# Patient Record
Sex: FEMALE | Race: WHITE | NOT HISPANIC OR LATINO | Employment: FULL TIME | URBAN - METROPOLITAN AREA
[De-identification: names, ages, dates, MRNs, and addresses within clinical notes are randomized per-mention and may not be internally consistent; named-entity substitution may affect disease eponyms.]

---

## 2017-10-27 ENCOUNTER — HOSPITAL ENCOUNTER (OUTPATIENT)
Dept: MAMMOGRAPHY | Facility: HOSPITAL | Age: 42
Discharge: HOME | End: 2017-10-27
Attending: OBSTETRICS & GYNECOLOGY

## 2017-10-27 DIAGNOSIS — Z12.31 VISIT FOR SCREENING MAMMOGRAM: ICD-10-CM

## 2021-05-25 ENCOUNTER — RECORDS - HEALTHEAST (OUTPATIENT)
Dept: ADMINISTRATIVE | Facility: CLINIC | Age: 46
End: 2021-05-25

## 2021-06-02 ENCOUNTER — RECORDS - HEALTHEAST (OUTPATIENT)
Dept: ADMINISTRATIVE | Facility: CLINIC | Age: 46
End: 2021-06-02

## 2022-04-26 ENCOUNTER — OFFICE VISIT (OUTPATIENT)
Dept: URGENT CARE | Facility: URGENT CARE | Age: 47
End: 2022-04-26
Payer: COMMERCIAL

## 2022-04-26 VITALS
DIASTOLIC BLOOD PRESSURE: 83 MMHG | OXYGEN SATURATION: 100 % | SYSTOLIC BLOOD PRESSURE: 144 MMHG | WEIGHT: 194.2 LBS | HEART RATE: 97 BPM | TEMPERATURE: 99.7 F

## 2022-04-26 DIAGNOSIS — R10.31 ABDOMINAL PAIN, RIGHT LOWER QUADRANT: Primary | ICD-10-CM

## 2022-04-26 PROCEDURE — 99204 OFFICE O/P NEW MOD 45 MIN: CPT | Performed by: STUDENT IN AN ORGANIZED HEALTH CARE EDUCATION/TRAINING PROGRAM

## 2022-04-27 NOTE — PROGRESS NOTES
"ASSESSMENT/PLAN:  (R10.31) Abdominal pain, right lower quadrant  (primary encounter diagnosis)  Comment: Concerned for appendicitis. Rodríguez score: 5 points even without labs. Supporting evidence includes poor appetite, pain migrating from periumbilical to RLQ, \"feeling warm\" though no fever, RLQ abdominal tenderness with +Rovsing sign, +Obturator sign. No peritoneal signs present on exam and patient nontoxic appearing. Less likely but also considered tubo-ovarian pathology vs colitis vs SBO vs nephrolithiasis vs biliary disease. We discussed concern for possible appendicitis vs other pathologies; recommended ED assessment where patient can have imaging and labs performed.  Plan:   -Patient sent to ED        Appropriate PPE worn.    Options for treatment and follow-up care were reviewed with the patient and/or guardian. Alicia Iniguez and/or guardian engaged in the decision making process and verbalized understanding of the options discussed and agreed with the final plan.    See [facility] for orders, medications, letters, patient instructions  This note was dictated with Codelearn.      Mayco Serra MD    SUBJECTIVE:  Alicia Iniguez is an 46 year old female who presents for abdominal pain.     Lower bilateral/perumbilical pain since last night that has since transitioned to RLQ primarily today.  Took some ibuprofen today which helped initially but then got worse later to where she was keeled over.   Denies n/v but endorses lack of appetite all day.  Denies true fever but endorses \"feeling warm\" all day.  Still has her appendix and gallbladder. States she had her left ovary and fallopian tube removed laparoscopically, right is still present.  LMP 3 weeks ago.  had vasectomy.  No hx of abdominal disease prior. Not on medications.    PMH:   has no past medical history on file.  There is no problem list on file for this patient.    Social History     Socioeconomic History     Marital status: "      Spouse name: None     Number of children: None     Years of education: None     Highest education level: None   Tobacco Use     Smoking status: Never Smoker     Smokeless tobacco: Never Used     Family History   Problem Relation Age of Onset     Breast Cancer Mother        ALLERGIES:  Patient has no known allergies.    No current outpatient medications on file.     No current facility-administered medications for this visit.         ROS:  ROS is done and is negative for general/constitutional, eye, ENT, Respiratory, cardiovascular, GI, , Skin, musculoskeletal except as noted elsewhere.  All other review of systems negative except as noted elsewhere.    OBJECTIVE:  BP (!) 144/83   Pulse 97   Temp 99.7  F (37.6  C) (Tympanic)   Wt 88.1 kg (194 lb 3.2 oz)   SpO2 100%   General: Sitting comfortably. No acute distress. Nontoxic appearing.  HEENT: Conjunctivae are clear without discharge or erythema. No scleral icterus.  Neck: No masses.  Respiratory: No respiratory distress.   Cardiac: Warm and well perfused. Brisk cap refill.  Abdominal: Abdomen nondistended. RLQ abdominal tenderness with (+)Rovsing sign, (+)Obturator sign. (-)Psoas sign. No peritoneal signs present such as rebound or guarding.  Extremities: Upper and lower extremities grossly normal.  Skin: Skin warm without rashes.  Neurological: Motor function is grossly normal. Normal gait. Normal mentation.  Psychiatric: Good insight and judgement.      RESULTS  No results found for any visits on 04/26/22.  No results found for this or any previous visit (from the past 48 hour(s)).

## 2022-12-19 ENCOUNTER — APPOINTMENT (OUTPATIENT)
Dept: URBAN - METROPOLITAN AREA CLINIC 259 | Age: 47
Setting detail: DERMATOLOGY
End: 2022-12-20

## 2022-12-19 VITALS — HEIGHT: 66 IN | WEIGHT: 170 LBS

## 2022-12-19 DIAGNOSIS — L71.0 PERIORAL DERMATITIS: ICD-10-CM

## 2022-12-19 PROCEDURE — OTHER MIPS QUALITY: OTHER

## 2022-12-19 PROCEDURE — OTHER PRESCRIPTION: OTHER

## 2022-12-19 PROCEDURE — OTHER COUNSELING: OTHER

## 2022-12-19 PROCEDURE — 99203 OFFICE O/P NEW LOW 30 MIN: CPT

## 2022-12-19 RX ORDER — MINOCYCLINE HYDROCHLORIDE 50 MG/1
CAPSULE ORAL
Qty: 180 | Refills: 0 | Status: ERX | COMMUNITY
Start: 2022-12-19

## 2022-12-19 ASSESSMENT — LOCATION ZONE DERM: LOCATION ZONE: FACE

## 2022-12-19 ASSESSMENT — LOCATION SIMPLE DESCRIPTION DERM: LOCATION SIMPLE: RIGHT CHEEK

## 2022-12-19 ASSESSMENT — LOCATION DETAILED DESCRIPTION DERM: LOCATION DETAILED: RIGHT CENTRAL BUCCAL CHEEK

## 2022-12-19 NOTE — HPI: RASH
What Type Of Note Output Would You Prefer (Optional)?: Standard Output
Is This A New Presentation, Or A Follow-Up?: Rash
Additional History: Rash comes and goes. Rash recently flared after the patient was sick. Aquaphor exacerbated the rash and hydrocortisone helps to clear the bumps.

## 2023-01-19 ENCOUNTER — TRANSFERRED RECORDS (OUTPATIENT)
Dept: MULTI SPECIALTY CLINIC | Facility: CLINIC | Age: 48
End: 2023-01-19

## 2023-01-19 LAB — PAP SMEAR - HIM PATIENT REPORTED: NORMAL

## 2023-01-25 ENCOUNTER — HOSPITAL ENCOUNTER (OUTPATIENT)
Dept: MAMMOGRAPHY | Facility: CLINIC | Age: 48
Discharge: HOME | End: 2023-01-25
Attending: OBSTETRICS & GYNECOLOGY
Payer: COMMERCIAL

## 2023-01-25 DIAGNOSIS — N63.0 BREAST MASS: ICD-10-CM

## 2023-01-25 PROCEDURE — 77062 BREAST TOMOSYNTHESIS BI: CPT

## 2023-01-25 PROCEDURE — 76642 ULTRASOUND BREAST LIMITED: CPT | Mod: RT

## 2023-01-25 NOTE — LETTER
Alicia Iniguez  [address] [facility] [address]            January 25, 2023  Date of Exam: 1/25/23    Dear Alicia:    Thank you for your recent visit.    Breast Imaging Result: Based on your recent breast imaging, you have a suspicious area that usually requires a biopsy, at which time a small tissue sample would be taken from your breast.      Breast Density: Your breast imaging shows that you have dense breast tissue. This means you have a slightly higher risk of getting breast cancer. It also means your breast imaging will be harder to read, but it doesn't mean that breast imaging isn't useful. In fact, yearly breast imaging is even more important for individuals at higher risk. Additional testing may be warranted depending on your overall risk.    If you have already made these arrangements, please disregard this letter.    A report of your breast imaging results was sent to: Suha Post    Your breast imaging will become part of your medical file here at [facility] for at least 10 years. You are responsible for informing any new health care team or breast imaging facility of the date and location of this examination.    We appreciate the opportunity to participate in your health care.    Sincerely,  Kayla Zendejas DO   [facility] [place]

## 2023-01-25 NOTE — PROGRESS NOTES
Radiologist, Dr Kayla Zendejas, recommends right breast and right axilla lymph node ultrasound guided biopsies, and consult appts with breast surgeon and medical oncologist.     RN scheduled pt at [facility], [facility], [address], [place], [place]. [phone].  Bx Appt: Monday 1/30/23, arrival at 1245pm for 1:00pm appt. Consult appt with Darin Brunner and Stiven on Thursday, 2/9/23, arriving at 0915am for 0930am and 1000am appts respectively.      RN reviewed the procedure with patient and gave patient written pre and post biopsy handouts to take home.     Pt verbalizes understanding of procedure and appt location, date, and time. Calls welcomed.    Loretta Montiel RN, BSN, CBCN  [facility]

## 2023-01-30 ENCOUNTER — ANCILLARY PROCEDURE (OUTPATIENT)
Dept: MAMMOGRAPHY | Facility: CLINIC | Age: 48
End: 2023-01-30
Attending: OBSTETRICS & GYNECOLOGY
Payer: COMMERCIAL

## 2023-01-30 DIAGNOSIS — N63.0 BREAST MASS: ICD-10-CM

## 2023-01-30 PROCEDURE — 999N000065 MA POST PROCEDURE RIGHT

## 2023-01-30 PROCEDURE — 19083 BX BREAST 1ST LESION US IMAG: CPT | Mod: RT

## 2023-01-30 PROCEDURE — 88305 TISSUE EXAM BY PATHOLOGIST: CPT | Mod: 26 | Performed by: PATHOLOGY

## 2023-01-30 PROCEDURE — 88342 IMHCHEM/IMCYTCHM 1ST ANTB: CPT | Mod: TC,59 | Performed by: OBSTETRICS & GYNECOLOGY

## 2023-01-30 PROCEDURE — 88342 IMHCHEM/IMCYTCHM 1ST ANTB: CPT | Mod: 26 | Performed by: PATHOLOGY

## 2023-01-30 PROCEDURE — 38505 NEEDLE BIOPSY LYMPH NODES: CPT | Mod: RT

## 2023-01-30 PROCEDURE — 88341 IMHCHEM/IMCYTCHM EA ADD ANTB: CPT | Mod: 26 | Performed by: PATHOLOGY

## 2023-01-30 PROCEDURE — 88377 M/PHMTRC ALYS ISHQUANT/SEMIQ: CPT | Performed by: OBSTETRICS & GYNECOLOGY

## 2023-01-30 PROCEDURE — 88377 M/PHMTRC ALYS ISHQUANT/SEMIQ: CPT | Mod: 26 | Performed by: MEDICAL GENETICS

## 2023-01-30 PROCEDURE — 88360 TUMOR IMMUNOHISTOCHEM/MANUAL: CPT | Mod: 26 | Performed by: PATHOLOGY

## 2023-01-30 PROCEDURE — 88342 IMHCHEM/IMCYTCHM 1ST ANTB: CPT | Mod: TC,XU,59 | Performed by: OBSTETRICS & GYNECOLOGY

## 2023-01-30 NOTE — PROGRESS NOTES
During post procedure mammogram, patient became light headed, pale, and said she felt faint.  Patient sat down in chair.  Tech retrieved RN.      Vital signs stable:  B/P: 108/75, P: 89, R: 16     Applied wet cold wash cloth to head.  Patient reports feeling better now, but was feeling severe pain in biopsy area under right axilla and felt flushed and hot while standing for mammogram.  Patient reports only food intake so far all day was a banana and coffee this morning.  Tech gave patient some juice.  Patient started to get more color in face and reports feeling better, patient was assisted with applying ice pack to biopsy site, inside bra and assisted to discharge.  Instructions reviewed and sent with patient.  Patient reports feeling stable and has returned to baseline.  Spouse is driving patient home.        Roxana Aragon, RN, BSN, PHN, CBCN  Breast Center Imaging Nurse Coordinator   [facility]  [address]  [place] [address]  [phone]

## 2023-02-01 ENCOUNTER — TELEPHONE (OUTPATIENT)
Dept: MAMMOGRAPHY | Facility: CLINIC | Age: 48
End: 2023-02-01
Payer: COMMERCIAL

## 2023-02-01 LAB
PATH REPORT.ADDENDUM SPEC: ABNORMAL
PATH REPORT.ADDENDUM SPEC: ABNORMAL
PATH REPORT.COMMENTS IMP SPEC: ABNORMAL
PATH REPORT.COMMENTS IMP SPEC: YES
PATH REPORT.COMMENTS IMP SPEC: YES
PATH REPORT.FINAL DX SPEC: ABNORMAL
PATH REPORT.FINAL DX SPEC: ABNORMAL
PATH REPORT.GROSS SPEC: ABNORMAL
PATH REPORT.GROSS SPEC: ABNORMAL
PATH REPORT.MICROSCOPIC SPEC OTHER STN: ABNORMAL
PATH REPORT.MICROSCOPIC SPEC OTHER STN: ABNORMAL
PATH REPORT.RELEVANT HX SPEC: ABNORMAL
PATH REPORT.RELEVANT HX SPEC: ABNORMAL
PHOTO IMAGE: ABNORMAL
PHOTO IMAGE: ABNORMAL

## 2023-02-01 NOTE — TELEPHONE ENCOUNTER
Pathology report was reviewed with our Breast Center Radiologist, Dr. Aguilera, who confirmed the recent breast imaging is concordant with the final surgical pathology results.    I phoned patient, confirmed her full name, date of birth, and notified patient of the following breast biopsy results:      Final Diagnosis   A) BREAST BIOPSY WITH NEOPLASTIC LESION:        -  BIOPSY TYPE: ULTRASOUND-GUIDED CORE BIOPSIES        -  BIOPSY SITE: RIGHT BREAST, 12:00, 1 CM FROM NIPPLE        -  HISTOLOGIC TYPE: INVASIVE DUCTAL CARCINOMA        -  HISTOLOGIC GRADE (JANUARY HISTOLOGIC SCORE)              -  TUBULAR DIFFERENTIATION SCORE 3              -  NUCLEAR PLEOMORPHISM SCORE 2              -  MITOTIC RATE SCORE 1              -  OVERALL GRADE 2 (TOTAL SCORE 6/9)        -  DUCTAL CARCINOMA IN SITU (DCIS): SOLITARY SMALL DUCT, SOLID PATTERN,                NUCLEAR GRADE 2, NO NECROSIS; NEGATIVE FOR EXTENSIVE                INTRADUCTAL COMPONENT (EIC)        -  TUMOR SIZE OR EXTENT: TUMOR OCCUPIES FULL LENGTH OF ALL 3 CORES        -  MICROCALCIFICATIONS: RARE, ASSOCIATED WITH TUMOR EPITHELIUM        -  SMALL-VESSEL LYMPHATIC INVASION: NOT IDENTIFIED        -  ADDITIONAL FINDINGS: NONE        -  ADDITIONAL STUDIES:              -  ESTROGEN RECEPTORS POSITIVE (90% OF TUMOR NUCLEI STAIN STRONGLY)              -  PROGESTERONE RECEPTORS POSITIVE (95% OF TUMOR NUCLEI STAIN STRONGLY)              -  HER2/CORTNEY RECEPTORS NEGATIVE (1+ MEMBRANE STAINING)       B) RIGHT AXILLARY LYMPH NODE, ULTRASOUND-GUIDED CORE BIOPSIES:        -  POSITIVE FOR METASTATIC CARCINOMA, CONSISTENT WITH METASTASIS FROM                CARCINOMA OF RIGHT BREAST        -  INDEFINITE FOR EXTRANODAL TUMOR EXTENSION, BECAUSE OF                SPECIMEN FRAGMENTATION        -  LARGEST METASTATIC DEPOSIT IS AT LEAST 5 MM IN GREATEST DIMENSION        -  ADDITIONAL STUDIES:              -  ESTROGEN RECEPTORS POSITIVE (95% OF TUMOR NUCLEI STAIN STRONGLY)               -  PROGESTERONE RECEPTORS POSITIVE (80% OF TUMOR NUCLEI STAIN STRONGLY)              -  HER2/CORTNEY RECEPTORS EQUIVOCAL FOR OVEREXPRESSION (2+ MEMBRANE STAINING)                                Electronically signed by Basilio Adames MD on 2/1/2023 at 11:14 AM       Patient is scheduled for the following consults:    Breast Surgeon Consult:  Dr. Brunner 2/9/23      Oncology Consult   Dr. Saleem 2/9/23     [facility]  [address], [address]   [place] [address]  [phone]    Patient denies any concerns at her biopsy site.  Questions were answered and pathology report is released to  [facility] for patient to view, and resources for [email or web address] discussed with patient and support provided. New diagnosis information and resource folder will be provided to patient at surgical consult.  She has my direct phone number if she has further questions.  Patient verbalized understanding and agrees with the plan of care.    Ordering provider has been notified of the results and plan.       Roxana Aragon RN, BSN, PHN, CBCN  Imaging Nurse Coordinator  [facility]  [phone]

## 2023-02-03 LAB — INTERPRETATION: NORMAL

## 2023-02-04 ENCOUNTER — HEALTH MAINTENANCE LETTER (OUTPATIENT)
Age: 48
End: 2023-02-04

## 2023-02-09 ENCOUNTER — OFFICE VISIT (OUTPATIENT)
Dept: SURGERY | Facility: CLINIC | Age: 48
End: 2023-02-09
Attending: OBSTETRICS & GYNECOLOGY
Payer: COMMERCIAL

## 2023-02-09 ENCOUNTER — OFFICE VISIT (OUTPATIENT)
Dept: ONCOLOGY | Facility: HOSPITAL | Age: 48
End: 2023-02-09
Attending: OBSTETRICS & GYNECOLOGY
Payer: COMMERCIAL

## 2023-02-09 VITALS
WEIGHT: 191 LBS | BODY MASS INDEX: 30.7 KG/M2 | RESPIRATION RATE: 16 BRPM | HEIGHT: 66 IN | DIASTOLIC BLOOD PRESSURE: 91 MMHG | HEART RATE: 96 BPM | SYSTOLIC BLOOD PRESSURE: 156 MMHG

## 2023-02-09 DIAGNOSIS — C50.011 MALIGNANT NEOPLASM INVOLVING BOTH NIPPLE AND AREOLA OF RIGHT BREAST IN FEMALE, ESTROGEN RECEPTOR POSITIVE (H): Primary | ICD-10-CM

## 2023-02-09 DIAGNOSIS — C50.811 MALIGNANT NEOPLASM OF OVERLAPPING SITES OF RIGHT BREAST IN FEMALE, ESTROGEN RECEPTOR POSITIVE (H): Primary | ICD-10-CM

## 2023-02-09 DIAGNOSIS — Z17.0 MALIGNANT NEOPLASM OF OVERLAPPING SITES OF RIGHT BREAST IN FEMALE, ESTROGEN RECEPTOR POSITIVE (H): Primary | ICD-10-CM

## 2023-02-09 DIAGNOSIS — Z17.0 MALIGNANT NEOPLASM INVOLVING BOTH NIPPLE AND AREOLA OF RIGHT BREAST IN FEMALE, ESTROGEN RECEPTOR POSITIVE (H): Primary | ICD-10-CM

## 2023-02-09 PROCEDURE — 99205 OFFICE O/P NEW HI 60 MIN: CPT | Performed by: SPECIALIST

## 2023-02-09 PROCEDURE — 99215 OFFICE O/P EST HI 40 MIN: CPT | Performed by: SPECIALIST

## 2023-02-09 PROCEDURE — 99204 OFFICE O/P NEW MOD 45 MIN: CPT | Performed by: INTERNAL MEDICINE

## 2023-02-09 RX ORDER — MINOCYCLINE HYDROCHLORIDE 50 MG/1
50 CAPSULE ORAL 2 TIMES DAILY
COMMUNITY
Start: 2022-12-19 | End: 2023-08-08

## 2023-02-09 NOTE — H&P (VIEW-ONLY)
"This is a 47 year old  female who I'm asked to see by Suha Post for evaluation of a right breast cancer.  This was picked up by the patient.  She is actually noticed some changes in her nipple for couple months and then noticed thickening in the tissue behind the nipple.  Recently after bumping her breast there was some bloody discharge from the nipple.  A mammogram and ultrasound were ordered.  A very large mass was noted on mammogram.  Also multiple suspicious lymph nodes.  A needle biopsy was done which shows an invasive ductal carcinoma.  It is estrogen receptor strongly positive , progesterone receptor strongly positive  and HER-2 negative.  A lymph node was also biopsied to be positive for metastatic disease.    She has a moderate family history of breast cancer.  Her mother  of breast cancer and she also has an uncle who had prostate cancer.      PAST MEDICAL HISTORY:  No past medical history on file.  Past Surgical History:   Procedure Laterality Date     BREAST CYST ASPIRATION         Medications:  No current outpatient medications on file.  No current facility-administered medications for this visit.    Facility-Administered Medications Ordered in Other Visits:      lidocaine 1 % 10 mL, 10 mL, Intradermal, Once, Suha Post MD     lidocaine 1 % 10 mL, 10 mL, Intradermal, Once, Suha Post MD    Allergies:  No Known Allergies     Social History:  Social History     Tobacco Use     Smoking status: Never     Smokeless tobacco: Never        ROS:  Pertinent items are noted in HPI.    Physical Exam  BP (!) 156/91 (BP Location: Left arm)   Pulse 96   Resp 16   Ht 1.676 m (5' 6\")   Wt 86.6 kg (191 lb)   BMI 30.83 kg/m    General:alert, appears stated age and cooperative  Lungs:clear to auscultation bilaterally  CV:regular rate and rhythm  Breasts: Large central mass of the right breast measuring at least 4 to 5 cm in size.  The nipple is clearly involved and there is erosion " of the skin of the nipple as the tumor is clearly starting to come through the skin.  No palpable masses on the left.  Lymph Nodes: Unable to appreciate the adenopathy on the right that is visible on ultrasound.  No palpable adenopathy on the left.  Neuro: No neurologic deficits are noted.  Musculoskeletal: Normal range of motion of her upper extremities.  Skin: Normal skin turgor.    Reviewed her mammograms and ultrasound and pathology.    Impression: Right Breast Cancer. Clinically T3, N1-2.  Discussed the typical surgical options for treatment of breast cancer which generally are a lumpectomy (partial mastectomy) combined with radiation versus a mastectomy.  Explained that the survival benefit is the same for both.  The difference is in local recurrence risk.  The patient is a Poor candidate for a lumpectomy.  I actually do not think she is a candidate for lumpectomy at all.  In addition because of how many lymph nodes are involved I think we should get a PET scan first to make sure she does not have metastatic disease before doing anything.  Explained to the patient that no matter what the PET shows, she needs systemic treatment first.  Hopefully it shows only local regional disease in which case both myself and Dr. Saleem feel she would benefit from neoadjuvant chemotherapy.  If the PET shows metastatic disease, then we will consider some sort of surgical treatment down the line if she has a good response.  Explained to her the concern about the possibility of metastatic disease given the size of the lesion and lymph node involvement.  Because of the patient's family history she also should get genetic testing done.  If and when it comes time for surgery, that could play a role.      Plan: A PET has been scheduled for Monday.  Results of that are going to guide her future treatment.  Initial treatment will be with oncology but I also talked her about the possibility of needing a port placed.  We will also get  genetic testing started.  That could impact the type of surgery recommend.  It also could have some effect on her chemotherapy recommended.

## 2023-02-09 NOTE — LETTER
"    2023         RE: Alicia Iniguez  [address] [facility] [address]        Dear Colleague,    Thank you for referring your patient, Alicia Iniguez, to the [facility] [place]. Please see a copy of my visit note below.    This is a 47 year old  female who I'm asked to see by Suha Post for evaluation of a right breast cancer.  This was picked up by the patient.  She is actually noticed some changes in her nipple for couple months and then noticed thickening in the tissue behind the nipple.  Recently after bumping her breast there was some bloody discharge from the nipple.  A mammogram and ultrasound were ordered.  A very large mass was noted on mammogram.  Also multiple suspicious lymph nodes.  A needle biopsy was done which shows an invasive ductal carcinoma.  It is estrogen receptor strongly positive , progesterone receptor strongly positive  and HER-2 negative.  A lymph node was also biopsied to be positive for metastatic disease.    She has a moderate family history of breast cancer.  Her mother  of breast cancer and she also has an uncle who had prostate cancer.      PAST MEDICAL HISTORY:  No past medical history on file.  Past Surgical History:   Procedure Laterality Date     BREAST CYST ASPIRATION         Medications:  No current outpatient medications on file.  No current facility-administered medications for this visit.    Facility-Administered Medications Ordered in Other Visits:      lidocaine 1 % 10 mL, 10 mL, Intradermal, Once, Suha Post MD     lidocaine 1 % 10 mL, 10 mL, Intradermal, Once, Suha Post MD    Allergies:  No Known Allergies     Social History:  Social History     Tobacco Use     Smoking status: Never     Smokeless tobacco: Never        ROS:  Pertinent items are noted in HPI.    Physical Exam  BP (!) 156/91 (BP Location: Left arm)   Pulse 96   Resp 16   Ht 1.676 m (5' 6\")   Wt 86.6 kg (191 lb)   BMI 30.83 kg/m  "   General:alert, appears stated age and cooperative  Lungs:clear to auscultation bilaterally  CV:regular rate and rhythm  Breasts: Large central mass of the right breast measuring at least 4 to 5 cm in size.  The nipple is clearly involved and there is erosion of the skin of the nipple as the tumor is clearly starting to come through the skin.  No palpable masses on the left.  Lymph Nodes: Unable to appreciate the adenopathy on the right that is visible on ultrasound.  No palpable adenopathy on the left.  Neuro: No neurologic deficits are noted.  Musculoskeletal: Normal range of motion of her upper extremities.  Skin: Normal skin turgor.    Reviewed her mammograms and ultrasound and pathology.    Impression: Right Breast Cancer. Clinically T3, N1-2.  Discussed the typical surgical options for treatment of breast cancer which generally are a lumpectomy (partial mastectomy) combined with radiation versus a mastectomy.  Explained that the survival benefit is the same for both.  The difference is in local recurrence risk.  The patient is a Poor candidate for a lumpectomy.  I actually do not think she is a candidate for lumpectomy at all.  In addition because of how many lymph nodes are involved I think we should get a PET scan first to make sure she does not have metastatic disease before doing anything.  Explained to the patient that no matter what the PET shows, she needs systemic treatment first.  Hopefully it shows only local regional disease in which case both myself and Dr. Saleem feel she would benefit from neoadjuvant chemotherapy.  If the PET shows metastatic disease, then we will consider some sort of surgical treatment down the line if she has a good response.  Explained to her the concern about the possibility of metastatic disease given the size of the lesion and lymph node involvement.  Because of the patient's family history she also should get genetic testing done.  If and when it comes time for  surgery, that could play a role.      Plan: A PET has been scheduled for Monday.  Results of that are going to guide her future treatment.  Initial treatment will be with oncology but I also talked her about the possibility of needing a port placed.  We will also get genetic testing started.  That could impact the type of surgery recommend.  It also could have some effect on her chemotherapy recommended.              Again, thank you for allowing me to participate in the care of your patient.        Sincerely,        Maribell Brunner MD

## 2023-02-09 NOTE — LETTER
2/9/2023         RE: Alicia Iniguez  [address] [facility] [address]        Dear Colleague,    Thank you for referring your patient, Alicia Iniguez, to the [facility]. Please see a copy of my visit note below.    [facility] Hematology and Oncology Consult Note    Patient: Alicia Iniguez  MRN: [id number]  Date of Service: 02/09/2023      Reason for Visit    No chief complaint on file.        Assessment/Plan    Problem List Items Addressed This Visit    None  Visit Diagnoses     Malignant neoplasm of overlapping sites of right breast in female, estrogen receptor positive (H)    -  Primary        Locally advanced right-sided breast cancer  Clinically T3, N1-2, ER positive, VT positive, HER2 negative breast cancer  I reviewed her mammogram, ultrasound and biopsy reports in detail today.  Biopsy-proven invasive ductal carcinoma strongly ER and VT positive.  HER2 negative.  Liver biopsy was also positive.  HER2 2+ on the liver biopsy and FISH negative.  Since that she already has a large breast mass with positive lymph nodes there is always concern for metastatic disease.  Would recommend getting a PET scan before proceeding with further treatment.  If no evidence of metastatic disease then would be a candidate for neoadjuvant chemotherapy followed by surgery.  Even if it does not change surgical outcome, I think she would benefit from neoadjuvant chemotherapy considering the extent of the disease.  Dr. Brunner is in agreement with the plan.  We will get PET scan going ASAP I will see her in the clinic for further discussion.  She is young and does not have any significant medical comorbidities so would be a candidate for AC-T.     ECOG Performance    0 - Independent    Problem List    There is no problem list on file for this patient.    ______________________________________________________________________________    Staging History     Cancer Staging   No matching staging  information was found for the patient.      History of presenting illness:  Alicia Iniguez is a 47-year-old female with significant past medical history who is seen in the multidisciplinary breast clinic for new diagnosis of pulm receptor positive and HER2 negative, lymph node positive breast cancer right side.    Recently she noticed a lump in her right breast along with some skin changes and nipple changes which has been going on for the last couple of months.  She has not had a screening mammogram since 2017.  Mammogram done on 1/25/2023 showed a large irregular high density mass in the central portion of the right breast with nipple retraction.  Some thickened lymph nodes were also seen in the right axilla on the mammogram.  Ultrasound showed a 4.1 x 2.1 cm mass in the right breast with indistinct margins.  She also had sonographic evidence of abnormal lymph nodes in the right axilla the largest measuring 1.4 x 1 x 0.9 cm.  Biopsy of the breast mass came back showing invasive ductal carcinoma.  Grade 2.  With background DCIS.  No evidence of LVI.  ER strongly positive, ND strongly positive and HER2 negative (1+).  Right axilla lymph node biopsy was also done which showed metastatic carcinoma consistent with breast primary.  It was also strongly hormone positive and HER2 was 2+.  FISH negative.  She has a family history of breast cancer in her mother.  Her maternal uncle also had prostate cancer.    No personal history of any cardiovascular disease or diabetes.  Never smoker.      Past History    No past medical history on file. Family History   Problem Relation Age of Onset     Breast Cancer Mother       Past Surgical History:   Procedure Laterality Date     BREAST CYST ASPIRATION      Social History     Socioeconomic History     Marital status:      Spouse name: Not on file     Number of children: Not on file     Years of education: Not on file     Highest education level: Not on file   Occupational  History     Not on file   Tobacco Use     Smoking status: Never     Smokeless tobacco: Never   Substance and Sexual Activity     Alcohol use: Not on file     Drug use: Not on file     Sexual activity: Not on file   Other Topics Concern     Not on file   Social History Narrative     Not on file     Social Determinants of Health     Financial Resource Strain: Not on file   Food Insecurity: Not on file   Transportation Needs: Not on file   Physical Activity: Not on file   Stress: Not on file   Social Connections: Not on file   Intimate Partner Violence: Not on file   Housing Stability: Not on file        Allergies    No Known Allergies    Review of Systems    Pertinent items are noted in HPI.      Physical Exam    Oncology Vitals 2/9/2023   Height 168 cm   Weight 86.637 kg   BSA (m2) 2.01 m2   /91   Pulse 96   Temp -   Temp src -   SpO2 -       General: alert and cooperative  HEENT: Head: Normal, normocephalic, atraumatic.  Eye: Normal external eye, conjunctiva, lids cornea, MARK.  CNS: Alert and oriented x3, neurologic exam grossly normal.        Lab Results    Recent Results (from the past 168 hour(s))   [employer] Hold For Preauthorization:   Result Value Ref Range    Interpretation       Sample processed in lab for DNA for genetic testing. Insurance preauthorization will be initiated. Contact lab with questions, [phone].           Imaging Results      MA Post Procedure Right    Addendum Date: 2/1/2023    Pathology shows DCIS and invasive ductal carcinoma. This is consistent with imaging findings. A verbal report was given to the patient. Surgical and oncological consultation is recommended.    Result Date: 2/1/2023  US Breast Biopsy Core Needle Right INDICATION: Right breast mass. PROCEDURE: Informed consent was obtained from the patient. Ultrasound was used to localize the mass at the 12 o'clock position of the right breast, 1 cm from the nipple.  The skin was marked, then prepped and draped in  a sterile fashion. 1% lidocaine was used for local anesthesia. Under direct sonographic guidance, a 14-gauge coaxial needle was used to obtain 3 core biopsies.  A biopsy marking clip was then placed.  Digital mammograms performed in a separate room, using separate equipment, to document clip placement. The mammogram showed the clip to be in good position. The patient tolerated this well; there are no immediate complications.    IMPRESSION: 1. Ultrasound-guided biopsy of mass in the right breast. Pathology pending. 2. Post procedure mammogram for marker placement     US Breast Biopsy Core Lymph Node Right    Addendum Date: 2/1/2023    Pathology shows POSITIVE FOR METASTATIC CARCINOMA, CONSISTENT WITH METASTASIS FROM               CARCINOMA OF RIGHT BREAST. This is consistent with imaging findings. A verbal report was given to the patient. Surgical and oncological consultation is recommended.    Result Date: 2/1/2023  RIGHT LYMPH NODE ULTRASOUND GUIDED BIOPSY, CLIP PLACEMENT: INDICATIONS: Abnormal Lymph Node PROCEDURE: Informed consent was obtained from the patient. Ultrasound was used to localize the lymph node in the right axilla.  The skin was prepped and draped in a sterile fashion. Lidocaine was used for local anesthesia. Under direct sonographic guidance, a 12 gauge coaxial needle was used to obtain 4 core biopises. Biopsy marker is not visualized on the post procedure mammogram, and is outside the field of view. The patient tolerated this well, there are no immediate complications.     IMPRESSION: Ultrasound guided biopsy of a suspicious lymph node in the right axilla. Pathology pending.     US Breast Right Limited 1-3 Quadrants    Result Date: 1/25/2023  EXAM: MA DIAGNOSTIC BILATERAL W/ LILIYA, US BREAST RIGHT LIMITED 1-3 QUADRANTS LOCATION: [facility] DATE/TIME: 1/25/2023 9:14 AM INDICATION: 47-year-old female presents with skin changes involving the right nipple for a few months. The  patient also reports an area of firmness deep to the right nipple and reports an episode of bleeding from the right nipple after it was bumped. COMPARISON: 10/27/2017, 7/20/2016, 4/26/2010 MAMMOGRAPHIC FINDINGS: Bilateral full-field digital diagnostic mammograms performed. The breasts are heterogeneously dense, which may obscure small masses. Images evaluated with the assistance of CAD. Breast tomosynthesis was used in interpretation. There is a large, irregular, high density mass with associated obscured margins and architectural distortion within the right central breast at middle depth. There is associated retraction of the right nipple. A few possibly thickened lymph nodes are also demonstrated within the right axilla. Recommend sonographic evaluation of the right central breast and right axilla for further evaluation. There are no new or suspicious masses, calcifications, or architectural distortion within the left breast. There is a benign-appearing, mammographically stable mass within the left breast at the 9:00 position. ULTRASOUND FINDINGS: Visual inspection of the right breast demonstrates erythema, scaling, and open sores involving the right nipple. The right nipple is also retracted. Physical examination of the right breast demonstrates large, firm mass measuring at least 5 cm immediately deep to the left nipple within the central breast. Sonographic evaluation of the right central breast was performed. There is a large, hypoechoic mass with indistinct margins and associated posterior shadowing located immediately deep to the right nipple. It is difficult to accurately measure this mass given its size and indistinct margins, however, measures at least 4.1 x 2.1 cm. This mass also extends superficially to involve the overlying nipple. Sonographic evaluation of the right axilla was performed demonstrating a few morphologically abnormal lymph nodes demonstrating loss of normal fatty rochelle and thickened  cortices. The largest visualized lymph node measures 1.4 x 1.0 x 0.9 cm.     IMPRESSION: 1.  Suspicious, irregular, hypoechoic mass with indistinct margins within the central right breast, immediately deep to the nipple measures at least 4.1 x 2.1 cm, although is likely larger given physical examination findings. This mass extends into and involves the overlying right nipple. Given the constellation of imaging and physical examination findings of a suspicious mass and the right nipple skin changes, Paget's disease of the breasts is suspected. Recommend ultrasound-guided biopsy of the mass immediately deep to the right nipple. Also recommend surgical and oncological consultation. 2.  Several morphologically abnormal right axillary lymph nodes. Recommend a single, representative ultrasound-guided biopsy of one of these abnormal lymph nodes. ACR BI-RADS Category 5: Highly Suggestive of Malignancy. I personally scanned and discussed the findings and recommendations with the patient at the conclusion of the examination.     US Breast Biopsy Core Needle Right    Addendum Date: 2/1/2023    Pathology shows DCIS and invasive ductal carcinoma. This is consistent with imaging findings. A verbal report was given to the patient. Surgical and oncological consultation is recommended.    Result Date: 2/1/2023  US Breast Biopsy Core Needle Right INDICATION: Right breast mass. PROCEDURE: Informed consent was obtained from the patient. Ultrasound was used to localize the mass at the 12 o'clock position of the right breast, 1 cm from the nipple.  The skin was marked, then prepped and draped in a sterile fashion. 1% lidocaine was used for local anesthesia. Under direct sonographic guidance, a 14-gauge coaxial needle was used to obtain 3 core biopsies.  A biopsy marking clip was then placed.  Digital mammograms performed in a separate room, using separate equipment, to document clip placement. The mammogram showed the clip to be in good  position. The patient tolerated this well; there are no immediate complications.    IMPRESSION: 1. Ultrasound-guided biopsy of mass in the right breast. Pathology pending. 2. Post procedure mammogram for marker placement     MA Diagnostic Bilateral w/Liliya    Result Date: 1/25/2023  EXAM: MA DIAGNOSTIC BILATERAL W/ LILIYA, US BREAST RIGHT LIMITED 1-3 QUADRANTS LOCATION: [facility] DATE/TIME: 1/25/2023 9:14 AM INDICATION: 47-year-old female presents with skin changes involving the right nipple for a few months. The patient also reports an area of firmness deep to the right nipple and reports an episode of bleeding from the right nipple after it was bumped. COMPARISON: 10/27/2017, 7/20/2016, 4/26/2010 MAMMOGRAPHIC FINDINGS: Bilateral full-field digital diagnostic mammograms performed. The breasts are heterogeneously dense, which may obscure small masses. Images evaluated with the assistance of CAD. Breast tomosynthesis was used in interpretation. There is a large, irregular, high density mass with associated obscured margins and architectural distortion within the right central breast at middle depth. There is associated retraction of the right nipple. A few possibly thickened lymph nodes are also demonstrated within the right axilla. Recommend sonographic evaluation of the right central breast and right axilla for further evaluation. There are no new or suspicious masses, calcifications, or architectural distortion within the left breast. There is a benign-appearing, mammographically stable mass within the left breast at the 9:00 position. ULTRASOUND FINDINGS: Visual inspection of the right breast demonstrates erythema, scaling, and open sores involving the right nipple. The right nipple is also retracted. Physical examination of the right breast demonstrates large, firm mass measuring at least 5 cm immediately deep to the left nipple within the central breast. Sonographic evaluation of the  right central breast was performed. There is a large, hypoechoic mass with indistinct margins and associated posterior shadowing located immediately deep to the right nipple. It is difficult to accurately measure this mass given its size and indistinct margins, however, measures at least 4.1 x 2.1 cm. This mass also extends superficially to involve the overlying nipple. Sonographic evaluation of the right axilla was performed demonstrating a few morphologically abnormal lymph nodes demonstrating loss of normal fatty rochelle and thickened cortices. The largest visualized lymph node measures 1.4 x 1.0 x 0.9 cm.     IMPRESSION: 1.  Suspicious, irregular, hypoechoic mass with indistinct margins within the central right breast, immediately deep to the nipple measures at least 4.1 x 2.1 cm, although is likely larger given physical examination findings. This mass extends into and involves the overlying right nipple. Given the constellation of imaging and physical examination findings of a suspicious mass and the right nipple skin changes, Paget's disease of the breasts is suspected. Recommend ultrasound-guided biopsy of the mass immediately deep to the right nipple. Also recommend surgical and oncological consultation. 2.  Several morphologically abnormal right axillary lymph nodes. Recommend a single, representative ultrasound-guided biopsy of one of these abnormal lymph nodes. ACR BI-RADS Category 5: Highly Suggestive of Malignancy. I personally scanned and discussed the findings and recommendations with the patient at the conclusion of the examination.       A total of 45 minutes was spent today on this visit including face to face conversation with the patient, EMR review (labs, imaging studies, pathology reports and outside records), counseling and care co-ordination and documentation.    Signed by: Paola Saleem MD        Again, thank you for allowing me to participate in the care of your patient.         Sincerely,        Paola Saleem MD

## 2023-02-09 NOTE — PROGRESS NOTES
[facility] Hematology and Oncology Consult Note    Patient: Alicia Iniguez  MRN: [id number]  Date of Service: 02/09/2023      Reason for Visit    No chief complaint on file.        Assessment/Plan    Problem List Items Addressed This Visit    None  Visit Diagnoses     Malignant neoplasm of overlapping sites of right breast in female, estrogen receptor positive (H)    -  Primary        Locally advanced right-sided breast cancer  Clinically T3, N1-2, ER positive, AK positive, HER2 negative breast cancer  I reviewed her mammogram, ultrasound and biopsy reports in detail today.  Biopsy-proven invasive ductal carcinoma strongly ER and AK positive.  HER2 negative.  Lymph node biopsy was also positive.  HER2 2+ on the lymph node and FISH negative.  Since that she already has a large breast mass with positive lymph nodes there is always concern for metastatic disease.  Would recommend getting a PET scan before proceeding with further treatment.  If no evidence of metastatic disease then would be a candidate for neoadjuvant chemotherapy followed by surgery.  Even if it does not change surgical outcome, I think she would benefit from neoadjuvant chemotherapy considering the extent of the disease.  Dr. Brunner is in agreement with the plan.  We will get PET scan going ASAP I will see her in the clinic for further discussion.  She is young and does not have any significant medical comorbidities so would be a candidate for AC-T.     ECOG Performance    0 - Independent    Problem List    There is no problem list on file for this patient.    ______________________________________________________________________________    Staging History     Cancer Staging   No matching staging information was found for the patient.      History of presenting illness:  Alicia Iniguez is a 47-year-old female with significant past medical history who is seen in the multidisciplinary breast clinic for new diagnosis of hormone receptor  positive and HER2 negative, lymph node positive breast cancer right side.    Recently she noticed a lump in her right breast along with some skin changes and nipple changes which has been going on for the last couple of months.  She has not had a screening mammogram since 2017.  Mammogram done on 1/25/2023 showed a large irregular high density mass in the central portion of the right breast with nipple retraction.  Some thickened lymph nodes were also seen in the right axilla on the mammogram.  Ultrasound showed a 4.1 x 2.1 cm mass in the right breast with indistinct margins.  She also had sonographic evidence of abnormal lymph nodes in the right axilla the largest measuring 1.4 x 1 x 0.9 cm.  Biopsy of the breast mass came back showing invasive ductal carcinoma.  Grade 2.  With background DCIS.  No evidence of LVI.  ER strongly positive, MS strongly positive and HER2 negative (1+).  Right axilla lymph node biopsy was also done which showed metastatic carcinoma consistent with breast primary.  It was also strongly hormone positive and HER2 was 2+.  FISH negative.  She has a family history of breast cancer in her mother.  Her maternal uncle also had prostate cancer.    No personal history of any cardiovascular disease or diabetes.  Never smoker.      Past History    No past medical history on file. Family History   Problem Relation Age of Onset     Breast Cancer Mother       Past Surgical History:   Procedure Laterality Date     BREAST CYST ASPIRATION      Social History     Socioeconomic History     Marital status:      Spouse name: Not on file     Number of children: Not on file     Years of education: Not on file     Highest education level: Not on file   Occupational History     Not on file   Tobacco Use     Smoking status: Never     Smokeless tobacco: Never   Substance and Sexual Activity     Alcohol use: Not on file     Drug use: Not on file     Sexual activity: Not on file   Other Topics Concern     Not  on file   Social History Narrative     Not on file     Social Determinants of Health     Financial Resource Strain: Not on file   Food Insecurity: Not on file   Transportation Needs: Not on file   Physical Activity: Not on file   Stress: Not on file   Social Connections: Not on file   Intimate Partner Violence: Not on file   Housing Stability: Not on file        Allergies    No Known Allergies    Review of Systems    Pertinent items are noted in HPI.      Physical Exam    Oncology Vitals 2/9/2023   Height 168 cm   Weight 86.637 kg   BSA (m2) 2.01 m2   /91   Pulse 96   Temp -   Temp src -   SpO2 -       General: alert and cooperative  HEENT: Head: Normal, normocephalic, atraumatic.  Eye: Normal external eye, conjunctiva, lids cornea, MARK.  CNS: Alert and oriented x3, neurologic exam grossly normal.        Lab Results    Recent Results (from the past 168 hour(s))   [employer] Hold For Preauthorization:   Result Value Ref Range    Interpretation       Sample processed in lab for DNA for genetic testing. Insurance preauthorization will be initiated. Contact lab with questions, [phone].           Imaging Results      MA Post Procedure Right    Addendum Date: 2/1/2023    Pathology shows DCIS and invasive ductal carcinoma. This is consistent with imaging findings. A verbal report was given to the patient. Surgical and oncological consultation is recommended.    Result Date: 2/1/2023  US Breast Biopsy Core Needle Right INDICATION: Right breast mass. PROCEDURE: Informed consent was obtained from the patient. Ultrasound was used to localize the mass at the 12 o'clock position of the right breast, 1 cm from the nipple.  The skin was marked, then prepped and draped in a sterile fashion. 1% lidocaine was used for local anesthesia. Under direct sonographic guidance, a 14-gauge coaxial needle was used to obtain 3 core biopsies.  A biopsy marking clip was then placed.  Digital mammograms performed in a separate  room, using separate equipment, to document clip placement. The mammogram showed the clip to be in good position. The patient tolerated this well; there are no immediate complications.    IMPRESSION: 1. Ultrasound-guided biopsy of mass in the right breast. Pathology pending. 2. Post procedure mammogram for marker placement     US Breast Biopsy Core Lymph Node Right    Addendum Date: 2/1/2023    Pathology shows POSITIVE FOR METASTATIC CARCINOMA, CONSISTENT WITH METASTASIS FROM               CARCINOMA OF RIGHT BREAST. This is consistent with imaging findings. A verbal report was given to the patient. Surgical and oncological consultation is recommended.    Result Date: 2/1/2023  RIGHT LYMPH NODE ULTRASOUND GUIDED BIOPSY, CLIP PLACEMENT: INDICATIONS: Abnormal Lymph Node PROCEDURE: Informed consent was obtained from the patient. Ultrasound was used to localize the lymph node in the right axilla.  The skin was prepped and draped in a sterile fashion. Lidocaine was used for local anesthesia. Under direct sonographic guidance, a 12 gauge coaxial needle was used to obtain 4 core biopises. Biopsy marker is not visualized on the post procedure mammogram, and is outside the field of view. The patient tolerated this well, there are no immediate complications.     IMPRESSION: Ultrasound guided biopsy of a suspicious lymph node in the right axilla. Pathology pending.     US Breast Right Limited 1-3 Quadrants    Result Date: 1/25/2023  EXAM: MA DIAGNOSTIC BILATERAL W/ LILIYA, US BREAST RIGHT LIMITED 1-3 QUADRANTS LOCATION: [facility] DATE/TIME: 1/25/2023 9:14 AM INDICATION: 47-year-old female presents with skin changes involving the right nipple for a few months. The patient also reports an area of firmness deep to the right nipple and reports an episode of bleeding from the right nipple after it was bumped. COMPARISON: 10/27/2017, 7/20/2016, 4/26/2010 MAMMOGRAPHIC FINDINGS: Bilateral full-field digital  diagnostic mammograms performed. The breasts are heterogeneously dense, which may obscure small masses. Images evaluated with the assistance of CAD. Breast tomosynthesis was used in interpretation. There is a large, irregular, high density mass with associated obscured margins and architectural distortion within the right central breast at middle depth. There is associated retraction of the right nipple. A few possibly thickened lymph nodes are also demonstrated within the right axilla. Recommend sonographic evaluation of the right central breast and right axilla for further evaluation. There are no new or suspicious masses, calcifications, or architectural distortion within the left breast. There is a benign-appearing, mammographically stable mass within the left breast at the 9:00 position. ULTRASOUND FINDINGS: Visual inspection of the right breast demonstrates erythema, scaling, and open sores involving the right nipple. The right nipple is also retracted. Physical examination of the right breast demonstrates large, firm mass measuring at least 5 cm immediately deep to the left nipple within the central breast. Sonographic evaluation of the right central breast was performed. There is a large, hypoechoic mass with indistinct margins and associated posterior shadowing located immediately deep to the right nipple. It is difficult to accurately measure this mass given its size and indistinct margins, however, measures at least 4.1 x 2.1 cm. This mass also extends superficially to involve the overlying nipple. Sonographic evaluation of the right axilla was performed demonstrating a few morphologically abnormal lymph nodes demonstrating loss of normal fatty rochelle and thickened cortices. The largest visualized lymph node measures 1.4 x 1.0 x 0.9 cm.     IMPRESSION: 1.  Suspicious, irregular, hypoechoic mass with indistinct margins within the central right breast, immediately deep to the nipple measures at least 4.1 x  2.1 cm, although is likely larger given physical examination findings. This mass extends into and involves the overlying right nipple. Given the constellation of imaging and physical examination findings of a suspicious mass and the right nipple skin changes, Paget's disease of the breasts is suspected. Recommend ultrasound-guided biopsy of the mass immediately deep to the right nipple. Also recommend surgical and oncological consultation. 2.  Several morphologically abnormal right axillary lymph nodes. Recommend a single, representative ultrasound-guided biopsy of one of these abnormal lymph nodes. ACR BI-RADS Category 5: Highly Suggestive of Malignancy. I personally scanned and discussed the findings and recommendations with the patient at the conclusion of the examination.     US Breast Biopsy Core Needle Right    Addendum Date: 2/1/2023    Pathology shows DCIS and invasive ductal carcinoma. This is consistent with imaging findings. A verbal report was given to the patient. Surgical and oncological consultation is recommended.    Result Date: 2/1/2023  US Breast Biopsy Core Needle Right INDICATION: Right breast mass. PROCEDURE: Informed consent was obtained from the patient. Ultrasound was used to localize the mass at the 12 o'clock position of the right breast, 1 cm from the nipple.  The skin was marked, then prepped and draped in a sterile fashion. 1% lidocaine was used for local anesthesia. Under direct sonographic guidance, a 14-gauge coaxial needle was used to obtain 3 core biopsies.  A biopsy marking clip was then placed.  Digital mammograms performed in a separate room, using separate equipment, to document clip placement. The mammogram showed the clip to be in good position. The patient tolerated this well; there are no immediate complications.    IMPRESSION: 1. Ultrasound-guided biopsy of mass in the right breast. Pathology pending. 2. Post procedure mammogram for marker placement     MA Diagnostic  Bilateral w/Liliya    Result Date: 1/25/2023  EXAM: MA DIAGNOSTIC BILATERAL W/ LILIYA, US BREAST RIGHT LIMITED 1-3 QUADRANTS LOCATION: [facility] DATE/TIME: 1/25/2023 9:14 AM INDICATION: 47-year-old female presents with skin changes involving the right nipple for a few months. The patient also reports an area of firmness deep to the right nipple and reports an episode of bleeding from the right nipple after it was bumped. COMPARISON: 10/27/2017, 7/20/2016, 4/26/2010 MAMMOGRAPHIC FINDINGS: Bilateral full-field digital diagnostic mammograms performed. The breasts are heterogeneously dense, which may obscure small masses. Images evaluated with the assistance of CAD. Breast tomosynthesis was used in interpretation. There is a large, irregular, high density mass with associated obscured margins and architectural distortion within the right central breast at middle depth. There is associated retraction of the right nipple. A few possibly thickened lymph nodes are also demonstrated within the right axilla. Recommend sonographic evaluation of the right central breast and right axilla for further evaluation. There are no new or suspicious masses, calcifications, or architectural distortion within the left breast. There is a benign-appearing, mammographically stable mass within the left breast at the 9:00 position. ULTRASOUND FINDINGS: Visual inspection of the right breast demonstrates erythema, scaling, and open sores involving the right nipple. The right nipple is also retracted. Physical examination of the right breast demonstrates large, firm mass measuring at least 5 cm immediately deep to the left nipple within the central breast. Sonographic evaluation of the right central breast was performed. There is a large, hypoechoic mass with indistinct margins and associated posterior shadowing located immediately deep to the right nipple. It is difficult to accurately measure this mass given its size and  indistinct margins, however, measures at least 4.1 x 2.1 cm. This mass also extends superficially to involve the overlying nipple. Sonographic evaluation of the right axilla was performed demonstrating a few morphologically abnormal lymph nodes demonstrating loss of normal fatty rochelle and thickened cortices. The largest visualized lymph node measures 1.4 x 1.0 x 0.9 cm.     IMPRESSION: 1.  Suspicious, irregular, hypoechoic mass with indistinct margins within the central right breast, immediately deep to the nipple measures at least 4.1 x 2.1 cm, although is likely larger given physical examination findings. This mass extends into and involves the overlying right nipple. Given the constellation of imaging and physical examination findings of a suspicious mass and the right nipple skin changes, Paget's disease of the breasts is suspected. Recommend ultrasound-guided biopsy of the mass immediately deep to the right nipple. Also recommend surgical and oncological consultation. 2.  Several morphologically abnormal right axillary lymph nodes. Recommend a single, representative ultrasound-guided biopsy of one of these abnormal lymph nodes. ACR BI-RADS Category 5: Highly Suggestive of Malignancy. I personally scanned and discussed the findings and recommendations with the patient at the conclusion of the examination.       A total of 45 minutes was spent today on this visit including face to face conversation with the patient, EMR review (labs, imaging studies, pathology reports and outside records), counseling and care co-ordination and documentation.    Signed by: Paola Saleem MD

## 2023-02-09 NOTE — NURSING NOTE
"Alicia presents to [facility] today for a surgical consult with Dr. Brunner for surgical consult and Dr. Saleem for Medical Oncology consult in the Multi disciplinary clinic regarding her newly diagnosed right breast cancer.  She is accompanied by her  and sister for consult.  RN assessment and EMR update. BP (!) 156/91 (BP Location: Left arm)   Pulse 96   Resp 16   Ht 1.676 m (5' 6\")   Wt 86.6 kg (191 lb)   BMI 30.83 kg/m    Patient given a Breast Cancer Packet, contents reviewed.  She met with both Dr. Brunner and Dr. Saleem see dictations for details of visit. She will plan to have a PET CT on Monday, 2-13-23.  Pre PET prep reviewed with her as outlined on AVS.    Support provided, invited calls.  RN time 25 mins.  "

## 2023-02-10 ENCOUNTER — TELEPHONE (OUTPATIENT)
Dept: SURGERY | Facility: CLINIC | Age: 48
End: 2023-02-10
Payer: COMMERCIAL

## 2023-02-10 DIAGNOSIS — Z17.0 MALIGNANT NEOPLASM INVOLVING BOTH NIPPLE AND AREOLA OF RIGHT BREAST IN FEMALE, ESTROGEN RECEPTOR POSITIVE (H): Primary | ICD-10-CM

## 2023-02-10 DIAGNOSIS — C50.011 MALIGNANT NEOPLASM INVOLVING BOTH NIPPLE AND AREOLA OF RIGHT BREAST IN FEMALE, ESTROGEN RECEPTOR POSITIVE (H): Primary | ICD-10-CM

## 2023-02-10 NOTE — TELEPHONE ENCOUNTER
Alicia is returning a missed call from Dr Brunner. She has questions about the message that she left about the scan on Monday. She is available for a call back at [phone].

## 2023-02-13 ENCOUNTER — HOSPITAL ENCOUNTER (OUTPATIENT)
Dept: CT IMAGING | Facility: HOSPITAL | Age: 48
Discharge: HOME | End: 2023-02-13
Attending: SPECIALIST | Admitting: SPECIALIST
Payer: COMMERCIAL

## 2023-02-13 ENCOUNTER — LAB (OUTPATIENT)
Dept: LAB | Facility: CLINIC | Age: 48
End: 2023-02-13
Payer: COMMERCIAL

## 2023-02-13 ENCOUNTER — DOCUMENTATION ONLY (OUTPATIENT)
Dept: SURGERY | Facility: CLINIC | Age: 48
End: 2023-02-13

## 2023-02-13 DIAGNOSIS — Z17.0 MALIGNANT NEOPLASM INVOLVING BOTH NIPPLE AND AREOLA OF RIGHT BREAST IN FEMALE, ESTROGEN RECEPTOR POSITIVE (H): ICD-10-CM

## 2023-02-13 DIAGNOSIS — C50.011 MALIGNANT NEOPLASM INVOLVING BOTH NIPPLE AND AREOLA OF RIGHT BREAST IN FEMALE, ESTROGEN RECEPTOR POSITIVE (H): ICD-10-CM

## 2023-02-13 DIAGNOSIS — Z17.0 MALIGNANT NEOPLASM INVOLVING BOTH NIPPLE AND AREOLA OF RIGHT BREAST IN FEMALE, ESTROGEN RECEPTOR POSITIVE (H): Primary | ICD-10-CM

## 2023-02-13 DIAGNOSIS — C50.011 MALIGNANT NEOPLASM INVOLVING BOTH NIPPLE AND AREOLA OF RIGHT BREAST IN FEMALE, ESTROGEN RECEPTOR POSITIVE (H): Primary | ICD-10-CM

## 2023-02-13 LAB
INTERPRETATION: NORMAL
LAB PDF RESULT: NORMAL
SIGNIFICANT RESULTS: NORMAL
SPECIMEN DESCRIPTION: NORMAL
TEST DETAILS, MDL: NORMAL

## 2023-02-13 PROCEDURE — 250N000011 HC RX IP 250 OP 636: Performed by: SPECIALIST

## 2023-02-13 PROCEDURE — 74177 CT ABD & PELVIS W/CONTRAST: CPT

## 2023-02-13 PROCEDURE — 36415 COLL VENOUS BLD VENIPUNCTURE: CPT

## 2023-02-13 RX ORDER — IOPAMIDOL 755 MG/ML
100 INJECTION, SOLUTION INTRAVASCULAR ONCE
Status: COMPLETED | OUTPATIENT
Start: 2023-02-13 | End: 2023-02-13

## 2023-02-13 RX ADMIN — IOPAMIDOL 100 ML: 755 INJECTION, SOLUTION INTRAVENOUS at 12:21

## 2023-02-13 NOTE — PROGRESS NOTES
Patient came up to [facility] today to sign consent for genetic testing.  Reviewed consent with patient, signed.  She will go to lab to have her blood drawn today.  Told her to expect a call from scheduling to set up an appointment with a genetic counselor.  Support provided, invited calls.     PAST SURGICAL HISTORY:  H/O colonoscopy     H/O endoscopy     S/P arteriovenous (AV) fistula creation (2019)    S/P dilation and curettage (2017)    S/P laparoscopic cholecystectomy     S/P ovarian cystectomy (Age 29)

## 2023-02-14 ENCOUNTER — TELEPHONE (OUTPATIENT)
Dept: SURGERY | Facility: CLINIC | Age: 48
End: 2023-02-14
Payer: COMMERCIAL

## 2023-02-14 DIAGNOSIS — C50.011 MALIGNANT NEOPLASM INVOLVING BOTH NIPPLE AND AREOLA OF RIGHT BREAST IN FEMALE, ESTROGEN RECEPTOR POSITIVE (H): Primary | ICD-10-CM

## 2023-02-14 DIAGNOSIS — Z17.0 MALIGNANT NEOPLASM INVOLVING BOTH NIPPLE AND AREOLA OF RIGHT BREAST IN FEMALE, ESTROGEN RECEPTOR POSITIVE (H): Primary | ICD-10-CM

## 2023-02-14 LAB — INTERPRETATION: NORMAL

## 2023-02-14 NOTE — TELEPHONE ENCOUNTER
Called Alicia to help her schedule a bone scan, ordered by Dr. Brunner.  Scheduled her for Friday, 2-17-23 at 0700/1000 at [facility]'s location.  Reviewed prep with patient.  Told her I would reach out to Dr. Saleem's nurse, Gisella, to see if he would want to reschedule her follow up appointment with him so he can include the bone scan results into his treatment plan.  She states understanding. Support provided, invited calls.

## 2023-02-17 ENCOUNTER — ONCOLOGY VISIT (OUTPATIENT)
Dept: ONCOLOGY | Facility: HOSPITAL | Age: 48
End: 2023-02-17
Attending: INTERNAL MEDICINE
Payer: COMMERCIAL

## 2023-02-17 ENCOUNTER — HOSPITAL ENCOUNTER (OUTPATIENT)
Dept: NUCLEAR MEDICINE | Facility: HOSPITAL | Age: 48
Discharge: HOME | End: 2023-02-17
Attending: SPECIALIST
Payer: COMMERCIAL

## 2023-02-17 VITALS
BODY MASS INDEX: 30.02 KG/M2 | RESPIRATION RATE: 14 BRPM | HEIGHT: 66 IN | TEMPERATURE: 97.9 F | OXYGEN SATURATION: 100 % | WEIGHT: 186.8 LBS | SYSTOLIC BLOOD PRESSURE: 147 MMHG | DIASTOLIC BLOOD PRESSURE: 84 MMHG | HEART RATE: 100 BPM

## 2023-02-17 DIAGNOSIS — C50.011 MALIGNANT NEOPLASM INVOLVING BOTH NIPPLE AND AREOLA OF RIGHT BREAST IN FEMALE, ESTROGEN RECEPTOR POSITIVE (H): Primary | ICD-10-CM

## 2023-02-17 DIAGNOSIS — Z17.0 MALIGNANT NEOPLASM INVOLVING BOTH NIPPLE AND AREOLA OF RIGHT BREAST IN FEMALE, ESTROGEN RECEPTOR POSITIVE (H): ICD-10-CM

## 2023-02-17 DIAGNOSIS — C50.811 MALIGNANT NEOPLASM OF OVERLAPPING SITES OF RIGHT BREAST IN FEMALE, ESTROGEN RECEPTOR POSITIVE (H): ICD-10-CM

## 2023-02-17 DIAGNOSIS — Z17.0 MALIGNANT NEOPLASM OF OVERLAPPING SITES OF RIGHT BREAST IN FEMALE, ESTROGEN RECEPTOR POSITIVE (H): ICD-10-CM

## 2023-02-17 DIAGNOSIS — Z17.0 MALIGNANT NEOPLASM INVOLVING BOTH NIPPLE AND AREOLA OF RIGHT BREAST IN FEMALE, ESTROGEN RECEPTOR POSITIVE (H): Primary | ICD-10-CM

## 2023-02-17 DIAGNOSIS — C50.011 MALIGNANT NEOPLASM INVOLVING BOTH NIPPLE AND AREOLA OF RIGHT BREAST IN FEMALE, ESTROGEN RECEPTOR POSITIVE (H): ICD-10-CM

## 2023-02-17 DIAGNOSIS — I42.7 DRUG-INDUCED CARDIOMYOPATHY (H): Primary | ICD-10-CM

## 2023-02-17 PROCEDURE — 99215 OFFICE O/P EST HI 40 MIN: CPT | Performed by: INTERNAL MEDICINE

## 2023-02-17 PROCEDURE — 78306 BONE IMAGING WHOLE BODY: CPT

## 2023-02-17 PROCEDURE — A9503 TC99M MEDRONATE: HCPCS | Performed by: SPECIALIST

## 2023-02-17 PROCEDURE — 99213 OFFICE O/P EST LOW 20 MIN: CPT | Mod: 25 | Performed by: INTERNAL MEDICINE

## 2023-02-17 PROCEDURE — 343N000001 HC RX 343: Performed by: SPECIALIST

## 2023-02-17 RX ORDER — TC 99M MEDRONATE 20 MG/10ML
23-27 INJECTION, POWDER, LYOPHILIZED, FOR SOLUTION INTRAVENOUS ONCE
Status: COMPLETED | OUTPATIENT
Start: 2023-02-17 | End: 2023-02-17

## 2023-02-17 RX ORDER — ACETAMINOPHEN 500 MG
500-1000 TABLET ORAL PRN
COMMUNITY
End: 2023-11-13

## 2023-02-17 RX ADMIN — TC 99M MEDRONATE 25.9 MILLICURIE: 20 INJECTION, POWDER, LYOPHILIZED, FOR SOLUTION INTRAVENOUS at 07:18

## 2023-02-17 ASSESSMENT — PAIN SCALES - GENERAL: PAINLEVEL: NO PAIN (0)

## 2023-02-17 NOTE — PROGRESS NOTES
"Oncology Rooming Note    February 17, 2023 11:54 AM   Alicia Iniguez is a 47 year old female who presents for:    Chief Complaint   Patient presents with     Oncology Clinic Visit     Return visit to review prior PET and discuss treatment planning related to Malignant neoplasm of overlapping sites of right breast in female, estrogen receptor positive     Initial Vitals: BP (!) 147/84 (BP Location: Left arm, Patient Position: Sitting, Cuff Size: Adult Regular)   Pulse 100   Temp 97.9  F (36.6  C) (Tympanic)   Resp 14   Ht 1.676 m (5' 6\")   Wt 84.7 kg (186 lb 12.8 oz)   SpO2 100%   BMI 30.15 kg/m   Estimated body mass index is 30.15 kg/m  as calculated from the following:    Height as of this encounter: 1.676 m (5' 6\").    Weight as of this encounter: 84.7 kg (186 lb 12.8 oz). Body surface area is 1.99 meters squared.  No Pain (0) Comment: Data Unavailable   No LMP recorded.  Allergies reviewed: Yes  Medications reviewed: Yes    Medications: Medication refills not needed today.  Pharmacy [facility] entered into [facility]: [id number] [address] OF [facility] [place]    Clinical concerns:       DIANA QUINN CMA            "

## 2023-02-17 NOTE — LETTER
"    2/17/2023         RE: Alicia Iniguez  [address] [facility] [address]        Dear Colleague,    Thank you for referring your patient, Alicia Iniguez, to the [facility] [place]. Please see a copy of my visit note below.    Oncology Rooming Note    February 17, 2023 11:54 AM   Alicia Iniguez is a 47 year old female who presents for:    Chief Complaint   Patient presents with     Oncology Clinic Visit     Return visit to review prior PET and discuss treatment planning related to Malignant neoplasm of overlapping sites of right breast in female, estrogen receptor positive     Initial Vitals: BP (!) 147/84 (BP Location: Left arm, Patient Position: Sitting, Cuff Size: Adult Regular)   Pulse 100   Temp 97.9  F (36.6  C) (Tympanic)   Resp 14   Ht 1.676 m (5' 6\")   Wt 84.7 kg (186 lb 12.8 oz)   SpO2 100%   BMI 30.15 kg/m   Estimated body mass index is 30.15 kg/m  as calculated from the following:    Height as of this encounter: 1.676 m (5' 6\").    Weight as of this encounter: 84.7 kg (186 lb 12.8 oz). Body surface area is 1.99 meters squared.  No Pain (0) Comment: Data Unavailable   No LMP recorded.  Allergies reviewed: Yes  Medications reviewed: Yes    Medications: Medication refills not needed today.  Pharmacy name entered into MICMALI: [id number] [address]    Clinical concerns:       DIANA QUINN CMA              [facility] Hematology and Oncology Progress Note    Patient: Alicia Iniguez  MRN: [id number]  Date of Service: 02/17/2023        Reason for Visit    Chief Complaint   Patient presents with     Oncology Clinic Visit     Return visit to review prior PET and discuss treatment planning related to Malignant neoplasm of overlapping sites of right breast in female, estrogen receptor positive         Problem List Items Addressed This Visit        Other    Malignant neoplasm of overlapping sites " of right breast in female, estrogen receptor positive (H)    Relevant Orders    Echocardiogram Complete   Other Visit Diagnoses     Drug-induced cardiomyopathy (H)    -  Primary    Relevant Orders    Echocardiogram Complete            Assessment and Plan  Locally advanced right-sided breast cancer, hormone receptor positive and HER2 negative  I reviewed her CT scan and bone scan images and report in detail today.  Fortunately no evidence of any metastatic disease.  She has significant right axillary lymphadenopathy which has been biopsy-proven to be metastatic breast cancer.  Clinically T3 N2 M0 disease.  She is already met with surgery and previously the plan was to proceed with neoadjuvant chemotherapy followed by surgery.  Likely she will need mastectomy.  Even though neoadjuvant chemotherapy for her will not make any difference in terms of what kind of surgery she gets, I think it will definitely improve surgical outcome.  She is happy to hear that there is no evidence of any metastatic disease.    In this setting I reviewed potential neoadjuvant chemotherapy options.  Considering her age and the high risk disease I would recommend neoadjuvant dose dense AC for 4 cycles followed by weekly Taxol for 12 weeks.  Reviewed the rationale behind this.  Reviewed the potential side effects and complications associated with treatment.  She will need an echocardiogram.    She is agreeable to the plan and has consented to the treatment.  We will schedule chemo in the coming week or so.  She will need a port placement which will be placed by surgery.  She lives close to [place] so plan is to schedule chemotherapy in [place] [facility].  I will follow along.     Cancer Staging   No matching staging information was found for the patient.    ECOG Performance    0 - Independent         Problem List    Patient Active Problem List   Diagnosis     Malignant neoplasm of overlapping sites of right breast in female, estrogen  receptor positive (H)     Malignant neoplasm involving both nipple and areola of right breast in female, estrogen receptor positive (H)     Adverse effect of antineoplastic and immunosuppressive drugs, sequela          Interval History   Alicia Iniguez is a 47 year old female with locally advanced hormone receptor positive HER2 negative breast cancer of the right side who is here to discuss neoadjuvant chemotherapy.    Previously I saw her in the multidisciplinary clinic for a new diagnosis of right-sided breast cancer.  She had a large right-sided breast mass with nipple retraction and right axillary lymphadenopathy.  Biopsy of both the breast mass and lymph node were positive for invasive ductal carcinoma.  Strongly ER and IN positive and HER2 negative.  Due to the size of the tumor we had recommended considering neoadjuvant chemotherapy.  However we also recommended getting systemic staging to look for any distant mets.  We had ordered a PET scan but it was denied by the insurance.  So she got a CT chest abdomen pelvis with contrast and a bone scan.  She is here to review the results and make further follow-up plans.  Denies any new issues since last visit.  He does notice a little bit of a clear discharge from her right nipple with some skin denudation in that area.        Review of Systems  A comprehensive review of systems was negative except for what is noted in the interval history    Current Outpatient Medications   Medication     acetaminophen (TYLENOL) 500 MG tablet     minocycline (MINOCIN) 50 MG capsule     Probiotic Product (PROBIOTIC PO)     HYDROcodone-acetaminophen (NORCO) 5-325 MG tablet     No current facility-administered medications for this visit.     Facility-Administered Medications Ordered in Other Visits   Medication     ceFAZolin (ANCEF) 2 g in 100 mL D5W intermittent infusion     HYDROcodone-acetaminophen (NORCO) 5-325 MG per tablet 1 tablet     ibuprofen (ADVIL/MOTRIN) tablet 600 mg      lactated ringers infusion     lidocaine (LMX4) kit     lidocaine 1 % 0.1-1 mL     lidocaine 1 % 10 mL     lidocaine 1 % 10 mL     oxyCODONE IR (ROXICODONE) tablet 10 mg     sodium chloride (PF) 0.9% PF flush 3 mL     sodium chloride (PF) 0.9% PF flush 3 mL        Physical Exam    No flowsheet data found.    General: alert and cooperative  HEENT: Head: Normal, normocephalic, atraumatic.  Eye: Normal external eye, conjunctiva, lids cornea, MARK.  Chest: Clear to auscultation bilaterally  Cardiac: S1, S2 normal, regular rate and rhythm  Abdomen: abdomen is soft without significant tenderness, masses, organomegaly or guarding  Extremities: atraumatic, no peripheral edema  Breast: Large right-sided breast mass with nipple retraction seen.  There is skin denudation on the right nipple with scant serous discharge.  Clinically palpable right axillary lymph nodes.  Firm and possibly matted.  N2: Clinical exam.  CNS: Alert and oriented x3, neurologic exam grossly normal.  Lymphatics: No bilateral cervical, axillary, supraclavicular or inguinal adenopathy noted    Lab Results    No results found for this or any previous visit (from the past 168 hour(s)).    Imaging    CT Chest/Abdomen/Pelvis w Contrast    Result Date: 2/13/2023  EXAM: CT CHEST/ABDOMEN/PELVIS W CONTRAST LOCATION: [facility] DATE/TIME: 2/13/2023 12:22 PM INDICATION: Pt with advanced Right Breast Cancer. High risk for metastatic disease. Insurance company would not offer a PET as initial evaluation. COMPARISON: 04/27/2022 CT abdomen and pelvis. TECHNIQUE: CT scan of the chest, abdomen, and pelvis was performed following injection of IV contrast. Multiplanar reformats were obtained. Dose reduction techniques were used. CONTRAST: 100ml IV Isovue 370. FINDINGS: LUNGS AND PLEURA: Normal. MEDIASTINUM/AXILLAE: There is a lobulated hyperdense mass in the central right breast involving the nipple with prominent but subcentimeter mildly  hyperdense right axillary lymph nodes largest measuring 7 mm in short axis dimension. CORONARY ARTERY CALCIFICATION: None. HEPATOBILIARY: Tiny presumed cyst in the left hepatic lobe. PANCREAS: Normal. SPLEEN: Normal. ADRENAL GLANDS: Normal. KIDNEYS/BLADDER: Small nonobstructing stone lower pole left kidney. No ureteric stone or hydronephrosis. BOWEL: Normal. LYMPH NODES: Normal. VASCULATURE: Unremarkable. PELVIC ORGANS: Normal. MUSCULOSKELETAL: Normal.     IMPRESSION: 1.  Lobulated hyperdense mass in the central right breast extending to the nipple where there is some retraction. There are subcentimeter but mildly hyperdense right axillary lymph nodes having the same attenuation as the primary breast mass. Largest node measures 7 mm in short axis dimension. 2.  Tiny presumed hepatic cyst.    MA Post Procedure Right    Addendum Date: 2/1/2023    Pathology shows DCIS and invasive ductal carcinoma. This is consistent with imaging findings. A verbal report was given to the patient. Surgical and oncological consultation is recommended.    Result Date: 2/1/2023  US Breast Biopsy Core Needle Right INDICATION: Right breast mass. PROCEDURE: Informed consent was obtained from the patient. Ultrasound was used to localize the mass at the 12 o'clock position of the right breast, 1 cm from the nipple.  The skin was marked, then prepped and draped in a sterile fashion. 1% lidocaine was used for local anesthesia. Under direct sonographic guidance, a 14-gauge coaxial needle was used to obtain 3 core biopsies.  A biopsy marking clip was then placed.  Digital mammograms performed in a separate room, using separate equipment, to document clip placement. The mammogram showed the clip to be in good position. The patient tolerated this well; there are no immediate complications.    IMPRESSION: 1. Ultrasound-guided biopsy of mass in the right breast. Pathology pending. 2. Post procedure mammogram for marker placement     US Breast Biopsy  Core Lymph Node Right    Addendum Date: 2/1/2023    Pathology shows POSITIVE FOR METASTATIC CARCINOMA, CONSISTENT WITH METASTASIS FROM               CARCINOMA OF RIGHT BREAST. This is consistent with imaging findings. A verbal report was given to the patient. Surgical and oncological consultation is recommended.    Result Date: 2/1/2023  RIGHT LYMPH NODE ULTRASOUND GUIDED BIOPSY, CLIP PLACEMENT: INDICATIONS: Abnormal Lymph Node PROCEDURE: Informed consent was obtained from the patient. Ultrasound was used to localize the lymph node in the right axilla.  The skin was prepped and draped in a sterile fashion. Lidocaine was used for local anesthesia. Under direct sonographic guidance, a 12 gauge coaxial needle was used to obtain 4 core biopises. Biopsy marker is not visualized on the post procedure mammogram, and is outside the field of view. The patient tolerated this well, there are no immediate complications.     IMPRESSION: Ultrasound guided biopsy of a suspicious lymph node in the right axilla. Pathology pending.     US Breast Right Limited 1-3 Quadrants    Result Date: 1/25/2023  EXAM: MA DIAGNOSTIC BILATERAL W/ LILIYA, US BREAST RIGHT LIMITED 1-3 QUADRANTS LOCATION: [facility] DATE/TIME: 1/25/2023 9:14 AM INDICATION: 47-year-old female presents with skin changes involving the right nipple for a few months. The patient also reports an area of firmness deep to the right nipple and reports an episode of bleeding from the right nipple after it was bumped. COMPARISON: 10/27/2017, 7/20/2016, 4/26/2010 MAMMOGRAPHIC FINDINGS: Bilateral full-field digital diagnostic mammograms performed. The breasts are heterogeneously dense, which may obscure small masses. Images evaluated with the assistance of CAD. Breast tomosynthesis was used in interpretation. There is a large, irregular, high density mass with associated obscured margins and architectural distortion within the right central breast at middle  depth. There is associated retraction of the right nipple. A few possibly thickened lymph nodes are also demonstrated within the right axilla. Recommend sonographic evaluation of the right central breast and right axilla for further evaluation. There are no new or suspicious masses, calcifications, or architectural distortion within the left breast. There is a benign-appearing, mammographically stable mass within the left breast at the 9:00 position. ULTRASOUND FINDINGS: Visual inspection of the right breast demonstrates erythema, scaling, and open sores involving the right nipple. The right nipple is also retracted. Physical examination of the right breast demonstrates large, firm mass measuring at least 5 cm immediately deep to the left nipple within the central breast. Sonographic evaluation of the right central breast was performed. There is a large, hypoechoic mass with indistinct margins and associated posterior shadowing located immediately deep to the right nipple. It is difficult to accurately measure this mass given its size and indistinct margins, however, measures at least 4.1 x 2.1 cm. This mass also extends superficially to involve the overlying nipple. Sonographic evaluation of the right axilla was performed demonstrating a few morphologically abnormal lymph nodes demonstrating loss of normal fatty rochelle and thickened cortices. The largest visualized lymph node measures 1.4 x 1.0 x 0.9 cm.     IMPRESSION: 1.  Suspicious, irregular, hypoechoic mass with indistinct margins within the central right breast, immediately deep to the nipple measures at least 4.1 x 2.1 cm, although is likely larger given physical examination findings. This mass extends into and involves the overlying right nipple. Given the constellation of imaging and physical examination findings of a suspicious mass and the right nipple skin changes, Paget's disease of the breasts is suspected. Recommend ultrasound-guided biopsy of the mass  immediately deep to the right nipple. Also recommend surgical and oncological consultation. 2.  Several morphologically abnormal right axillary lymph nodes. Recommend a single, representative ultrasound-guided biopsy of one of these abnormal lymph nodes. ACR BI-RADS Category 5: Highly Suggestive of Malignancy. I personally scanned and discussed the findings and recommendations with the patient at the conclusion of the examination.     US Breast Biopsy Core Needle Right    Addendum Date: 2/1/2023    Pathology shows DCIS and invasive ductal carcinoma. This is consistent with imaging findings. A verbal report was given to the patient. Surgical and oncological consultation is recommended.    Result Date: 2/1/2023  US Breast Biopsy Core Needle Right INDICATION: Right breast mass. PROCEDURE: Informed consent was obtained from the patient. Ultrasound was used to localize the mass at the 12 o'clock position of the right breast, 1 cm from the nipple.  The skin was marked, then prepped and draped in a sterile fashion. 1% lidocaine was used for local anesthesia. Under direct sonographic guidance, a 14-gauge coaxial needle was used to obtain 3 core biopsies.  A biopsy marking clip was then placed.  Digital mammograms performed in a separate room, using separate equipment, to document clip placement. The mammogram showed the clip to be in good position. The patient tolerated this well; there are no immediate complications.    IMPRESSION: 1. Ultrasound-guided biopsy of mass in the right breast. Pathology pending. 2. Post procedure mammogram for marker placement     MA Diagnostic Bilateral w/Liliya    Result Date: 1/25/2023  EXAM: MA DIAGNOSTIC BILATERAL W/ LILIYA, US BREAST RIGHT LIMITED 1-3 QUADRANTS LOCATION: [facility] DATE/TIME: 1/25/2023 9:14 AM INDICATION: 47-year-old female presents with skin changes involving the right nipple for a few months. The patient also reports an area of firmness deep to the  right nipple and reports an episode of bleeding from the right nipple after it was bumped. COMPARISON: 10/27/2017, 7/20/2016, 4/26/2010 MAMMOGRAPHIC FINDINGS: Bilateral full-field digital diagnostic mammograms performed. The breasts are heterogeneously dense, which may obscure small masses. Images evaluated with the assistance of CAD. Breast tomosynthesis was used in interpretation. There is a large, irregular, high density mass with associated obscured margins and architectural distortion within the right central breast at middle depth. There is associated retraction of the right nipple. A few possibly thickened lymph nodes are also demonstrated within the right axilla. Recommend sonographic evaluation of the right central breast and right axilla for further evaluation. There are no new or suspicious masses, calcifications, or architectural distortion within the left breast. There is a benign-appearing, mammographically stable mass within the left breast at the 9:00 position. ULTRASOUND FINDINGS: Visual inspection of the right breast demonstrates erythema, scaling, and open sores involving the right nipple. The right nipple is also retracted. Physical examination of the right breast demonstrates large, firm mass measuring at least 5 cm immediately deep to the left nipple within the central breast. Sonographic evaluation of the right central breast was performed. There is a large, hypoechoic mass with indistinct margins and associated posterior shadowing located immediately deep to the right nipple. It is difficult to accurately measure this mass given its size and indistinct margins, however, measures at least 4.1 x 2.1 cm. This mass also extends superficially to involve the overlying nipple. Sonographic evaluation of the right axilla was performed demonstrating a few morphologically abnormal lymph nodes demonstrating loss of normal fatty rochelle and thickened cortices. The largest visualized lymph node measures 1.4 x  1.0 x 0.9 cm.     IMPRESSION: 1.  Suspicious, irregular, hypoechoic mass with indistinct margins within the central right breast, immediately deep to the nipple measures at least 4.1 x 2.1 cm, although is likely larger given physical examination findings. This mass extends into and involves the overlying right nipple. Given the constellation of imaging and physical examination findings of a suspicious mass and the right nipple skin changes, Paget's disease of the breasts is suspected. Recommend ultrasound-guided biopsy of the mass immediately deep to the right nipple. Also recommend surgical and oncological consultation. 2.  Several morphologically abnormal right axillary lymph nodes. Recommend a single, representative ultrasound-guided biopsy of one of these abnormal lymph nodes. ACR BI-RADS Category 5: Highly Suggestive of Malignancy. I personally scanned and discussed the findings and recommendations with the patient at the conclusion of the examination.     NM Bone Scan Whole Body    Result Date: 2/17/2023  EXAM: NM BONE SCAN WHOLE BODY LOCATION: [facility] DATE/TIME: 2/17/2023 10:32 AM INDICATION: Breast cancer COMPARISON: CT the chest and pelvis dated 02/13/2023 TECHNIQUE: 25.9 mCi technetium-99m MDP, IV. Anterior and posterior delayed whole-body images at 3 hours with additional spot images of the skull. FINDINGS: Radiotracer uptake in a pattern typical of degenerative change involving the shoulders and spine. Focal radiotracer uptake in the mid anterior left tibia likely representing sequelae of prior trauma or stress related process. No suspicious areas of altered uptake to suggest osseous metastasis.     IMPRESSION: No convincing scintigraphic evidence of osseous metastasis.    Complex patient.  E5 visit.    Signed by: Paola Saleem MD        Again, thank you for allowing me to participate in the care of your patient.        Sincerely,        Paola Saleem MD

## 2023-02-17 NOTE — PROGRESS NOTES
[facility] Hematology and Oncology Progress Note    Patient: Alicia Iniguez  MRN: [id number]  Date of Service: 02/17/2023        Reason for Visit    Chief Complaint   Patient presents with     Oncology Clinic Visit     Return visit to review prior PET and discuss treatment planning related to Malignant neoplasm of overlapping sites of right breast in female, estrogen receptor positive         Problem List Items Addressed This Visit        Other    Malignant neoplasm of overlapping sites of right breast in female, estrogen receptor positive (H)    Relevant Orders    Echocardiogram Complete   Other Visit Diagnoses     Drug-induced cardiomyopathy (H)    -  Primary    Relevant Orders    Echocardiogram Complete            Assessment and Plan  Locally advanced right-sided breast cancer, hormone receptor positive and HER2 negative  I reviewed her CT scan and bone scan images and report in detail today.  Fortunately no evidence of any metastatic disease.  She has significant right axillary lymphadenopathy which has been biopsy-proven to be metastatic breast cancer.  Clinically T3 N2 M0 disease.  She is already met with surgery and previously the plan was to proceed with neoadjuvant chemotherapy followed by surgery.  Likely she will need mastectomy.  Even though neoadjuvant chemotherapy for her will not make any difference in terms of what kind of surgery she gets, I think it will definitely improve surgical outcome.  She is happy to hear that there is no evidence of any metastatic disease.    In this setting I reviewed potential neoadjuvant chemotherapy options.  Considering her age and the high risk disease I would recommend neoadjuvant dose dense AC for 4 cycles followed by weekly Taxol for 12 weeks.  Reviewed the rationale behind this.  Reviewed the potential side effects and complications associated with treatment.  She will need an echocardiogram.    She is agreeable to the plan and has consented to the  treatment.  We will schedule chemo in the coming week or so.  She will need a port placement which will be placed by surgery.  She lives close to [place] so plan is to schedule chemotherapy in [place] [facility].  I will follow along.     Cancer Staging   No matching staging information was found for the patient.    ECOG Performance    0 - Independent         Problem List    Patient Active Problem List   Diagnosis     Malignant neoplasm of overlapping sites of right breast in female, estrogen receptor positive (H)     Malignant neoplasm involving both nipple and areola of right breast in female, estrogen receptor positive (H)     Adverse effect of antineoplastic and immunosuppressive drugs, sequela          Interval History   Alicia Iniguez is a 47 year old female with locally advanced hormone receptor positive HER2 negative breast cancer of the right side who is here to discuss neoadjuvant chemotherapy.    Previously I saw her in the multidisciplinary clinic for a new diagnosis of right-sided breast cancer.  She had a large right-sided breast mass with nipple retraction and right axillary lymphadenopathy.  Biopsy of both the breast mass and lymph node were positive for invasive ductal carcinoma.  Strongly ER and NE positive and HER2 negative.  Due to the size of the tumor we had recommended considering neoadjuvant chemotherapy.  However we also recommended getting systemic staging to look for any distant mets.  We had ordered a PET scan but it was denied by the insurance.  So she got a CT chest abdomen pelvis with contrast and a bone scan.  She is here to review the results and make further follow-up plans.  Denies any new issues since last visit.  He does notice a little bit of a clear discharge from her right nipple with some skin denudation in that area.        Review of Systems  A comprehensive review of systems was negative except for what is noted in the interval history    Current Outpatient Medications    Medication     acetaminophen (TYLENOL) 500 MG tablet     minocycline (MINOCIN) 50 MG capsule     Probiotic Product (PROBIOTIC PO)     HYDROcodone-acetaminophen (NORCO) 5-325 MG tablet     No current facility-administered medications for this visit.     Facility-Administered Medications Ordered in Other Visits   Medication     ceFAZolin (ANCEF) 2 g in 100 mL D5W intermittent infusion     HYDROcodone-acetaminophen (NORCO) 5-325 MG per tablet 1 tablet     ibuprofen (ADVIL/MOTRIN) tablet 600 mg     lactated ringers infusion     lidocaine (LMX4) kit     lidocaine 1 % 0.1-1 mL     lidocaine 1 % 10 mL     lidocaine 1 % 10 mL     oxyCODONE IR (ROXICODONE) tablet 10 mg     sodium chloride (PF) 0.9% PF flush 3 mL     sodium chloride (PF) 0.9% PF flush 3 mL        Physical Exam    No flowsheet data found.    General: alert and cooperative  HEENT: Head: Normal, normocephalic, atraumatic.  Eye: Normal external eye, conjunctiva, lids cornea, MARK.  Chest: Clear to auscultation bilaterally  Cardiac: S1, S2 normal, regular rate and rhythm  Abdomen: abdomen is soft without significant tenderness, masses, organomegaly or guarding  Extremities: atraumatic, no peripheral edema  Breast: Large right-sided breast mass with nipple retraction seen.  There is skin denudation on the right nipple with scant serous discharge.  Clinically palpable right axillary lymph nodes.  Firm and possibly matted.  N2: Clinical exam.  CNS: Alert and oriented x3, neurologic exam grossly normal.  Lymphatics: No bilateral cervical, axillary, supraclavicular or inguinal adenopathy noted    Lab Results    No results found for this or any previous visit (from the past 168 hour(s)).    Imaging    CT Chest/Abdomen/Pelvis w Contrast    Result Date: 2/13/2023  EXAM: CT CHEST/ABDOMEN/PELVIS W CONTRAST LOCATION: [facility] DATE/TIME: 2/13/2023 12:22 PM INDICATION: Pt with advanced Right Breast Cancer. High risk for metastatic disease.  Insurance company would not offer a PET as initial evaluation. COMPARISON: 04/27/2022 CT abdomen and pelvis. TECHNIQUE: CT scan of the chest, abdomen, and pelvis was performed following injection of IV contrast. Multiplanar reformats were obtained. Dose reduction techniques were used. CONTRAST: 100ml IV Isovue 370. FINDINGS: LUNGS AND PLEURA: Normal. MEDIASTINUM/AXILLAE: There is a lobulated hyperdense mass in the central right breast involving the nipple with prominent but subcentimeter mildly hyperdense right axillary lymph nodes largest measuring 7 mm in short axis dimension. CORONARY ARTERY CALCIFICATION: None. HEPATOBILIARY: Tiny presumed cyst in the left hepatic lobe. PANCREAS: Normal. SPLEEN: Normal. ADRENAL GLANDS: Normal. KIDNEYS/BLADDER: Small nonobstructing stone lower pole left kidney. No ureteric stone or hydronephrosis. BOWEL: Normal. LYMPH NODES: Normal. VASCULATURE: Unremarkable. PELVIC ORGANS: Normal. MUSCULOSKELETAL: Normal.     IMPRESSION: 1.  Lobulated hyperdense mass in the central right breast extending to the nipple where there is some retraction. There are subcentimeter but mildly hyperdense right axillary lymph nodes having the same attenuation as the primary breast mass. Largest node measures 7 mm in short axis dimension. 2.  Tiny presumed hepatic cyst.    MA Post Procedure Right    Addendum Date: 2/1/2023    Pathology shows DCIS and invasive ductal carcinoma. This is consistent with imaging findings. A verbal report was given to the patient. Surgical and oncological consultation is recommended.    Result Date: 2/1/2023  US Breast Biopsy Core Needle Right INDICATION: Right breast mass. PROCEDURE: Informed consent was obtained from the patient. Ultrasound was used to localize the mass at the 12 o'clock position of the right breast, 1 cm from the nipple.  The skin was marked, then prepped and draped in a sterile fashion. 1% lidocaine was used for local anesthesia. Under direct sonographic  guidance, a 14-gauge coaxial needle was used to obtain 3 core biopsies.  A biopsy marking clip was then placed.  Digital mammograms performed in a separate room, using separate equipment, to document clip placement. The mammogram showed the clip to be in good position. The patient tolerated this well; there are no immediate complications.    IMPRESSION: 1. Ultrasound-guided biopsy of mass in the right breast. Pathology pending. 2. Post procedure mammogram for marker placement     US Breast Biopsy Core Lymph Node Right    Addendum Date: 2/1/2023    Pathology shows POSITIVE FOR METASTATIC CARCINOMA, CONSISTENT WITH METASTASIS FROM               CARCINOMA OF RIGHT BREAST. This is consistent with imaging findings. A verbal report was given to the patient. Surgical and oncological consultation is recommended.    Result Date: 2/1/2023  RIGHT LYMPH NODE ULTRASOUND GUIDED BIOPSY, CLIP PLACEMENT: INDICATIONS: Abnormal Lymph Node PROCEDURE: Informed consent was obtained from the patient. Ultrasound was used to localize the lymph node in the right axilla.  The skin was prepped and draped in a sterile fashion. Lidocaine was used for local anesthesia. Under direct sonographic guidance, a 12 gauge coaxial needle was used to obtain 4 core biopises. Biopsy marker is not visualized on the post procedure mammogram, and is outside the field of view. The patient tolerated this well, there are no immediate complications.     IMPRESSION: Ultrasound guided biopsy of a suspicious lymph node in the right axilla. Pathology pending.     US Breast Right Limited 1-3 Quadrants    Result Date: 1/25/2023  EXAM: MA DIAGNOSTIC BILATERAL W/ LILIYA, US BREAST RIGHT LIMITED 1-3 QUADRANTS LOCATION: [facility] DATE/TIME: 1/25/2023 9:14 AM INDICATION: 47-year-old female presents with skin changes involving the right nipple for a few months. The patient also reports an area of firmness deep to the right nipple and reports an episode  of bleeding from the right nipple after it was bumped. COMPARISON: 10/27/2017, 7/20/2016, 4/26/2010 MAMMOGRAPHIC FINDINGS: Bilateral full-field digital diagnostic mammograms performed. The breasts are heterogeneously dense, which may obscure small masses. Images evaluated with the assistance of CAD. Breast tomosynthesis was used in interpretation. There is a large, irregular, high density mass with associated obscured margins and architectural distortion within the right central breast at middle depth. There is associated retraction of the right nipple. A few possibly thickened lymph nodes are also demonstrated within the right axilla. Recommend sonographic evaluation of the right central breast and right axilla for further evaluation. There are no new or suspicious masses, calcifications, or architectural distortion within the left breast. There is a benign-appearing, mammographically stable mass within the left breast at the 9:00 position. ULTRASOUND FINDINGS: Visual inspection of the right breast demonstrates erythema, scaling, and open sores involving the right nipple. The right nipple is also retracted. Physical examination of the right breast demonstrates large, firm mass measuring at least 5 cm immediately deep to the left nipple within the central breast. Sonographic evaluation of the right central breast was performed. There is a large, hypoechoic mass with indistinct margins and associated posterior shadowing located immediately deep to the right nipple. It is difficult to accurately measure this mass given its size and indistinct margins, however, measures at least 4.1 x 2.1 cm. This mass also extends superficially to involve the overlying nipple. Sonographic evaluation of the right axilla was performed demonstrating a few morphologically abnormal lymph nodes demonstrating loss of normal fatty rochelle and thickened cortices. The largest visualized lymph node measures 1.4 x 1.0 x 0.9 cm.     IMPRESSION: 1.   Suspicious, irregular, hypoechoic mass with indistinct margins within the central right breast, immediately deep to the nipple measures at least 4.1 x 2.1 cm, although is likely larger given physical examination findings. This mass extends into and involves the overlying right nipple. Given the constellation of imaging and physical examination findings of a suspicious mass and the right nipple skin changes, Paget's disease of the breasts is suspected. Recommend ultrasound-guided biopsy of the mass immediately deep to the right nipple. Also recommend surgical and oncological consultation. 2.  Several morphologically abnormal right axillary lymph nodes. Recommend a single, representative ultrasound-guided biopsy of one of these abnormal lymph nodes. ACR BI-RADS Category 5: Highly Suggestive of Malignancy. I personally scanned and discussed the findings and recommendations with the patient at the conclusion of the examination.     US Breast Biopsy Core Needle Right    Addendum Date: 2/1/2023    Pathology shows DCIS and invasive ductal carcinoma. This is consistent with imaging findings. A verbal report was given to the patient. Surgical and oncological consultation is recommended.    Result Date: 2/1/2023  US Breast Biopsy Core Needle Right INDICATION: Right breast mass. PROCEDURE: Informed consent was obtained from the patient. Ultrasound was used to localize the mass at the 12 o'clock position of the right breast, 1 cm from the nipple.  The skin was marked, then prepped and draped in a sterile fashion. 1% lidocaine was used for local anesthesia. Under direct sonographic guidance, a 14-gauge coaxial needle was used to obtain 3 core biopsies.  A biopsy marking clip was then placed.  Digital mammograms performed in a separate room, using separate equipment, to document clip placement. The mammogram showed the clip to be in good position. The patient tolerated this well; there are no immediate  complications.    IMPRESSION: 1. Ultrasound-guided biopsy of mass in the right breast. Pathology pending. 2. Post procedure mammogram for marker placement     MA Diagnostic Bilateral w/Liliya    Result Date: 1/25/2023  EXAM: MA DIAGNOSTIC BILATERAL W/ LILIYA, US BREAST RIGHT LIMITED 1-3 QUADRANTS LOCATION: [facility] DATE/TIME: 1/25/2023 9:14 AM INDICATION: 47-year-old female presents with skin changes involving the right nipple for a few months. The patient also reports an area of firmness deep to the right nipple and reports an episode of bleeding from the right nipple after it was bumped. COMPARISON: 10/27/2017, 7/20/2016, 4/26/2010 MAMMOGRAPHIC FINDINGS: Bilateral full-field digital diagnostic mammograms performed. The breasts are heterogeneously dense, which may obscure small masses. Images evaluated with the assistance of CAD. Breast tomosynthesis was used in interpretation. There is a large, irregular, high density mass with associated obscured margins and architectural distortion within the right central breast at middle depth. There is associated retraction of the right nipple. A few possibly thickened lymph nodes are also demonstrated within the right axilla. Recommend sonographic evaluation of the right central breast and right axilla for further evaluation. There are no new or suspicious masses, calcifications, or architectural distortion within the left breast. There is a benign-appearing, mammographically stable mass within the left breast at the 9:00 position. ULTRASOUND FINDINGS: Visual inspection of the right breast demonstrates erythema, scaling, and open sores involving the right nipple. The right nipple is also retracted. Physical examination of the right breast demonstrates large, firm mass measuring at least 5 cm immediately deep to the left nipple within the central breast. Sonographic evaluation of the right central breast was performed. There is a large, hypoechoic mass  with indistinct margins and associated posterior shadowing located immediately deep to the right nipple. It is difficult to accurately measure this mass given its size and indistinct margins, however, measures at least 4.1 x 2.1 cm. This mass also extends superficially to involve the overlying nipple. Sonographic evaluation of the right axilla was performed demonstrating a few morphologically abnormal lymph nodes demonstrating loss of normal fatty rochelle and thickened cortices. The largest visualized lymph node measures 1.4 x 1.0 x 0.9 cm.     IMPRESSION: 1.  Suspicious, irregular, hypoechoic mass with indistinct margins within the central right breast, immediately deep to the nipple measures at least 4.1 x 2.1 cm, although is likely larger given physical examination findings. This mass extends into and involves the overlying right nipple. Given the constellation of imaging and physical examination findings of a suspicious mass and the right nipple skin changes, Paget's disease of the breasts is suspected. Recommend ultrasound-guided biopsy of the mass immediately deep to the right nipple. Also recommend surgical and oncological consultation. 2.  Several morphologically abnormal right axillary lymph nodes. Recommend a single, representative ultrasound-guided biopsy of one of these abnormal lymph nodes. ACR BI-RADS Category 5: Highly Suggestive of Malignancy. I personally scanned and discussed the findings and recommendations with the patient at the conclusion of the examination.     NM Bone Scan Whole Body    Result Date: 2/17/2023  EXAM: NM BONE SCAN WHOLE BODY LOCATION: [facility] DATE/TIME: 2/17/2023 10:32 AM INDICATION: Breast cancer COMPARISON: CT the chest and pelvis dated 02/13/2023 TECHNIQUE: 25.9 mCi technetium-99m MDP, IV. Anterior and posterior delayed whole-body images at 3 hours with additional spot images of the skull. FINDINGS: Radiotracer uptake in a pattern typical of  degenerative change involving the shoulders and spine. Focal radiotracer uptake in the mid anterior left tibia likely representing sequelae of prior trauma or stress related process. No suspicious areas of altered uptake to suggest osseous metastasis.     IMPRESSION: No convincing scintigraphic evidence of osseous metastasis.    Complex patient.  E5 visit.    Signed by: Paola Saleem MD

## 2023-02-21 ENCOUNTER — ANESTHESIA EVENT (OUTPATIENT)
Dept: SURGERY | Facility: AMBULATORY SURGERY CENTER | Age: 48
End: 2023-02-21
Payer: COMMERCIAL

## 2023-02-21 DIAGNOSIS — T45.1X5S ADVERSE EFFECT OF ANTINEOPLASTIC AND IMMUNOSUPPRESSIVE DRUGS, SEQUELA: ICD-10-CM

## 2023-02-22 ENCOUNTER — HOSPITAL ENCOUNTER (OUTPATIENT)
Facility: AMBULATORY SURGERY CENTER | Age: 48
Discharge: HOME | End: 2023-02-22
Attending: SPECIALIST
Payer: COMMERCIAL

## 2023-02-22 ENCOUNTER — ANESTHESIA (OUTPATIENT)
Dept: SURGERY | Facility: AMBULATORY SURGERY CENTER | Age: 48
End: 2023-02-22
Payer: COMMERCIAL

## 2023-02-22 ENCOUNTER — PATIENT OUTREACH (OUTPATIENT)
Dept: ONCOLOGY | Facility: HOSPITAL | Age: 48
End: 2023-02-22

## 2023-02-22 VITALS
HEART RATE: 71 BPM | HEIGHT: 66 IN | RESPIRATION RATE: 16 BRPM | DIASTOLIC BLOOD PRESSURE: 80 MMHG | WEIGHT: 180 LBS | SYSTOLIC BLOOD PRESSURE: 134 MMHG | BODY MASS INDEX: 28.93 KG/M2 | OXYGEN SATURATION: 99 % | TEMPERATURE: 96.9 F

## 2023-02-22 DIAGNOSIS — Z17.0 MALIGNANT NEOPLASM INVOLVING BOTH NIPPLE AND AREOLA OF RIGHT BREAST IN FEMALE, ESTROGEN RECEPTOR POSITIVE (H): ICD-10-CM

## 2023-02-22 DIAGNOSIS — C50.011 MALIGNANT NEOPLASM INVOLVING BOTH NIPPLE AND AREOLA OF RIGHT BREAST IN FEMALE, ESTROGEN RECEPTOR POSITIVE (H): ICD-10-CM

## 2023-02-22 PROCEDURE — 36561 INSERT TUNNELED CV CATH: CPT | Performed by: SPECIALIST

## 2023-02-22 DEVICE — IMPLANTABLE DEVICE
Type: IMPLANTABLE DEVICE | Site: CHEST | Status: NON-FUNCTIONAL
Removed: 2023-12-01

## 2023-02-22 RX ORDER — CEFAZOLIN SODIUM 2 G/100ML
2 INJECTION, SOLUTION INTRAVENOUS
Status: COMPLETED | OUTPATIENT
Start: 2023-02-22 | End: 2023-02-22

## 2023-02-22 RX ORDER — CEFAZOLIN SODIUM 2 G/100ML
2 INJECTION, SOLUTION INTRAVENOUS SEE ADMIN INSTRUCTIONS
Status: DISCONTINUED | OUTPATIENT
Start: 2023-02-22 | End: 2023-02-23 | Stop reason: HOSPADM

## 2023-02-22 RX ORDER — OXYCODONE HYDROCHLORIDE 10 MG/1
10 TABLET ORAL
Status: DISCONTINUED | OUTPATIENT
Start: 2023-02-22 | End: 2023-02-23 | Stop reason: HOSPADM

## 2023-02-22 RX ORDER — OXYCODONE HYDROCHLORIDE 5 MG/1
5 TABLET ORAL
Status: COMPLETED | OUTPATIENT
Start: 2023-02-22 | End: 2023-02-22

## 2023-02-22 RX ORDER — GLYCOPYRROLATE 0.2 MG/ML
INJECTION, SOLUTION INTRAMUSCULAR; INTRAVENOUS PRN
Status: DISCONTINUED | OUTPATIENT
Start: 2023-02-22 | End: 2023-02-22

## 2023-02-22 RX ORDER — PROPOFOL 10 MG/ML
INJECTION, EMULSION INTRAVENOUS CONTINUOUS PRN
Status: DISCONTINUED | OUTPATIENT
Start: 2023-02-22 | End: 2023-02-22

## 2023-02-22 RX ORDER — LIDOCAINE HYDROCHLORIDE 20 MG/ML
INJECTION, SOLUTION INFILTRATION; PERINEURAL PRN
Status: DISCONTINUED | OUTPATIENT
Start: 2023-02-22 | End: 2023-02-22

## 2023-02-22 RX ORDER — HYDROCODONE BITARTRATE AND ACETAMINOPHEN 5; 325 MG/1; MG/1
1 TABLET ORAL EVERY 6 HOURS PRN
Qty: 10 TABLET | Refills: 0 | Status: SHIPPED | OUTPATIENT
Start: 2023-02-22 | End: 2023-03-17

## 2023-02-22 RX ORDER — LIDOCAINE 40 MG/G
CREAM TOPICAL
Status: DISCONTINUED | OUTPATIENT
Start: 2023-02-22 | End: 2023-02-23 | Stop reason: HOSPADM

## 2023-02-22 RX ORDER — FENTANYL CITRATE 50 UG/ML
INJECTION, SOLUTION INTRAMUSCULAR; INTRAVENOUS PRN
Status: DISCONTINUED | OUTPATIENT
Start: 2023-02-22 | End: 2023-02-22

## 2023-02-22 RX ORDER — IBUPROFEN 600 MG/1
600 TABLET, FILM COATED ORAL
Status: DISCONTINUED | OUTPATIENT
Start: 2023-02-22 | End: 2023-02-23 | Stop reason: HOSPADM

## 2023-02-22 RX ORDER — HYDROCODONE BITARTRATE AND ACETAMINOPHEN 5; 325 MG/1; MG/1
1 TABLET ORAL
Status: DISCONTINUED | OUTPATIENT
Start: 2023-02-22 | End: 2023-02-23 | Stop reason: HOSPADM

## 2023-02-22 RX ORDER — BUPIVACAINE HYDROCHLORIDE 2.5 MG/ML
INJECTION, SOLUTION INFILTRATION; PERINEURAL PRN
Status: DISCONTINUED | OUTPATIENT
Start: 2023-02-22 | End: 2023-02-22 | Stop reason: HOSPADM

## 2023-02-22 RX ORDER — ONDANSETRON 2 MG/ML
INJECTION INTRAMUSCULAR; INTRAVENOUS PRN
Status: DISCONTINUED | OUTPATIENT
Start: 2023-02-22 | End: 2023-02-22

## 2023-02-22 RX ORDER — DEXAMETHASONE SODIUM PHOSPHATE 4 MG/ML
INJECTION, SOLUTION INTRA-ARTICULAR; INTRALESIONAL; INTRAMUSCULAR; INTRAVENOUS; SOFT TISSUE PRN
Status: DISCONTINUED | OUTPATIENT
Start: 2023-02-22 | End: 2023-02-22

## 2023-02-22 RX ORDER — SODIUM CHLORIDE, SODIUM LACTATE, POTASSIUM CHLORIDE, CALCIUM CHLORIDE 600; 310; 30; 20 MG/100ML; MG/100ML; MG/100ML; MG/100ML
INJECTION, SOLUTION INTRAVENOUS CONTINUOUS
Status: DISCONTINUED | OUTPATIENT
Start: 2023-02-22 | End: 2023-02-23 | Stop reason: HOSPADM

## 2023-02-22 RX ADMIN — ONDANSETRON 4 MG: 2 INJECTION INTRAMUSCULAR; INTRAVENOUS at 08:21

## 2023-02-22 RX ADMIN — PROPOFOL 75 MCG/KG/MIN: 10 INJECTION, EMULSION INTRAVENOUS at 08:17

## 2023-02-22 RX ADMIN — FENTANYL CITRATE 50 MCG: 50 INJECTION, SOLUTION INTRAMUSCULAR; INTRAVENOUS at 08:15

## 2023-02-22 RX ADMIN — OXYCODONE HYDROCHLORIDE 5 MG: 5 TABLET ORAL at 09:01

## 2023-02-22 RX ADMIN — CEFAZOLIN SODIUM 2 G: 2 INJECTION, SOLUTION INTRAVENOUS at 08:11

## 2023-02-22 RX ADMIN — PROPOFOL 200 MCG/KG/MIN: 10 INJECTION, EMULSION INTRAVENOUS at 08:15

## 2023-02-22 RX ADMIN — SODIUM CHLORIDE, SODIUM LACTATE, POTASSIUM CHLORIDE, CALCIUM CHLORIDE: 600; 310; 30; 20 INJECTION, SOLUTION INTRAVENOUS at 06:57

## 2023-02-22 RX ADMIN — DEXAMETHASONE SODIUM PHOSPHATE 4 MG: 4 INJECTION, SOLUTION INTRA-ARTICULAR; INTRALESIONAL; INTRAMUSCULAR; INTRAVENOUS; SOFT TISSUE at 08:21

## 2023-02-22 RX ADMIN — FENTANYL CITRATE 50 MCG: 50 INJECTION, SOLUTION INTRAMUSCULAR; INTRAVENOUS at 08:24

## 2023-02-22 RX ADMIN — LIDOCAINE HYDROCHLORIDE 2 ML: 20 INJECTION, SOLUTION INFILTRATION; PERINEURAL at 08:15

## 2023-02-22 RX ADMIN — GLYCOPYRROLATE 0.2 MG: 0.2 INJECTION, SOLUTION INTRAMUSCULAR; INTRAVENOUS at 08:15

## 2023-02-22 NOTE — DISCHARGE INSTRUCTIONS
If you have any questions or concerns regarding your procedure, please contact Dr. Brunner, her office number is [phone].    You were given 5 mg of Oxycodone at 9:01 AM.    Vascular Access Port Implantation (Port Placement)    Port implantation is surgery to place (implant) a port under the skin. For vascular access, it's placed into a vein. The port allows medicines or nutrition to be sent right into your bloodstream. Blood can also be taken or given through the port. During the procedure, a long, thin tube (catheter) is threaded into one of your large veins. The tube is then attached to the port. This usually sits under the skin of your chest and causes a small bump. To use the port, a special needle is passed through your skin and into the port. The needle can stay in your skin for up to 7 days, if needed. A port can stay in place for weeks or months or longer.    After the procedure    You may be taken to a recovery room to recover from the sedation. Nurses will check on you as you rest. If you have pain, nurses can give you medicine. If you are not staying in the hospital overnight, you will be sent home a few hours after the procedure is done. A healthcare provider will tell you when you can go home. An adult family member or friend will need to drive you home.    Recovering at home    Take pain medicine as directed by your healthcare provider.  Take it easy for 24 hours after the procedure. Don't do any physical activity or heavy lifting until your healthcare provider says it s OK.  Keep the port clean and dry. Ask when you can shower again. You will need to keep the port dry by covering it when you shower.  Care for the insertion site as you are directed.  Don t swim, bathe, or do other activities that cause water to cover the insertion site.  Your port needs to be flushed at least every 4 to 6 weeks to keep it from getting blocked with blood clots. Talk with your healthcare provider about who will do  this for you.  In some cases, you may be taught how to flush it yourself.    Risks and possible complications of implantation    Bleeding  Infection of the insertion site  Damage to a blood vessel  Nerve injury or irritation  Skin breakdown over the port  Rarely, collapsed lung (for chest port placements)    Risks and possible complications of having a port    Blocked  port or catheter  Leakage or breakage of the port or catheter  The port moves out of position  Blood clot  Skin or bloodstream infection  Skin breakdown over the port    When to seek medical care    Call your healthcare provider right away if you have any of the following:  A fever of 101.5  F (38.6  C) or higher  Swelling, bleeding, or a collection of blood (hematoma) around the port insertion site  The skin near the port is red, warm, swollen, or broken  Shoulder pain or arm numbness, weakness, or tingling on the side where the port is located  You feel a heart flutter or racing heart  Swollen arm, if the port is placed in your arm  Cough, shortness of breath, or trouble taking a full, deep breath    Coping with pain    *Pain after surgery is normal and expected*    If you have pain after surgery, pain medicine will help you feel better. Take it as told, before pain becomes severe. Also, ask your healthcare provider or pharmacist about other ways to control pain. This might be with heat, ice, or relaxation. And follow any other instructions your surgeon or nurse gives you.    Tips for taking pain medicine    To get the best relief possible, remember these points:    Pain medicines can upset your stomach. Taking them with a little food may help.  Most pain relievers taken by mouth need at least 20 to 30 minutes to start to work.  Don't wait till your pain becomes severe before you take your medicine. Try to time your medicine so that you can take it before starting an activity. This might be before you get dressed, go for a walk, or sit down for  dinner.  Constipation is a common side effect of pain medicines. You can take medicines such as laxatives (Miralax) or stool softeners to help ease constipation. Drinking lots of fluids and eating foods such as fruits and vegetables that are high in fiber can also help.  Drinking alcohol and taking pain medicine can cause dizziness and slow your breathing. It can even be deadly. Don't drink alcohol while taking pain medicine.  Pain medicine can make you react more slowly to things. Don't drive or run machinery while taking pain medicine.  Your healthcare provider may tell you to take acetaminophen to help ease your pain. Ask him or her how much you are supposed to take each day. Acetaminophen or other pain relievers may interact with your prescription medicines or other over-the-counter (OTC) medicines. Some prescription medicines have acetaminophen and other ingredients. Using both prescription and OTC acetaminophen for pain can cause you to overdose. Read the labels on your OTC medicines with care. This will help you to clearly know the list of ingredients, how much to take, and any warnings. It may also help you not take too much acetaminophen. If you have questions or don't understand the information, ask your pharmacist or healthcare provider to explain it to you before you take the OTC medicine.    Discharge Instructions: After Your Surgery, regarding Anesthesia    You ve just had surgery. During surgery, you were given medicine called anesthesia to keep you relaxed and free of pain. After surgery, you may have some pain or nausea. This is common. Here are some tips for feeling better and getting well after surgery.    Going home    Your healthcare provider will show you how to take care of yourself when you go home. He or she will also answer your questions. Have an adult family member or friend drive you home. For the first 24 hours after your surgery:    Don't drive or use heavy equipment.  Don't make  important decisions or sign legal papers.  Don't drink alcohol.  Have an adult stay with you for the next 24 hours. He or she can watch for problems and help keep you safe.  Be sure to go to all follow-up visits with your healthcare provider. And rest after your surgery for as long as your healthcare provider tells you to.    Managing nausea    Some people have an upset stomach after surgery. This is often because of anesthesia, pain, or pain medicine, or the stress of surgery. These tips will help you handle nausea and eat healthy foods as you get better. If you were on a special food plan before surgery, ask your healthcare provider if you should follow it while you get better. These tips may help:    Don't push yourself to eat. Your body will tell you when to eat and how much.  Start off with clear liquids and soup. They are easier to digest.  Next try semi-solid foods, such as mashed potatoes, applesauce, and gelatin, as you feel ready.  Slowly move to solid foods. Don t eat fatty, rich, or spicy foods at first.  Don't force yourself to have 3 large meals a day. Instead eat smaller amounts more often.  Take pain medicines with a small amount of solid food, such as crackers or toast, to prevent nausea.    If you have obstructive sleep apnea    You were given anesthesia medicine during surgery to keep you comfortable and free of pain. After surgery, you may have more apnea spells because of this medicine and other medicines you were given. The spells may last longer than usual.   At home:    Keep using the continuous positive airway pressure (CPAP) device when you sleep. Unless your healthcare provider tells you not to, use it when you sleep, day or night. CPAP is a common device used to treat obstructive sleep apnea.  Talk with your provider before taking any pain medicine, muscle relaxants, or sedatives. Your provider will tell you about the possible dangers of taking these medicines.

## 2023-02-22 NOTE — ANESTHESIA POSTPROCEDURE EVALUATION
Patient: Alicia Iniguez    Procedure: Procedure(s):  Port Placement       Anesthesia Type:  MAC    Note:  Disposition: Outpatient   Postop Pain Control: Uneventful            Sign Out: Well controlled pain   PONV: No   Neuro/Psych: Uneventful            Sign Out: Acceptable/Baseline neuro status   Airway/Respiratory: Uneventful            Sign Out: Acceptable/Baseline resp. status   CV/Hemodynamics: Uneventful            Sign Out: Acceptable CV status; No obvious hypovolemia; No obvious fluid overload   Other NRE: NONE   DID A NON-ROUTINE EVENT OCCUR? No           Last vitals:  Vitals Value Taken Time   /59 02/22/23 0845   Temp 96.9  F (36.1  C) 02/22/23 0844   Pulse 81 02/22/23 0848   Resp 16 02/22/23 0844   SpO2 95 % 02/22/23 0848   Vitals shown include unvalidated device data.    Electronically Signed By: Ender Duran MD  February 22, 2023  8:52 AM

## 2023-02-22 NOTE — PROGRESS NOTES
[facility]: Cancer Care                                                                                      Pt calls in to ask questions about getting scheduled for chemo start.    She explained about the PET denial and CT + Bone scan instead - per Dr. Brunner.    Port is placed today.    Looking to schedule chemo start next week. She is willing to go to [place], [place], or [place] for start of chemo.      Signature:  Gisella Mauricio RN

## 2023-02-22 NOTE — INTERVAL H&P NOTE
"I have reviewed the surgical (or preoperative) H&P that is linked to this encounter, and examined the patient. There are no significant changes    Clinical Conditions Present on Arrival:  Clinically Significant Risk Factors Present on Admission                    # Overweight: Estimated body mass index is 29.05 kg/m  as calculated from the following:    Height as of this encounter: 1.676 m (5' 6\").    Weight as of this encounter: 81.6 kg (180 lb).       "

## 2023-02-22 NOTE — OP NOTE
Operative Note    Name:  Alicia Iniguez  PCP:  Suha Post  Procedure Date:  2/22/2023       Procedure:  Procedure(s):  Port Placement     Pre-Procedure Diagnosis:  Malignant neoplasm involving both nipple and areola of right breast in female, estrogen receptor positive (H) [C50.011, Z17.0]     Post-Procedure Diagnosis:    Same     Surgeon(s):  Maribell Brunner MD     Assistant: None        Anesthesia Type:  MAC       Findings:  Good position of catheter    Operative Report:      Patient was brought to the operating suite where she was placed in supine position.  IV sedation was administered.  The patient was prepped and draped in a sterile fashion.  After infiltration with quarter percent Marcaine, the Left subclavian vein  was accessed easily with a needle and a wire was passed.  An incision was then made further down on the chest wall and a small subcutaneous pocket was created with electrocautery.  The catheter was tunneled between the 2 points.  Under fluoroscopic guidance the introducer was then passed over the wire.  The wire was removed and the catheter passed through the introducer which was subsequently removed leaving the tip of the catheter in the right atrium.  This was all done under fluoroscopic guidance.  The catheter was then cut and hooked up to the port which is placed into the pocket.  This was accessed and blood withdrawn easily and flushed with heparinized saline.  The wound was then closed with 3-0 Vicryl to subcutaneous tissues and a subcuticular stitch of 4 Monocryl to the skin. Sterile dressings were placed.    Estimated Blood Loss:   5cc        Specimens:    None       Drains:   None    Complications:    None    Maribell Brunner MD     Date: 2/22/2023  Time: 8:40 AM

## 2023-02-22 NOTE — PROGRESS NOTES
Prior to discharge, offered to assist patient with getting dressed in accordance with [facility]'s Fall Prevention Policy ([id number]_06_104) following procedures where anesthesia medications are given. These medications can cause coordination and balance issues. This also includes assistance with bathroom usage. Educated patient about safeguards to prevent falls but patient declined/refused assistance.    Dagoberto Noland RN, 2/22/2023, 9:43 AM

## 2023-02-22 NOTE — ANESTHESIA PREPROCEDURE EVALUATION
Anesthesia Pre-Procedure Evaluation    Patient: Alicia Iniguez   MRN: [id number] : 1975        Procedure : Procedure(s):  Port Placement          History reviewed. No pertinent past medical history.   Past Surgical History:   Procedure Laterality Date     BREAST CYST ASPIRATION        No Known Allergies   Social History     Tobacco Use     Smoking status: Never     Smokeless tobacco: Never   Substance Use Topics     Alcohol use: Not Currently      Wt Readings from Last 1 Encounters:   23 81.6 kg (180 lb)        Anesthesia Evaluation   Pt has had prior anesthetic.     No history of anesthetic complications       ROS/MED HX  ENT/Pulmonary:  - neg pulmonary ROS     Neurologic:  - neg neurologic ROS     Cardiovascular:  - neg cardiovascular ROS     METS/Exercise Tolerance:     Hematologic:  - neg hematologic  ROS     Musculoskeletal:  - neg musculoskeletal ROS     GI/Hepatic:  - neg GI/hepatic ROS     Renal/Genitourinary:  - neg Renal ROS     Endo:  - neg endo ROS     Psychiatric/Substance Use:  - neg psychiatric ROS     Infectious Disease:  - neg infectious disease ROS     Malignancy:   (+) Malignancy, History of Breast.    Other:  - neg other ROS          Physical Exam    Airway  airway exam normal      Mallampati: II   TM distance: > 3 FB   Neck ROM: full   Mouth opening: > 3 cm    Respiratory Devices and Support         Dental           Cardiovascular   cardiovascular exam normal       Rhythm and rate: regular and normal     Pulmonary   pulmonary exam normal        breath sounds clear to auscultation           OUTSIDE LABS:  CBC: No results found for: WBC, HGB, HCT, PLT  BMP: No results found for: NA, POTASSIUM, CHLORIDE, CO2, BUN, CR, GLC  COAGS: No results found for: PTT, INR, FIBR  POC: No results found for: BGM, HCG, HCGS  HEPATIC: No results found for: ALBUMIN, PROTTOTAL, ALT, AST, GGT, ALKPHOS, BILITOTAL, BILIDIRECT, MARIANA  OTHER: No results found for: PH, LACT, A1C, KURT, PHOS, MAG, LIPASE,  AMYLASE, TSH, T4, T3, CRP, SED    Anesthesia Plan    ASA Status:  2      Anesthesia Type: MAC.     - Reason for MAC: immobility needed, straight local not clinically adequate              Consents    Anesthesia Plan(s) and associated risks, benefits, and realistic alternatives discussed. Questions answered and patient/representative(s) expressed understanding.    - Discussed:     - Discussed with:  Patient      - Extended Intubation/Ventilatory Support Discussed: No.      - Patient is DNR/DNI Status: No    Use of blood products discussed: No .     Postoperative Care    Pain management: IV analgesics, Oral pain medications.   PONV prophylaxis: Ondansetron (or other 5HT-3), Dexamethasone or Solumedrol     Comments:                Ender Duran MD

## 2023-02-22 NOTE — ANESTHESIA CARE TRANSFER NOTE
Patient: Alicia Iniguez    Procedure: Procedure(s):  Port Placement       Diagnosis: Malignant neoplasm involving both nipple and areola of right breast in female, estrogen receptor positive (H) [C50.011, Z17.0]  Diagnosis Additional Information: No value filed.    Anesthesia Type:   MAC     Note:    Oropharynx: oropharynx clear of all foreign objects and spontaneously breathing  Level of Consciousness: awake  Oxygen Supplementation: room air    Independent Airway: airway patency satisfactory and stable  Dentition: dentition unchanged  Vital Signs Stable: post-procedure vital signs reviewed and stable  Report to RN Given: handoff report given  Patient transferred to: Phase II    Handoff Report: Identifed the Patient, Identified the Reponsible Provider, Reviewed the pertinent medical history, Discussed the surgical course, Reviewed Intra-OP anesthesia mangement and issues during anesthesia, Set expectations for post-procedure period and Allowed opportunity for questions and acknowledgement of understanding      Vitals:  Vitals Value Taken Time   /58 02/22/23 0844   Temp 96.9  F (36.1  C) 02/22/23 0844   Pulse 81    Resp 16 02/22/23 0844   SpO2 96 % 02/22/23 0844       Electronically Signed By: KYLEIGH Rees CRNA  February 22, 2023  8:46 AM

## 2023-02-24 ENCOUNTER — HOSPITAL ENCOUNTER (OUTPATIENT)
Dept: CARDIOLOGY | Facility: HOSPITAL | Age: 48
Discharge: HOME | End: 2023-02-24
Attending: INTERNAL MEDICINE | Admitting: INTERNAL MEDICINE
Payer: COMMERCIAL

## 2023-02-24 DIAGNOSIS — C50.811 MALIGNANT NEOPLASM OF OVERLAPPING SITES OF RIGHT BREAST IN FEMALE, ESTROGEN RECEPTOR POSITIVE (H): ICD-10-CM

## 2023-02-24 DIAGNOSIS — I42.7 DRUG-INDUCED CARDIOMYOPATHY (H): ICD-10-CM

## 2023-02-24 DIAGNOSIS — Z17.0 MALIGNANT NEOPLASM OF OVERLAPPING SITES OF RIGHT BREAST IN FEMALE, ESTROGEN RECEPTOR POSITIVE (H): ICD-10-CM

## 2023-02-24 LAB — LVEF ECHO: NORMAL

## 2023-02-24 PROCEDURE — 93306 TTE W/DOPPLER COMPLETE: CPT | Mod: 26 | Performed by: INTERNAL MEDICINE

## 2023-02-24 PROCEDURE — 255N000002 HC RX 255 OP 636: Performed by: INTERNAL MEDICINE

## 2023-02-24 RX ADMIN — PERFLUTREN 2 ML: 6.52 INJECTION, SUSPENSION INTRAVENOUS at 13:20

## 2023-02-28 ENCOUNTER — PATIENT OUTREACH (OUTPATIENT)
Dept: ONCOLOGY | Facility: HOSPITAL | Age: 48
End: 2023-02-28
Payer: COMMERCIAL

## 2023-02-28 DIAGNOSIS — Z17.0 MALIGNANT NEOPLASM OF OVERLAPPING SITES OF RIGHT BREAST IN FEMALE, ESTROGEN RECEPTOR POSITIVE (H): ICD-10-CM

## 2023-02-28 DIAGNOSIS — C50.811 MALIGNANT NEOPLASM OF OVERLAPPING SITES OF RIGHT BREAST IN FEMALE, ESTROGEN RECEPTOR POSITIVE (H): ICD-10-CM

## 2023-02-28 DIAGNOSIS — T45.1X5S ADVERSE EFFECT OF ANTINEOPLASTIC AND IMMUNOSUPPRESSIVE DRUGS, SEQUELA: Primary | ICD-10-CM

## 2023-02-28 DIAGNOSIS — Z17.0 MALIGNANT NEOPLASM OF OVERLAPPING SITES OF RIGHT BREAST IN FEMALE, ESTROGEN RECEPTOR POSITIVE (H): Primary | ICD-10-CM

## 2023-02-28 DIAGNOSIS — C50.811 MALIGNANT NEOPLASM OF OVERLAPPING SITES OF RIGHT BREAST IN FEMALE, ESTROGEN RECEPTOR POSITIVE (H): Primary | ICD-10-CM

## 2023-02-28 RX ORDER — PROCHLORPERAZINE MALEATE 10 MG
10 TABLET ORAL EVERY 6 HOURS PRN
Qty: 30 TABLET | Refills: 2 | Status: SHIPPED | OUTPATIENT
Start: 2023-03-02 | End: 2023-08-08

## 2023-02-28 RX ORDER — ONDANSETRON 8 MG/1
8 TABLET, FILM COATED ORAL EVERY 8 HOURS PRN
Qty: 30 TABLET | Refills: 2 | Status: SHIPPED | OUTPATIENT
Start: 2023-03-02 | End: 2023-08-08

## 2023-02-28 RX ORDER — DEXAMETHASONE 4 MG/1
8 TABLET ORAL DAILY
Qty: 6 TABLET | Refills: 3 | Status: SHIPPED | OUTPATIENT
Start: 2023-03-02 | End: 2023-08-08

## 2023-02-28 RX ORDER — LIDOCAINE/PRILOCAINE 2.5 %-2.5%
CREAM (GRAM) TOPICAL PRN
Qty: 30 G | Refills: 1 | Status: SHIPPED | OUTPATIENT
Start: 2023-02-28 | End: 2023-11-13

## 2023-02-28 NOTE — PROGRESS NOTES
[facility]: Cancer Care Initial Note                                    Discussion with Patient:                                                      Phone call with patient to go over chemo teaching.      Antiemetics: discussed  Steroid use/side effect: discussed  Medication understanding/side effect: discussed  Port concerns: Port is still very sore nearly 1 week after placement. It is better today, but she is quite hesitant about having it accessed or used in any way at this point. Got EMLA cream RX and gave pt instructions.  Education concerns: Questions answered    Assessment:                                                      Initial  Current living arrangement:: I live in a private home with family  Informal Support system:: Children  Bed or wheelchair confined:: No  Mobility Status: Independent  Transportation means:: Regular car    Plan of Care Education Review:   Assessment completed with:: Patient    Current living arrangement  I live in a private home with family    Plan of Care Education   Diagnosis:: breast  Preparing for treatment:: Reviewed treatment preparation information with patient (vascular access, day of chemo, visitor policy, what to bring, etc.)  Vascular access:: Port  Side effect education:: Diarrhea/Constipation;Mouth sores;Nausea/Vomiting;Lab value monitoring (anemia, neutropenia, thrombocytopenia);Fatigue;Mylosuppression;Infection;Neuropathy;Hair loss;Skin changes;Urinary  Supportive services:: Nutrition;Oncology behavioral health;Social work;Palliative care;Cancer rehab;Home care  Transportation means:: Regular car  Safety/self care at home reviewed with patient:: Yes  Informal Support system:: Children  Coping - concerns/fears reviewed with patient:: No  Plan of Care:: JESSE follow-up appointment;Lab appointment;Imaging;MD follow-up appointment;Treatment schedule  When to call provider:: Bleeding;Increased shortness of breath;New/worsening pain;Shaking chills;Temperature  >100.4F;Uncontrolled diarrhea/constipation;Uncontrolled nausea/vomiting  Reasons for deferring treatment reviewed with patient:: Yes    Evaluation of Learning  Patient Education Provided: Yes  Readiness:: Eager;Acceptance         Intervention/Education provided during outreach:                                                       As above.     Patient to follow up as scheduled at next appt. Chemo starts on Thursday 3/2. Will reach out with new pt folder.    Signature:  Gisella Mauricio RN

## 2023-03-01 ENCOUNTER — TELEPHONE (OUTPATIENT)
Dept: ONCOLOGY | Facility: HOSPITAL | Age: 48
End: 2023-03-01
Payer: COMMERCIAL

## 2023-03-01 NOTE — PROGRESS NOTES
[facility] Hematology and Oncology Progress Note    Patient: Alicia Iniguez  MRN: [id number]  Date of Service: Mar 2, 2023          Reason for Visit    Chief Complaint   Patient presents with     Oncology Clinic Visit     [place] - Malignant neoplasm of overlapping sites of right breast in female, estrogen receptor positive (H)       Assessment and Plan     Cancer Staging   Malignant neoplasm of overlapping sites of right breast in female, estrogen receptor positive (H)  Staging form: Breast, AJCC 8th Edition  - Clinical: Stage IIA (cT3, cN2, cM0, G2, ER+, MD+, HER2-) - Signed by Shanelle Weaver APRN CNP on 3/1/2023    1.  Locally Advanced Breast cancer, right side, stage IIa, T3 N2 M0, grade 2, ER/MD positive, HER2/janine negative: Patient is here today to start neoadjuvant chemotherapy with dose dense Adriamycin and Cytoxan.  She will get this for 4 cycles and then follow that with 12 weekly doses of Taxol. High risk for neutropenia so she will get Neulasta.  We talked at length today about the expected side effects of both the chemotherapy as well as the Neulasta.  Patient did get some chemo education through the care coordinator.  She did give verbal consent to start today.  She understands that side effects include but are not limited to: Alopecia, nausea vomiting, diarrhea constipation, rash, heart issues, allergic reaction, headaches, myelosuppression, fatigue and weakness, peripheral neuropathy.  Patient understands that this is with curable intent.  She will return in 2 weeks for her next cycle.  I will have a care coordinator give her a call on Monday or Tuesday to see how she is doing.      ECOG Performance    0 - Independent    Distress Screening (within last 30 days)    1. How concerned are you about your ability to eat? : 0  2. How concerned are you about unintended weight loss or your current weight? : 0  3. How concerned are you about feeling depressed or very sad? : 0  4. How concerned  are you about feeling anxious or very scared? : 5  5. Do you struggle with the loss of meaning and tiago in your life? : Not at all  6. How concerned are you about work and home life issues that may be affected by your cancer? : 0  7. How concerned are you about knowing what resources are available to help you? : 0  8. Do you currently have what you would describe as Orthodoxy or spiritual struggles?            : Not at all       Pain  Pain Score: No Pain (0)    Problem List    Patient Active Problem List   Diagnosis     Malignant neoplasm of overlapping sites of right breast in female, estrogen receptor positive (H)     Malignant neoplasm involving both nipple and areola of right breast in female, estrogen receptor positive (H)     Adverse effect of antineoplastic and immunosuppressive drugs, sequela        ______________________________________________________________________________    History of Present Illness    Oncology history:  DIAGNOSIS: Breast cancer, right side, patient had a palpable lump with some nipple and skin changes.  She was also found to have lymphadenopathy in the axilla.  Patient had an ultrasound and mammogram that showed a 4.1 cm tumor with axillary lymphadenopathy  Biopsy was done January 31 and it showed invasive ductal carcinoma.  Grade 2.  ER CT positive and HER2/janine negative.  Biopsy was done of the lymph node as well and it was positive.    TREATMENT: Patient is here today to start neoadjuvant chemotherapy with dose dense Adriamycin and Cytoxan.    INTERIM HISTORY:   Is here today to start treatment.  She states that she feels very educated and ready to start.  She says she wants to just start so she can get it over with.  She denies any new bone or back pain.  Denies any changes in her breast.  Denies any infectious complaints.    Review of Systems    Pertinent items are noted in HPI.    Past History    No past medical history on file.    PHYSICAL EXAM  BP (!) 150/71 (BP Location: Left  "arm, Patient Position: Sitting, Cuff Size: Adult Regular)   Pulse 94   Temp 99.7  F (37.6  C) (Oral)   Resp 18   Ht 1.676 m (5' 6\")   Wt 86.2 kg (190 lb)   SpO2 99%   BMI 30.67 kg/m      GENERAL: no acute distress. Cooperative in conversation. Here with family and . Mask on  RESP: Regular respiratory rate. No expiratory wheezes   MUSCULOSKELETAL: no bilateral leg swelling  NEURO: non focal. Alert and oriented x3.   PSYCH: within normal limits. No depression or anxiety.  SKIN: exposed skin is dry intact.     Lab Results    Recent Results (from the past 168 hour(s))   Echocardiogram Complete   Result Value Ref Range    LVEF  60-65%    Comprehensive metabolic panel   Result Value Ref Range    Sodium 138 136 - 145 mmol/L    Potassium 4.0 3.4 - 5.3 mmol/L    Chloride 106 98 - 107 mmol/L    Carbon Dioxide (CO2) 22 22 - 29 mmol/L    Anion Gap 10 7 - 15 mmol/L    Urea Nitrogen 7.5 6.0 - 20.0 mg/dL    Creatinine 0.61 0.51 - 0.95 mg/dL    Calcium 9.1 8.6 - 10.0 mg/dL    Glucose 126 (H) 70 - 99 mg/dL    Alkaline Phosphatase 77 35 - 104 U/L    AST 20 10 - 35 U/L    ALT 16 10 - 35 U/L    Protein Total 7.1 6.4 - 8.3 g/dL    Albumin 4.3 3.5 - 5.2 g/dL    Bilirubin Total 0.2 <=1.2 mg/dL    GFR Estimate >90 >60 mL/min/1.73m2   CBC with platelets and differential   Result Value Ref Range    WBC Count 9.2 4.0 - 11.0 10e3/uL    RBC Count 4.49 3.80 - 5.20 10e6/uL    Hemoglobin 12.5 11.7 - 15.7 g/dL    Hematocrit 38.5 35.0 - 47.0 %    MCV 86 78 - 100 fL    MCH 27.8 26.5 - 33.0 pg    MCHC 32.5 31.5 - 36.5 g/dL    RDW 13.4 10.0 - 15.0 %    Platelet Count 358 150 - 450 10e3/uL    % Neutrophils 77 %    % Lymphocytes 16 %    % Monocytes 5 %    % Eosinophils 1 %    % Basophils 1 %    % Immature Granulocytes 0 %    NRBCs per 100 WBC 0 <1 /100    Absolute Neutrophils 7.2 1.6 - 8.3 10e3/uL    Absolute Lymphocytes 1.4 0.8 - 5.3 10e3/uL    Absolute Monocytes 0.5 0.0 - 1.3 10e3/uL    Absolute Eosinophils 0.1 0.0 - 0.7 10e3/uL    " Absolute Basophils 0.1 0.0 - 0.2 10e3/uL    Absolute Immature Granulocytes 0.0 <=0.4 10e3/uL    Absolute NRBCs 0.0 10e3/uL       Imaging    CT Chest/Abdomen/Pelvis w Contrast    Result Date: 2023  EXAM: CT CHEST/ABDOMEN/PELVIS W CONTRAST LOCATION: [facility] DATE/TIME: 2023 12:22 PM INDICATION: Pt with advanced Right Breast Cancer. High risk for metastatic disease. Insurance company would not offer a PET as initial evaluation. COMPARISON: 2022 CT abdomen and pelvis. TECHNIQUE: CT scan of the chest, abdomen, and pelvis was performed following injection of IV contrast. Multiplanar reformats were obtained. Dose reduction techniques were used. CONTRAST: 100ml IV Isovue 370. FINDINGS: LUNGS AND PLEURA: Normal. MEDIASTINUM/AXILLAE: There is a lobulated hyperdense mass in the central right breast involving the nipple with prominent but subcentimeter mildly hyperdense right axillary lymph nodes largest measuring 7 mm in short axis dimension. CORONARY ARTERY CALCIFICATION: None. HEPATOBILIARY: Tiny presumed cyst in the left hepatic lobe. PANCREAS: Normal. SPLEEN: Normal. ADRENAL GLANDS: Normal. KIDNEYS/BLADDER: Small nonobstructing stone lower pole left kidney. No ureteric stone or hydronephrosis. BOWEL: Normal. LYMPH NODES: Normal. VASCULATURE: Unremarkable. PELVIC ORGANS: Normal. MUSCULOSKELETAL: Normal.     IMPRESSION: 1.  Lobulated hyperdense mass in the central right breast extending to the nipple where there is some retraction. There are subcentimeter but mildly hyperdense right axillary lymph nodes having the same attenuation as the primary breast mass. Largest node measures 7 mm in short axis dimension. 2.  Tiny presumed hepatic cyst.    Echocardiogram Complete    Result Date: 2023  [id number]^[place] [address]  Name: LEE LLAMAS MRN: [id number] : 1975 Study Date: 2023  12:38 PM Age: 47 yrs Gender: Female Patient Location: [facility] Reason For Study: Malignant neoplasm of overlapping sites of right breast in femal Ordering Physician: LA UGARTE Referring Physician: LA UGARTE Performed By: YEVGENIY/DWAINE  BSA: 1.9 m2 Height: 66 in Weight: 180 lb HR: 86 BP: 138/71 mmHg ______________________________________________________________________________ Procedure Complete Echo Adult. ______________________________________________________________________________ Interpretation Summary  Normal chamber sizes There is borderline concentric left ventricular hypertrophy. The visual ejection fraction is 60-65%. No regional wall motion abnormalities noted. The right ventricle is normal in size and function. The interatrial septum bows toward the right atrium, consistent with increased left atrial pressure. Possible patent foramen ovale. There is trace to mild mitral regurgitation. There is trace tricuspid regurgitation. ______________________________________________________________________________ Left Ventricle The left ventricle is normal in size. There is borderline concentric left ventricular hypertrophy. The visual ejection fraction is 60-65%. Left ventricular diastolic function is normal. No regional wall motion abnormalities noted.  Right Ventricle The right ventricle is normal in size and function.  Atria Normal left atrial size. Right atrial size is normal. The interatrial septum bows toward the right atrium, consistent with increased left atrial pressure. Possible patent foramen ovale.  Mitral Valve Mitral valve leaflets appear normal. There is trace to mild mitral regurgitation.  Tricuspid Valve Tricuspid valve leaflets appear normal. There is trace tricuspid regurgitation.  Aortic Valve Aortic valve leaflets appear normal. There is no evidence of aortic stenosis or clinically significant aortic regurgitation.  Pulmonic Valve The pulmonic valve is normal in structure and function.   Vessels The aorta root is normal. IVC diameter and respiratory changes fall into an intermediate range suggesting an RA pressure of 8 mmHg.  Pericardium There is no pericardial effusion.  ______________________________________________________________________________ MMode/2D Measurements & Calculations IVSd: 1.2 cm LVIDd: 3.3 cm LVIDs: 1.7 cm LVPWd: 0.98 cm FS: 49.4 % LV mass(C)d: 109.0 grams LV mass(C)dI: 57.0 grams/m2  Ao root diam: 2.5 cm LA dimension: 2.7 cm LA/Ao: 1.1 LVOT diam: 2.0 cm LVOT area: 3.0 cm2 LA Volume Indexed (AL/bp): 28.0 ml/m2 RWT: 0.58  Time Measurements MM HR: 86.0 BPM  Doppler Measurements & Calculations MV E max franklin: 63.8 cm/sec MV A max franklin: 73.7 cm/sec MV E/A: 0.87  MV dec slope: 338.0 cm/sec2 MV dec time: 0.19 sec Ao V2 max: 125.1 cm/sec Ao max P.0 mmHg Ao V2 mean: 90.3 cm/sec Ao mean PG: 3.6 mmHg Ao V2 VTI: 25.4 cm ROSA M(I,D): 2.3 cm2 ROSA M(V,D): 2.3 cm2 LV V1 max PG: 3.7 mmHg LV V1 max: 96.4 cm/sec LV V1 VTI: 19.2 cm SV(LVOT): 58.5 ml SI(LVOT): 30.6 ml/m2 PA acc time: 0.15 sec AV Franklin Ratio (DI): 0.77 ROSA M Index (cm2/m2): 1.2 E/E': 5.9 E/E' av.5 Lateral E/e': 5.8 Medial E/e': 9.2 Peak E' Franklin: 10.9 cm/sec  ______________________________________________________________________________ Report approved by: Olga Calvo 2023 02:22 PM       NM Bone Scan Whole Body    Result Date: 2023  EXAM: NM BONE SCAN WHOLE BODY LOCATION: [facility] DATE/TIME: 2023 10:32 AM INDICATION: Breast cancer COMPARISON: CT the chest and pelvis dated 2023 TECHNIQUE: 25.9 mCi technetium-99m MDP, IV. Anterior and posterior delayed whole-body images at 3 hours with additional spot images of the skull. FINDINGS: Radiotracer uptake in a pattern typical of degenerative change involving the shoulders and spine. Focal radiotracer uptake in the mid anterior left tibia likely representing sequelae of prior trauma or stress related process. No suspicious areas of altered  uptake to suggest osseous metastasis.     IMPRESSION: No convincing scintigraphic evidence of osseous metastasis.     Total time spent with patient in face to face time, chart review and documentation was 45 minutes.    Complex patient undergoing chemotherapy, high risk drug therapy requiring intensive monitoring.       Signed by: KYLEIGH Fernandez CNP

## 2023-03-01 NOTE — TELEPHONE ENCOUNTER
PA Initiation    Medication: Emla Cream- PA initiated  Insurance Company: [employer] - Phone [phone] Fax [phone]  Start Date: 3/1/2023

## 2023-03-02 ENCOUNTER — ONCOLOGY VISIT (OUTPATIENT)
Dept: ONCOLOGY | Facility: HOSPITAL | Age: 48
End: 2023-03-02
Attending: INTERNAL MEDICINE
Payer: COMMERCIAL

## 2023-03-02 ENCOUNTER — INFUSION THERAPY VISIT (OUTPATIENT)
Dept: INFUSION THERAPY | Facility: HOSPITAL | Age: 48
End: 2023-03-02
Attending: INTERNAL MEDICINE
Payer: COMMERCIAL

## 2023-03-02 VITALS
OXYGEN SATURATION: 99 % | BODY MASS INDEX: 30.53 KG/M2 | TEMPERATURE: 99.7 F | DIASTOLIC BLOOD PRESSURE: 71 MMHG | SYSTOLIC BLOOD PRESSURE: 150 MMHG | WEIGHT: 190 LBS | RESPIRATION RATE: 18 BRPM | HEIGHT: 66 IN | HEART RATE: 94 BPM

## 2023-03-02 DIAGNOSIS — T45.1X5S ADVERSE EFFECT OF ANTINEOPLASTIC AND IMMUNOSUPPRESSIVE DRUGS, SEQUELA: Primary | ICD-10-CM

## 2023-03-02 DIAGNOSIS — Z17.0 MALIGNANT NEOPLASM OF OVERLAPPING SITES OF RIGHT BREAST IN FEMALE, ESTROGEN RECEPTOR POSITIVE (H): ICD-10-CM

## 2023-03-02 DIAGNOSIS — C50.811 MALIGNANT NEOPLASM OF OVERLAPPING SITES OF RIGHT BREAST IN FEMALE, ESTROGEN RECEPTOR POSITIVE (H): ICD-10-CM

## 2023-03-02 DIAGNOSIS — T45.1X5S ADVERSE EFFECT OF ANTINEOPLASTIC AND IMMUNOSUPPRESSIVE DRUGS, SEQUELA: ICD-10-CM

## 2023-03-02 DIAGNOSIS — C50.811 MALIGNANT NEOPLASM OF OVERLAPPING SITES OF RIGHT BREAST IN FEMALE, ESTROGEN RECEPTOR POSITIVE (H): Primary | ICD-10-CM

## 2023-03-02 DIAGNOSIS — Z17.0 MALIGNANT NEOPLASM OF OVERLAPPING SITES OF RIGHT BREAST IN FEMALE, ESTROGEN RECEPTOR POSITIVE (H): Primary | ICD-10-CM

## 2023-03-02 LAB
ALBUMIN SERPL BCG-MCNC: 4.3 G/DL (ref 3.5–5.2)
ALP SERPL-CCNC: 77 U/L (ref 35–104)
ALT SERPL W P-5'-P-CCNC: 16 U/L (ref 10–35)
ANION GAP SERPL CALCULATED.3IONS-SCNC: 10 MMOL/L (ref 7–15)
AST SERPL W P-5'-P-CCNC: 20 U/L (ref 10–35)
BASOPHILS # BLD AUTO: 0.1 10E3/UL (ref 0–0.2)
BASOPHILS NFR BLD AUTO: 1 %
BILIRUB SERPL-MCNC: 0.2 MG/DL
BUN SERPL-MCNC: 7.5 MG/DL (ref 6–20)
CALCIUM SERPL-MCNC: 9.1 MG/DL (ref 8.6–10)
CHLORIDE SERPL-SCNC: 106 MMOL/L (ref 98–107)
CREAT SERPL-MCNC: 0.61 MG/DL (ref 0.51–0.95)
DEPRECATED HCO3 PLAS-SCNC: 22 MMOL/L (ref 22–29)
EOSINOPHIL # BLD AUTO: 0.1 10E3/UL (ref 0–0.7)
EOSINOPHIL NFR BLD AUTO: 1 %
ERYTHROCYTE [DISTWIDTH] IN BLOOD BY AUTOMATED COUNT: 13.4 % (ref 10–15)
GFR SERPL CREATININE-BSD FRML MDRD: >90 ML/MIN/1.73M2
GLUCOSE SERPL-MCNC: 126 MG/DL (ref 70–99)
HCT VFR BLD AUTO: 38.5 % (ref 35–47)
HGB BLD-MCNC: 12.5 G/DL (ref 11.7–15.7)
IMM GRANULOCYTES # BLD: 0 10E3/UL
IMM GRANULOCYTES NFR BLD: 0 %
LYMPHOCYTES # BLD AUTO: 1.4 10E3/UL (ref 0.8–5.3)
LYMPHOCYTES NFR BLD AUTO: 16 %
MCH RBC QN AUTO: 27.8 PG (ref 26.5–33)
MCHC RBC AUTO-ENTMCNC: 32.5 G/DL (ref 31.5–36.5)
MCV RBC AUTO: 86 FL (ref 78–100)
MONOCYTES # BLD AUTO: 0.5 10E3/UL (ref 0–1.3)
MONOCYTES NFR BLD AUTO: 5 %
NEUTROPHILS # BLD AUTO: 7.2 10E3/UL (ref 1.6–8.3)
NEUTROPHILS NFR BLD AUTO: 77 %
NRBC # BLD AUTO: 0 10E3/UL
NRBC BLD AUTO-RTO: 0 /100
PLATELET # BLD AUTO: 358 10E3/UL (ref 150–450)
POTASSIUM SERPL-SCNC: 4 MMOL/L (ref 3.4–5.3)
PROT SERPL-MCNC: 7.1 G/DL (ref 6.4–8.3)
RBC # BLD AUTO: 4.49 10E6/UL (ref 3.8–5.2)
SODIUM SERPL-SCNC: 138 MMOL/L (ref 136–145)
WBC # BLD AUTO: 9.2 10E3/UL (ref 4–11)

## 2023-03-02 PROCEDURE — 96413 CHEMO IV INFUSION 1 HR: CPT

## 2023-03-02 PROCEDURE — 80053 COMPREHEN METABOLIC PANEL: CPT | Performed by: INTERNAL MEDICINE

## 2023-03-02 PROCEDURE — 85018 HEMOGLOBIN: CPT | Performed by: INTERNAL MEDICINE

## 2023-03-02 PROCEDURE — 99212 OFFICE O/P EST SF 10 MIN: CPT | Mod: 25 | Performed by: NURSE PRACTITIONER

## 2023-03-02 PROCEDURE — 96372 THER/PROPH/DIAG INJ SC/IM: CPT | Performed by: NURSE PRACTITIONER

## 2023-03-02 PROCEDURE — 250N000011 HC RX IP 250 OP 636: Performed by: NURSE PRACTITIONER

## 2023-03-02 PROCEDURE — 96411 CHEMO IV PUSH ADDL DRUG: CPT

## 2023-03-02 PROCEDURE — 96377 APPLICATON ON-BODY INJECTOR: CPT | Mod: XS

## 2023-03-02 PROCEDURE — 99215 OFFICE O/P EST HI 40 MIN: CPT | Performed by: NURSE PRACTITIONER

## 2023-03-02 PROCEDURE — 96375 TX/PRO/DX INJ NEW DRUG ADDON: CPT

## 2023-03-02 PROCEDURE — 258N000003 HC RX IP 258 OP 636: Performed by: NURSE PRACTITIONER

## 2023-03-02 PROCEDURE — 36415 COLL VENOUS BLD VENIPUNCTURE: CPT | Performed by: INTERNAL MEDICINE

## 2023-03-02 PROCEDURE — 96367 TX/PROPH/DG ADDL SEQ IV INF: CPT

## 2023-03-02 RX ORDER — ALBUTEROL SULFATE 0.83 MG/ML
2.5 SOLUTION RESPIRATORY (INHALATION)
Status: CANCELLED | OUTPATIENT
Start: 2023-03-03

## 2023-03-02 RX ORDER — HEPARIN SODIUM,PORCINE 10 UNIT/ML
5 VIAL (ML) INTRAVENOUS
Status: CANCELLED | OUTPATIENT
Start: 2023-03-17

## 2023-03-02 RX ORDER — HEPARIN SODIUM (PORCINE) LOCK FLUSH IV SOLN 100 UNIT/ML 100 UNIT/ML
5 SOLUTION INTRAVENOUS
Status: CANCELLED | OUTPATIENT
Start: 2023-03-03

## 2023-03-02 RX ORDER — HEPARIN SODIUM (PORCINE) LOCK FLUSH IV SOLN 100 UNIT/ML 100 UNIT/ML
5 SOLUTION INTRAVENOUS
Status: CANCELLED | OUTPATIENT
Start: 2023-03-17

## 2023-03-02 RX ORDER — METHYLPREDNISOLONE SODIUM SUCCINATE 125 MG/2ML
125 INJECTION, POWDER, LYOPHILIZED, FOR SOLUTION INTRAMUSCULAR; INTRAVENOUS
Status: CANCELLED
Start: 2023-03-17

## 2023-03-02 RX ORDER — DOXORUBICIN HYDROCHLORIDE 2 MG/ML
60 INJECTION, SOLUTION INTRAVENOUS ONCE
Status: COMPLETED | OUTPATIENT
Start: 2023-03-02 | End: 2023-03-02

## 2023-03-02 RX ORDER — ALBUTEROL SULFATE 90 UG/1
1-2 AEROSOL, METERED RESPIRATORY (INHALATION)
Status: CANCELLED
Start: 2023-03-03

## 2023-03-02 RX ORDER — ALBUTEROL SULFATE 0.83 MG/ML
2.5 SOLUTION RESPIRATORY (INHALATION)
Status: CANCELLED | OUTPATIENT
Start: 2023-03-17

## 2023-03-02 RX ORDER — HEPARIN SODIUM,PORCINE 10 UNIT/ML
5 VIAL (ML) INTRAVENOUS
Status: CANCELLED | OUTPATIENT
Start: 2023-03-03

## 2023-03-02 RX ORDER — DIPHENHYDRAMINE HYDROCHLORIDE 50 MG/ML
50 INJECTION INTRAMUSCULAR; INTRAVENOUS
Status: CANCELLED
Start: 2023-03-17

## 2023-03-02 RX ORDER — DOXORUBICIN HYDROCHLORIDE 2 MG/ML
60 INJECTION, SOLUTION INTRAVENOUS ONCE
Status: CANCELLED | OUTPATIENT
Start: 2023-03-17

## 2023-03-02 RX ORDER — ALBUTEROL SULFATE 90 UG/1
1-2 AEROSOL, METERED RESPIRATORY (INHALATION)
Status: CANCELLED
Start: 2023-03-17

## 2023-03-02 RX ORDER — EPINEPHRINE 1 MG/ML
0.3 INJECTION, SOLUTION INTRAMUSCULAR; SUBCUTANEOUS EVERY 5 MIN PRN
Status: CANCELLED | OUTPATIENT
Start: 2023-03-03

## 2023-03-02 RX ORDER — EPINEPHRINE 1 MG/ML
0.3 INJECTION, SOLUTION INTRAMUSCULAR; SUBCUTANEOUS EVERY 5 MIN PRN
Status: CANCELLED | OUTPATIENT
Start: 2023-03-17

## 2023-03-02 RX ORDER — PALONOSETRON 0.05 MG/ML
0.25 INJECTION, SOLUTION INTRAVENOUS ONCE
Status: CANCELLED | OUTPATIENT
Start: 2023-03-03

## 2023-03-02 RX ORDER — LORAZEPAM 2 MG/ML
0.5 INJECTION INTRAMUSCULAR EVERY 4 HOURS PRN
Status: CANCELLED | OUTPATIENT
Start: 2023-03-17

## 2023-03-02 RX ORDER — DOXORUBICIN HYDROCHLORIDE 2 MG/ML
60 INJECTION, SOLUTION INTRAVENOUS ONCE
Status: CANCELLED | OUTPATIENT
Start: 2023-03-03

## 2023-03-02 RX ORDER — MEPERIDINE HYDROCHLORIDE 25 MG/ML
25 INJECTION INTRAMUSCULAR; INTRAVENOUS; SUBCUTANEOUS EVERY 30 MIN PRN
Status: CANCELLED | OUTPATIENT
Start: 2023-03-03

## 2023-03-02 RX ORDER — HEPARIN SODIUM (PORCINE) LOCK FLUSH IV SOLN 100 UNIT/ML 100 UNIT/ML
5 SOLUTION INTRAVENOUS
Status: DISCONTINUED | OUTPATIENT
Start: 2023-03-02 | End: 2023-03-02 | Stop reason: HOSPADM

## 2023-03-02 RX ORDER — METHYLPREDNISOLONE SODIUM SUCCINATE 125 MG/2ML
125 INJECTION, POWDER, LYOPHILIZED, FOR SOLUTION INTRAMUSCULAR; INTRAVENOUS
Status: CANCELLED
Start: 2023-03-03

## 2023-03-02 RX ORDER — LORAZEPAM 2 MG/ML
0.5 INJECTION INTRAMUSCULAR EVERY 4 HOURS PRN
Status: CANCELLED | OUTPATIENT
Start: 2023-03-03

## 2023-03-02 RX ORDER — MEPERIDINE HYDROCHLORIDE 25 MG/ML
25 INJECTION INTRAMUSCULAR; INTRAVENOUS; SUBCUTANEOUS EVERY 30 MIN PRN
Status: CANCELLED | OUTPATIENT
Start: 2023-03-17

## 2023-03-02 RX ORDER — PALONOSETRON 0.05 MG/ML
0.25 INJECTION, SOLUTION INTRAVENOUS ONCE
Status: COMPLETED | OUTPATIENT
Start: 2023-03-02 | End: 2023-03-02

## 2023-03-02 RX ORDER — DIPHENHYDRAMINE HYDROCHLORIDE 50 MG/ML
50 INJECTION INTRAMUSCULAR; INTRAVENOUS
Status: CANCELLED
Start: 2023-03-03

## 2023-03-02 RX ORDER — PALONOSETRON 0.05 MG/ML
0.25 INJECTION, SOLUTION INTRAVENOUS ONCE
Status: CANCELLED | OUTPATIENT
Start: 2023-03-17

## 2023-03-02 RX ADMIN — Medication 5 ML: at 13:40

## 2023-03-02 RX ADMIN — CYCLOPHOSPHAMIDE 1195 MG: 1 INJECTION, POWDER, FOR SOLUTION INTRAVENOUS; ORAL at 12:49

## 2023-03-02 RX ADMIN — DOXORUBICIN HYDROCHLORIDE 120 MG: 2 INJECTION, SOLUTION INTRAVENOUS at 12:36

## 2023-03-02 RX ADMIN — PALONOSETRON 0.25 MG: 0.05 INJECTION, SOLUTION INTRAVENOUS at 12:01

## 2023-03-02 RX ADMIN — PEGFILGRASTIM 6 MG: KIT SUBCUTANEOUS at 12:59

## 2023-03-02 RX ADMIN — DEXAMETHASONE SODIUM PHOSPHATE: 10 INJECTION, SOLUTION INTRAMUSCULAR; INTRAVENOUS at 12:08

## 2023-03-02 RX ADMIN — SODIUM CHLORIDE 250 ML: 9 INJECTION, SOLUTION INTRAVENOUS at 11:59

## 2023-03-02 ASSESSMENT — PAIN SCALES - GENERAL: PAINLEVEL: NO PAIN (0)

## 2023-03-02 NOTE — PROGRESS NOTES
PT here ambulatory for first treatment. Labs drawn from port and results approved for txt. Orientated pt and family to txt area. Reviewed txt/ possible side effects and how to manage at home. Each medication given reviewed with pt. Premeds administered followed by Adriamycin ivp. Positive blood return throughout adriamycin ivp. Cytoxan infused over 45 minutes with concurrent ns at 100 ml per hour. PT still had sinus pressure during cytoxan towards end of infusion.Neulasta onpro video watched by pt and family. Onpro placed on right abdomen and reviewed it will start around 415 tomorrow and be completed around 5. Follow up reviewed. Txt completed and pt tolerated without any problems. txt completed and port flushed/deaccessed with 2x2 to site. Pt dc'd steady gait

## 2023-03-02 NOTE — LETTER
3/2/2023         RE: Alicia Inigeuz  [address] [facility] [address]        Dear Colleague,    Thank you for referring your patient, Alicia Iniguez, to the [facility] [place]. Please see a copy of my visit note below.    [facility] Progress Note    Patient: Alicia Iniguez  MRN: [id number]  Date of Service: Mar 2, 2023          Reason for Visit    Chief Complaint   Patient presents with     Oncology Clinic Visit     [place] - Malignant neoplasm of overlapping sites of right breast in female, estrogen receptor positive (H)       Assessment and Plan     Cancer Staging   Malignant neoplasm of overlapping sites of right breast in female, estrogen receptor positive (H)  Staging form: Breast, AJCC 8th Edition  - Clinical: Stage IIA (cT3, cN2, cM0, G2, ER+, NJ+, HER2-) - Signed by Shanelle Weaver APRN CNP on 3/1/2023    1.  Locally Advanced Breast cancer, right side, stage IIa, T3 N2 M0, grade 2, ER/NJ positive, HER2/janine negative: Patient is here today to start neoadjuvant chemotherapy with dose dense Adriamycin and Cytoxan.  She will get this for 4 cycles and then follow that with 12 weekly doses of Taxol. High risk for neutropenia so she will get Neulasta.  We talked at length today about the expected side effects of both the chemotherapy as well as the Neulasta.  Patient did get some chemo education through the care coordinator.  She did give verbal consent to start today.  She understands that side effects include but are not limited to: Alopecia, nausea vomiting, diarrhea constipation, rash, heart issues, allergic reaction, headaches, myelosuppression, fatigue and weakness, peripheral neuropathy.  Patient understands that this is with curable intent.  She will return in 2 weeks for her next cycle.  I will have a care coordinator give her a call on Monday or Tuesday to see how she is doing.      ECOG Performance    0 - Independent    Distress  Screening (within last 30 days)    1. How concerned are you about your ability to eat? : 0  2. How concerned are you about unintended weight loss or your current weight? : 0  3. How concerned are you about feeling depressed or very sad? : 0  4. How concerned are you about feeling anxious or very scared? : 5  5. Do you struggle with the loss of meaning and tiago in your life? : Not at all  6. How concerned are you about work and home life issues that may be affected by your cancer? : 0  7. How concerned are you about knowing what resources are available to help you? : 0  8. Do you currently have what you would describe as Yarsanism or spiritual struggles?            : Not at all       Pain  Pain Score: No Pain (0)    Problem List    Patient Active Problem List   Diagnosis     Malignant neoplasm of overlapping sites of right breast in female, estrogen receptor positive (H)     Malignant neoplasm involving both nipple and areola of right breast in female, estrogen receptor positive (H)     Adverse effect of antineoplastic and immunosuppressive drugs, sequela        ______________________________________________________________________________    History of Present Illness    Oncology history:  DIAGNOSIS: Breast cancer, right side, patient had a palpable lump with some nipple and skin changes.  She was also found to have lymphadenopathy in the axilla.  Patient had an ultrasound and mammogram that showed a 4.1 cm tumor with axillary lymphadenopathy  Biopsy was done January 31 and it showed invasive ductal carcinoma.  Grade 2.  ER ID positive and HER2/janine negative.  Biopsy was done of the lymph node as well and it was positive.    TREATMENT: Patient is here today to start neoadjuvant chemotherapy with dose dense Adriamycin and Cytoxan.    INTERIM HISTORY:   Is here today to start treatment.  She states that she feels very educated and ready to start.  She says she wants to just start so she can get it over with.  She denies  "any new bone or back pain.  Denies any changes in her breast.  Denies any infectious complaints.    Review of Systems    Pertinent items are noted in HPI.    Past History    No past medical history on file.    PHYSICAL EXAM  BP (!) 150/71 (BP Location: Left arm, Patient Position: Sitting, Cuff Size: Adult Regular)   Pulse 94   Temp 99.7  F (37.6  C) (Oral)   Resp 18   Ht 1.676 m (5' 6\")   Wt 86.2 kg (190 lb)   SpO2 99%   BMI 30.67 kg/m      GENERAL: no acute distress. Cooperative in conversation. Here with family and . Mask on  RESP: Regular respiratory rate. No expiratory wheezes   MUSCULOSKELETAL: no bilateral leg swelling  NEURO: non focal. Alert and oriented x3.   PSYCH: within normal limits. No depression or anxiety.  SKIN: exposed skin is dry intact.     Lab Results    Recent Results (from the past 168 hour(s))   Echocardiogram Complete   Result Value Ref Range    LVEF  60-65%    Comprehensive metabolic panel   Result Value Ref Range    Sodium 138 136 - 145 mmol/L    Potassium 4.0 3.4 - 5.3 mmol/L    Chloride 106 98 - 107 mmol/L    Carbon Dioxide (CO2) 22 22 - 29 mmol/L    Anion Gap 10 7 - 15 mmol/L    Urea Nitrogen 7.5 6.0 - 20.0 mg/dL    Creatinine 0.61 0.51 - 0.95 mg/dL    Calcium 9.1 8.6 - 10.0 mg/dL    Glucose 126 (H) 70 - 99 mg/dL    Alkaline Phosphatase 77 35 - 104 U/L    AST 20 10 - 35 U/L    ALT 16 10 - 35 U/L    Protein Total 7.1 6.4 - 8.3 g/dL    Albumin 4.3 3.5 - 5.2 g/dL    Bilirubin Total 0.2 <=1.2 mg/dL    GFR Estimate >90 >60 mL/min/1.73m2   CBC with platelets and differential   Result Value Ref Range    WBC Count 9.2 4.0 - 11.0 10e3/uL    RBC Count 4.49 3.80 - 5.20 10e6/uL    Hemoglobin 12.5 11.7 - 15.7 g/dL    Hematocrit 38.5 35.0 - 47.0 %    MCV 86 78 - 100 fL    MCH 27.8 26.5 - 33.0 pg    MCHC 32.5 31.5 - 36.5 g/dL    RDW 13.4 10.0 - 15.0 %    Platelet Count 358 150 - 450 10e3/uL    % Neutrophils 77 %    % Lymphocytes 16 %    % Monocytes 5 %    % Eosinophils 1 %    % Basophils " 1 %    % Immature Granulocytes 0 %    NRBCs per 100 WBC 0 <1 /100    Absolute Neutrophils 7.2 1.6 - 8.3 10e3/uL    Absolute Lymphocytes 1.4 0.8 - 5.3 10e3/uL    Absolute Monocytes 0.5 0.0 - 1.3 10e3/uL    Absolute Eosinophils 0.1 0.0 - 0.7 10e3/uL    Absolute Basophils 0.1 0.0 - 0.2 10e3/uL    Absolute Immature Granulocytes 0.0 <=0.4 10e3/uL    Absolute NRBCs 0.0 10e3/uL       Imaging    CT Chest/Abdomen/Pelvis w Contrast    Result Date: 2/13/2023  EXAM: CT CHEST/ABDOMEN/PELVIS W CONTRAST LOCATION: [facility] DATE/TIME: 2/13/2023 12:22 PM INDICATION: Pt with advanced Right Breast Cancer. High risk for metastatic disease. Insurance company would not offer a PET as initial evaluation. COMPARISON: 04/27/2022 CT abdomen and pelvis. TECHNIQUE: CT scan of the chest, abdomen, and pelvis was performed following injection of IV contrast. Multiplanar reformats were obtained. Dose reduction techniques were used. CONTRAST: 100ml IV Isovue 370. FINDINGS: LUNGS AND PLEURA: Normal. MEDIASTINUM/AXILLAE: There is a lobulated hyperdense mass in the central right breast involving the nipple with prominent but subcentimeter mildly hyperdense right axillary lymph nodes largest measuring 7 mm in short axis dimension. CORONARY ARTERY CALCIFICATION: None. HEPATOBILIARY: Tiny presumed cyst in the left hepatic lobe. PANCREAS: Normal. SPLEEN: Normal. ADRENAL GLANDS: Normal. KIDNEYS/BLADDER: Small nonobstructing stone lower pole left kidney. No ureteric stone or hydronephrosis. BOWEL: Normal. LYMPH NODES: Normal. VASCULATURE: Unremarkable. PELVIC ORGANS: Normal. MUSCULOSKELETAL: Normal.     IMPRESSION: 1.  Lobulated hyperdense mass in the central right breast extending to the nipple where there is some retraction. There are subcentimeter but mildly hyperdense right axillary lymph nodes having the same attenuation as the primary breast mass. Largest node measures 7 mm in short axis dimension. 2.  Tiny presumed hepatic  cyst.    Echocardiogram Complete    Result Date: 2023  [id number]^[place] [address]  Name: LEE LLAMAS MRN: [id number] : 1975 Study Date: 2023 12:38 PM Age: 47 yrs Gender: Female Patient Location: [facility] Reason For Study: Malignant neoplasm of overlapping sites of right breast in femal Ordering Physician: LA UGARTE Referring Physician: LA UGARTE Performed By: YEVGENIY/DWAINE  BSA: 1.9 m2 Height: 66 in Weight: 180 lb HR: 86 BP: 138/71 mmHg ______________________________________________________________________________ Procedure Complete Echo Adult. ______________________________________________________________________________ Interpretation Summary  Normal chamber sizes There is borderline concentric left ventricular hypertrophy. The visual ejection fraction is 60-65%. No regional wall motion abnormalities noted. The right ventricle is normal in size and function. The interatrial septum bows toward the right atrium, consistent with increased left atrial pressure. Possible patent foramen ovale. There is trace to mild mitral regurgitation. There is trace tricuspid regurgitation. ______________________________________________________________________________ Left Ventricle The left ventricle is normal in size. There is borderline concentric left ventricular hypertrophy. The visual ejection fraction is 60-65%. Left ventricular diastolic function is normal. No regional wall motion abnormalities noted.  Right Ventricle The right ventricle is normal in size and function.  Atria Normal left atrial size. Right atrial size is normal. The interatrial septum bows toward the right atrium, consistent with increased left atrial pressure. Possible patent foramen ovale.  Mitral Valve Mitral valve leaflets appear normal. There is trace to mild mitral regurgitation.  Tricuspid Valve Tricuspid valve leaflets appear  normal. There is trace tricuspid regurgitation.  Aortic Valve Aortic valve leaflets appear normal. There is no evidence of aortic stenosis or clinically significant aortic regurgitation.  Pulmonic Valve The pulmonic valve is normal in structure and function.  Vessels The aorta root is normal. IVC diameter and respiratory changes fall into an intermediate range suggesting an RA pressure of 8 mmHg.  Pericardium There is no pericardial effusion.  ______________________________________________________________________________ MMode/2D Measurements & Calculations IVSd: 1.2 cm LVIDd: 3.3 cm LVIDs: 1.7 cm LVPWd: 0.98 cm FS: 49.4 % LV mass(C)d: 109.0 grams LV mass(C)dI: 57.0 grams/m2  Ao root diam: 2.5 cm LA dimension: 2.7 cm LA/Ao: 1.1 LVOT diam: 2.0 cm LVOT area: 3.0 cm2 LA Volume Indexed (AL/bp): 28.0 ml/m2 RWT: 0.58  Time Measurements MM HR: 86.0 BPM  Doppler Measurements & Calculations MV E max franklin: 63.8 cm/sec MV A max franklin: 73.7 cm/sec MV E/A: 0.87  MV dec slope: 338.0 cm/sec2 MV dec time: 0.19 sec Ao V2 max: 125.1 cm/sec Ao max P.0 mmHg Ao V2 mean: 90.3 cm/sec Ao mean PG: 3.6 mmHg Ao V2 VTI: 25.4 cm ROSA M(I,D): 2.3 cm2 ROSA M(V,D): 2.3 cm2 LV V1 max PG: 3.7 mmHg LV V1 max: 96.4 cm/sec LV V1 VTI: 19.2 cm SV(LVOT): 58.5 ml SI(LVOT): 30.6 ml/m2 PA acc time: 0.15 sec AV Franklin Ratio (DI): 0.77 ROSA M Index (cm2/m2): 1.2 E/E': 5.9 E/E' av.5 Lateral E/e': 5.8 Medial E/e': 9.2 Peak E' Franklin: 10.9 cm/sec  ______________________________________________________________________________ Report approved by: Olga Calvo 2023 02:22 PM       NM Bone Scan Whole Body    Result Date: 2023  EXAM: NM BONE SCAN WHOLE BODY LOCATION: [facility] DATE/TIME: 2023 10:32 AM INDICATION: Breast cancer COMPARISON: CT the chest and pelvis dated 2023 TECHNIQUE: 25.9 mCi technetium-99m MDP, IV. Anterior and posterior delayed whole-body images at 3 hours with additional spot images of the skull. FINDINGS:  Radiotracer uptake in a pattern typical of degenerative change involving the shoulders and spine. Focal radiotracer uptake in the mid anterior left tibia likely representing sequelae of prior trauma or stress related process. No suspicious areas of altered uptake to suggest osseous metastasis.     IMPRESSION: No convincing scintigraphic evidence of osseous metastasis.     Total time spent with patient in face to face time, chart review and documentation was 45 minutes.    Complex patient undergoing chemotherapy, high risk drug therapy requiring intensive monitoring.       Signed by: KYLEIGH Fernandez CNP      Again, thank you for allowing me to participate in the care of your patient.        Sincerely,        KYLEIGH Fernandez CNP

## 2023-03-09 ENCOUNTER — PATIENT OUTREACH (OUTPATIENT)
Dept: ONCOLOGY | Facility: HOSPITAL | Age: 48
End: 2023-03-09
Payer: COMMERCIAL

## 2023-03-09 NOTE — PROGRESS NOTES
Addendum 2:30:    Pt returned call. She is doing well.   [facility]  SITUATION                                                      Admission:    Admission Date: 03/02/23   Reason for Admission: Chemotherapy start D1 C1  Discharge:   Discharge Date: 03/02/23  Discharge Diagnosis: same    BACKGROUND                                                      Per hospital discharge summary and inpatient provider notes:  Chemo D1 C1    ASSESSMENT         Discharge Assessment  How are you doing now that you are home?: good  How are your symptoms? (Red Flag symptoms escalate to triage hotline per guidelines): Improved  Do you feel your condition is stable enough to be safe at home until your provider visit?: Yes  Does the patient have their discharge instructions? : Yes  Does the patient have questions regarding their discharge instructions? : No  Were you started on any new medications or were there changes to any of your previous medications? : Yes  Does the patient have all of their medications?: Yes  Do you have questions regarding any of your medications? : No  Do you have all of your needed medical supplies or equipment (DME)?  (i.e. oxygen tank, CPAP, cane, etc.): Yes  Discharge follow-up appointment scheduled within 14 calendar days? : Yes  Discharge Follow Up Appointment Date: 03/10/23  Discharge Follow Up Appointment Scheduled with?: Specialty Care Provider       Post-op (Clinicians Only)  Fever: No  Chills: No  Eating & Drinking: eating and drinking without complaints/concerns  PO Intake: regular diet  Additional Symptoms: nausea (Very small amount. Took anti emetics only twice on seperate days.)  Bowel Function: normal  Urinary Status: voiding without complaint/concerns  Fatigue:  Mostly very fatigued, which was noticeable on Monday & Tuesday. It got worse on Tuesday evening & Wednesday. It is better today.     Cancer Care  No assessment indicated    PLAN                                                       Outpatient Plan:  As scheduled  We discussed when her next appointments are scheduled. Next week she will bring her daughter who is a PA student.       Future Appointments   Date Time Provider Department Center   3/17/2023  1:00 PM N CHEMO LAB DRAW 2 [facility]   3/17/2023  1:30 PM Shanelle Weaver APRN CNP [facility]   3/17/2023  2:00 PM N CHEMO CHAIR 4 [facility]   3/31/2023  8:30 AM [facility] CHEMO LAB DRAW 2 [facility]   3/31/2023  9:00 AM Paola Saleem MD [facility]   3/31/2023  9:30 AM N CHEMO CHAIR 6 [facility]   5/12/2023  9:00 AM Rihsabh Jarvis GC [facility]         For any urgent concerns, please contact our 24 hour clinic line:   Andreas: [phone]       Gisella Mauricio RN    9:04 AM:  [facility]: Cancer Care                                                                                      Called patient for wellbeing check. No answer, left message with invitation to call back and update how she is doing.      Signature:  Gisella Mauricio RN

## 2023-03-13 ENCOUNTER — LAB (OUTPATIENT)
Dept: LAB | Facility: CLINIC | Age: 48
End: 2023-03-13
Payer: COMMERCIAL

## 2023-03-13 DIAGNOSIS — C50.011 MALIGNANT NEOPLASM INVOLVING BOTH NIPPLE AND AREOLA OF RIGHT BREAST IN FEMALE, ESTROGEN RECEPTOR POSITIVE (H): Primary | ICD-10-CM

## 2023-03-13 DIAGNOSIS — Z17.0 MALIGNANT NEOPLASM INVOLVING BOTH NIPPLE AND AREOLA OF RIGHT BREAST IN FEMALE, ESTROGEN RECEPTOR POSITIVE (H): Primary | ICD-10-CM

## 2023-03-13 PROCEDURE — G0452 MOLECULAR PATHOLOGY INTERPR: HCPCS | Mod: 26 | Performed by: PATHOLOGY

## 2023-03-13 PROCEDURE — 81162 BRCA1&2 GEN FULL SEQ DUP/DEL: CPT | Performed by: SPECIALIST

## 2023-03-17 ENCOUNTER — LAB (OUTPATIENT)
Dept: INFUSION THERAPY | Facility: HOSPITAL | Age: 48
End: 2023-03-17
Attending: INTERNAL MEDICINE
Payer: COMMERCIAL

## 2023-03-17 ENCOUNTER — ONCOLOGY VISIT (OUTPATIENT)
Dept: ONCOLOGY | Facility: HOSPITAL | Age: 48
End: 2023-03-17
Attending: INTERNAL MEDICINE
Payer: COMMERCIAL

## 2023-03-17 VITALS
HEART RATE: 100 BPM | WEIGHT: 191.3 LBS | SYSTOLIC BLOOD PRESSURE: 135 MMHG | HEIGHT: 66 IN | RESPIRATION RATE: 16 BRPM | BODY MASS INDEX: 30.74 KG/M2 | OXYGEN SATURATION: 97 % | DIASTOLIC BLOOD PRESSURE: 63 MMHG | TEMPERATURE: 98.2 F

## 2023-03-17 DIAGNOSIS — C50.811 MALIGNANT NEOPLASM OF OVERLAPPING SITES OF RIGHT BREAST IN FEMALE, ESTROGEN RECEPTOR POSITIVE (H): ICD-10-CM

## 2023-03-17 DIAGNOSIS — Z17.0 MALIGNANT NEOPLASM OF OVERLAPPING SITES OF RIGHT BREAST IN FEMALE, ESTROGEN RECEPTOR POSITIVE (H): ICD-10-CM

## 2023-03-17 DIAGNOSIS — T45.1X5S ADVERSE EFFECT OF ANTINEOPLASTIC AND IMMUNOSUPPRESSIVE DRUGS, SEQUELA: Primary | ICD-10-CM

## 2023-03-17 LAB
BASOPHILS # BLD MANUAL: 0.1 10E3/UL (ref 0–0.2)
BASOPHILS NFR BLD MANUAL: 1 %
EOSINOPHIL # BLD MANUAL: 0 10E3/UL (ref 0–0.7)
EOSINOPHIL NFR BLD MANUAL: 0 %
ERYTHROCYTE [DISTWIDTH] IN BLOOD BY AUTOMATED COUNT: 14 % (ref 10–15)
HCT VFR BLD AUTO: 35 % (ref 35–47)
HGB BLD-MCNC: 11.7 G/DL (ref 11.7–15.7)
LYMPHOCYTES # BLD MANUAL: 1.6 10E3/UL (ref 0.8–5.3)
LYMPHOCYTES NFR BLD MANUAL: 22 %
MCH RBC QN AUTO: 28.4 PG (ref 26.5–33)
MCHC RBC AUTO-ENTMCNC: 33.4 G/DL (ref 31.5–36.5)
MCV RBC AUTO: 85 FL (ref 78–100)
METAMYELOCYTES # BLD MANUAL: 0.2 10E3/UL
METAMYELOCYTES NFR BLD MANUAL: 3 %
MONOCYTES # BLD MANUAL: 0.1 10E3/UL (ref 0–1.3)
MONOCYTES NFR BLD MANUAL: 2 %
NEUTROPHILS # BLD MANUAL: 5.2 10E3/UL (ref 1.6–8.3)
NEUTROPHILS NFR BLD MANUAL: 72 %
PLAT MORPH BLD: ABNORMAL
PLATELET # BLD AUTO: 355 10E3/UL (ref 150–450)
RBC # BLD AUTO: 4.12 10E6/UL (ref 3.8–5.2)
RBC MORPH BLD: ABNORMAL
WBC # BLD AUTO: 7.2 10E3/UL (ref 4–11)

## 2023-03-17 PROCEDURE — 96367 TX/PROPH/DG ADDL SEQ IV INF: CPT

## 2023-03-17 PROCEDURE — 250N000011 HC RX IP 250 OP 636: Performed by: NURSE PRACTITIONER

## 2023-03-17 PROCEDURE — 85027 COMPLETE CBC AUTOMATED: CPT | Performed by: NURSE PRACTITIONER

## 2023-03-17 PROCEDURE — 96372 THER/PROPH/DIAG INJ SC/IM: CPT | Performed by: NURSE PRACTITIONER

## 2023-03-17 PROCEDURE — 96411 CHEMO IV PUSH ADDL DRUG: CPT

## 2023-03-17 PROCEDURE — 99212 OFFICE O/P EST SF 10 MIN: CPT | Mod: 25 | Performed by: NURSE PRACTITIONER

## 2023-03-17 PROCEDURE — 96375 TX/PRO/DX INJ NEW DRUG ADDON: CPT

## 2023-03-17 PROCEDURE — 96377 APPLICATON ON-BODY INJECTOR: CPT

## 2023-03-17 PROCEDURE — 85007 BL SMEAR W/DIFF WBC COUNT: CPT | Performed by: NURSE PRACTITIONER

## 2023-03-17 PROCEDURE — 258N000003 HC RX IP 258 OP 636: Performed by: NURSE PRACTITIONER

## 2023-03-17 PROCEDURE — 96413 CHEMO IV INFUSION 1 HR: CPT

## 2023-03-17 PROCEDURE — 99214 OFFICE O/P EST MOD 30 MIN: CPT | Performed by: NURSE PRACTITIONER

## 2023-03-17 RX ORDER — METHYLPREDNISOLONE SODIUM SUCCINATE 125 MG/2ML
125 INJECTION, POWDER, LYOPHILIZED, FOR SOLUTION INTRAMUSCULAR; INTRAVENOUS
Status: DISCONTINUED | OUTPATIENT
Start: 2023-03-17 | End: 2023-03-17 | Stop reason: HOSPADM

## 2023-03-17 RX ORDER — DIPHENHYDRAMINE HYDROCHLORIDE 50 MG/ML
50 INJECTION INTRAMUSCULAR; INTRAVENOUS
Status: DISCONTINUED | OUTPATIENT
Start: 2023-03-17 | End: 2023-03-17 | Stop reason: HOSPADM

## 2023-03-17 RX ORDER — EPINEPHRINE 1 MG/ML
0.3 INJECTION, SOLUTION INTRAMUSCULAR; SUBCUTANEOUS EVERY 5 MIN PRN
Status: DISCONTINUED | OUTPATIENT
Start: 2023-03-17 | End: 2023-03-17 | Stop reason: HOSPADM

## 2023-03-17 RX ORDER — ALBUTEROL SULFATE 90 UG/1
1-2 AEROSOL, METERED RESPIRATORY (INHALATION)
Status: DISCONTINUED | OUTPATIENT
Start: 2023-03-17 | End: 2023-03-17 | Stop reason: HOSPADM

## 2023-03-17 RX ORDER — MEPERIDINE HYDROCHLORIDE 25 MG/ML
25 INJECTION INTRAMUSCULAR; INTRAVENOUS; SUBCUTANEOUS EVERY 30 MIN PRN
Status: DISCONTINUED | OUTPATIENT
Start: 2023-03-17 | End: 2023-03-17 | Stop reason: HOSPADM

## 2023-03-17 RX ORDER — ALBUTEROL SULFATE 0.83 MG/ML
2.5 SOLUTION RESPIRATORY (INHALATION)
Status: DISCONTINUED | OUTPATIENT
Start: 2023-03-17 | End: 2023-03-17 | Stop reason: HOSPADM

## 2023-03-17 RX ORDER — HEPARIN SODIUM (PORCINE) LOCK FLUSH IV SOLN 100 UNIT/ML 100 UNIT/ML
5 SOLUTION INTRAVENOUS
Status: DISCONTINUED | OUTPATIENT
Start: 2023-03-17 | End: 2023-03-17 | Stop reason: HOSPADM

## 2023-03-17 RX ORDER — DOXORUBICIN HYDROCHLORIDE 2 MG/ML
60 INJECTION, SOLUTION INTRAVENOUS ONCE
Status: COMPLETED | OUTPATIENT
Start: 2023-03-17 | End: 2023-03-17

## 2023-03-17 RX ORDER — PALONOSETRON 0.05 MG/ML
0.25 INJECTION, SOLUTION INTRAVENOUS ONCE
Status: COMPLETED | OUTPATIENT
Start: 2023-03-17 | End: 2023-03-17

## 2023-03-17 RX ADMIN — PALONOSETRON 0.25 MG: 0.05 INJECTION, SOLUTION INTRAVENOUS at 14:17

## 2023-03-17 RX ADMIN — Medication 5 ML: at 16:22

## 2023-03-17 RX ADMIN — PEGFILGRASTIM 6 MG: KIT SUBCUTANEOUS at 16:00

## 2023-03-17 RX ADMIN — DOXORUBICIN HYDROCHLORIDE 120 MG: 2 INJECTION, SOLUTION INTRAVENOUS at 15:04

## 2023-03-17 RX ADMIN — DEXAMETHASONE SODIUM PHOSPHATE: 10 INJECTION, SOLUTION INTRAMUSCULAR; INTRAVENOUS at 14:26

## 2023-03-17 RX ADMIN — SODIUM CHLORIDE 250 ML: 9 INJECTION, SOLUTION INTRAVENOUS at 14:14

## 2023-03-17 RX ADMIN — CYCLOPHOSPHAMIDE 1195 MG: 1 INJECTION, POWDER, FOR SOLUTION INTRAVENOUS; ORAL at 15:27

## 2023-03-17 ASSESSMENT — PAIN SCALES - GENERAL: PAINLEVEL: MILD PAIN (2)

## 2023-03-17 NOTE — PROGRESS NOTES
[facility] Hematology and Oncology Progress Note    Patient: Alicia Iniguez  MRN: [id number]  Date of Service: Mar 17, 2023          Reason for Visit    Chief Complaint   Patient presents with     Oncology Clinic Visit     2 week follow up with labs/infusion related to Malignant neoplasm of overlapping sites of right breast in female, estrogen receptor positive       Assessment and Plan     Cancer Staging   Malignant neoplasm of overlapping sites of right breast in female, estrogen receptor positive (H)  Staging form: Breast, AJCC 8th Edition  - Clinical: Stage IIA (cT3, cN2, cM0, G2, ER+, WV+, HER2-) - Signed by Shanelle Weaver APRN CNP on 3/1/2023    1.  Locally Advanced Breast cancer, right side, stage IIa, T3 N2 M0, grade 2, ER/WV positive, HER2/janine negative: Patient has started neoadjuvant chemotherapy with dose dense Adriamycin and Cytoxan.  She is here today for cycle 2.  She will continue at full dose.  She will return in 2 weeks for cycle 3.  After 4 cycles of the Adriamycin and Cytoxan she will then go on to 12 weekly doses of Taxol.    2.  Fatigue, low-grade nausea: These are expected side effects of treatment.  Continues take antiemetics as needed.  Continue to be active but rest as well.  We did talk about the Chemotrim nature of the fatigue.      ECOG Performance    0 - Independent    Distress Screening (within last 30 days)    1. How concerned are you about your ability to eat? : 0  2. How concerned are you about unintended weight loss or your current weight? : 0  3. How concerned are you about feeling depressed or very sad? : 0  4. How concerned are you about feeling anxious or very scared? : 5  5. Do you struggle with the loss of meaning and tiago in your life? : Not at all  6. How concerned are you about work and home life issues that may be affected by your cancer? : 0  7. How concerned are you about knowing what resources are available to help you? : 0  8. Do you currently have what  you would describe as [employer] or spiritual struggles?            : Not at all       Pain  Pain Score: Mild Pain (2)  Pain Loc: Head (mild headache)    Problem List    Patient Active Problem List   Diagnosis     Malignant neoplasm of overlapping sites of right breast in female, estrogen receptor positive (H)     Malignant neoplasm involving both nipple and areola of right breast in female, estrogen receptor positive (H)     Adverse effect of antineoplastic and immunosuppressive drugs, sequela        ______________________________________________________________________________    History of Present Illness    Oncology history:  DIAGNOSIS: Breast cancer, right side, patient had a palpable lump with some nipple and skin changes.  She was also found to have lymphadenopathy in the axilla.  Patient had an ultrasound and mammogram that showed a 4.1 cm tumor with axillary lymphadenopathy  Biopsy was done January 31 and it showed invasive ductal carcinoma.  Grade 2.  ER NC positive and HER2/janine negative.  Biopsy was done of the lymph node as well and it was positive.    TREATMENT: Patient has started neoadjuvant chemotherapy with dose dense Adriamycin and Cytoxan.  Today is cycle 2    INTERIM HISTORY:   Is here today to continue treatment overall she states that the first cycle went better than expected.  Her hair is starting to fall out.  She states that she did have a lot of fatigue for a couple of days as well as some low-grade nausea.  She says she does had to eat certain things but was not really throwing up.  She did take Compazine for a couple of days.  She denies any mucositis.  Denies any significant neuropathy.  Denies any infectious complaints.  She says she had a little head cold earlier this week but feels much better.    Review of Systems    Pertinent items are noted in HPI.    Past History    No past medical history on file.    PHYSICAL EXAM  /63 (BP Location: Left arm, Patient Position: Sitting, Cuff  "Size: Adult Regular)   Pulse 100   Temp 98.2  F (36.8  C) (Tympanic)   Resp 16   Ht 1.676 m (5' 6\")   Wt 86.8 kg (191 lb 4.8 oz)   SpO2 97%   BMI 30.88 kg/m      GENERAL: no acute distress. Cooperative in conversation. Here with daughter. Mask on  RESP: Regular respiratory rate. No expiratory wheezes   MUSCULOSKELETAL: no bilateral leg swelling  NEURO: non focal. Alert and oriented x3.   PSYCH: within normal limits. No depression or anxiety.  SKIN: exposed skin is dry intact.     Lab Results    Recent Results (from the past 168 hour(s))   Hereditary cancer BRCA1/BRCA2   Result Value Ref Range    Specimen Description       Blood: ACD      Significant Results       TEST REQUESTED:  Hereditary Cancer - BRCA1/BRCA2.  Next generation sequencing and copy number variation analysis of: BRCA1 and BRCA2.    RESULTS: NEGATIVE  Pathogenic/Likely Pathogenic Variant(s): None Detected  Variant(s) of Uncertain Significance: None Detected      Interpretation       No pathogenic or likely pathogenic variants were detected in the genes analyzed. Therefore, a hereditary cause for this patient's personal and/or family history of cancer was not identified. Genetic counseling regarding these results is recommended.        Test Details       BACKGROUND:   Hereditary breast and ovarian cancer syndrome (HBOC) is an inherited cancer predisposition syndrome caused by pathogenic variants in the BRCA1 and BRCA2 genes. HBOC is associated with an increased risk for breast, ovarian, pancreatic, prostate, and possibly other cancers. HBOC is inherited in an autosomal dominant manner.    In addition to the analysis of the coding exons and adjoining intronic/UTR sequences, this assay includes sequence analysis of the promoter regions of BRCA1 and BRCA2. Copy number variation analysis is also performed for the promoter regions of BRCA1 and BRCA2.       COVERAGE:  This analysis is limited to the coding exons and immediately adjoining intronic " sequences of the analyzed genes. Coverage is only guaranteed for biologically relevant transcripts, as defined in the LRG database. Coverage minimums are not guaranteed for intronic positions. Therefore, intronic variants cannot be confirmed or excluded with the same degree of confidence as coding exonic variants. With the exception of a limited set of known pathogenic variants, sequences residing more than 25 base pairs from a coding exon are not routinely analyzed. A list of these supplemental positions is available upon request. The proportion of coding bases covered at 15x and 20x coverage are reported below. Sensitivity is reduced in regions with less than 20x coverage, and variants cannot be confidently excluded in regions with <15x coverage. A list of specific regions not meeting these minimum thresholds is available upon request. For panels with less than 25 genes, when possible, [place] sequencing is used to supplement coverage in regions with <15x coverage.  Percentage of bases covered at 15x: 100  Percentage of bases covered at 20x: 100      METHODOLOGY [Nura et al., 2015][Aleja et al., 2014]:  Genomic DNA is extracted from the sample, and sequencing libraries are prepared according to standard Agilent protocols using a custom-designed Agilent Sureselect XT kit for enrichment of targeted genes. The enriched DNA libraries are sequenced on an Illumina [employer]q or [employer]q instrument. Raw sequencing reads are mapped to the reference genome using BWA. Raw alignment files are realigned in the neighborhood of indels and recalibrated for base quality accuracy using the Genome Analysis Tool Kit (GATK) version 3.8. Point mutation and indel calls in exons and adjoining intronic regions are made using a combination of the GATK haplotype caller and Samtools mpileup. Variants are interpreted according to guidance issued by the American College of Medical Genetics [Bobby et al., 2015]. For exons 11-15 of PMS2, reads  "from both PMS2 and PMS2CL are aligned to the PMS2 sequence as described in Vera et al. [2018]. Any pathogenic or likely pathogenic variants in exons 11-15 of PMS2 are subsequently confirmed by long-range PCR. The long-range PMS2 PCR product is processed and sequenced on an Illumina MiSeq instrument and reads are aligned to PMS2. Genotyping and indel calling for the PMS2 long-range PCR product are done using Freebayes and Scanindel [Guevara et al., 2015].    Pathogenic and predicted pathogenic variants that meet ALL of the following criteria are reported without validation by [place] sequencin- single nucleotide substitution, 2- receives a \"PASS\" from the SNP or indel filter, 3- has a VAF in the accepted range (heterozygous = 0.3-0.6, homozygous/hemizygous >0.9), 4- has a minimum of 20x coverage, and 5- is present in the GATOnBeep VCF file. Pathogenic variants that fail to meet any of these criteria are verified by Yin sequencing prior to reporting [Jaime et al., 2015].    For copy number variation (CNV) analysis, raw sequencing reads are mapped to the reference human genome (HG19) using both BWA and Bowtie algorithms. CNV ratios are computed by comparing the average coverage in the sample across 60 base pair windows. Average coverage is compared to control loci both within the sample and within a gender-matched control sample from the same run. The BWA/Bowtie coverage ratio is calculated for each window in order to identify regions where the presence of homologous sequences precludes accurate CNV calls. Heterozygous deletion calls are made when 3 consecutive windows have a CNV ratio of 0.3-0.7; homozygous/hemizygous deletion calls are made when 3 consecutive windows have a CNV ratio less than 0.3; and duplication calls are made when 5 consecutive windows have a CNV ratio greater than 1.3. Calls are only made in areas where the BWA/Bowtie ratio is 0.75-1.1 and the average coverage is 20x. All CNV calls are " confirmed with a supplementary qPCR assay [Aleja et al., 2016].    The following types of variants are not included in the clinical report, but information about these variants is available upon request:  *Missense variants that are present at >1% MAF in population databases AND are not reported as pathogenic/likely pathogenic in ClinVar.  *Missense variants with a MAF <1% that are classified as benign or likely benign according to published ACMG criteria.  *Synonymous variants that do not occur at an intron/exon boundary, are not reported as pathogenic/likely pathogenic in relevant clinical databases, and are present in 15 or more individuals in gnomAD.  *Intronic variants that reside more than 5 bps from an intron/exon boundary AND are not reported as pathogenic/likely pathogenic in ClinVar. Known pathogenic variants residing 5-25 bps from an intron/exon boundary will be reported.  *Untranslated region (UTR) variants not previously reported as pathogenic/likely pathogenic in relevant clinical databases.  *Some variants may be excluded based on review of sequencing quality data. It is possible that some low quality variants are, in fact, real.       LIMITATIONS:  This analysis has not been validated for detection of insertion/deletion mutations larger than 18bp in length. The size of polymorphic repetitive sequences, such as CAG repeats, intronic dinucleotide repeats, or intronic polyT/polyA sequences, cannot be determined by this analysis.    The CNV algorithm is not validated to detect deletions encompassing less than 180 base pairs of coding sequence or duplications encompassing less than 300 base pairs of coding sequence. Coding exons comprised of less than 60 bases are not assessed by these methods. The CNV algorithm does not define precise breakpoints for duplications or deletions.    Due to issues with GC content, the presence of a pseudogene, and/or repetitive sequences, detection of sequence and/or copy  number variants is not possible in a limited number of regions, even when coverage exceeds 20x. Each of the following genes contains one or more exons that cannot be adequately analyzed due to these issues: CCDC40, RIA, CES1, CISD2, DSPP, ESPN, FMN2, GCSH1, GNAS, GP1BA, HYDIN, KMT2C, KRT8, MENG, MUC5B, NEFH, OTOA, PPA2, PSPH, RBMX, RP1L1, RPS17, SHANK3, , STRC, TRIOBP, TTN, [id number]. Additional details are available upon request.       REFERENCES:   Jaime BARKSDALE, Loyda ZAMORA, Jose ORELLANA, John DUGAN, Aleja GAMING, et al. 2015. Criteria for Clinical Reporting of Variants from a Broad Target Capture NGS Assay without [place] Verification. JSM biomarkers 2(1):1005.    Jose Easley, John Metz, Bri ZAMORA, [place] RAGSDALE, Ally B. 2016. CNV-RF Is a Random [place]-Based Copy Number Variation Detection Method Using Next-Generation Sequencing. J Mol Diagn. 18(6):872-881. PMID: 24428720.    Lebron Easley Spears MD, Meme ACEVEDO, Bel HANNON, Ajay XIAO, Nura WELLS, Bri ZAMORA, Loyda ZAMORA, [place] RAGSDALE, Aniyaagarakyle B. 2014. Implementation of Cloud based next generation sequencing data analysis in a clinical laboratory. BMC Res Notes. 7:314. PMID: [id number].    Diamante S, Bhargavi N, Quincy S, Viviana D, Rutledge S, Alisha J, [place] WW, Doretha M, Alex E, Gurmeet E, Tisha K, [facility] HL, [facility]  Committee. 2015. Standards and guidelines for the interpretation of sequence variants: a joint consensus recommendation of the American College of Medical Genetics and Genomics and the Association for Molecular Pathology. Lore. Med. 17(5):405-24. PMID: 07210692.    Ajay Arnett, Diane MARQUEZ, Kwabena T, Bri Metz, Aleja GAMING, Lebron Triana, Nguyễn Y, Sameer Flores MD, Bel MARQUEZ, [place] RAGSDALE, Ally STEEN. 2015. Clinical validation of targeted next-generation sequencing for inherited disorders. Arch. Pathol. Lab. Med. 139(2):204-10. PMID: [id number].    If a patient is the recipient of an  allogeneic bone marrow transplant, this test must be done on a pre-transplant sample or buccal swab. A previous allogeneic bone marrow transplant will interfere with test results. Call the [facility] ([phone]) for instructions on sample collection for these patients.     This test was developed and its performance characteristics determined by the [facility], Molecular Diagnostics Laboratory and the [facility] (Cancer and Cardiovascular Research Building Room [address]210, [address] [place] [address]). Genomic DNA extraction, interpretation of sequencing data, and, when necessary, [place] sequencing is performed at [facility], Molecular Diagnostics Laboratory. Wet bench processes including library creation, sequence capture using an Illumina Pythagoras Solar analyzer and bioinformatics is performed at the [facility] [place]. It has not been cleared or approved by the FDA. The laboratory is regulated under CLIA as qualified to perform high-complexity testing. This test is used for clinical purposes. It should not be regarded as investigational or for research.    A resident/fellow in an accredited training program was involved in the selection of testing, review of laboratory data, and/or interpretation of this case. I, as the senior physician, attest that I: (i) confirmed appropriate testing, (ii) examined the relevant raw data for the specimen(s), and (iii) rendered or confirmed the interpretation(s).      Lab PDF Result     Next Generation Sequencing   Result Value Ref Range    Specimen Description       Blood: ACD      Significant Results       TEST REQUESTED:  Hereditary Cancer - Breast Actionable Panel.  Next generation sequencing and copy number variation analysis of genes listed in 'Background' section below.    RESULTS: NEGATIVE  Pathogenic/Likely Pathogenic Variant(s): None  Detected  Variant(s) of Uncertain Significance: None Detected      Interpretation       No pathogenic or likely pathogenic variants were detected in the genes analyzed. Therefore, a hereditary cause for this patient's personal and/or family history of cancer was not identified. Genetic counseling regarding these results is recommended.        Lab PDF Result      Test Details       BACKGROUND:   Breast cancer is a common type of cancer typically resulting from complex interactions of genetic and environmental factors. In a subset of cases, often characterized by young age of onset and/or multiple affected relatives, a hereditary factor can be identified. The majority of hereditary susceptibilities to breast cancer have an autosomal dominant inheritance pattern. The risk of developing breast cancer varies depending on the specific gene and pathogenic variant identified. A negative genetic test result does not completely exclude a hereditary susceptibility to breast cancer, and genetic test results should be interpreted in the context of the patient's personal and/or family history.    Hereditary Cancer - Breast Actionable Panel: TOÑO, BARD1, BRCA1, BRCA2, CDH1, CHEK2, NF1, PALB2, PTEN, STK11, TP53.    In addition to the analysis of the coding exons and adjoining intronic/UTR sequences, this assay includes sequence analysis of the promoter regions of BRCA1, BRCA2, PTEN, and TP53. Copy number variation analysis is also performed for the promoter regions of BRCA1, BRCA2, PTEN, and TP53.       COVERAGE:  This analysis is limited to the coding exons and immediately adjoining intronic sequences of the analyzed genes. Coverage is only guaranteed for biologically relevant transcripts, as defined in the LRG database. Coverage minimums are not guaranteed for intronic positions. Therefore, intronic variants cannot be confirmed or excluded with the same degree of confidence as coding exonic variants. With the exception of a  limited set of known pathogenic variants, sequences residing more than 25 base pairs from a coding exon are not routinely analyzed. A list of these supplemental positions is available upon request. The proportion of coding bases covered at 15x and 20x coverage are reported below. Sensitivity is reduced in regions with less than 20x coverage, and variants cannot be confidently excluded in regions with <15x coverage. A list of specific regions not meeting these minimum thresholds is available upon request. For panels with less than 25 genes, when possible, [place] sequencing is used to supplement coverage in regions with <15x coverage.  Percentage of bases covered at 15x: 100  Percentage of bases covered at 20x: >99.75      METHODOLOGY [Nura et al., 2015][Aleja et al., 2014]:  Genomic DNA is extracted from the sample, and sequencing libraries are prepared according to standard [employer] protocols using a custom-designed [employer] Sureselect XT kit for enrichment of targeted genes. The enriched DNA libraries are sequenced on an Illumina [employer]q or [employer]q instrument. Raw sequencing reads are mapped to the reference genome using BWA. Raw alignment files are realigned in the neighborhood of indels and recalibrated for base quality accuracy using the Genome Analysis Tool Kit (GATK) version 3.8. Point mutation and indel calls in exons and adjoining intronic regions are made using a combination of the GATK haplotype caller and Samtools mpileup. Variants are interpreted according to guidance issued by the American College of Medical Genetics [Bobby et al., 2015]. For exons 11-15 of PMS2, reads from both PMS2 and PMS2CL are aligned to the PMS2 sequence as described in Chuy et al. [2018]. Any pathogenic or likely pathogenic variants in exons 11-15 of PMS2 are subsequently confirmed by long-range PCR. The long-range PMS2 PCR product is processed and sequenced on an Illumina MiSeq instrument and reads are aligned to PMS2.  "Genotyping and indel calling for the PMS2 long-range PCR product are done using FreebaDiffbots and Scanindel [Guevara et al., 2015].    Pathogenic and predicted pathogenic variants that meet ALL of the following criteria are reported without validation by Y[facility] sequencin- single nucleotide substitution, 2- receives a \"PASS\" from the SNP or indel filter, 3- has a VAF in the accepted range (heterozygous = 0.3-0.6, homozygous/hemizygous >0.9), 4- has a minimum of 20x coverage, and 5- is present in the [employer]AT[employer] us VCF file. Pathogenic variants that fail to meet any of these criteria are verified by [place] sequencing prior to reporting [Jaime et al., 2015].    For copy number variation (CNV) analysis, raw sequencing reads are mapped to the reference human genome (HG19) using both BWA and Bowtie algorithms. CNV ratios are computed by comparing the average coverage in the sample across 60 base pair windows. Average coverage is compared to control loci both within the sample and within a gender-matched control sample from the same run. The BWA/Bowtie coverage ratio is calculated for each window in order to identify regions where the presence of homologous sequences precludes accurate CNV calls. Heterozygous deletion calls are made when 3 consecutive windows have a CNV ratio of 0.3-0.7; homozygous/hemizygous deletion calls are made when 3 consecutive windows have a CNV ratio less than 0.3; and duplication calls are made when 5 consecutive windows have a CNV ratio greater than 1.3. Calls are only made in areas where the BWA/Bowtie ratio is 0.75-1.1 and the average coverage is 20x. All CNV calls are confirmed with a supplementary qPCR assay [Aleja et al., 2016].    The following types of variants are not included in the clinical report, but information about these variants is available upon request:  *Missense variants that are present at >1% MAF in population databases AND are not reported as pathogenic/likely pathogenic in " ClinVar.  *Missense variants with a MAF <1% that are classified as benign or likely benign according to published ACMG criteria.  *Synonymous variants that do not occur at an intron/exon boundary, are not reported as pathogenic/likely pathogenic in relevant clinical databases, and are present in 15 or more individuals in gnomAD.  *Intronic variants that reside more than 5 bps from an intron/exon boundary AND are not reported as pathogenic/likely pathogenic in ClinVar. Known pathogenic variants residing 5-25 bps from an intron/exon boundary will be reported.  *Untranslated region (UTR) variants not previously reported as pathogenic/likely pathogenic in relevant clinical databases.  *Some variants may be excluded based on review of sequencing quality data. It is possible that some low quality variants are, in fact, real.       LIMITATIONS:  This analysis has not been validated for detection of insertion/deletion mutations larger than 18bp in length. The size of polymorphic repetitive sequences, such as CAG repeats, intronic dinucleotide repeats, or intronic polyT/polyA sequences, cannot be determined by this analysis.    The CNV algorithm is not validated to detect deletions encompassing less than 180 base pairs of coding sequence or duplications encompassing less than 300 base pairs of coding sequence. Coding exons comprised of less than 60 bases are not assessed by these methods. The CNV algorithm does not define precise breakpoints for duplications or deletions.    Due to issues with GC content, the presence of a pseudogene, and/or repetitive sequences, detection of sequence and/or copy number variants is not possible in a limited number of regions, even when coverage exceeds 20x. Each of the following genes contains one or more exons that cannot be adequately analyzed due to these issues: CCDC40, RIA, CES1, CISD2, DSPP, ESPN, FMN2, GCSH1, GNAS, GP1BA, HYDIN, KMT2C, KRT8, MENG, MUC5B, NEFH, OTOA, PKD1, PPA2, PSPH,  RBMX, RP1L1, RPS17, SHANK3, , STRC, TRIOBP, TTN, [id number]. A list of genes with a highly similar homolog or pseudogene for which specificity/sensitivity may be reduced is available at https://www.ncbi.nlm.nih.gov/books/IUU356411/.  Additional details are available upon request.       REFERENCES:   Jaime BARKSDALE, Loyda ZAMORA, Jose ORELLANA, John DUGAN, Aleja GAMING, et al. 2015. Criteria for Clinical Reporting of Variants from a Broad Target Capture NGS Assay without Yin Verification. JSM biomarkers 2(1):1005.    Aleja GAMING, Jose ORELLANA, Loyda ZAMORA, John DUGAN, Bri ZAMORA, London RAGSDALE, Thyagaselina B. 2016. CNV-RF Is a Random [place]-Based Copy Number Variation Detection Method Using Next-Generation Sequencing. J Mol Diagn. 18(6):872-881. PMID: 52949602.    Aleja GAMING, Lebron ACEVEDO, Sameer GARCIA, Meme ACEVEDO, Bel HANNON, Ajay A, Yohe S, Bri ZAMORA, Loyda ZAMORA, [place] RAGSDALE, Thyagarajan B. 2014. Implementation of Cloud based next generation sequencing data analysis in a clinical laboratory. BMC Res Notes. 7:314. PMID: 73246499.    Diamante S, Bhargavi N, Quincy S, Viviana D, Rutledge S, Alisha J, Virginia WW, Doretha M, Alex E, Gurmeet E, Tisha K, Tanja HL, [facility]  Committee. 2015. Standards and guidelines for the interpretation of sequence variants: a joint consensus recommendation of the American College of Medical Genetics and Genomics and the Association for Molecular Pathology. Lore. Med. 17(5):405-24. PMID: 13322870.    Kelly JA, Inga AW, O'Umesh TD, Ramiro W, Craig EE, Jose Luis S, Bird S, Do R, Michele X, Noe G, Koko HM, Milton D, Deng SM, Shen S, Skyla MJ, Abecatim G, Altshuler D, Lupe DA, Nima E, Sunyaev S, Jessica CD, Tara LEROY, Akey JM, Broad GO, Jessup GO, [facility], 1928–1983 Exome Sequencing Project. 2012. Evolution and functional impact of rare coding variation from deep sequencing of human exomes. Science. 337(5228):64-9. PMID: 48702156.    Nura S, Ajay A, Diane K, Kwabena T, Loyda M, Bri ZAMORA,  Aleja G, Chacorta J, Lebron J, Nguyễn Y, Isadora XIAO, Sameer GARCIA, Bel MARQUEZ, London KA, Ally STEEN. 2015. Clinical validation of targeted next-generation sequencing for inherited disorders. Arch. Pathol. Lab. Med. 139(2):204-10. PMID: 78854040.      If a patient is the recipient of an allogeneic bone marrow transplant, this test must be done on a pre-transplant sample or buccal swab. A previous allogeneic bone marrow transplant will interfere with test results. Call the [facility] ([phone]) for instructions on sample collection for these patients.     This test was developed and its performance characteristics determined by the [facility], Molecular Diagnostics Laboratory and the [facility] (Cancer and Cardiovascular Research Building Room [address], [address] [place] [address]). Genomic DNA extraction, interpretation of sequencing data, and, when necessary, [place] sequencing is performed at [facility], Molecular Diagnostics Laboratory. Wet bench processes including library creation, sequence capture using an Illumina [employer] analyzer and bioinformatics is performed at the [facility]. It has not been cleared or approved by the FDA. The laboratory is regulated under CLIA as qualified to perform high-complexity testing. This test is used for clinical purposes. It should not be regarded as investigational or for research.    A resident/fellow in an accredited training program was involved in the selection of testing, review of laboratory data, and/or interpretation of this case. I, as the senior physician, attest that I: (i) confirmed appropriate testing, (ii) examined the relevant raw data for the specimen(s), and (iii) rendered or confirmed the interpretation(s).      CBC with platelets and differential   Result Value Ref Range    WBC Count 7.2 4.0 - 11.0 10e3/uL    RBC  Count 4.12 3.80 - 5.20 10e6/uL    Hemoglobin 11.7 11.7 - 15.7 g/dL    Hematocrit 35.0 35.0 - 47.0 %    MCV 85 78 - 100 fL    MCH 28.4 26.5 - 33.0 pg    MCHC 33.4 31.5 - 36.5 g/dL    RDW 14.0 10.0 - 15.0 %    Platelet Count 355 150 - 450 10e3/uL       Imaging    Echocardiogram Complete    Result Date: 2023  [id number]^[place] [address]  Name: LEE LLAMAS MRN: [id number] : 1975 Study Date: 2023 12:38 PM Age: 47 yrs Gender: Female Patient Location: [facility] Reason For Study: Malignant neoplasm of overlapping sites of right breast in femal Ordering Physician: LA UGARTE Referring Physician: LA UGARTE Performed By: YEVGENIY/DWAINE  BSA: 1.9 m2 Height: 66 in Weight: 180 lb HR: 86 BP: 138/71 mmHg ______________________________________________________________________________ Procedure Complete Echo Adult. ______________________________________________________________________________ Interpretation Summary  Normal chamber sizes There is borderline concentric left ventricular hypertrophy. The visual ejection fraction is 60-65%. No regional wall motion abnormalities noted. The right ventricle is normal in size and function. The interatrial septum bows toward the right atrium, consistent with increased left atrial pressure. Possible patent foramen ovale. There is trace to mild mitral regurgitation. There is trace tricuspid regurgitation. ______________________________________________________________________________ Left Ventricle The left ventricle is normal in size. There is borderline concentric left ventricular hypertrophy. The visual ejection fraction is 60-65%. Left ventricular diastolic function is normal. No regional wall motion abnormalities noted.  Right Ventricle The right ventricle is normal in size and function.  Atria Normal left atrial size. Right atrial size is normal. The interatrial septum bows  toward the right atrium, consistent with increased left atrial pressure. Possible patent foramen ovale.  Mitral Valve Mitral valve leaflets appear normal. There is trace to mild mitral regurgitation.  Tricuspid Valve Tricuspid valve leaflets appear normal. There is trace tricuspid regurgitation.  Aortic Valve Aortic valve leaflets appear normal. There is no evidence of aortic stenosis or clinically significant aortic regurgitation.  Pulmonic Valve The pulmonic valve is normal in structure and function.  Vessels The aorta root is normal. IVC diameter and respiratory changes fall into an intermediate range suggesting an RA pressure of 8 mmHg.  Pericardium There is no pericardial effusion.  ______________________________________________________________________________ MMode/2D Measurements & Calculations IVSd: 1.2 cm LVIDd: 3.3 cm LVIDs: 1.7 cm LVPWd: 0.98 cm FS: 49.4 % LV mass(C)d: 109.0 grams LV mass(C)dI: 57.0 grams/m2  Ao root diam: 2.5 cm LA dimension: 2.7 cm LA/Ao: 1.1 LVOT diam: 2.0 cm LVOT area: 3.0 cm2 LA Volume Indexed (AL/bp): 28.0 ml/m2 RWT: 0.58  Time Measurements MM HR: 86.0 BPM  Doppler Measurements & Calculations MV E max franklin: 63.8 cm/sec MV A max franklin: 73.7 cm/sec MV E/A: 0.87  MV dec slope: 338.0 cm/sec2 MV dec time: 0.19 sec Ao V2 max: 125.1 cm/sec Ao max P.0 mmHg Ao V2 mean: 90.3 cm/sec Ao mean PG: 3.6 mmHg Ao V2 VTI: 25.4 cm ROSA M(I,D): 2.3 cm2 ROSA M(V,D): 2.3 cm2 LV V1 max PG: 3.7 mmHg LV V1 max: 96.4 cm/sec LV V1 VTI: 19.2 cm SV(LVOT): 58.5 ml SI(LVOT): 30.6 ml/m2 PA acc time: 0.15 sec AV Franklin Ratio (DI): 0.77 ROSA M Index (cm2/m2): 1.2 E/E': 5.9 E/E' av.5 Lateral E/e': 5.8 Medial E/e': 9.2 Peak E' Franklin: 10.9 cm/sec  ______________________________________________________________________________ Report approved by: Olga Calvo 2023 02:22 PM       NM Bone Scan Whole Body    Result Date: 2023  EXAM: NM BONE SCAN WHOLE BODY LOCATION: [facility] DATE/TIME:  2/17/2023 10:32 AM INDICATION: Breast cancer COMPARISON: CT the chest and pelvis dated 02/13/2023 TECHNIQUE: 25.9 mCi technetium-99m MDP, IV. Anterior and posterior delayed whole-body images at 3 hours with additional spot images of the skull. FINDINGS: Radiotracer uptake in a pattern typical of degenerative change involving the shoulders and spine. Focal radiotracer uptake in the mid anterior left tibia likely representing sequelae of prior trauma or stress related process. No suspicious areas of altered uptake to suggest osseous metastasis.     IMPRESSION: No convincing scintigraphic evidence of osseous metastasis.     Total time spent with patient in face to face time, chart review and documentation was 45 minutes.    Complex patient undergoing chemotherapy, high risk drug therapy requiring intensive monitoring.       Signed by: KYLEIGH Fernandez CNP

## 2023-03-17 NOTE — PROGRESS NOTES
Infusion Nursing Note:  Alicia Iniguez presents today for C2D1.    Patient seen by provider today: Yes, Shanelle [place], GISSEL   present during visit today: Not Applicable.    Note: PT here ambulatory for C2 D1 Labs drawn from port and results approved for txt. Reviewed txt/ possible side effects. Each medication given reviewed with pt. Premeds administered followed by Adriamycin ivp. Positive blood return throughout adriamycin ivp. Cytoxan infused over 45 minutes with concurrent ns at 200 ml per hour, due to pt having sinus pressure at the end of the infusion. PT still had very mild sinus pressure during cytoxan towards end of infusion. Onpro placed on right abdomen and reviewed it will start around 1600  tomorrow and be completed around 1900. Follow up reviewed. Txt completed and pt tolerated without any problems. txt completed and port flushed/deaccessed per protocol.  Pt dc'd steady gait    Intravenous Access:  Labs drawn without difficulty.  Implanted Port.    Treatment Conditions:  Lab Results   Component Value Date    HGB 11.7 03/17/2023    WBC 7.2 03/17/2023    ANEU 5.2 03/17/2023    ANEUTAUTO 7.2 03/02/2023     03/17/2023      Lab Results   Component Value Date     03/02/2023    POTASSIUM 4.0 03/02/2023    CR 0.61 03/02/2023    KURT 9.1 03/02/2023    BILITOTAL 0.2 03/02/2023    ALBUMIN 4.3 03/02/2023    ALT 16 03/02/2023    AST 20 03/02/2023     Results reviewed, labs MET treatment parameters, ok to proceed with treatment.    Post Infusion Assessment:  Patient tolerated infusion without incident.  No evidence of extravasations.  Access discontinued per protocol.     Discharge Plan:   Discharge instructions reviewed with: Patient.  Patient and/or family verbalized understanding of discharge instructions and all questions answered.  Patient discharged in stable condition accompanied by: self.  Departure Mode: Ambulatory.      Olivia Salmeron RN

## 2023-03-17 NOTE — LETTER
"    3/17/2023         RE: Alicia Iniguez  [address] [facility] [address]        Dear Colleague,    Thank you for referring your patient, Alicia Iniguez, to the [facility] [place]. Please see a copy of my visit note below.    Oncology Rooming Note    March 17, 2023 1:25 PM   Alicia Iniguez is a 47 year old female who presents for:    Chief Complaint   Patient presents with     Oncology Clinic Visit     2 week follow up with labs/infusion related to Malignant neoplasm of overlapping sites of right breast in female, estrogen receptor positive     Initial Vitals: /63 (BP Location: Left arm, Patient Position: Sitting, Cuff Size: Adult Regular)   Pulse 100   Temp 98.2  F (36.8  C) (Tympanic)   Resp 16   Ht 1.676 m (5' 6\")   Wt 86.8 kg (191 lb 4.8 oz)   SpO2 97%   BMI 30.88 kg/m   Estimated body mass index is 30.88 kg/m  as calculated from the following:    Height as of this encounter: 1.676 m (5' 6\").    Weight as of this encounter: 86.8 kg (191 lb 4.8 oz). Body surface area is 2.01 meters squared.  Mild Pain (2) Comment: Data Unavailable   No LMP recorded.  Allergies reviewed: Yes  Medications reviewed: Yes    Medications: Medication refills not needed today.  Pharmacy name entered into [employer]: [id number] - [facility] [address] [facility]    Clinical concerns: 2 week follow up with labs/infusion related to Malignant neoplasm of overlapping sites of right breast in female, estrogen receptor positive      DIANA QUINN CMA              [facility] Hematology and Oncology Progress Note    Patient: Alicia Iniguez  MRN: [id number]  Date of Service: Mar 17, 2023          Reason for Visit    Chief Complaint   Patient presents with     Oncology Clinic Visit     2 week follow up with labs/infusion related to Malignant neoplasm of overlapping sites of right breast in female, estrogen receptor positive       Assessment and " Plan     Cancer Staging   Malignant neoplasm of overlapping sites of right breast in female, estrogen receptor positive (H)  Staging form: Breast, AJCC 8th Edition  - Clinical: Stage IIA (cT3, cN2, cM0, G2, ER+, OR+, HER2-) - Signed by Shanelle Weaver APRN CNP on 3/1/2023    1.  Locally Advanced Breast cancer, right side, stage IIa, T3 N2 M0, grade 2, ER/OR positive, HER2/janine negative: Patient has started neoadjuvant chemotherapy with dose dense Adriamycin and Cytoxan.  She is here today for cycle 2.  She will continue at full dose.  She will return in 2 weeks for cycle 3.  After 4 cycles of the Adriamycin and Cytoxan she will then go on to 12 weekly doses of Taxol.    2.  Fatigue, low-grade nausea: These are expected side effects of treatment.  Continues take antiemetics as needed.  Continue to be active but rest as well.  We did talk about the Chemotrim nature of the fatigue.      ECOG Performance    0 - Independent    Distress Screening (within last 30 days)    1. How concerned are you about your ability to eat? : 0  2. How concerned are you about unintended weight loss or your current weight? : 0  3. How concerned are you about feeling depressed or very sad? : 0  4. How concerned are you about feeling anxious or very scared? : 5  5. Do you struggle with the loss of meaning and tiago in your life? : Not at all  6. How concerned are you about work and home life issues that may be affected by your cancer? : 0  7. How concerned are you about knowing what resources are available to help you? : 0  8. Do you currently have what you would describe as [employer] or spiritual struggles?            : Not at all       Pain  Pain Score: Mild Pain (2)  Pain Loc: Head (mild headache)    Problem List    Patient Active Problem List   Diagnosis     Malignant neoplasm of overlapping sites of right breast in female, estrogen receptor positive (H)     Malignant neoplasm involving both nipple and areola of right breast in female,  "estrogen receptor positive (H)     Adverse effect of antineoplastic and immunosuppressive drugs, sequela        ______________________________________________________________________________    History of Present Illness    Oncology history:  DIAGNOSIS: Breast cancer, right side, patient had a palpable lump with some nipple and skin changes.  She was also found to have lymphadenopathy in the axilla.  Patient had an ultrasound and mammogram that showed a 4.1 cm tumor with axillary lymphadenopathy  Biopsy was done January 31 and it showed invasive ductal carcinoma.  Grade 2.  ER MA positive and HER2/janine negative.  Biopsy was done of the lymph node as well and it was positive.    TREATMENT: Patient has started neoadjuvant chemotherapy with dose dense Adriamycin and Cytoxan.  Today is cycle 2    INTERIM HISTORY:   Is here today to continue treatment overall she states that the first cycle went better than expected.  Her hair is starting to fall out.  She states that she did have a lot of fatigue for a couple of days as well as some low-grade nausea.  She says she does had to eat certain things but was not really throwing up.  She did take Compazine for a couple of days.  She denies any mucositis.  Denies any significant neuropathy.  Denies any infectious complaints.  She says she had a little head cold earlier this week but feels much better.    Review of Systems    Pertinent items are noted in HPI.    Past History    No past medical history on file.    PHYSICAL EXAM  /63 (BP Location: Left arm, Patient Position: Sitting, Cuff Size: Adult Regular)   Pulse 100   Temp 98.2  F (36.8  C) (Tympanic)   Resp 16   Ht 1.676 m (5' 6\")   Wt 86.8 kg (191 lb 4.8 oz)   SpO2 97%   BMI 30.88 kg/m      GENERAL: no acute distress. Cooperative in conversation. Here with daughter. Mask on  RESP: Regular respiratory rate. No expiratory wheezes   MUSCULOSKELETAL: no bilateral leg swelling  NEURO: non focal. Alert and oriented " x3.   PSYCH: within normal limits. No depression or anxiety.  SKIN: exposed skin is dry intact.     Lab Results    Recent Results (from the past 168 hour(s))   Hereditary cancer BRCA1/BRCA2   Result Value Ref Range    Specimen Description       Blood: ACD      Significant Results       TEST REQUESTED:  Hereditary Cancer - BRCA1/BRCA2.  Next generation sequencing and copy number variation analysis of: BRCA1 and BRCA2.    RESULTS: NEGATIVE  Pathogenic/Likely Pathogenic Variant(s): None Detected  Variant(s) of Uncertain Significance: None Detected      Interpretation       No pathogenic or likely pathogenic variants were detected in the genes analyzed. Therefore, a hereditary cause for this patient's personal and/or family history of cancer was not identified. Genetic counseling regarding these results is recommended.        Test Details       BACKGROUND:   Hereditary breast and ovarian cancer syndrome (HBOC) is an inherited cancer predisposition syndrome caused by pathogenic variants in the BRCA1 and BRCA2 genes. HBOC is associated with an increased risk for breast, ovarian, pancreatic, prostate, and possibly other cancers. HBOC is inherited in an autosomal dominant manner.    In addition to the analysis of the coding exons and adjoining intronic/UTR sequences, this assay includes sequence analysis of the promoter regions of BRCA1 and BRCA2. Copy number variation analysis is also performed for the promoter regions of BRCA1 and BRCA2.       COVERAGE:  This analysis is limited to the coding exons and immediately adjoining intronic sequences of the analyzed genes. Coverage is only guaranteed for biologically relevant transcripts, as defined in the LRG database. Coverage minimums are not guaranteed for intronic positions. Therefore, intronic variants cannot be confirmed or excluded with the same degree of confidence as coding exonic variants. With the exception of a limited set of known pathogenic variants, sequences  residing more than 25 base pairs from a coding exon are not routinely analyzed. A list of these supplemental positions is available upon request. The proportion of coding bases covered at 15x and 20x coverage are reported below. Sensitivity is reduced in regions with less than 20x coverage, and variants cannot be confidently excluded in regions with <15x coverage. A list of specific regions not meeting these minimum thresholds is available upon request. For panels with less than 25 genes, when possible, [place] sequencing is used to supplement coverage in regions with <15x coverage.  Percentage of bases covered at 15x: 100  Percentage of bases covered at 20x: 100      METHODOLOGY [Nura et al., 2015][Aleja et al., 2014]:  Genomic DNA is extracted from the sample, and sequencing libraries are prepared according to standard Agilent protocols using a custom-designed MedShape Sureselect XT kit for enrichment of targeted genes. The enriched DNA libraries are sequenced on an Illumina SellanAppq or Nextseq instrument. Raw sequencing reads are mapped to the reference genome using BWA. Raw alignment files are realigned in the neighborhood of indels and recalibrated for base quality accuracy using the Genome Analysis Tool Kit (GATK) version 3.8. Point mutation and indel calls in exons and adjoining intronic regions are made using a combination of the GATK haplotype caller and Samtools mpileup. Variants are interpreted according to guidance issued by the American College of Medical Genetics [Bobby et al., 2015]. For exons 11-15 of PMS2, reads from both PMS2 and PMS2CL are aligned to the PMS2 sequence as described in Vera et al. [2018]. Any pathogenic or likely pathogenic variants in exons 11-15 of PMS2 are subsequently confirmed by long-range PCR. The long-range PMS2 PCR product is processed and sequenced on an Illumina MiSeq instrument and reads are aligned to PMS2. Genotyping and indel calling for the PMS2 long-range PCR  "product are done using Freebayes and Scanindel [Guevara et al., 2015].    Pathogenic and predicted pathogenic variants that meet ALL of the following criteria are reported without validation by [place] sequencin- single nucleotide substitution, 2- receives a \"PASS\" from the SNP or indel filter, 3- has a VAF in the accepted range (heterozygous = 0.3-0.6, homozygous/hemizygous >0.9), 4- has a minimum of 20x coverage, and 5- is present in the GATCARGOBR VCF file. Pathogenic variants that fail to meet any of these criteria are verified by Yin sequencing prior to reporting [Jaime et al., 2015].    For copy number variation (CNV) analysis, raw sequencing reads are mapped to the reference human genome (HG19) using both BWA and Bowtie algorithms. CNV ratios are computed by comparing the average coverage in the sample across 60 base pair windows. Average coverage is compared to control loci both within the sample and within a gender-matched control sample from the same run. The BWA/Bowtie coverage ratio is calculated for each window in order to identify regions where the presence of homologous sequences precludes accurate CNV calls. Heterozygous deletion calls are made when 3 consecutive windows have a CNV ratio of 0.3-0.7; homozygous/hemizygous deletion calls are made when 3 consecutive windows have a CNV ratio less than 0.3; and duplication calls are made when 5 consecutive windows have a CNV ratio greater than 1.3. Calls are only made in areas where the BWA/Bowtie ratio is 0.75-1.1 and the average coverage is 20x. All CNV calls are confirmed with a supplementary qPCR assay [Aleja et al., 2016].    The following types of variants are not included in the clinical report, but information about these variants is available upon request:  *Missense variants that are present at >1% MAF in population databases AND are not reported as pathogenic/likely pathogenic in ClinVar.  *Missense variants with a MAF <1% that are classified " as benign or likely benign according to published ACMG criteria.  *Synonymous variants that do not occur at an intron/exon boundary, are not reported as pathogenic/likely pathogenic in relevant clinical databases, and are present in 15 or more individuals in gnomAD.  *Intronic variants that reside more than 5 bps from an intron/exon boundary AND are not reported as pathogenic/likely pathogenic in ClinVar. Known pathogenic variants residing 5-25 bps from an intron/exon boundary will be reported.  *Untranslated region (UTR) variants not previously reported as pathogenic/likely pathogenic in relevant clinical databases.  *Some variants may be excluded based on review of sequencing quality data. It is possible that some low quality variants are, in fact, real.       LIMITATIONS:  This analysis has not been validated for detection of insertion/deletion mutations larger than 18bp in length. The size of polymorphic repetitive sequences, such as CAG repeats, intronic dinucleotide repeats, or intronic polyT/polyA sequences, cannot be determined by this analysis.    The CNV algorithm is not validated to detect deletions encompassing less than 180 base pairs of coding sequence or duplications encompassing less than 300 base pairs of coding sequence. Coding exons comprised of less than 60 bases are not assessed by these methods. The CNV algorithm does not define precise breakpoints for duplications or deletions.    Due to issues with GC content, the presence of a pseudogene, and/or repetitive sequences, detection of sequence and/or copy number variants is not possible in a limited number of regions, even when coverage exceeds 20x. Each of the following genes contains one or more exons that cannot be adequately analyzed due to these issues: CCDC40, RIA, CES1, CISD2, DSPP, ESPN, FMN2, GCSH1, GNAS, GP1BA, HYDIN, KMT2C, KRT8, MENG, MUC5B, NEFH, OTOA, PPA2, PSPH, RBMX, RP1L1, RPS17, SHANK3, , STRC, TRIOBP, TTN, AFV980.  Additional details are available upon request.       REFERENCES:   Jaime AC, Loyda ZAMORA, Jose ORELLANA, John DUGAN, Aleja GAMING, et al. 2015. Criteria for Clinical Reporting of Variants from a Broad Target Capture NGS Assay without Yin Verification. JSM biomarkers 2(1):1005.    Jose Easley Bower M, Henzler C, Bri ZAMORA, [place] RAGSDALE, Ally B. 2016. CNV-RF Is a Random Holmes-Based Copy Number Variation Detection Method Using Next-Generation Sequencing. J Mol Diagn. 18(6):872-881. PMID: 05722870.    Lebron Easley Spears MD, Meme ACEVEDO, Bel HANNON, Ajay XIAO, Nura WELLS, Bri ZAMORA, Loyda ZAMOAR, [place] RAGSDALE, Ally B. 2014. Implementation of Cloud based next generation sequencing data analysis in a clinical laboratory. BMC Res Notes. 7:314. PMID: 59709891.    Diamante S, Bhargavi N, Quincy S, Viviana D, Aamir S, Alisha J, Virginia TSANG, Doretha ZAMORA, Alex E, Gurmeet E, Tisha K, [facility], [facility]  Committee. 2015. Standards and guidelines for the interpretation of sequence variants: a joint consensus recommendation of the American College of Medical Genetics and Genomics and the Association for Molecular Pathology. Lore. Med. 17(5):405-24. PMID: 34957512.    Nura WELLS, Ajay XIAO, Diane K, Kwabena T, Bri Metz, Chacorta Easley Erdmann J, Nguyễn Y, Sameer Flores MD, Bel MARQUEZ, [place] RAGSDALE, Ally B. 2015. Clinical validation of targeted next-generation sequencing for inherited disorders. Arch. Pathol. Lab. Med. 139(2):204-10. PMID: 99300429.    If a patient is the recipient of an allogeneic bone marrow transplant, this test must be done on a pre-transplant sample or buccal swab. A previous allogeneic bone marrow transplant will interfere with test results. Call the [facility] ([phone]) for instructions on sample collection for these patients.     This test was developed and its performance characteristics determined by the [place] [facility]  [facility], Molecular Diagnostics Laboratory and the [facility] (Cancer and Cardiovascular Research Building Room 1210, [address] [place] [address]). Genomic DNA extraction, interpretation of sequencing data, and, when necessary, Yin sequencing is performed at [facility], Molecular Diagnostics Laboratory. Wet bench processes including library creation, sequence capture using an Illumina AppShare analyzer and bioinformatics is performed at the [facility]. It has not been cleared or approved by the FDA. The laboratory is regulated under CLIA as qualified to perform high-complexity testing. This test is used for clinical purposes. It should not be regarded as investigational or for research.    A resident/fellow in an accredited training program was involved in the selection of testing, review of laboratory data, and/or interpretation of this case. I, as the senior physician, attest that I: (i) confirmed appropriate testing, (ii) examined the relevant raw data for the specimen(s), and (iii) rendered or confirmed the interpretation(s).      Lab PDF Result     Next Generation Sequencing   Result Value Ref Range    Specimen Description       Blood: ACD      Significant Results       TEST REQUESTED:  Hereditary Cancer - Breast Actionable Panel.  Next generation sequencing and copy number variation analysis of genes listed in 'Background' section below.    RESULTS: NEGATIVE  Pathogenic/Likely Pathogenic Variant(s): None Detected  Variant(s) of Uncertain Significance: None Detected      Interpretation       No pathogenic or likely pathogenic variants were detected in the genes analyzed. Therefore, a hereditary cause for this patient's personal and/or family history of cancer was not identified. Genetic counseling regarding these results is recommended.        Lab PDF Result      Test Details       BACKGROUND:    Breast cancer is a common type of cancer typically resulting from complex interactions of genetic and environmental factors. In a subset of cases, often characterized by young age of onset and/or multiple affected relatives, a hereditary factor can be identified. The majority of hereditary susceptibilities to breast cancer have an autosomal dominant inheritance pattern. The risk of developing breast cancer varies depending on the specific gene and pathogenic variant identified. A negative genetic test result does not completely exclude a hereditary susceptibility to breast cancer, and genetic test results should be interpreted in the context of the patient's personal and/or family history.    Hereditary Cancer - Breast Actionable Panel: TOÑO, BARD1, BRCA1, BRCA2, CDH1, CHEK2, NF1, PALB2, PTEN, STK11, TP53.    In addition to the analysis of the coding exons and adjoining intronic/UTR sequences, this assay includes sequence analysis of the promoter regions of BRCA1, BRCA2, PTEN, and TP53. Copy number variation analysis is also performed for the promoter regions of BRCA1, BRCA2, PTEN, and TP53.       COVERAGE:  This analysis is limited to the coding exons and immediately adjoining intronic sequences of the analyzed genes. Coverage is only guaranteed for biologically relevant transcripts, as defined in the LRG database. Coverage minimums are not guaranteed for intronic positions. Therefore, intronic variants cannot be confirmed or excluded with the same degree of confidence as coding exonic variants. With the exception of a limited set of known pathogenic variants, sequences residing more than 25 base pairs from a coding exon are not routinely analyzed. A list of these supplemental positions is available upon request. The proportion of coding bases covered at 15x and 20x coverage are reported below. Sensitivity is reduced in regions with less than 20x coverage, and variants cannot be confidently excluded in regions  "with <15x coverage. A list of specific regions not meeting these minimum thresholds is available upon request. For panels with less than 25 genes, when possible, Yin sequencing is used to supplement coverage in regions with <15x coverage.  Percentage of bases covered at 15x: 100  Percentage of bases covered at 20x: >99.75      METHODOLOGY [Nura et al., 2015][Aleja et al., 2014]:  Genomic DNA is extracted from the sample, and sequencing libraries are prepared according to standard Agilent protocols using a custom-designed Agilent Sureselect XT kit for enrichment of targeted genes. The enriched DNA libraries are sequenced on an Illumina Modular Roboticsq or NextSolace Therapeuticsq instrument. Raw sequencing reads are mapped to the reference genome using BWA. Raw alignment files are realigned in the neighborhood of indels and recalibrated for base quality accuracy using the Genome Analysis Tool Kit (GATK) version 3.8. Point mutation and indel calls in exons and adjoining intronic regions are made using a combination of the GATK haplotype caller and Samtools mpileup. Variants are interpreted according to guidance issued by the American College of Medical Genetics [Bobby et al., 2015]. For exons 11-15 of PMS2, reads from both PMS2 and PMS2CL are aligned to the PMS2 sequence as described in Vera et al. [2018]. Any pathogenic or likely pathogenic variants in exons 11-15 of PMS2 are subsequently confirmed by long-range PCR. The long-range PMS2 PCR product is processed and sequenced on an Illumina MiSeq instrument and reads are aligned to PMS2. Genotyping and indel calling for the PMS2 long-range PCR product are done using Freebayes and Scanindel [Guevara et al., 2015].    Pathogenic and predicted pathogenic variants that meet ALL of the following criteria are reported without validation by [place] sequencin- single nucleotide substitution, 2- receives a \"PASS\" from the SNP or indel filter, 3- has a VAF in the accepted range (heterozygous = " 0.3-0.6, homozygous/hemizygous >0.9), 4- has a minimum of 20x coverage, and 5- is present in the GATK38 VCF file. Pathogenic variants that fail to meet any of these criteria are verified by Yin sequencing prior to reporting [Jaime et al., 2015].    For copy number variation (CNV) analysis, raw sequencing reads are mapped to the reference human genome (HG19) using both BWA and Bowtie algorithms. CNV ratios are computed by comparing the average coverage in the sample across 60 base pair windows. Average coverage is compared to control loci both within the sample and within a gender-matched control sample from the same run. The BWA/Bowtie coverage ratio is calculated for each window in order to identify regions where the presence of homologous sequences precludes accurate CNV calls. Heterozygous deletion calls are made when 3 consecutive windows have a CNV ratio of 0.3-0.7; homozygous/hemizygous deletion calls are made when 3 consecutive windows have a CNV ratio less than 0.3; and duplication calls are made when 5 consecutive windows have a CNV ratio greater than 1.3. Calls are only made in areas where the BWA/Bowtie ratio is 0.75-1.1 and the average coverage is 20x. All CNV calls are confirmed with a supplementary qPCR assay [Aleja et al., 2016].    The following types of variants are not included in the clinical report, but information about these variants is available upon request:  *Missense variants that are present at >1% MAF in population databases AND are not reported as pathogenic/likely pathogenic in ClinVar.  *Missense variants with a MAF <1% that are classified as benign or likely benign according to published ACMG criteria.  *Synonymous variants that do not occur at an intron/exon boundary, are not reported as pathogenic/likely pathogenic in relevant clinical databases, and are present in 15 or more individuals in gnomAD.  *Intronic variants that reside more than 5 bps from an intron/exon boundary  AND are not reported as pathogenic/likely pathogenic in ClinVar. Known pathogenic variants residing 5-25 bps from an intron/exon boundary will be reported.  *Untranslated region (UTR) variants not previously reported as pathogenic/likely pathogenic in relevant clinical databases.  *Some variants may be excluded based on review of sequencing quality data. It is possible that some low quality variants are, in fact, real.       LIMITATIONS:  This analysis has not been validated for detection of insertion/deletion mutations larger than 18bp in length. The size of polymorphic repetitive sequences, such as CAG repeats, intronic dinucleotide repeats, or intronic polyT/polyA sequences, cannot be determined by this analysis.    The CNV algorithm is not validated to detect deletions encompassing less than 180 base pairs of coding sequence or duplications encompassing less than 300 base pairs of coding sequence. Coding exons comprised of less than 60 bases are not assessed by these methods. The CNV algorithm does not define precise breakpoints for duplications or deletions.    Due to issues with GC content, the presence of a pseudogene, and/or repetitive sequences, detection of sequence and/or copy number variants is not possible in a limited number of regions, even when coverage exceeds 20x. Each of the following genes contains one or more exons that cannot be adequately analyzed due to these issues: CCDC40, RIA, CES1, CISD2, DSPP, ESPN, FMN2, GCSH1, GNAS, GP1BA, HYDIN, KMT2C, KRT8, MENG, MUC5B, NEFH, OTOA, PKD1, PPA2, PSPH, RBMX, RP1L1, RPS17, SHANK3, , STRC, TRIOBP, TTN, RPJ070. A list of genes with a highly similar homolog or pseudogene for which specificity/sensitivity may be reduced is available at https://www.ncbi.nlm.nih.gov/books/ORY323692/.  Additional details are available upon request.       REFERENCES:   Jaime AC, Loyda M, Jose LB, John C, Aleja G, et al. 2015. Criteria for Clinical Reporting of  Variants from a Broad Target Capture NGS Assay without Yin Verification. [facility] biomarkers 2(1):1005.    Aleja GAMING, Jose ORELLANA, Loyda ZAMROA, John DUGAN, Bri ZAMORA, London RAGSDALE, Ally B. 2016. CNV-RF Is a Random Sublette-Based Copy Number Variation Detection Method Using Next-Generation Sequencing. J Mol Diagn. 18(6):872-881. PMID: 73517066.    Lebron Easley Spears MD, Bel Palumbo, Ajay XIAO, Nura WELLS, Bri ZAMORA, Loyda ZAMORA, London RAGSDALE, Ally B. 2014. Implementation of Cloud based next generation sequencing data analysis in a clinical laboratory. BMC Res Notes. 7:314. PMID: 82243157.    Diamante S, Bhargavi N, Quincy S, Viviana D, Aamir S, Alisha J, Virginia WW, Doretha M, Alex E, Gurmeet E, Tisha K, [facility] [address] [facility]  Committee. 2015. Standards and guidelines for the interpretation of sequence variants: a joint consensus recommendation of the American College of Medical Genetics and Genomics and the Association for Molecular Pathology. Lore. Med. 17(5):405-24. PMID: 68175083.    Kelly JA, Inga AW, O'Umesh TD, Ramiro W, Craig EE, Jose Luis S, Pelon S, Do R, Michele X, One G, Sutherland HM, Milton D, Deng SM, Shen S, Skyla MJ, Abdia G, Altshuler D, Lupe DA, Nima E, Sungrant S, Jessica CD, Tara LEROY, Issa BOYKIN, Broad GO, Vidor GO, NHL Exome Sequencing Project. 2012. Evolution and functional impact of rare coding variation from deep sequencing of human exomes. Science. 337(7985):64-9. PMID: 48643038.    Nura S, Ajay XIAO, Diane MARQUEZ, Kwabena JUAN, Bri Metz, Aleja GAMING, Lebron Triana, Nguyễn Y, Sameer Flores MD, Bel MARQUEZ, London RAGSDALE, Thyagarajan B. 2015. Clinical validation of targeted next-generation sequencing for inherited disorders. Arch. Pathol. Lab. Med. 139(2):204-10. PMID: 76824174.      If a patient is the recipient of an allogeneic bone marrow transplant, this test must be done on a pre-transplant sample or buccal swab. A previous  allogeneic bone marrow transplant will interfere with test results. Call the [facility] ([phone]) for instructions on sample collection for these patients.     This test was developed and its performance characteristics determined by the [facility], Molecular Diagnostics Laboratory and the [facility] (Cancer and Cardiovascular Research Building Room 1-210, [address] [place] [address]). Genomic DNA extraction, interpretation of sequencing data, and, when necessary, [place] sequencing is performed at [facility]. Wet bench processes including library creation, sequence capture using an Illumina EZ-Apps analyzer and bioinformatics is performed at the [facility]. It has not been cleared or approved by the FDA. The laboratory is regulated under CLIA as qualified to perform high-complexity testing. This test is used for clinical purposes. It should not be regarded as investigational or for research.    A resident/fellow in an accredited training program was involved in the selection of testing, review of laboratory data, and/or interpretation of this case. I, as the senior physician, attest that I: (i) confirmed appropriate testing, (ii) examined the relevant raw data for the specimen(s), and (iii) rendered or confirmed the interpretation(s).      CBC with platelets and differential   Result Value Ref Range    WBC Count 7.2 4.0 - 11.0 10e3/uL    RBC Count 4.12 3.80 - 5.20 10e6/uL    Hemoglobin 11.7 11.7 - 15.7 g/dL    Hematocrit 35.0 35.0 - 47.0 %    MCV 85 78 - 100 fL    MCH 28.4 26.5 - 33.0 pg    MCHC 33.4 31.5 - 36.5 g/dL    RDW 14.0 10.0 - 15.0 %    Platelet Count 355 150 - 450 10e3/uL       Imaging    Echocardiogram Complete    Result Date: 2/24/2023  [id number]^TORITO^LA  [address]  [place] [address]  Name: LEE LLAMAS MRN: [id number] : 1975 Study Date: 2023 12:38 PM Age: 47 yrs Gender: Female Patient Location: [facility] Reason For Study: Malignant neoplasm of overlapping sites of right breast in femal Ordering Physician: LA UGARTE Referring Physician: LA UGARTE Performed By: YEVGENIY/DWAINE  BSA: 1.9 m2 Height: 66 in Weight: 180 lb HR: 86 BP: 138/71 mmHg ______________________________________________________________________________ Procedure Complete Echo Adult. ______________________________________________________________________________ Interpretation Summary  Normal chamber sizes There is borderline concentric left ventricular hypertrophy. The visual ejection fraction is 60-65%. No regional wall motion abnormalities noted. The right ventricle is normal in size and function. The interatrial septum bows toward the right atrium, consistent with increased left atrial pressure. Possible patent foramen ovale. There is trace to mild mitral regurgitation. There is trace tricuspid regurgitation. ______________________________________________________________________________ Left Ventricle The left ventricle is normal in size. There is borderline concentric left ventricular hypertrophy. The visual ejection fraction is 60-65%. Left ventricular diastolic function is normal. No regional wall motion abnormalities noted.  Right Ventricle The right ventricle is normal in size and function.  Atria Normal left atrial size. Right atrial size is normal. The interatrial septum bows toward the right atrium, consistent with increased left atrial pressure. Possible patent foramen ovale.  Mitral Valve Mitral valve leaflets appear normal. There is trace to mild mitral regurgitation.  Tricuspid Valve Tricuspid valve leaflets appear normal. There is trace tricuspid regurgitation.  Aortic Valve Aortic valve leaflets appear normal. There is no evidence of aortic stenosis or clinically  significant aortic regurgitation.  Pulmonic Valve The pulmonic valve is normal in structure and function.  Vessels The aorta root is normal. IVC diameter and respiratory changes fall into an intermediate range suggesting an RA pressure of 8 mmHg.  Pericardium There is no pericardial effusion.  ______________________________________________________________________________ MMode/2D Measurements & Calculations IVSd: 1.2 cm LVIDd: 3.3 cm LVIDs: 1.7 cm LVPWd: 0.98 cm FS: 49.4 % LV mass(C)d: 109.0 grams LV mass(C)dI: 57.0 grams/m2  Ao root diam: 2.5 cm LA dimension: 2.7 cm LA/Ao: 1.1 LVOT diam: 2.0 cm LVOT area: 3.0 cm2 LA Volume Indexed (AL/bp): 28.0 ml/m2 RWT: 0.58  Time Measurements MM HR: 86.0 BPM  Doppler Measurements & Calculations MV E max franklin: 63.8 cm/sec MV A max franklin: 73.7 cm/sec MV E/A: 0.87  MV dec slope: 338.0 cm/sec2 MV dec time: 0.19 sec Ao V2 max: 125.1 cm/sec Ao max P.0 mmHg Ao V2 mean: 90.3 cm/sec Ao mean PG: 3.6 mmHg Ao V2 VTI: 25.4 cm ROSA M(I,D): 2.3 cm2 ROSA M(V,D): 2.3 cm2 LV V1 max PG: 3.7 mmHg LV V1 max: 96.4 cm/sec LV V1 VTI: 19.2 cm SV(LVOT): 58.5 ml SI(LVOT): 30.6 ml/m2 PA acc time: 0.15 sec AV Franklin Ratio (DI): 0.77 ROSA M Index (cm2/m2): 1.2 E/E': 5.9 E/E' av.5 Lateral E/e': 5.8 Medial E/e': 9.2 Peak E' Franklin: 10.9 cm/sec  ______________________________________________________________________________ Report approved by: Olga Calvo 2023 02:22 PM       NM Bone Scan Whole Body    Result Date: 2023  EXAM: NM BONE SCAN WHOLE BODY LOCATION: [facility] DATE/TIME: 2023 10:32 AM INDICATION: Breast cancer COMPARISON: CT the chest and pelvis dated 2023 TECHNIQUE: 25.9 mCi technetium-99m MDP, IV. Anterior and posterior delayed whole-body images at 3 hours with additional spot images of the skull. FINDINGS: Radiotracer uptake in a pattern typical of degenerative change involving the shoulders and spine. Focal radiotracer uptake in the mid anterior left tibia  likely representing sequelae of prior trauma or stress related process. No suspicious areas of altered uptake to suggest osseous metastasis.     IMPRESSION: No convincing scintigraphic evidence of osseous metastasis.     Total time spent with patient in face to face time, chart review and documentation was 45 minutes.    Complex patient undergoing chemotherapy, high risk drug therapy requiring intensive monitoring.       Signed by: KYLEIGH Fernandez CNP      Again, thank you for allowing me to participate in the care of your patient.        Sincerely,        KYLEIGH Fernandez CNP

## 2023-03-17 NOTE — PROGRESS NOTES
"Oncology Rooming Note    March 17, 2023 1:25 PM   Alicia Iniguez is a 47 year old female who presents for:    Chief Complaint   Patient presents with     Oncology Clinic Visit     2 week follow up with labs/infusion related to Malignant neoplasm of overlapping sites of right breast in female, estrogen receptor positive     Initial Vitals: /63 (BP Location: Left arm, Patient Position: Sitting, Cuff Size: Adult Regular)   Pulse 100   Temp 98.2  F (36.8  C) (Tympanic)   Resp 16   Ht 1.676 m (5' 6\")   Wt 86.8 kg (191 lb 4.8 oz)   SpO2 97%   BMI 30.88 kg/m   Estimated body mass index is 30.88 kg/m  as calculated from the following:    Height as of this encounter: 1.676 m (5' 6\").    Weight as of this encounter: 86.8 kg (191 lb 4.8 oz). Body surface area is 2.01 meters squared.  Mild Pain (2) Comment: Data Unavailable   No LMP recorded.  Allergies reviewed: Yes  Medications reviewed: Yes    Medications: Medication refills not needed today.  Pharmacy name entered into [facility]: [facility][id number] [address] [facility] [address] [facility] [address]    Clinical concerns: 2 week follow up with labs/infusion related to Malignant neoplasm of overlapping sites of right breast in female, estrogen receptor positive      DIANA QUINN CMA            "

## 2023-03-31 ENCOUNTER — ONCOLOGY VISIT (OUTPATIENT)
Dept: ONCOLOGY | Facility: HOSPITAL | Age: 48
End: 2023-03-31
Attending: INTERNAL MEDICINE
Payer: COMMERCIAL

## 2023-03-31 ENCOUNTER — LAB (OUTPATIENT)
Dept: INFUSION THERAPY | Facility: HOSPITAL | Age: 48
End: 2023-03-31
Attending: INTERNAL MEDICINE
Payer: COMMERCIAL

## 2023-03-31 VITALS
RESPIRATION RATE: 20 BRPM | HEIGHT: 66 IN | BODY MASS INDEX: 31.02 KG/M2 | WEIGHT: 193 LBS | TEMPERATURE: 98.3 F | OXYGEN SATURATION: 97 % | HEART RATE: 111 BPM | SYSTOLIC BLOOD PRESSURE: 129 MMHG | DIASTOLIC BLOOD PRESSURE: 61 MMHG

## 2023-03-31 DIAGNOSIS — T45.1X5S ADVERSE EFFECT OF ANTINEOPLASTIC AND IMMUNOSUPPRESSIVE DRUGS, SEQUELA: Primary | ICD-10-CM

## 2023-03-31 DIAGNOSIS — C50.811 MALIGNANT NEOPLASM OF OVERLAPPING SITES OF RIGHT BREAST IN FEMALE, ESTROGEN RECEPTOR POSITIVE (H): ICD-10-CM

## 2023-03-31 DIAGNOSIS — C50.811 MALIGNANT NEOPLASM OF OVERLAPPING SITES OF RIGHT BREAST IN FEMALE, ESTROGEN RECEPTOR POSITIVE (H): Primary | ICD-10-CM

## 2023-03-31 DIAGNOSIS — Z17.0 MALIGNANT NEOPLASM OF OVERLAPPING SITES OF RIGHT BREAST IN FEMALE, ESTROGEN RECEPTOR POSITIVE (H): ICD-10-CM

## 2023-03-31 DIAGNOSIS — R11.0 CHEMOTHERAPY-INDUCED NAUSEA: ICD-10-CM

## 2023-03-31 DIAGNOSIS — Z17.0 MALIGNANT NEOPLASM OF OVERLAPPING SITES OF RIGHT BREAST IN FEMALE, ESTROGEN RECEPTOR POSITIVE (H): Primary | ICD-10-CM

## 2023-03-31 DIAGNOSIS — T45.1X5A CHEMOTHERAPY-INDUCED NAUSEA: ICD-10-CM

## 2023-03-31 LAB
ALBUMIN SERPL BCG-MCNC: 4.2 G/DL (ref 3.5–5.2)
ALP SERPL-CCNC: 103 U/L (ref 35–104)
ALT SERPL W P-5'-P-CCNC: 19 U/L (ref 10–35)
ANION GAP SERPL CALCULATED.3IONS-SCNC: 11 MMOL/L (ref 7–15)
AST SERPL W P-5'-P-CCNC: 19 U/L (ref 10–35)
BASOPHILS # BLD MANUAL: 0 10E3/UL (ref 0–0.2)
BASOPHILS NFR BLD MANUAL: 0 %
BILIRUB SERPL-MCNC: 0.2 MG/DL
BUN SERPL-MCNC: 8.8 MG/DL (ref 6–20)
CALCIUM SERPL-MCNC: 9.2 MG/DL (ref 8.6–10)
CHLORIDE SERPL-SCNC: 105 MMOL/L (ref 98–107)
CREAT SERPL-MCNC: 0.64 MG/DL (ref 0.51–0.95)
DEPRECATED HCO3 PLAS-SCNC: 25 MMOL/L (ref 22–29)
EOSINOPHIL # BLD MANUAL: 0 10E3/UL (ref 0–0.7)
EOSINOPHIL NFR BLD MANUAL: 0 %
ERYTHROCYTE [DISTWIDTH] IN BLOOD BY AUTOMATED COUNT: 15.5 % (ref 10–15)
GFR SERPL CREATININE-BSD FRML MDRD: >90 ML/MIN/1.73M2
GLUCOSE SERPL-MCNC: 114 MG/DL (ref 70–99)
HCT VFR BLD AUTO: 35.6 % (ref 35–47)
HGB BLD-MCNC: 11.8 G/DL (ref 11.7–15.7)
LYMPHOCYTES # BLD MANUAL: 1.4 10E3/UL (ref 0.8–5.3)
LYMPHOCYTES NFR BLD MANUAL: 13 %
MCH RBC QN AUTO: 28.6 PG (ref 26.5–33)
MCHC RBC AUTO-ENTMCNC: 33.1 G/DL (ref 31.5–36.5)
MCV RBC AUTO: 86 FL (ref 78–100)
METAMYELOCYTES # BLD MANUAL: 0.5 10E3/UL
METAMYELOCYTES NFR BLD MANUAL: 5 %
MONOCYTES # BLD MANUAL: 0.4 10E3/UL (ref 0–1.3)
MONOCYTES NFR BLD MANUAL: 4 %
NEUTROPHILS # BLD MANUAL: 8.3 10E3/UL (ref 1.6–8.3)
NEUTROPHILS NFR BLD MANUAL: 78 %
NRBC # BLD AUTO: 0.1 10E3/UL
NRBC BLD MANUAL-RTO: 1 %
PLAT MORPH BLD: ABNORMAL
PLATELET # BLD AUTO: 241 10E3/UL (ref 150–450)
POTASSIUM SERPL-SCNC: 4 MMOL/L (ref 3.4–5.3)
PROT SERPL-MCNC: 6.9 G/DL (ref 6.4–8.3)
RBC # BLD AUTO: 4.13 10E6/UL (ref 3.8–5.2)
RBC MORPH BLD: ABNORMAL
SODIUM SERPL-SCNC: 141 MMOL/L (ref 136–145)
WBC # BLD AUTO: 10.6 10E3/UL (ref 4–11)

## 2023-03-31 PROCEDURE — 96377 APPLICATON ON-BODY INJECTOR: CPT | Mod: XS

## 2023-03-31 PROCEDURE — 96413 CHEMO IV INFUSION 1 HR: CPT

## 2023-03-31 PROCEDURE — 96375 TX/PRO/DX INJ NEW DRUG ADDON: CPT

## 2023-03-31 PROCEDURE — 96367 TX/PROPH/DG ADDL SEQ IV INF: CPT

## 2023-03-31 PROCEDURE — 96411 CHEMO IV PUSH ADDL DRUG: CPT

## 2023-03-31 PROCEDURE — 250N000011 HC RX IP 250 OP 636: Performed by: INTERNAL MEDICINE

## 2023-03-31 PROCEDURE — 85007 BL SMEAR W/DIFF WBC COUNT: CPT | Performed by: NURSE PRACTITIONER

## 2023-03-31 PROCEDURE — 99214 OFFICE O/P EST MOD 30 MIN: CPT | Performed by: INTERNAL MEDICINE

## 2023-03-31 PROCEDURE — 85027 COMPLETE CBC AUTOMATED: CPT | Performed by: NURSE PRACTITIONER

## 2023-03-31 PROCEDURE — 96372 THER/PROPH/DIAG INJ SC/IM: CPT | Performed by: INTERNAL MEDICINE

## 2023-03-31 PROCEDURE — 99212 OFFICE O/P EST SF 10 MIN: CPT | Mod: 25 | Performed by: INTERNAL MEDICINE

## 2023-03-31 PROCEDURE — 80053 COMPREHEN METABOLIC PANEL: CPT | Performed by: NURSE PRACTITIONER

## 2023-03-31 PROCEDURE — 258N000003 HC RX IP 258 OP 636: Performed by: INTERNAL MEDICINE

## 2023-03-31 RX ORDER — HEPARIN SODIUM (PORCINE) LOCK FLUSH IV SOLN 100 UNIT/ML 100 UNIT/ML
5 SOLUTION INTRAVENOUS
Status: DISCONTINUED | OUTPATIENT
Start: 2023-03-31 | End: 2023-03-31 | Stop reason: HOSPADM

## 2023-03-31 RX ORDER — DIPHENHYDRAMINE HYDROCHLORIDE 50 MG/ML
50 INJECTION INTRAMUSCULAR; INTRAVENOUS
Status: DISCONTINUED | OUTPATIENT
Start: 2023-03-31 | End: 2023-03-31 | Stop reason: HOSPADM

## 2023-03-31 RX ORDER — ALBUTEROL SULFATE 0.83 MG/ML
2.5 SOLUTION RESPIRATORY (INHALATION)
Status: CANCELLED | OUTPATIENT
Start: 2023-03-31

## 2023-03-31 RX ORDER — ALBUTEROL SULFATE 90 UG/1
1-2 AEROSOL, METERED RESPIRATORY (INHALATION)
Status: DISCONTINUED | OUTPATIENT
Start: 2023-03-31 | End: 2023-03-31 | Stop reason: HOSPADM

## 2023-03-31 RX ORDER — MEPERIDINE HYDROCHLORIDE 25 MG/ML
25 INJECTION INTRAMUSCULAR; INTRAVENOUS; SUBCUTANEOUS EVERY 30 MIN PRN
Status: DISCONTINUED | OUTPATIENT
Start: 2023-03-31 | End: 2023-03-31 | Stop reason: HOSPADM

## 2023-03-31 RX ORDER — PALONOSETRON 0.05 MG/ML
0.25 INJECTION, SOLUTION INTRAVENOUS ONCE
Status: CANCELLED | OUTPATIENT
Start: 2023-03-31

## 2023-03-31 RX ORDER — DOXORUBICIN HYDROCHLORIDE 2 MG/ML
60 INJECTION, SOLUTION INTRAVENOUS ONCE
Status: CANCELLED | OUTPATIENT
Start: 2023-03-31

## 2023-03-31 RX ORDER — PALONOSETRON 0.05 MG/ML
0.25 INJECTION, SOLUTION INTRAVENOUS ONCE
Status: COMPLETED | OUTPATIENT
Start: 2023-03-31 | End: 2023-03-31

## 2023-03-31 RX ORDER — LORAZEPAM 2 MG/ML
0.5 INJECTION INTRAMUSCULAR EVERY 4 HOURS PRN
Status: CANCELLED | OUTPATIENT
Start: 2023-03-31

## 2023-03-31 RX ORDER — HEPARIN SODIUM,PORCINE 10 UNIT/ML
5 VIAL (ML) INTRAVENOUS
Status: CANCELLED | OUTPATIENT
Start: 2023-03-31

## 2023-03-31 RX ORDER — HEPARIN SODIUM (PORCINE) LOCK FLUSH IV SOLN 100 UNIT/ML 100 UNIT/ML
5 SOLUTION INTRAVENOUS
Status: CANCELLED | OUTPATIENT
Start: 2023-03-31

## 2023-03-31 RX ORDER — ALBUTEROL SULFATE 90 UG/1
1-2 AEROSOL, METERED RESPIRATORY (INHALATION)
Status: CANCELLED
Start: 2023-03-31

## 2023-03-31 RX ORDER — ALBUTEROL SULFATE 0.83 MG/ML
2.5 SOLUTION RESPIRATORY (INHALATION)
Status: DISCONTINUED | OUTPATIENT
Start: 2023-03-31 | End: 2023-03-31

## 2023-03-31 RX ORDER — MEPERIDINE HYDROCHLORIDE 25 MG/ML
25 INJECTION INTRAMUSCULAR; INTRAVENOUS; SUBCUTANEOUS EVERY 30 MIN PRN
Status: CANCELLED | OUTPATIENT
Start: 2023-03-31

## 2023-03-31 RX ORDER — DIPHENHYDRAMINE HYDROCHLORIDE 50 MG/ML
50 INJECTION INTRAMUSCULAR; INTRAVENOUS
Status: CANCELLED
Start: 2023-03-31

## 2023-03-31 RX ORDER — METHYLPREDNISOLONE SODIUM SUCCINATE 125 MG/2ML
125 INJECTION, POWDER, LYOPHILIZED, FOR SOLUTION INTRAMUSCULAR; INTRAVENOUS
Status: CANCELLED
Start: 2023-03-31

## 2023-03-31 RX ORDER — DOXORUBICIN HYDROCHLORIDE 2 MG/ML
60 INJECTION, SOLUTION INTRAVENOUS ONCE
Status: COMPLETED | OUTPATIENT
Start: 2023-03-31 | End: 2023-03-31

## 2023-03-31 RX ORDER — EPINEPHRINE 1 MG/ML
0.3 INJECTION, SOLUTION INTRAMUSCULAR; SUBCUTANEOUS EVERY 5 MIN PRN
Status: DISCONTINUED | OUTPATIENT
Start: 2023-03-31 | End: 2023-03-31 | Stop reason: HOSPADM

## 2023-03-31 RX ORDER — EPINEPHRINE 1 MG/ML
0.3 INJECTION, SOLUTION INTRAMUSCULAR; SUBCUTANEOUS EVERY 5 MIN PRN
Status: CANCELLED | OUTPATIENT
Start: 2023-03-31

## 2023-03-31 RX ORDER — METHYLPREDNISOLONE SODIUM SUCCINATE 125 MG/2ML
125 INJECTION, POWDER, LYOPHILIZED, FOR SOLUTION INTRAMUSCULAR; INTRAVENOUS
Status: DISCONTINUED | OUTPATIENT
Start: 2023-03-31 | End: 2023-03-31 | Stop reason: HOSPADM

## 2023-03-31 RX ADMIN — PEGFILGRASTIM 6 MG: KIT SUBCUTANEOUS at 11:19

## 2023-03-31 RX ADMIN — SODIUM CHLORIDE 250 ML: 9 INJECTION, SOLUTION INTRAVENOUS at 09:37

## 2023-03-31 RX ADMIN — PALONOSETRON 0.25 MG: 0.05 INJECTION, SOLUTION INTRAVENOUS at 09:37

## 2023-03-31 RX ADMIN — CYCLOPHOSPHAMIDE 1195 MG: 1 INJECTION, POWDER, FOR SOLUTION INTRAVENOUS; ORAL at 10:29

## 2023-03-31 RX ADMIN — Medication 5 ML: at 11:23

## 2023-03-31 RX ADMIN — DEXAMETHASONE SODIUM PHOSPHATE: 10 INJECTION, SOLUTION INTRAMUSCULAR; INTRAVENOUS at 09:50

## 2023-03-31 RX ADMIN — DOXORUBICIN HYDROCHLORIDE 120 MG: 2 INJECTION, SOLUTION INTRAVENOUS at 10:17

## 2023-03-31 ASSESSMENT — PAIN SCALES - GENERAL: PAINLEVEL: NO PAIN (0)

## 2023-03-31 NOTE — PROGRESS NOTES
"Oncology Rooming Note    March 31, 2023 8:32 AM   Alicia Iniguez is a 47 year old female who presents for:    Chief Complaint   Patient presents with     Oncology Clinic Visit     2 week return Malignant neoplasm of overlapping sites of right breast in female, estrogen receptor positive     Initial Vitals: /61 (BP Location: Right arm, Patient Position: Sitting, Cuff Size: Adult Large)   Pulse 111   Temp 98.3  F (36.8  C) (Oral)   Resp 20   Ht 1.664 m (5' 5.5\")   Wt 87.5 kg (193 lb)   SpO2 97%   BMI 31.63 kg/m   Estimated body mass index is 31.63 kg/m  as calculated from the following:    Height as of this encounter: 1.664 m (5' 5.5\").    Weight as of this encounter: 87.5 kg (193 lb). Body surface area is 2.01 meters squared.  No Pain (0) Comment: Data Unavailable   No LMP recorded.  Allergies reviewed: Yes  Medications reviewed: Yes    Medications: Medication refills not needed today.  Pharmacy name entered into [facility]: [id number] [place] [id number]    Clinical concerns: 2 week return Malignant neoplasm of overlapping sites of right breast in female, estrogen receptor positive.      Isa Kowalski CMA            "

## 2023-03-31 NOTE — PROGRESS NOTES
[facility] Hematology and Oncology Progress Note    Patient: Alicia Iniguez  MRN: [id number]  Date of Service: Mar 31, 2023          Reason for Visit    Chief Complaint   Patient presents with     Oncology Clinic Visit     2 week return Malignant neoplasm of overlapping sites of right breast in female, estrogen receptor positive       Assessment and Plan     Cancer Staging   Malignant neoplasm of overlapping sites of right breast in female, estrogen receptor positive (H)  Staging form: Breast, AJCC 8th Edition  - Clinical: Stage IIA (cT3, cN2, cM0, G2, ER+, MT+, HER2-) - Signed by Shanelle Weaver APRN CNP on 3/1/2023    1.  Locally Advanced Breast cancer, right side, stage IIa, T3 N2 M0, grade 2, ER/MT positive, HER2/janine negative: Currently on neoadjuvant chemotherapy with dose dense AC followed by Taxol.  Here for cycle 3.  Has done extremely well on chemotherapy so far.  Some fatigue after cycle 1.  Nausea intermittently which responds to Compazine.  Otherwise no other side effects at all.  No diarrhea.  No infectious complications.  Her right breast mass feels softer and smaller.  Also right axilla adenopathy is not obviously palpable right now.  Labs reviewed today.  Normal white count and platelet counts.  Normal hemoglobin.  Electrolytes and serum chemistries are within normal limits.  No contraindication to proceed with cycle 3 AC today.  She will come back in 2 weeks for cycle 4.  She gets Neulasta support.  Following cycle 4 she will  will proceed with weekly Taxol.    2.  Fatigue, low-grade nausea: Very stable and respond to antinausea medications.  Fatigue is not that bad today.  Recommended physical activity.    ECOG Performance    0 - Independent    Distress Screening (within last 30 days)    1. How concerned are you about your ability to eat? : 0  2. How concerned are you about unintended weight loss or your current weight? : 0  3. How concerned are you about feeling depressed or very sad?  : 0  4. How concerned are you about feeling anxious or very scared? : 5  5. Do you struggle with the loss of meaning and tiago in your life? : Not at all  6. How concerned are you about work and home life issues that may be affected by your cancer? : 0  7. How concerned are you about knowing what resources are available to help you? : 0  8. Do you currently have what you would describe as Sikh or spiritual struggles?            : Not at all       Pain  Pain Score: No Pain (0)    Problem List    Patient Active Problem List   Diagnosis     Malignant neoplasm of overlapping sites of right breast in female, estrogen receptor positive (H)     Malignant neoplasm involving both nipple and areola of right breast in female, estrogen receptor positive (H)     Adverse effect of antineoplastic and immunosuppressive drugs, sequela        ______________________________________________________________________________    History of Present Illness    Oncology history:  DIAGNOSIS: Breast cancer, right side, patient had a palpable lump with some nipple and skin changes.  She was also found to have lymphadenopathy in the axilla.  Patient had an ultrasound and mammogram that showed a 4.1 cm tumor with axillary lymphadenopathy  Biopsy was done January 31 and it showed invasive ductal carcinoma.  Grade 2.  ER RI positive and HER2/janine negative.  Biopsy was done of the lymph node as well and it was positive.    TREATMENT: Patient has started neoadjuvant chemotherapy with dose dense Adriamycin and Cytoxan.  Today is cycle 3    INTERIM HISTORY:   She is here prior to cycle 3 of dose dense AC.  Has done relatively well on chemotherapy so far without any major side effects.  Some nausea and fatigue as expected.  Does respond to antinausea medications.  No fevers chills or night sweats.  No infections.  No significant diarrhea reported.  She feels her right breast mass is softer and smaller now.    Review of Systems    Pertinent items are  "noted in HPI.    Past History    No past medical history on file.    PHYSICAL EXAM  /61 (BP Location: Right arm, Patient Position: Sitting, Cuff Size: Adult Large)   Pulse 111   Temp 98.3  F (36.8  C) (Oral)   Resp 20   Ht 1.664 m (5' 5.5\")   Wt 87.5 kg (193 lb)   SpO2 97%   BMI 31.63 kg/m      GENERAL: no acute distress. Cooperative in conversation. Here with daughter. Mask on  RESP: Regular respiratory rate. No expiratory wheezes   MUSCULOSKELETAL: no bilateral leg swelling  Lymph nodes: Right axillary lymph node is not very obviously palpable today  NEURO: non focal. Alert and oriented x3.   PSYCH: within normal limits. No depression or anxiety.  SKIN: exposed skin is dry intact.     Lab Results    Recent Results (from the past 168 hour(s))   Comprehensive metabolic panel   Result Value Ref Range    Sodium 141 136 - 145 mmol/L    Potassium 4.0 3.4 - 5.3 mmol/L    Chloride 105 98 - 107 mmol/L    Carbon Dioxide (CO2) 25 22 - 29 mmol/L    Anion Gap 11 7 - 15 mmol/L    Urea Nitrogen 8.8 6.0 - 20.0 mg/dL    Creatinine 0.64 0.51 - 0.95 mg/dL    Calcium 9.2 8.6 - 10.0 mg/dL    Glucose 114 (H) 70 - 99 mg/dL    Alkaline Phosphatase 103 35 - 104 U/L    AST 19 10 - 35 U/L    ALT 19 10 - 35 U/L    Protein Total 6.9 6.4 - 8.3 g/dL    Albumin 4.2 3.5 - 5.2 g/dL    Bilirubin Total 0.2 <=1.2 mg/dL    GFR Estimate >90 >60 mL/min/1.73m2   CBC with platelets and differential   Result Value Ref Range    WBC Count 10.6 4.0 - 11.0 10e3/uL    RBC Count 4.13 3.80 - 5.20 10e6/uL    Hemoglobin 11.8 11.7 - 15.7 g/dL    Hematocrit 35.6 35.0 - 47.0 %    MCV 86 78 - 100 fL    MCH 28.6 26.5 - 33.0 pg    MCHC 33.1 31.5 - 36.5 g/dL    RDW 15.5 (H) 10.0 - 15.0 %    Platelet Count 241 150 - 450 10e3/uL   Manual Differential   Result Value Ref Range    % Neutrophils 78 %    % Lymphocytes 13 %    % Monocytes 4 %    % Eosinophils 0 %    % Basophils 0 %    % Metamyelocytes 5 %    NRBCs per 100 WBC 1 (H) <=0 %    Absolute Neutrophils 8.3 " 1.6 - 8.3 10e3/uL    Absolute Lymphocytes 1.4 0.8 - 5.3 10e3/uL    Absolute Monocytes 0.4 0.0 - 1.3 10e3/uL    Absolute Eosinophils 0.0 0.0 - 0.7 10e3/uL    Absolute Basophils 0.0 0.0 - 0.2 10e3/uL    Absolute Metamyelocytes 0.5 (H) <=0.0 10e3/uL    Absolute NRBCs 0.1 (H) <=0.0 10e3/uL    RBC Morphology Confirmed RBC Indices     Platelet Assessment  Automated Count Confirmed. Platelet morphology is normal.     Automated Count Confirmed. Platelet morphology is normal.       Imaging    No results found.     Interpretation of labs and signing chemotherapy orders.  E5 visit.    Signed by: Paola Saleem MD

## 2023-03-31 NOTE — PROGRESS NOTES
Infusion Nursing Note:  Alicia Iniguez presents today for cycle 3 day 1 doxorubicin/cyclophosphamide.    Patient seen by provider today: Yes: Dr. Saleem   present during visit today: Not Applicable.    Note: Alicia arrived ambulatory and in stable condition accompanied by her . Port was accessed using sterile technique, good blood return noted, and labs collected. She was premedicated and chemotherapy was administered per orders. Cytoxan was administered over 45 minutes with NS running concurrently at 200 ml/hr due to sinus pressure with previous infusions. Neulasta On Pro was applied to RLQ at 1130 and she was instructed that she'd be able to remove applicator around 1515 on 4/1. Port de-accessed and site covered with gauze and tape. Will return on 4/14 for next appointment.    Intravenous Access:  Labs drawn without difficulty.  Implanted Port.    Treatment Conditions:  Lab Results   Component Value Date    HGB 11.8 03/31/2023    WBC 10.6 03/31/2023    ANEU 8.3 03/31/2023    ANEUTAUTO 7.2 03/02/2023     03/31/2023      Lab Results   Component Value Date     03/31/2023    POTASSIUM 4.0 03/31/2023    CR 0.64 03/31/2023    KURT 9.2 03/31/2023    BILITOTAL 0.2 03/31/2023    ALBUMIN 4.2 03/31/2023    ALT 19 03/31/2023    AST 19 03/31/2023     Results reviewed, labs MET treatment parameters, ok to proceed with treatment.    Post Infusion Assessment:  Patient tolerated infusion without incident.  Blood return noted pre and post infusion.  Blood return noted during administration every 5 cc.  Site patent and intact, free from redness, edema or discomfort.  No evidence of extravasations.  Access discontinued per protocol.     Discharge Plan:   Patient and/or family verbalized understanding of discharge instructions and all questions answered.  AVS to patient via BtiquesT.  Patient will return 4/14 for next appointment.   Patient discharged in stable condition accompanied by:  .  Departure Mode: Ambulatory.      Rita Edwards RN

## 2023-03-31 NOTE — LETTER
3/31/2023         RE: Alicia Iniguez  [address] [facility] [address]        Dear Colleague,    Thank you for referring your patient, Alicia Iniguez, to the [facility] [place]. Please see a copy of my visit note below.    [facility] Hematology and Oncology Progress Note    Patient: Alicia Iniguez  MRN: [id number]  Date of Service: Mar 31, 2023          Reason for Visit    Chief Complaint   Patient presents with     Oncology Clinic Visit     2 week return Malignant neoplasm of overlapping sites of right breast in female, estrogen receptor positive       Assessment and Plan     Cancer Staging   Malignant neoplasm of overlapping sites of right breast in female, estrogen receptor positive (H)  Staging form: Breast, AJCC 8th Edition  - Clinical: Stage IIA (cT3, cN2, cM0, G2, ER+, ND+, HER2-) - Signed by Shanelle Weaver APRN CNP on 3/1/2023    1.  Locally Advanced Breast cancer, right side, stage IIa, T3 N2 M0, grade 2, ER/ND positive, HER2/janine negative: Currently on neoadjuvant chemotherapy with dose dense AC followed by Taxol.  Here for cycle 3.  Has done extremely well on chemotherapy so far.  Some fatigue after cycle 1.  Nausea intermittently which responds to Compazine.  Otherwise no other side effects at all.  No diarrhea.  No infectious complications.  Her right breast mass feels softer and smaller.  Also right axilla adenopathy is not obviously palpable right now.  Labs reviewed today.  Normal white count and platelet counts.  Normal hemoglobin.  Electrolytes and serum chemistries are within normal limits.  No contraindication to proceed with cycle 3 AC today.  She will come back in 2 weeks for cycle 4.  She gets Neulasta support.  Following cycle 4 she will  will proceed with weekly Taxol.    2.  Fatigue, low-grade nausea: Very stable and respond to antinausea medications.  Fatigue is not that bad today.  Recommended physical activity.    ECOG Performance    0 -  Independent    Distress Screening (within last 30 days)    1. How concerned are you about your ability to eat? : 0  2. How concerned are you about unintended weight loss or your current weight? : 0  3. How concerned are you about feeling depressed or very sad? : 0  4. How concerned are you about feeling anxious or very scared? : 5  5. Do you struggle with the loss of meaning and tiago in your life? : Not at all  6. How concerned are you about work and home life issues that may be affected by your cancer? : 0  7. How concerned are you about knowing what resources are available to help you? : 0  8. Do you currently have what you would describe as Anglican or spiritual struggles?            : Not at all       Pain  Pain Score: No Pain (0)    Problem List    Patient Active Problem List   Diagnosis     Malignant neoplasm of overlapping sites of right breast in female, estrogen receptor positive (H)     Malignant neoplasm involving both nipple and areola of right breast in female, estrogen receptor positive (H)     Adverse effect of antineoplastic and immunosuppressive drugs, sequela        ______________________________________________________________________________    History of Present Illness    Oncology history:  DIAGNOSIS: Breast cancer, right side, patient had a palpable lump with some nipple and skin changes.  She was also found to have lymphadenopathy in the axilla.  Patient had an ultrasound and mammogram that showed a 4.1 cm tumor with axillary lymphadenopathy  Biopsy was done January 31 and it showed invasive ductal carcinoma.  Grade 2.  ER MT positive and HER2/janine negative.  Biopsy was done of the lymph node as well and it was positive.    TREATMENT: Patient has started neoadjuvant chemotherapy with dose dense Adriamycin and Cytoxan.  Today is cycle 3    INTERIM HISTORY:   She is here prior to cycle 3 of dose dense AC.  Has done relatively well on chemotherapy so far without any major side effects.  Some  "nausea and fatigue as expected.  Does respond to antinausea medications.  No fevers chills or night sweats.  No infections.  No significant diarrhea reported.  She feels her right breast mass is softer and smaller now.    Review of Systems    Pertinent items are noted in HPI.    Past History    No past medical history on file.    PHYSICAL EXAM  /61 (BP Location: Right arm, Patient Position: Sitting, Cuff Size: Adult Large)   Pulse 111   Temp 98.3  F (36.8  C) (Oral)   Resp 20   Ht 1.664 m (5' 5.5\")   Wt 87.5 kg (193 lb)   SpO2 97%   BMI 31.63 kg/m      GENERAL: no acute distress. Cooperative in conversation. Here with daughter. Mask on  RESP: Regular respiratory rate. No expiratory wheezes   MUSCULOSKELETAL: no bilateral leg swelling  Lymph nodes: Right axillary lymph node is not very obviously palpable today  NEURO: non focal. Alert and oriented x3.   PSYCH: within normal limits. No depression or anxiety.  SKIN: exposed skin is dry intact.     Lab Results    Recent Results (from the past 168 hour(s))   Comprehensive metabolic panel   Result Value Ref Range    Sodium 141 136 - 145 mmol/L    Potassium 4.0 3.4 - 5.3 mmol/L    Chloride 105 98 - 107 mmol/L    Carbon Dioxide (CO2) 25 22 - 29 mmol/L    Anion Gap 11 7 - 15 mmol/L    Urea Nitrogen 8.8 6.0 - 20.0 mg/dL    Creatinine 0.64 0.51 - 0.95 mg/dL    Calcium 9.2 8.6 - 10.0 mg/dL    Glucose 114 (H) 70 - 99 mg/dL    Alkaline Phosphatase 103 35 - 104 U/L    AST 19 10 - 35 U/L    ALT 19 10 - 35 U/L    Protein Total 6.9 6.4 - 8.3 g/dL    Albumin 4.2 3.5 - 5.2 g/dL    Bilirubin Total 0.2 <=1.2 mg/dL    GFR Estimate >90 >60 mL/min/1.73m2   CBC with platelets and differential   Result Value Ref Range    WBC Count 10.6 4.0 - 11.0 10e3/uL    RBC Count 4.13 3.80 - 5.20 10e6/uL    Hemoglobin 11.8 11.7 - 15.7 g/dL    Hematocrit 35.6 35.0 - 47.0 %    MCV 86 78 - 100 fL    MCH 28.6 26.5 - 33.0 pg    MCHC 33.1 31.5 - 36.5 g/dL    RDW 15.5 (H) 10.0 - 15.0 %    Platelet " "Count 241 150 - 450 10e3/uL   Manual Differential   Result Value Ref Range    % Neutrophils 78 %    % Lymphocytes 13 %    % Monocytes 4 %    % Eosinophils 0 %    % Basophils 0 %    % Metamyelocytes 5 %    NRBCs per 100 WBC 1 (H) <=0 %    Absolute Neutrophils 8.3 1.6 - 8.3 10e3/uL    Absolute Lymphocytes 1.4 0.8 - 5.3 10e3/uL    Absolute Monocytes 0.4 0.0 - 1.3 10e3/uL    Absolute Eosinophils 0.0 0.0 - 0.7 10e3/uL    Absolute Basophils 0.0 0.0 - 0.2 10e3/uL    Absolute Metamyelocytes 0.5 (H) <=0.0 10e3/uL    Absolute NRBCs 0.1 (H) <=0.0 10e3/uL    RBC Morphology Confirmed RBC Indices     Platelet Assessment  Automated Count Confirmed. Platelet morphology is normal.     Automated Count Confirmed. Platelet morphology is normal.       Imaging    No results found.     Interpretation of labs and signing chemotherapy orders.  E5 visit.    Signed by: Paola Saleem MD    Oncology Rooming Note    March 31, 2023 8:32 AM   Alicia Iniguez is a 47 year old female who presents for:    Chief Complaint   Patient presents with     Oncology Clinic Visit     2 week return Malignant neoplasm of overlapping sites of right breast in female, estrogen receptor positive     Initial Vitals: /61 (BP Location: Right arm, Patient Position: Sitting, Cuff Size: Adult Large)   Pulse 111   Temp 98.3  F (36.8  C) (Oral)   Resp 20   Ht 1.664 m (5' 5.5\")   Wt 87.5 kg (193 lb)   SpO2 97%   BMI 31.63 kg/m   Estimated body mass index is 31.63 kg/m  as calculated from the following:    Height as of this encounter: 1.664 m (5' 5.5\").    Weight as of this encounter: 87.5 kg (193 lb). Body surface area is 2.01 meters squared.  No Pain (0) Comment: Data Unavailable   No LMP recorded.  Allergies reviewed: Yes  Medications reviewed: Yes    Medications: Medication refills not needed today.  Pharmacy name entered into [employer]: [facility] #[id number] - [facility] [address] AT [address] [facility]    Clinical concerns: " 2 week return Malignant neoplasm of overlapping sites of right breast in female, estrogen receptor positive.      Isa Kowalski [facility]                Again, thank you for allowing me to participate in the care of your patient.        Sincerely,        Paola Saleem MD

## 2023-04-14 ENCOUNTER — ONCOLOGY VISIT (OUTPATIENT)
Dept: ONCOLOGY | Facility: HOSPITAL | Age: 48
End: 2023-04-14
Attending: INTERNAL MEDICINE
Payer: COMMERCIAL

## 2023-04-14 ENCOUNTER — LAB (OUTPATIENT)
Dept: INFUSION THERAPY | Facility: HOSPITAL | Age: 48
End: 2023-04-14
Attending: INTERNAL MEDICINE
Payer: COMMERCIAL

## 2023-04-14 VITALS
RESPIRATION RATE: 20 BRPM | HEART RATE: 92 BPM | OXYGEN SATURATION: 99 % | BODY MASS INDEX: 31.96 KG/M2 | DIASTOLIC BLOOD PRESSURE: 65 MMHG | TEMPERATURE: 98.5 F | WEIGHT: 195 LBS | SYSTOLIC BLOOD PRESSURE: 124 MMHG

## 2023-04-14 DIAGNOSIS — T45.1X5S ADVERSE EFFECT OF ANTINEOPLASTIC AND IMMUNOSUPPRESSIVE DRUGS, SEQUELA: ICD-10-CM

## 2023-04-14 DIAGNOSIS — T45.1X5S ADVERSE EFFECT OF ANTINEOPLASTIC AND IMMUNOSUPPRESSIVE DRUGS, SEQUELA: Primary | ICD-10-CM

## 2023-04-14 DIAGNOSIS — C50.811 MALIGNANT NEOPLASM OF OVERLAPPING SITES OF RIGHT BREAST IN FEMALE, ESTROGEN RECEPTOR POSITIVE (H): ICD-10-CM

## 2023-04-14 DIAGNOSIS — C50.811 MALIGNANT NEOPLASM OF OVERLAPPING SITES OF RIGHT BREAST IN FEMALE, ESTROGEN RECEPTOR POSITIVE (H): Primary | ICD-10-CM

## 2023-04-14 DIAGNOSIS — Z17.0 MALIGNANT NEOPLASM OF OVERLAPPING SITES OF RIGHT BREAST IN FEMALE, ESTROGEN RECEPTOR POSITIVE (H): Primary | ICD-10-CM

## 2023-04-14 DIAGNOSIS — Z17.0 MALIGNANT NEOPLASM OF OVERLAPPING SITES OF RIGHT BREAST IN FEMALE, ESTROGEN RECEPTOR POSITIVE (H): ICD-10-CM

## 2023-04-14 DIAGNOSIS — C50.919 BREAST CA (H): Primary | ICD-10-CM

## 2023-04-14 DIAGNOSIS — M54.42 ACUTE LEFT-SIDED LOW BACK PAIN WITH LEFT-SIDED SCIATICA: ICD-10-CM

## 2023-04-14 LAB
BASOPHILS # BLD MANUAL: 0.1 10E3/UL (ref 0–0.2)
BASOPHILS NFR BLD MANUAL: 1 %
EOSINOPHIL # BLD MANUAL: 0 10E3/UL (ref 0–0.7)
EOSINOPHIL NFR BLD MANUAL: 0 %
ERYTHROCYTE [DISTWIDTH] IN BLOOD BY AUTOMATED COUNT: 16.8 % (ref 10–15)
HCT VFR BLD AUTO: 32.7 % (ref 35–47)
HGB BLD-MCNC: 10.6 G/DL (ref 11.7–15.7)
LYMPHOCYTES # BLD MANUAL: 1 10E3/UL (ref 0.8–5.3)
LYMPHOCYTES NFR BLD MANUAL: 12 %
MCH RBC QN AUTO: 28.5 PG (ref 26.5–33)
MCHC RBC AUTO-ENTMCNC: 32.4 G/DL (ref 31.5–36.5)
MCV RBC AUTO: 88 FL (ref 78–100)
METAMYELOCYTES # BLD MANUAL: 0.2 10E3/UL
METAMYELOCYTES NFR BLD MANUAL: 2 %
MONOCYTES # BLD MANUAL: 0.4 10E3/UL (ref 0–1.3)
MONOCYTES NFR BLD MANUAL: 5 %
NEUTROPHILS # BLD MANUAL: 6.9 10E3/UL (ref 1.6–8.3)
NEUTROPHILS NFR BLD MANUAL: 80 %
PLAT MORPH BLD: ABNORMAL
PLATELET # BLD AUTO: 340 10E3/UL (ref 150–450)
RBC # BLD AUTO: 3.72 10E6/UL (ref 3.8–5.2)
RBC MORPH BLD: ABNORMAL
WBC # BLD AUTO: 8.6 10E3/UL (ref 4–11)

## 2023-04-14 PROCEDURE — 96377 APPLICATON ON-BODY INJECTOR: CPT | Mod: XS

## 2023-04-14 PROCEDURE — 258N000003 HC RX IP 258 OP 636: Performed by: NURSE PRACTITIONER

## 2023-04-14 PROCEDURE — 96375 TX/PRO/DX INJ NEW DRUG ADDON: CPT

## 2023-04-14 PROCEDURE — 250N000011 HC RX IP 250 OP 636: Performed by: NURSE PRACTITIONER

## 2023-04-14 PROCEDURE — 250N000013 HC RX MED GY IP 250 OP 250 PS 637: Performed by: NURSE PRACTITIONER

## 2023-04-14 PROCEDURE — 85027 COMPLETE CBC AUTOMATED: CPT | Performed by: INTERNAL MEDICINE

## 2023-04-14 PROCEDURE — 96413 CHEMO IV INFUSION 1 HR: CPT

## 2023-04-14 PROCEDURE — 96411 CHEMO IV PUSH ADDL DRUG: CPT

## 2023-04-14 PROCEDURE — 99215 OFFICE O/P EST HI 40 MIN: CPT | Performed by: NURSE PRACTITIONER

## 2023-04-14 PROCEDURE — 99212 OFFICE O/P EST SF 10 MIN: CPT | Mod: 25 | Performed by: NURSE PRACTITIONER

## 2023-04-14 PROCEDURE — 85007 BL SMEAR W/DIFF WBC COUNT: CPT | Performed by: INTERNAL MEDICINE

## 2023-04-14 PROCEDURE — 96372 THER/PROPH/DIAG INJ SC/IM: CPT | Performed by: NURSE PRACTITIONER

## 2023-04-14 RX ORDER — PALONOSETRON 0.05 MG/ML
0.25 INJECTION, SOLUTION INTRAVENOUS ONCE
Status: COMPLETED | OUTPATIENT
Start: 2023-04-14 | End: 2023-04-14

## 2023-04-14 RX ORDER — HYDROCODONE BITARTRATE AND ACETAMINOPHEN 5; 325 MG/1; MG/1
1-2 TABLET ORAL ONCE
Status: COMPLETED | OUTPATIENT
Start: 2023-04-14 | End: 2023-04-14

## 2023-04-14 RX ORDER — HYDROCODONE BITARTRATE AND ACETAMINOPHEN 5; 325 MG/1; MG/1
1-2 TABLET ORAL ONCE
Status: CANCELLED
Start: 2023-04-14 | End: 2023-04-14

## 2023-04-14 RX ORDER — MEPERIDINE HYDROCHLORIDE 25 MG/ML
25 INJECTION INTRAMUSCULAR; INTRAVENOUS; SUBCUTANEOUS EVERY 30 MIN PRN
Status: DISCONTINUED | OUTPATIENT
Start: 2023-04-14 | End: 2023-04-14 | Stop reason: HOSPADM

## 2023-04-14 RX ORDER — HEPARIN SODIUM (PORCINE) LOCK FLUSH IV SOLN 100 UNIT/ML 100 UNIT/ML
5 SOLUTION INTRAVENOUS
Status: DISCONTINUED | OUTPATIENT
Start: 2023-04-14 | End: 2023-04-14 | Stop reason: HOSPADM

## 2023-04-14 RX ORDER — DIPHENHYDRAMINE HYDROCHLORIDE 50 MG/ML
50 INJECTION INTRAMUSCULAR; INTRAVENOUS
Status: DISCONTINUED | OUTPATIENT
Start: 2023-04-14 | End: 2023-04-14 | Stop reason: HOSPADM

## 2023-04-14 RX ORDER — HYDROCODONE BITARTRATE AND ACETAMINOPHEN 5; 325 MG/1; MG/1
1 TABLET ORAL EVERY 6 HOURS PRN
Qty: 20 TABLET | Refills: 0 | Status: SHIPPED | OUTPATIENT
Start: 2023-04-14 | End: 2023-04-19

## 2023-04-14 RX ORDER — CYCLOBENZAPRINE HCL 5 MG
5-10 TABLET ORAL 3 TIMES DAILY PRN
Qty: 20 TABLET | Refills: 0 | Status: SHIPPED | OUTPATIENT
Start: 2023-04-14 | End: 2023-08-08

## 2023-04-14 RX ORDER — HEPARIN SODIUM,PORCINE 10 UNIT/ML
5 VIAL (ML) INTRAVENOUS
Status: CANCELLED | OUTPATIENT
Start: 2023-04-14

## 2023-04-14 RX ORDER — MEPERIDINE HYDROCHLORIDE 25 MG/ML
25 INJECTION INTRAMUSCULAR; INTRAVENOUS; SUBCUTANEOUS EVERY 30 MIN PRN
Status: CANCELLED | OUTPATIENT
Start: 2023-04-14

## 2023-04-14 RX ORDER — METHYLPREDNISOLONE SODIUM SUCCINATE 125 MG/2ML
125 INJECTION, POWDER, LYOPHILIZED, FOR SOLUTION INTRAMUSCULAR; INTRAVENOUS
Status: CANCELLED
Start: 2023-04-14

## 2023-04-14 RX ORDER — METHYLPREDNISOLONE SODIUM SUCCINATE 125 MG/2ML
125 INJECTION, POWDER, LYOPHILIZED, FOR SOLUTION INTRAMUSCULAR; INTRAVENOUS
Status: DISCONTINUED | OUTPATIENT
Start: 2023-04-14 | End: 2023-04-14 | Stop reason: HOSPADM

## 2023-04-14 RX ORDER — EPINEPHRINE 1 MG/ML
0.3 INJECTION, SOLUTION INTRAMUSCULAR; SUBCUTANEOUS EVERY 5 MIN PRN
Status: CANCELLED | OUTPATIENT
Start: 2023-04-14

## 2023-04-14 RX ORDER — LORAZEPAM 2 MG/ML
0.5 INJECTION INTRAMUSCULAR EVERY 4 HOURS PRN
Status: CANCELLED | OUTPATIENT
Start: 2023-04-14

## 2023-04-14 RX ORDER — PALONOSETRON 0.05 MG/ML
0.25 INJECTION, SOLUTION INTRAVENOUS ONCE
Status: CANCELLED | OUTPATIENT
Start: 2023-04-14

## 2023-04-14 RX ORDER — ALBUTEROL SULFATE 90 UG/1
1-2 AEROSOL, METERED RESPIRATORY (INHALATION)
Status: CANCELLED
Start: 2023-04-14

## 2023-04-14 RX ORDER — ALBUTEROL SULFATE 0.83 MG/ML
2.5 SOLUTION RESPIRATORY (INHALATION)
Status: DISCONTINUED | OUTPATIENT
Start: 2023-04-14 | End: 2023-04-14 | Stop reason: HOSPADM

## 2023-04-14 RX ORDER — EPINEPHRINE 1 MG/ML
0.3 INJECTION, SOLUTION INTRAMUSCULAR; SUBCUTANEOUS EVERY 5 MIN PRN
Status: DISCONTINUED | OUTPATIENT
Start: 2023-04-14 | End: 2023-04-14 | Stop reason: HOSPADM

## 2023-04-14 RX ORDER — DOXORUBICIN HYDROCHLORIDE 2 MG/ML
60 INJECTION, SOLUTION INTRAVENOUS ONCE
Status: COMPLETED | OUTPATIENT
Start: 2023-04-14 | End: 2023-04-14

## 2023-04-14 RX ORDER — ALBUTEROL SULFATE 90 UG/1
1-2 AEROSOL, METERED RESPIRATORY (INHALATION)
Status: DISCONTINUED | OUTPATIENT
Start: 2023-04-14 | End: 2023-04-14 | Stop reason: HOSPADM

## 2023-04-14 RX ORDER — ALBUTEROL SULFATE 0.83 MG/ML
2.5 SOLUTION RESPIRATORY (INHALATION)
Status: CANCELLED | OUTPATIENT
Start: 2023-04-14

## 2023-04-14 RX ORDER — DIPHENHYDRAMINE HYDROCHLORIDE 50 MG/ML
50 INJECTION INTRAMUSCULAR; INTRAVENOUS
Status: CANCELLED
Start: 2023-04-14

## 2023-04-14 RX ORDER — HEPARIN SODIUM,PORCINE 10 UNIT/ML
5 VIAL (ML) INTRAVENOUS
Status: DISCONTINUED | OUTPATIENT
Start: 2023-04-14 | End: 2023-04-14 | Stop reason: HOSPADM

## 2023-04-14 RX ORDER — HEPARIN SODIUM (PORCINE) LOCK FLUSH IV SOLN 100 UNIT/ML 100 UNIT/ML
5 SOLUTION INTRAVENOUS
Status: CANCELLED | OUTPATIENT
Start: 2023-04-14

## 2023-04-14 RX ORDER — DOXORUBICIN HYDROCHLORIDE 2 MG/ML
60 INJECTION, SOLUTION INTRAVENOUS ONCE
Status: CANCELLED | OUTPATIENT
Start: 2023-04-14

## 2023-04-14 RX ADMIN — DOXORUBICIN HYDROCHLORIDE 120 MG: 2 INJECTION, SOLUTION INTRAVENOUS at 12:15

## 2023-04-14 RX ADMIN — DEXAMETHASONE SODIUM PHOSPHATE: 10 INJECTION, SOLUTION INTRAMUSCULAR; INTRAVENOUS at 11:11

## 2023-04-14 RX ADMIN — Medication 5 ML: at 13:37

## 2023-04-14 RX ADMIN — HYDROCODONE BITARTRATE AND ACETAMINOPHEN 1 TABLET: 5; 325 TABLET ORAL at 11:34

## 2023-04-14 RX ADMIN — PEGFILGRASTIM 6 MG: KIT SUBCUTANEOUS at 13:24

## 2023-04-14 RX ADMIN — CYCLOPHOSPHAMIDE 1195 MG: 1 INJECTION, POWDER, FOR SOLUTION INTRAVENOUS; ORAL at 12:33

## 2023-04-14 RX ADMIN — PALONOSETRON 0.25 MG: 0.05 INJECTION, SOLUTION INTRAVENOUS at 11:01

## 2023-04-14 RX ADMIN — SODIUM CHLORIDE 250 ML: 9 INJECTION, SOLUTION INTRAVENOUS at 11:01

## 2023-04-14 ASSESSMENT — PAIN SCALES - GENERAL: PAINLEVEL: EXTREME PAIN (9)

## 2023-04-14 NOTE — PROGRESS NOTES
[facility] Hematology and Oncology Progress Note    Patient: Alicia Iniguez  MRN: [id number]  Date of Service: Apr 14, 2023          Reason for Visit    Right breast cancer    Primary Oncologist: Dr. Saleem    Assessment and Plan     Cancer Staging   Malignant neoplasm of overlapping sites of right breast in female, estrogen receptor positive (H)  Staging form: Breast, AJCC 8th Edition  - Clinical: Stage IIA (cT3, cN2, cM0, G2, ER+, OH+, HER2-) - Signed by Shanelle Weaver APRN CNP on 3/1/2023    1.  Locally Advanced Breast cancer, right side, stage IIa, T3 N2 M0, grade 2, ER/OH positive, HER2/janine negative:   Currently on neoadjuvant chemotherapy with dose dense AC + Neulasta, has completed 3 cycles thus far.  Has done extremely well on chemotherapy so far.  Mild fatigue and well-managed nausea only side effects.    Labs: hgb 10.6, rest CBC WNL    The right breast mass feels softer and smaller and right axilla adenopathy no longer palpable right now.      She is having acute low back pain, but feels able to proceed with chemo without delay.    Plan:  -Proceed with cycle 4 AC. This will be the last cycle of this  -Return in 2 weeks to initiate weekly Taxol x 12 weeks    2.   Acute left low back pain with sciatica  Acute x 48-72 hrs. No hx. No acute neuro symptoms or concerning exam findings. No rash consistent with zoster.CTcap and bone scan in mid-February negative for mets.   Her pain is quite bothersome.    Plan:  -MRI L spine today or next week. Follow-up on results and recs. If benign, will have her follow-up with PCP and may need PT  -Dex today with chemo anc x 2 days post-chemo will serve as anti-inflammatory  -Flexeril 5-10 mg as needed  -Tylenol TID. If not effective, did give Rx for Norco 1-2 as needed for acute period (do not use with Tylenol)  -Ice/heat    ECOG Performance    0 - Independent    Problem List    Patient Active Problem List   Diagnosis     Malignant neoplasm of overlapping sites  of right breast in female, estrogen receptor positive (H)     Malignant neoplasm involving both nipple and areola of right breast in female, estrogen receptor positive (H)     Adverse effect of antineoplastic and immunosuppressive drugs, sequela        ______________________________________________________________________________    History of Present Illness    Oncology history:  DIAGNOSIS: Jan, 2023 - Stage IIA (cT3-cN2-cM0) Right breast cancer with axillary node mets (ER/MI+)   -Breast cancer, right side, patient had a palpable lump with some nipple and skin changes.  She was also found to have lymphadenopathy in the axilla.  Patient had an ultrasound and mammogram that showed a 4.1 cm tumor with axillary lymphadenopathy  -Biopsy  January 31 and it showed invasive ductal carcinoma.  Grade 2.  ER MI positive and HER2/janine negative.  Biopsy was done of the lymph node as well and it was positive.    TREATMENT:   On neoadjuvant chemotherapy with dose dense Adriamycin and Cytoxan.  Today is cycle 4    INTERIM HISTORY:   She is here prior to cycle 4 of dose dense AC.  Has done relatively well on chemotherapy so far without any major side effects  Some nausea and fatigue as expected.  Does respond to antinausea medications.  Mild tingling in left palm after this cycle, lasted just a few days and now resolved. No fevers chills or night sweats.  No infections.  No significant diarrhea reported.  She feels her right breast mass is softer and smaller now.    On Monday (4 days ago), developed acute onset left lumbar back pain exacerbated from sitting to standing position. No hx of back pain. Over the last 24 hrs, increase severity and now more constant. Radiating into left hip/thigh. No numbness/tingling in extremity. No cauda equina symptoms. No fevers. No  symptoms.   She tried Ibuprofen 800 mg total yesterday without a lot of benefit. This morning took Tylenol 1000 mg and a hot shower with some improvement.    PHYSICAL  EXAM  /65 (BP Location: Left arm, Patient Position: Sitting, Cuff Size: Adult Regular)   Pulse 92   Temp 98.5  F (36.9  C) (Oral)   Resp 20   Wt 88.5 kg (195 lb)   SpO2 99%   BMI 31.96 kg/m      GENERAL: no acute distress. Cooperative in conversation. Here with .   RESP: Regular respiratory rate. Clear lung sounds.  HEART: RRR  MUSCULOSKELETAL: No midline spine pain. Reproducible pain over left SI joint area and buttock. No left hip/femur reproducible pain. No leg swelling.   LYMPH NODE: Right axillary lymph node is not very obviously palpable today  BREAST: Not examined today  NEURO: non focal. Alert and oriented x3. Some decreased left quad strength, guarded due to pain in back. Rest of strength and sensation intact bilateral lower extremities.  PSYCH: within normal limits. No depression or anxiety.  SKIN: exposed skin is dry intact. No rash.    Lab Results    Recent Results (from the past 168 hour(s))   CBC with platelets and differential   Result Value Ref Range    WBC Count 8.6 4.0 - 11.0 10e3/uL    RBC Count 3.72 (L) 3.80 - 5.20 10e6/uL    Hemoglobin 10.6 (L) 11.7 - 15.7 g/dL    Hematocrit 32.7 (L) 35.0 - 47.0 %    MCV 88 78 - 100 fL    MCH 28.5 26.5 - 33.0 pg    MCHC 32.4 31.5 - 36.5 g/dL    RDW 16.8 (H) 10.0 - 15.0 %    Platelet Count 340 150 - 450 10e3/uL       Imaging    No results found.     Total time 45 minutes, to include face to face visit, review of EMR, ordering, documentation and coordination of care on date of service      Signed by: Ani Salmeron NP

## 2023-04-14 NOTE — PROGRESS NOTES
Infusion Nursing Note:  Alicia Iniguez presents today for C4D1.    Patient seen by provider today: Yes: Ani Salmeron, GISSEL   present during visit today: Not Applicable.    Note: PT here ambulatory for C4 D1 Labs drawn from port and results approved for txt. Reviewed txt/ possible side effects. Each medication given reviewed with pt. Premeds administered followed by Adriamycin ivp. Positive blood return throughout adriamycin ivp. Cytoxan infused over 45 minutes with concurrent ns at 200 ml per hour, due to pt having sinus pressure at the end of the infusion. PT still had very mild sinus pressure during cytoxan towards end of infusion. Onpro placed on right abdomen and reviewed it will start around 1630 tomorrow and be completed around 1730 Follow up reviewed. Txt completed and pt tolerated without any problems. txt completed and port flushed/deaccessed per protocol.  Pt dc'd steady gait. Pt has appointments made and MRI rescheduled for Sunday 4/16 since Neulasta On Pro is on today.  Intravenous Access:  Implanted Port.    Treatment Conditions:  Lab Results   Component Value Date    HGB 10.6 (L) 04/14/2023    WBC 8.6 04/14/2023    ANEU 6.9 04/14/2023    ANEUTAUTO 7.2 03/02/2023     04/14/2023      Results reviewed, labs MET treatment parameters, ok to proceed with treatment.      Post Infusion Assessment:  Patient tolerated infusion without incident.  Blood return noted pre and post infusion.  No evidence of extravasations.  Access discontinued per protocol.       Discharge Plan:   Discharge instructions reviewed with: Patient.  Patient and/or family verbalized understanding of discharge instructions and all questions answered.  Patient discharged in stable condition accompanied by: self and .  Departure Mode: Ambulatory.      Olivia Salmeron RN

## 2023-04-14 NOTE — LETTER
4/14/2023         RE: Alicia Iniguez  [address] [facility] [address]        Dear Colleague,    Thank you for referring your patient, Alicia Iniguez, to the [facility] [place]. Please see a copy of my visit note below.    [facility] Hematology and Oncology Progress Note    Patient: Alicia Iniguez  MRN: [id number]  Date of Service: Apr 14, 2023          Reason for Visit    Right breast cancer    Primary Oncologist: Dr. Saleem    Assessment and Plan     Cancer Staging   Malignant neoplasm of overlapping sites of right breast in female, estrogen receptor positive (H)  Staging form: Breast, AJCC 8th Edition  - Clinical: Stage IIA (cT3, cN2, cM0, G2, ER+, RI+, HER2-) - Signed by Shanelle Weaver APRN CNP on 3/1/2023    1.  Locally Advanced Breast cancer, right side, stage IIa, T3 N2 M0, grade 2, ER/RI positive, HER2/janine negative:   Currently on neoadjuvant chemotherapy with dose dense AC + Neulasta, has completed 3 cycles thus far.  Has done extremely well on chemotherapy so far.  Mild fatigue and well-managed nausea only side effects.    Labs: hgb 10.6, rest CBC WNL    The right breast mass feels softer and smaller and right axilla adenopathy no longer palpable right now.      She is having acute low back pain, but feels able to proceed with chemo without delay.    Plan:  -Proceed with cycle 4 AC. This will be the last cycle of this  -Return in 2 weeks to initiate weekly Taxol x 12 weeks    2.   Acute left low back pain with sciatica  Acute x 48-72 hrs. No hx. No acute neuro symptoms or concerning exam findings. No rash consistent with zoster.CTcap and bone scan in mid-February negative for mets.   Her pain is quite bothersome.    Plan:  -MRI L spine today or next week. Follow-up on results and recs. If benign, will have her follow-up with PCP and may need PT  -Dex today with chemo anc x 2 days post-chemo will serve as anti-inflammatory  -Flexeril 5-10 mg as  needed  -Tylenol TID. If not effective, did give Rx for Norco 1-2 as needed for acute period (do not use with Tylenol)  -Ice/heat    ECOG Performance    0 - Independent    Problem List    Patient Active Problem List   Diagnosis     Malignant neoplasm of overlapping sites of right breast in female, estrogen receptor positive (H)     Malignant neoplasm involving both nipple and areola of right breast in female, estrogen receptor positive (H)     Adverse effect of antineoplastic and immunosuppressive drugs, sequela        ______________________________________________________________________________    History of Present Illness    Oncology history:  DIAGNOSIS: Jan, 2023 - Stage IIA (cT3-cN2-cM0) Right breast cancer with axillary node mets (ER/KS+)   -Breast cancer, right side, patient had a palpable lump with some nipple and skin changes.  She was also found to have lymphadenopathy in the axilla.  Patient had an ultrasound and mammogram that showed a 4.1 cm tumor with axillary lymphadenopathy  -Biopsy  January 31 and it showed invasive ductal carcinoma.  Grade 2.  ER KS positive and HER2/janine negative.  Biopsy was done of the lymph node as well and it was positive.    TREATMENT:   On neoadjuvant chemotherapy with dose dense Adriamycin and Cytoxan.  Today is cycle 4    INTERIM HISTORY:   She is here prior to cycle 4 of dose dense AC.  Has done relatively well on chemotherapy so far without any major side effects  Some nausea and fatigue as expected.  Does respond to antinausea medications.  Mild tingling in left palm after this cycle, lasted just a few days and now resolved. No fevers chills or night sweats.  No infections.  No significant diarrhea reported.  She feels her right breast mass is softer and smaller now.    On Monday (4 days ago), developed acute onset left lumbar back pain exacerbated from sitting to standing position. No hx of back pain. Over the last 24 hrs, increase severity and now more constant.  Radiating into left hip/thigh. No numbness/tingling in extremity. No cauda equina symptoms. No fevers. No  symptoms.   She tried Ibuprofen 800 mg total yesterday without a lot of benefit. This morning took Tylenol 1000 mg and a hot shower with some improvement.    PHYSICAL EXAM  /65 (BP Location: Left arm, Patient Position: Sitting, Cuff Size: Adult Regular)   Pulse 92   Temp 98.5  F (36.9  C) (Oral)   Resp 20   Wt 88.5 kg (195 lb)   SpO2 99%   BMI 31.96 kg/m      GENERAL: no acute distress. Cooperative in conversation. Here with .   RESP: Regular respiratory rate. Clear lung sounds.  HEART: RRR  MUSCULOSKELETAL: No midline spine pain. Reproducible pain over left SI joint area and buttock. No left hip/femur reproducible pain. No leg swelling.   LYMPH NODE: Right axillary lymph node is not very obviously palpable today  BREAST: Not examined today  NEURO: non focal. Alert and oriented x3. Some decreased left quad strength, guarded due to pain in back. Rest of strength and sensation intact bilateral lower extremities.  PSYCH: within normal limits. No depression or anxiety.  SKIN: exposed skin is dry intact. No rash.    Lab Results    Recent Results (from the past 168 hour(s))   CBC with platelets and differential   Result Value Ref Range    WBC Count 8.6 4.0 - 11.0 10e3/uL    RBC Count 3.72 (L) 3.80 - 5.20 10e6/uL    Hemoglobin 10.6 (L) 11.7 - 15.7 g/dL    Hematocrit 32.7 (L) 35.0 - 47.0 %    MCV 88 78 - 100 fL    MCH 28.5 26.5 - 33.0 pg    MCHC 32.4 31.5 - 36.5 g/dL    RDW 16.8 (H) 10.0 - 15.0 %    Platelet Count 340 150 - 450 10e3/uL       Imaging    No results found.     Total time 45 minutes, to include face to face visit, review of EMR, ordering, documentation and coordination of care on date of service      Signed by: Ani Salmeron NP    Oncology Rooming Note    April 14, 2023 9:47 AM   Alicia Iniguez is a 47 year old female who presents for:    Chief Complaint   Patient presents with      "Oncology Clinic Visit     C4D! Malignant neoplasm of overlapping sites of right breast in female, estrogen receptor positive, review labs & Infusion      Initial Vitals: /65 (BP Location: Left arm, Patient Position: Sitting, Cuff Size: Adult Regular)   Pulse 92   Temp 98.5  F (36.9  C) (Oral)   Resp 20   Wt 88.5 kg (195 lb)   SpO2 99%   BMI 31.96 kg/m   Estimated body mass index is 31.96 kg/m  as calculated from the following:    Height as of 3/31/23: 1.664 m (5' 5.5\").    Weight as of this encounter: 88.5 kg (195 lb). Body surface area is 2.02 meters squared.  Extreme Pain (9) Comment: Data Unavailable   No LMP recorded.  Allergies reviewed: Yes  Medications reviewed: Yes    Medications: Medication refills not needed today.  Pharmacy name entered into [employer]: [facility] #[id number] [address] [facility]    Clinical concerns:C4D1 Malignant neoplasm of overlapping sites of right breast in female, estrogen receptor positive, review labs & Infusion       Isa Kowalski [facility]                Again, thank you for allowing me to participate in the care of your patient.        Sincerely,        Ani Salmeron NP    "

## 2023-04-14 NOTE — PROGRESS NOTES
"Oncology Rooming Note    April 14, 2023 9:47 AM   Alicia Iniguez is a 47 year old female who presents for:    Chief Complaint   Patient presents with     Oncology Clinic Visit     C4D! Malignant neoplasm of overlapping sites of right breast in female, estrogen receptor positive, review labs & Infusion      Initial Vitals: /65 (BP Location: Left arm, Patient Position: Sitting, Cuff Size: Adult Regular)   Pulse 92   Temp 98.5  F (36.9  C) (Oral)   Resp 20   Wt 88.5 kg (195 lb)   SpO2 99%   BMI 31.96 kg/m   Estimated body mass index is 31.96 kg/m  as calculated from the following:    Height as of 3/31/23: 1.664 m (5' 5.5\").    Weight as of this encounter: 88.5 kg (195 lb). Body surface area is 2.02 meters squared.  Extreme Pain (9) Comment: Data Unavailable   No LMP recorded.  Allergies reviewed: Yes  Medications reviewed: Yes    Medications: Medication refills not needed today.  Pharmacy name entered into [employer]: [id number] [address] AT [address] OF [facility] [place]    Clinical concerns:C4D1 Malignant neoplasm of overlapping sites of right breast in female, estrogen receptor positive, review labs & Infusion       Isa Kowalski CMA            "

## 2023-04-16 ENCOUNTER — HOSPITAL ENCOUNTER (OUTPATIENT)
Dept: MRI IMAGING | Facility: HOSPITAL | Age: 48
Discharge: HOME | End: 2023-04-16
Attending: NURSE PRACTITIONER | Admitting: NURSE PRACTITIONER
Payer: COMMERCIAL

## 2023-04-16 DIAGNOSIS — M54.42 ACUTE LEFT-SIDED LOW BACK PAIN WITH LEFT-SIDED SCIATICA: ICD-10-CM

## 2023-04-16 PROCEDURE — A9585 GADOBUTROL INJECTION: HCPCS | Performed by: NURSE PRACTITIONER

## 2023-04-16 PROCEDURE — 72158 MRI LUMBAR SPINE W/O & W/DYE: CPT

## 2023-04-16 PROCEDURE — 255N000002 HC RX 255 OP 636: Performed by: NURSE PRACTITIONER

## 2023-04-16 RX ORDER — GADOBUTROL 604.72 MG/ML
8 INJECTION INTRAVENOUS ONCE
Status: COMPLETED | OUTPATIENT
Start: 2023-04-16 | End: 2023-04-16

## 2023-04-16 RX ADMIN — GADOBUTROL 8 ML: 604.72 INJECTION INTRAVENOUS at 07:22

## 2023-04-18 ENCOUNTER — PATIENT OUTREACH (OUTPATIENT)
Dept: ONCOLOGY | Facility: HOSPITAL | Age: 48
End: 2023-04-18
Payer: COMMERCIAL

## 2023-04-18 NOTE — PROGRESS NOTES
[facility]: Cancer Care                                                                                      RN Cancer Care Coordinator called patient at request of Ani Salmeron CNP, to go over CT spine results. Pt had already seen the results in [facility] and saw a chiropractor today, which she thought may be beginning to help.     We discussed her medications, and she has not taken any hydrocodone or flexeril. She is taking tylenol. Writer let her know it is acceptable to take both tylenol and ibuprofen alternating. Recommended PT, which she plans on pursuing.    She verbalized appreciation for the call.    Signature:  Gisella Mauricio RN

## 2023-04-21 DIAGNOSIS — M54.9 BACK PAIN: Primary | ICD-10-CM

## 2023-04-28 ENCOUNTER — ONCOLOGY VISIT (OUTPATIENT)
Dept: ONCOLOGY | Facility: HOSPITAL | Age: 48
End: 2023-04-28
Attending: INTERNAL MEDICINE
Payer: COMMERCIAL

## 2023-04-28 ENCOUNTER — LAB (OUTPATIENT)
Dept: INFUSION THERAPY | Facility: HOSPITAL | Age: 48
End: 2023-04-28
Attending: INTERNAL MEDICINE
Payer: COMMERCIAL

## 2023-04-28 ENCOUNTER — PATIENT OUTREACH (OUTPATIENT)
Dept: ONCOLOGY | Facility: HOSPITAL | Age: 48
End: 2023-04-28

## 2023-04-28 VITALS
OXYGEN SATURATION: 96 % | BODY MASS INDEX: 31.6 KG/M2 | RESPIRATION RATE: 16 BRPM | HEIGHT: 66 IN | DIASTOLIC BLOOD PRESSURE: 71 MMHG | TEMPERATURE: 98.4 F | WEIGHT: 196.6 LBS | HEART RATE: 108 BPM | SYSTOLIC BLOOD PRESSURE: 120 MMHG

## 2023-04-28 DIAGNOSIS — T45.1X5S ADVERSE EFFECT OF ANTINEOPLASTIC AND IMMUNOSUPPRESSIVE DRUGS, SEQUELA: ICD-10-CM

## 2023-04-28 DIAGNOSIS — T45.1X5S ADVERSE EFFECT OF ANTINEOPLASTIC AND IMMUNOSUPPRESSIVE DRUGS, SEQUELA: Primary | ICD-10-CM

## 2023-04-28 DIAGNOSIS — C50.811 MALIGNANT NEOPLASM OF OVERLAPPING SITES OF RIGHT BREAST IN FEMALE, ESTROGEN RECEPTOR POSITIVE (H): Primary | ICD-10-CM

## 2023-04-28 DIAGNOSIS — C50.811 MALIGNANT NEOPLASM OF OVERLAPPING SITES OF RIGHT BREAST IN FEMALE, ESTROGEN RECEPTOR POSITIVE (H): ICD-10-CM

## 2023-04-28 DIAGNOSIS — Z17.0 MALIGNANT NEOPLASM OF OVERLAPPING SITES OF RIGHT BREAST IN FEMALE, ESTROGEN RECEPTOR POSITIVE (H): ICD-10-CM

## 2023-04-28 DIAGNOSIS — Z17.0 MALIGNANT NEOPLASM OF OVERLAPPING SITES OF RIGHT BREAST IN FEMALE, ESTROGEN RECEPTOR POSITIVE (H): Primary | ICD-10-CM

## 2023-04-28 LAB
ALBUMIN SERPL BCG-MCNC: 4.2 G/DL (ref 3.5–5.2)
ALP SERPL-CCNC: 99 U/L (ref 35–104)
ALT SERPL W P-5'-P-CCNC: 21 U/L (ref 10–35)
AST SERPL W P-5'-P-CCNC: 19 U/L (ref 10–35)
BASOPHILS # BLD MANUAL: 0.1 10E3/UL (ref 0–0.2)
BASOPHILS NFR BLD MANUAL: 1 %
BILIRUB DIRECT SERPL-MCNC: <0.2 MG/DL (ref 0–0.3)
BILIRUB SERPL-MCNC: 0.2 MG/DL
EOSINOPHIL # BLD MANUAL: 0 10E3/UL (ref 0–0.7)
EOSINOPHIL NFR BLD MANUAL: 0 %
ERYTHROCYTE [DISTWIDTH] IN BLOOD BY AUTOMATED COUNT: 17.7 % (ref 10–15)
HCT VFR BLD AUTO: 33.9 % (ref 35–47)
HGB BLD-MCNC: 11.1 G/DL (ref 11.7–15.7)
LYMPHOCYTES # BLD MANUAL: 1 10E3/UL (ref 0.8–5.3)
LYMPHOCYTES NFR BLD MANUAL: 14 %
MCH RBC QN AUTO: 28.8 PG (ref 26.5–33)
MCHC RBC AUTO-ENTMCNC: 32.7 G/DL (ref 31.5–36.5)
MCV RBC AUTO: 88 FL (ref 78–100)
METAMYELOCYTES # BLD MANUAL: 0.3 10E3/UL
METAMYELOCYTES NFR BLD MANUAL: 4 %
MONOCYTES # BLD MANUAL: 0.4 10E3/UL (ref 0–1.3)
MONOCYTES NFR BLD MANUAL: 6 %
NEUTROPHILS # BLD MANUAL: 5.6 10E3/UL (ref 1.6–8.3)
NEUTROPHILS NFR BLD MANUAL: 75 %
NRBC # BLD AUTO: 0.1 10E3/UL
NRBC BLD MANUAL-RTO: 1 %
PLAT MORPH BLD: ABNORMAL
PLATELET # BLD AUTO: 287 10E3/UL (ref 150–450)
PROT SERPL-MCNC: 6.7 G/DL (ref 6.4–8.3)
RBC # BLD AUTO: 3.85 10E6/UL (ref 3.8–5.2)
RBC MORPH BLD: ABNORMAL
WBC # BLD AUTO: 7.4 10E3/UL (ref 4–11)

## 2023-04-28 PROCEDURE — 96375 TX/PRO/DX INJ NEW DRUG ADDON: CPT

## 2023-04-28 PROCEDURE — 250N000011 HC RX IP 250 OP 636: Performed by: INTERNAL MEDICINE

## 2023-04-28 PROCEDURE — 80076 HEPATIC FUNCTION PANEL: CPT | Performed by: NURSE PRACTITIONER

## 2023-04-28 PROCEDURE — 96367 TX/PROPH/DG ADDL SEQ IV INF: CPT

## 2023-04-28 PROCEDURE — 99214 OFFICE O/P EST MOD 30 MIN: CPT | Performed by: INTERNAL MEDICINE

## 2023-04-28 PROCEDURE — 96413 CHEMO IV INFUSION 1 HR: CPT

## 2023-04-28 PROCEDURE — 85007 BL SMEAR W/DIFF WBC COUNT: CPT | Performed by: NURSE PRACTITIONER

## 2023-04-28 PROCEDURE — 99212 OFFICE O/P EST SF 10 MIN: CPT | Mod: 25 | Performed by: INTERNAL MEDICINE

## 2023-04-28 PROCEDURE — 85027 COMPLETE CBC AUTOMATED: CPT | Performed by: NURSE PRACTITIONER

## 2023-04-28 PROCEDURE — 258N000003 HC RX IP 258 OP 636: Performed by: INTERNAL MEDICINE

## 2023-04-28 RX ORDER — METHYLPREDNISOLONE SODIUM SUCCINATE 125 MG/2ML
125 INJECTION, POWDER, LYOPHILIZED, FOR SOLUTION INTRAMUSCULAR; INTRAVENOUS
Status: DISCONTINUED | OUTPATIENT
Start: 2023-04-28 | End: 2023-04-28 | Stop reason: HOSPADM

## 2023-04-28 RX ORDER — LORAZEPAM 2 MG/ML
0.5 INJECTION INTRAMUSCULAR EVERY 4 HOURS PRN
Status: DISCONTINUED | OUTPATIENT
Start: 2023-04-28 | End: 2023-04-28 | Stop reason: HOSPADM

## 2023-04-28 RX ORDER — EPINEPHRINE 1 MG/ML
0.3 INJECTION, SOLUTION INTRAMUSCULAR; SUBCUTANEOUS EVERY 5 MIN PRN
Status: CANCELLED | OUTPATIENT
Start: 2023-04-28

## 2023-04-28 RX ORDER — HEPARIN SODIUM (PORCINE) LOCK FLUSH IV SOLN 100 UNIT/ML 100 UNIT/ML
5 SOLUTION INTRAVENOUS
Status: CANCELLED | OUTPATIENT
Start: 2023-04-28

## 2023-04-28 RX ORDER — HEPARIN SODIUM,PORCINE 10 UNIT/ML
5 VIAL (ML) INTRAVENOUS
Status: CANCELLED | OUTPATIENT
Start: 2023-04-28

## 2023-04-28 RX ORDER — ALBUTEROL SULFATE 0.83 MG/ML
2.5 SOLUTION RESPIRATORY (INHALATION)
Status: CANCELLED | OUTPATIENT
Start: 2023-04-28

## 2023-04-28 RX ORDER — DIPHENHYDRAMINE HCL 50 MG
50 CAPSULE ORAL ONCE
Status: CANCELLED
Start: 2023-04-28

## 2023-04-28 RX ORDER — EPINEPHRINE 1 MG/ML
0.3 INJECTION, SOLUTION INTRAMUSCULAR; SUBCUTANEOUS EVERY 5 MIN PRN
Status: DISCONTINUED | OUTPATIENT
Start: 2023-04-28 | End: 2023-04-28 | Stop reason: HOSPADM

## 2023-04-28 RX ORDER — ALBUTEROL SULFATE 0.83 MG/ML
2.5 SOLUTION RESPIRATORY (INHALATION)
Status: DISCONTINUED | OUTPATIENT
Start: 2023-04-28 | End: 2023-04-28 | Stop reason: HOSPADM

## 2023-04-28 RX ORDER — ALBUTEROL SULFATE 90 UG/1
1-2 AEROSOL, METERED RESPIRATORY (INHALATION)
Status: CANCELLED
Start: 2023-04-28

## 2023-04-28 RX ORDER — HEPARIN SODIUM (PORCINE) LOCK FLUSH IV SOLN 100 UNIT/ML 100 UNIT/ML
5 SOLUTION INTRAVENOUS
Status: DISCONTINUED | OUTPATIENT
Start: 2023-04-28 | End: 2023-04-28 | Stop reason: HOSPADM

## 2023-04-28 RX ORDER — MEPERIDINE HYDROCHLORIDE 25 MG/ML
25 INJECTION INTRAMUSCULAR; INTRAVENOUS; SUBCUTANEOUS EVERY 30 MIN PRN
Status: CANCELLED | OUTPATIENT
Start: 2023-04-28

## 2023-04-28 RX ORDER — ALBUTEROL SULFATE 90 UG/1
1-2 AEROSOL, METERED RESPIRATORY (INHALATION)
Status: DISCONTINUED | OUTPATIENT
Start: 2023-04-28 | End: 2023-04-28 | Stop reason: HOSPADM

## 2023-04-28 RX ORDER — LORAZEPAM 2 MG/ML
0.5 INJECTION INTRAMUSCULAR EVERY 4 HOURS PRN
Status: CANCELLED | OUTPATIENT
Start: 2023-04-28

## 2023-04-28 RX ORDER — DIPHENHYDRAMINE HYDROCHLORIDE 50 MG/ML
50 INJECTION INTRAMUSCULAR; INTRAVENOUS
Status: CANCELLED
Start: 2023-04-28

## 2023-04-28 RX ORDER — DIPHENHYDRAMINE HYDROCHLORIDE 50 MG/ML
50 INJECTION INTRAMUSCULAR; INTRAVENOUS
Status: DISCONTINUED | OUTPATIENT
Start: 2023-04-28 | End: 2023-04-28 | Stop reason: HOSPADM

## 2023-04-28 RX ORDER — METHYLPREDNISOLONE SODIUM SUCCINATE 125 MG/2ML
125 INJECTION, POWDER, LYOPHILIZED, FOR SOLUTION INTRAMUSCULAR; INTRAVENOUS
Status: CANCELLED
Start: 2023-04-28

## 2023-04-28 RX ORDER — MEPERIDINE HYDROCHLORIDE 25 MG/ML
25 INJECTION INTRAMUSCULAR; INTRAVENOUS; SUBCUTANEOUS EVERY 30 MIN PRN
Status: DISCONTINUED | OUTPATIENT
Start: 2023-04-28 | End: 2023-04-28 | Stop reason: HOSPADM

## 2023-04-28 RX ADMIN — DEXAMETHASONE SODIUM PHOSPHATE: 10 INJECTION, SOLUTION INTRAMUSCULAR; INTRAVENOUS at 10:05

## 2023-04-28 RX ADMIN — PACLITAXEL 159 MG: 6 INJECTION, SOLUTION INTRAVENOUS at 10:38

## 2023-04-28 RX ADMIN — DIPHENHYDRAMINE HYDROCHLORIDE 50 MG: 50 INJECTION, SOLUTION INTRAMUSCULAR; INTRAVENOUS at 09:52

## 2023-04-28 RX ADMIN — SODIUM CHLORIDE 250 ML: 9 INJECTION, SOLUTION INTRAVENOUS at 09:45

## 2023-04-28 RX ADMIN — FAMOTIDINE 20 MG: 10 INJECTION, SOLUTION INTRAVENOUS at 09:46

## 2023-04-28 RX ADMIN — Medication 5 ML: at 11:42

## 2023-04-28 ASSESSMENT — PAIN SCALES - GENERAL: PAINLEVEL: NO PAIN (0)

## 2023-04-28 NOTE — PROGRESS NOTES
[facility] Hematology and Oncology Progress Note    Patient: Alicia Iniguez  MRN: [id number]  Date of Service: Apr 28, 2023          Reason for Visit    Right breast cancer    Primary Oncologist: Dr. Saleem    Assessment and Plan     Cancer Staging   Malignant neoplasm of overlapping sites of right breast in female, estrogen receptor positive (H)  Staging form: Breast, AJCC 8th Edition  - Clinical: Stage IIA (cT3, cN2, cM0, G2, ER+, MT+, HER2-) - Signed by Shanelle Weaver APRN CNP on 3/1/2023    1.  Locally Advanced Breast cancer, right side, stage IIa, T3 N2 M0, grade 2, ER/MT positive, HER2/janine negative:   Currently on neoadjuvant chemotherapy status post 4 cycles of dose dense AC.  Here to start weekly Taxol.  Labs reviewed today.  Normal total white count and normal platelet counts.  Hemoglobin is up at 11.1.  Last time.  No contraindication to proceed with Taxol today.  I again reviewed potential side effects and complications associated with Taxol chemotherapy.  Watch for peripheral neuropathy.  Following completion of weekly Taxol x12 she will proceed with surgery and then radiation.  She will also need endocrine therapy going forward with or without ovarian suppression.  We will discuss this after her surgery.  She is agreeable to the plan.      2.   Acute left low back pain with sciatica  Started suddenly when she presented for her last dose of chemo 2 weeks ago.  MRI of the spine is showing some L3-L4 disc protrusion and L4 nerve root compression.  No cord compromise.  No neurological symptoms.  She is getting some physical therapy and it has improved her pain a lot.  No need for any intervention right now.  But I would continue to watch for any worsening of pain or any neurological symptoms.    She is agreeable to the plan.    ECOG Performance    0 - Independent    Problem List    Patient Active Problem List   Diagnosis     Malignant neoplasm of overlapping sites of right breast in female,  estrogen receptor positive (H)     Malignant neoplasm involving both nipple and areola of right breast in female, estrogen receptor positive (H)     Adverse effect of antineoplastic and immunosuppressive drugs, sequela        ______________________________________________________________________________    History of Present Illness    Oncology history:  DIAGNOSIS: Jan, 2023 - Stage IIA (cT3-cN2-cM0) Right breast cancer with axillary node mets (ER/NV+)   -Breast cancer, right side, patient had a palpable lump with some nipple and skin changes.  She was also found to have lymphadenopathy in the axilla.  Patient had an ultrasound and mammogram that showed a 4.1 cm tumor with axillary lymphadenopathy  -Biopsy  January 31 and it showed invasive ductal carcinoma.  Grade 2.  ER NV positive and HER2/janine negative.  Biopsy was done of the lymph node as well and it was positive.    TREATMENT:   On neoadjuvant chemotherapy with dose dense Adriamycin and Cytoxan.  Today is cycle 4    INTERIM HISTORY:   She is status post 4 cycles of dose dense AC.  Tolerated pretty well without any significant side effects.  She is here to start weekly Taxol.  During the last visit she had complained of low back pain.  We obtained an MRI of the lumbar spine which shows some disc protrusion at L3-L4.  No evidence of any cord compromise.  But there is some evidence of L4 nerve root compression.  No significant neurological symptoms.      PHYSICAL EXAM  There were no vitals taken for this visit.    GENERAL: no acute distress. Cooperative in conversation. Here with .   RESP: Regular respiratory rate. Clear lung sounds.  HEART: RRR  LYMPH NODE: No clinically palpable bilateral axillary adenopathy  BREAST: Not examined today  NEURO: non focal. Alert and oriented x3. Some decreased left quad strength, guarded due to pain in back. Rest of strength and sensation intact bilateral lower extremities.  PSYCH: within normal limits. No depression or  anxiety.  SKIN: exposed skin is dry intact. No rash.    Lab Results    No results found for this or any previous visit (from the past 168 hour(s)).    Imaging    MR Lumbar Spine w/o & w Contrast    Result Date: 4/17/2023  EXAM: MR LUMBAR SPINE W/O and W CONTRAST LOCATION: [facility] DATE/TIME: 4/16/2023 7:36 AM CDT INDICATION: Acute left sacroiliac pain. History of breast cancer. COMPARISON: None. CONTRAST: 8ml Gadavist. TECHNIQUE: Routine Lumbar Spine MRI without and with IV contrast. FINDINGS: 5 lumbar type vertebral bodies. Normal vertebral body heights, alignment and marrow signal. Normal distal spinal cord and cauda equina with conus medullaris at L1-L2. No extraspinal abnormality. Unremarkable visualized bony pelvis. T12-L1: Normal disc height and signal. No herniation. Normal facets. No spinal canal or neural foraminal stenosis. L1-L2: Normal disc height and signal. No herniation. Normal facets. No spinal canal or neural foraminal stenosis. L2-L3: Normal disc height and signal. No herniation. Normal facets. No spinal canal or neural foraminal stenosis. L3-L4: Normal disc height and signal. Small left central disc protrusion. Normal facets. Mild left lateral recess stenosis with posterior displacement of the left L4 nerve root. No central spinal canal stenosis. No neural foraminal stenosis. L4-L5: Mild disc height and T2 signal loss. Mild posterior annular bulge. Mild bilateral facet arthropathy. Mild effacement of the ventral spinal canal. Mild bilateral neural foraminal stenoses. L5-S1: Normal disc height and signal. No herniation. Normal facets. No spinal canal or neural foraminal stenosis.     IMPRESSION: 1.  Small left central L3-L4 disc protrusion with mild left lateral recess stenosis and posterior displacement of the left L4 nerve root. 2.  Mild L4-L5 interbody degeneration with mild effacement of the ventral spinal canal. 3.  Normal marrow signal. 4.  No abnormal contrast  enhancement.                    On cytotoxic chemotherapy requiring intensive monitoring and follow-ups.  E5 visit.      Signed by: Paola Saleem MD

## 2023-04-28 NOTE — LETTER
4/28/2023         RE: Alicia Iniguez  [address] [facility] [address]        Dear Colleague,    Thank you for referring your patient, Alicia Iniguez, to the [facility] [place]. Please see a copy of my visit note below.    [facility] Hematology and Oncology Progress Note    Patient: Alicia Iniguez  MRN: [id number]  Date of Service: Apr 28, 2023          Reason for Visit    Right breast cancer    Primary Oncologist: Dr. Saleem    Assessment and Plan     Cancer Staging   Malignant neoplasm of overlapping sites of right breast in female, estrogen receptor positive (H)  Staging form: Breast, AJCC 8th Edition  - Clinical: Stage IIA (cT3, cN2, cM0, G2, ER+, WV+, HER2-) - Signed by Shanelle Weaver APRN CNP on 3/1/2023    1.  Locally Advanced Breast cancer, right side, stage IIa, T3 N2 M0, grade 2, ER/WV positive, HER2/janine negative:   Currently on neoadjuvant chemotherapy status post 4 cycles of dose dense AC.  Here to start weekly Taxol.  Labs reviewed today.  Normal total white count and normal platelet counts.  Hemoglobin is up at 11.1.  Last time.  No contraindication to proceed with Taxol today.  I again reviewed potential side effects and complications associated with Taxol chemotherapy.  Watch for peripheral neuropathy.  Following completion of weekly Taxol x12 she will proceed with surgery and then radiation.  She will also need endocrine therapy going forward with or without ovarian suppression.  We will discuss this after her surgery.  She is agreeable to the plan.      2.   Acute left low back pain with sciatica  Started suddenly when she presented for her last dose of chemo 2 weeks ago.  MRI of the spine is showing some L3-L4 disc protrusion and L4 nerve root compression.  No cord compromise.  No neurological symptoms.  She is getting some physical therapy and it has improved her pain a lot.  No need for any intervention right now.  But I would continue to watch  for any worsening of pain or any neurological symptoms.    She is agreeable to the plan.    ECOG Performance    0 - Independent    Problem List    Patient Active Problem List   Diagnosis     Malignant neoplasm of overlapping sites of right breast in female, estrogen receptor positive (H)     Malignant neoplasm involving both nipple and areola of right breast in female, estrogen receptor positive (H)     Adverse effect of antineoplastic and immunosuppressive drugs, sequela        ______________________________________________________________________________    History of Present Illness    Oncology history:  DIAGNOSIS: Jan, 2023 - Stage IIA (cT3-cN2-cM0) Right breast cancer with axillary node mets (ER/OK+)   -Breast cancer, right side, patient had a palpable lump with some nipple and skin changes.  She was also found to have lymphadenopathy in the axilla.  Patient had an ultrasound and mammogram that showed a 4.1 cm tumor with axillary lymphadenopathy  -Biopsy  January 31 and it showed invasive ductal carcinoma.  Grade 2.  ER OK positive and HER2/janine negative.  Biopsy was done of the lymph node as well and it was positive.    TREATMENT:   On neoadjuvant chemotherapy with dose dense Adriamycin and Cytoxan.  Today is cycle 4    INTERIM HISTORY:   She is status post 4 cycles of dose dense AC.  Tolerated pretty well without any significant side effects.  She is here to start weekly Taxol.  During the last visit she had complained of low back pain.  We obtained an MRI of the lumbar spine which shows some disc protrusion at L3-L4.  No evidence of any cord compromise.  But there is some evidence of L4 nerve root compression.  No significant neurological symptoms.      PHYSICAL EXAM  There were no vitals taken for this visit.    GENERAL: no acute distress. Cooperative in conversation. Here with .   RESP: Regular respiratory rate. Clear lung sounds.  HEART: RRR  LYMPH NODE: No clinically palpable bilateral axillary  adenopathy  BREAST: Not examined today  NEURO: non focal. Alert and oriented x3. Some decreased left quad strength, guarded due to pain in back. Rest of strength and sensation intact bilateral lower extremities.  PSYCH: within normal limits. No depression or anxiety.  SKIN: exposed skin is dry intact. No rash.    Lab Results    No results found for this or any previous visit (from the past 168 hour(s)).    Imaging    MR Lumbar Spine w/o & w Contrast    Result Date: 4/17/2023  EXAM: MR LUMBAR SPINE W/O and W CONTRAST LOCATION: [facility] DATE/TIME: 4/16/2023 7:36 AM CDT INDICATION: Acute left sacroiliac pain. History of breast cancer. COMPARISON: None. CONTRAST: 8ml Gadavist. TECHNIQUE: Routine Lumbar Spine MRI without and with IV contrast. FINDINGS: 5 lumbar type vertebral bodies. Normal vertebral body heights, alignment and marrow signal. Normal distal spinal cord and cauda equina with conus medullaris at L1-L2. No extraspinal abnormality. Unremarkable visualized bony pelvis. T12-L1: Normal disc height and signal. No herniation. Normal facets. No spinal canal or neural foraminal stenosis. L1-L2: Normal disc height and signal. No herniation. Normal facets. No spinal canal or neural foraminal stenosis. L2-L3: Normal disc height and signal. No herniation. Normal facets. No spinal canal or neural foraminal stenosis. L3-L4: Normal disc height and signal. Small left central disc protrusion. Normal facets. Mild left lateral recess stenosis with posterior displacement of the left L4 nerve root. No central spinal canal stenosis. No neural foraminal stenosis. L4-L5: Mild disc height and T2 signal loss. Mild posterior annular bulge. Mild bilateral facet arthropathy. Mild effacement of the ventral spinal canal. Mild bilateral neural foraminal stenoses. L5-S1: Normal disc height and signal. No herniation. Normal facets. No spinal canal or neural foraminal stenosis.     IMPRESSION: 1.  Small left central  "L3-L4 disc protrusion with mild left lateral recess stenosis and posterior displacement of the left L4 nerve root. 2.  Mild L4-L5 interbody degeneration with mild effacement of the ventral spinal canal. 3.  Normal marrow signal. 4.  No abnormal contrast enhancement.                    On cytotoxic chemotherapy requiring intensive monitoring and follow-ups.  E5 visit.      Signed by: Paola Saleem MD    Oncology Rooming Note    April 28, 2023 8:51 AM   Alicia Iniguez is a 47 year old female who presents for:    Chief Complaint   Patient presents with     Oncology Clinic Visit     2 week return Malignant neoplasm of overlapping sites of right breast in female, estrogen receptor positive, review labs & Infusion      Initial Vitals: /71   Pulse 108   Temp 98.4  F (36.9  C) (Oral)   Resp 16   Ht 1.683 m (5' 6.25\")   Wt 89.2 kg (196 lb 9.6 oz)   SpO2 96%   BMI 31.49 kg/m   Estimated body mass index is 31.49 kg/m  as calculated from the following:    Height as of this encounter: 1.683 m (5' 6.25\").    Weight as of this encounter: 89.2 kg (196 lb 9.6 oz). Body surface area is 2.04 meters squared.  No Pain (0) Comment: Data Unavailable   No LMP recorded.  Allergies reviewed: Yes  Medications reviewed: Yes    Medications: Medication refills not needed today.  Pharmacy name entered into "[employer]": [id number] [address]    Clinical concerns: Here for 1st Taxol. Education provided for side effect managment      Renée Morley RN                  Again, thank you for allowing me to participate in the care of your patient.        Sincerely,        Paola Saleem MD    "

## 2023-04-28 NOTE — PROGRESS NOTES
SE profile reviewed for her first Taxol. She had an opportunity to ask questions. I explained how her pre and post meds have changed and the rationale for that. She again expressed understanding. STEPHANIE Chaudhary, OCN, CBCN

## 2023-04-28 NOTE — PROGRESS NOTES
"Oncology Rooming Note    April 28, 2023 8:51 AM   Alicia Iniguez is a 47 year old female who presents for:    Chief Complaint   Patient presents with     Oncology Clinic Visit     2 week return Malignant neoplasm of overlapping sites of right breast in female, estrogen receptor positive, review labs & Infusion      Initial Vitals: /71   Pulse 108   Temp 98.4  F (36.9  C) (Oral)   Resp 16   Ht 1.683 m (5' 6.25\")   Wt 89.2 kg (196 lb 9.6 oz)   SpO2 96%   BMI 31.49 kg/m   Estimated body mass index is 31.49 kg/m  as calculated from the following:    Height as of this encounter: 1.683 m (5' 6.25\").    Weight as of this encounter: 89.2 kg (196 lb 9.6 oz). Body surface area is 2.04 meters squared.  No Pain (0) Comment: Data Unavailable   No LMP recorded.  Allergies reviewed: Yes  Medications reviewed: Yes    Medications: Medication refills not needed today.  Pharmacy name entered into [employer]: [id number] [address] [facility] AT [address]    Clinical concerns: Here for 1st Taxol. Education provided for side effect managment      Renée Morley RN              "

## 2023-04-28 NOTE — PROGRESS NOTES
Infusion Nursing Note:  Alicia Iniguez presents today for cycle 5 day 1 first dose paclitaxel.    Patient seen by provider today: Yes: Dr. Saleem   present during visit today: Not Applicable.    Note: Alicia arrived ambulatory and in stable condition accompanied by her . Port was accessed using sterile technique, good blood return noted, and labs collected. Medication reviewed with patient, and she verbalized understanding. She was premedicated and chemotherapy administered per orders. Port de-accessed upon completion and site covered with gauze and secured with tape. Port was noted to be more tender today but site was WNL. When removing tegaderm dressing from port, skin was red and raised which per patient has not happened prior. Recommended that we try an alternate dressing for future accesses. Will return on 5/5 for next appointment.      Intravenous Access:  Labs drawn without difficulty.  Implanted Port.    Treatment Conditions:  Lab Results   Component Value Date    HGB 11.1 (L) 04/28/2023    WBC 7.4 04/28/2023    ANEU 5.6 04/28/2023    ANEUTAUTO 7.2 03/02/2023     04/28/2023      Lab Results   Component Value Date     03/31/2023    POTASSIUM 4.0 03/31/2023    CR 0.64 03/31/2023    KURT 9.2 03/31/2023    BILITOTAL 0.2 04/28/2023    ALBUMIN 4.2 04/28/2023    ALT 21 04/28/2023    AST 19 04/28/2023     Results reviewed, labs MET treatment parameters, ok to proceed with treatment.      Post Infusion Assessment:  Patient tolerated infusion without incident.  Blood return noted pre and post infusion.  Site patent and intact, free from redness, edema or discomfort.  No evidence of extravasations.  Access discontinued per protocol.       Discharge Plan:   Patient and/or family verbalized understanding of discharge instructions and all questions answered.  AVS to patient via [employer].  Patient will return 5/5 for next appointment.   Patient discharged in stable condition accompanied by:  .  Departure Mode: Ambulatory.      Rita Edwards RN

## 2023-05-04 DIAGNOSIS — Z17.0 MALIGNANT NEOPLASM OF OVERLAPPING SITES OF RIGHT BREAST IN FEMALE, ESTROGEN RECEPTOR POSITIVE (H): ICD-10-CM

## 2023-05-04 DIAGNOSIS — T45.1X5S ADVERSE EFFECT OF ANTINEOPLASTIC AND IMMUNOSUPPRESSIVE DRUGS, SEQUELA: Primary | ICD-10-CM

## 2023-05-04 DIAGNOSIS — C50.811 MALIGNANT NEOPLASM OF OVERLAPPING SITES OF RIGHT BREAST IN FEMALE, ESTROGEN RECEPTOR POSITIVE (H): ICD-10-CM

## 2023-05-04 RX ORDER — ONDANSETRON 2 MG/ML
8 INJECTION INTRAMUSCULAR; INTRAVENOUS ONCE
Status: CANCELLED | OUTPATIENT
Start: 2023-05-19

## 2023-05-04 RX ORDER — ALBUTEROL SULFATE 0.83 MG/ML
2.5 SOLUTION RESPIRATORY (INHALATION)
Status: CANCELLED | OUTPATIENT
Start: 2023-05-05

## 2023-05-04 RX ORDER — HEPARIN SODIUM (PORCINE) LOCK FLUSH IV SOLN 100 UNIT/ML 100 UNIT/ML
5 SOLUTION INTRAVENOUS
Status: CANCELLED | OUTPATIENT
Start: 2023-05-05

## 2023-05-04 RX ORDER — HEPARIN SODIUM,PORCINE 10 UNIT/ML
5 VIAL (ML) INTRAVENOUS
Status: CANCELLED | OUTPATIENT
Start: 2023-05-19

## 2023-05-04 RX ORDER — ALBUTEROL SULFATE 90 UG/1
1-2 AEROSOL, METERED RESPIRATORY (INHALATION)
Status: CANCELLED
Start: 2023-05-05

## 2023-05-04 RX ORDER — ALBUTEROL SULFATE 0.83 MG/ML
2.5 SOLUTION RESPIRATORY (INHALATION)
Status: CANCELLED | OUTPATIENT
Start: 2023-05-19

## 2023-05-04 RX ORDER — MEPERIDINE HYDROCHLORIDE 25 MG/ML
25 INJECTION INTRAMUSCULAR; INTRAVENOUS; SUBCUTANEOUS EVERY 30 MIN PRN
Status: CANCELLED | OUTPATIENT
Start: 2023-05-05

## 2023-05-04 RX ORDER — LORAZEPAM 2 MG/ML
0.5 INJECTION INTRAMUSCULAR EVERY 4 HOURS PRN
Status: CANCELLED | OUTPATIENT
Start: 2023-05-05

## 2023-05-04 RX ORDER — DIPHENHYDRAMINE HCL 50 MG
50 CAPSULE ORAL
Status: CANCELLED
Start: 2023-05-12

## 2023-05-04 RX ORDER — METHYLPREDNISOLONE SODIUM SUCCINATE 125 MG/2ML
125 INJECTION, POWDER, LYOPHILIZED, FOR SOLUTION INTRAMUSCULAR; INTRAVENOUS
Status: CANCELLED
Start: 2023-05-19

## 2023-05-04 RX ORDER — METHYLPREDNISOLONE SODIUM SUCCINATE 125 MG/2ML
125 INJECTION, POWDER, LYOPHILIZED, FOR SOLUTION INTRAMUSCULAR; INTRAVENOUS
Status: CANCELLED
Start: 2023-05-26

## 2023-05-04 RX ORDER — DIPHENHYDRAMINE HYDROCHLORIDE 50 MG/ML
50 INJECTION INTRAMUSCULAR; INTRAVENOUS
Status: CANCELLED
Start: 2023-05-19

## 2023-05-04 RX ORDER — ONDANSETRON 2 MG/ML
8 INJECTION INTRAMUSCULAR; INTRAVENOUS ONCE
Status: CANCELLED | OUTPATIENT
Start: 2023-05-26

## 2023-05-04 RX ORDER — HEPARIN SODIUM,PORCINE 10 UNIT/ML
5 VIAL (ML) INTRAVENOUS
Status: CANCELLED | OUTPATIENT
Start: 2023-05-12

## 2023-05-04 RX ORDER — DIPHENHYDRAMINE HYDROCHLORIDE 50 MG/ML
50 INJECTION INTRAMUSCULAR; INTRAVENOUS
Status: CANCELLED
Start: 2023-05-26

## 2023-05-04 RX ORDER — HEPARIN SODIUM (PORCINE) LOCK FLUSH IV SOLN 100 UNIT/ML 100 UNIT/ML
5 SOLUTION INTRAVENOUS
Status: CANCELLED | OUTPATIENT
Start: 2023-05-26

## 2023-05-04 RX ORDER — HEPARIN SODIUM (PORCINE) LOCK FLUSH IV SOLN 100 UNIT/ML 100 UNIT/ML
5 SOLUTION INTRAVENOUS
Status: CANCELLED | OUTPATIENT
Start: 2023-05-19

## 2023-05-04 RX ORDER — LORAZEPAM 2 MG/ML
0.5 INJECTION INTRAMUSCULAR EVERY 4 HOURS PRN
Status: CANCELLED | OUTPATIENT
Start: 2023-05-12

## 2023-05-04 RX ORDER — ALBUTEROL SULFATE 0.83 MG/ML
2.5 SOLUTION RESPIRATORY (INHALATION)
Status: CANCELLED | OUTPATIENT
Start: 2023-05-26

## 2023-05-04 RX ORDER — METHYLPREDNISOLONE SODIUM SUCCINATE 125 MG/2ML
125 INJECTION, POWDER, LYOPHILIZED, FOR SOLUTION INTRAMUSCULAR; INTRAVENOUS
Status: CANCELLED
Start: 2023-05-05

## 2023-05-04 RX ORDER — ALBUTEROL SULFATE 90 UG/1
1-2 AEROSOL, METERED RESPIRATORY (INHALATION)
Status: CANCELLED
Start: 2023-05-26

## 2023-05-04 RX ORDER — LORAZEPAM 2 MG/ML
0.5 INJECTION INTRAMUSCULAR EVERY 4 HOURS PRN
Status: CANCELLED | OUTPATIENT
Start: 2023-05-26

## 2023-05-04 RX ORDER — MEPERIDINE HYDROCHLORIDE 25 MG/ML
25 INJECTION INTRAMUSCULAR; INTRAVENOUS; SUBCUTANEOUS EVERY 30 MIN PRN
Status: CANCELLED | OUTPATIENT
Start: 2023-05-19

## 2023-05-04 RX ORDER — ONDANSETRON 2 MG/ML
8 INJECTION INTRAMUSCULAR; INTRAVENOUS ONCE
Status: CANCELLED | OUTPATIENT
Start: 2023-05-12

## 2023-05-04 RX ORDER — ALBUTEROL SULFATE 90 UG/1
1-2 AEROSOL, METERED RESPIRATORY (INHALATION)
Status: CANCELLED
Start: 2023-05-19

## 2023-05-04 RX ORDER — HEPARIN SODIUM,PORCINE 10 UNIT/ML
5 VIAL (ML) INTRAVENOUS
Status: CANCELLED | OUTPATIENT
Start: 2023-05-26

## 2023-05-04 RX ORDER — EPINEPHRINE 1 MG/ML
0.3 INJECTION, SOLUTION INTRAMUSCULAR; SUBCUTANEOUS EVERY 5 MIN PRN
Status: CANCELLED | OUTPATIENT
Start: 2023-05-26

## 2023-05-04 RX ORDER — EPINEPHRINE 1 MG/ML
0.3 INJECTION, SOLUTION INTRAMUSCULAR; SUBCUTANEOUS EVERY 5 MIN PRN
Status: CANCELLED | OUTPATIENT
Start: 2023-05-05

## 2023-05-04 RX ORDER — DIPHENHYDRAMINE HCL 50 MG
50 CAPSULE ORAL
Status: CANCELLED
Start: 2023-05-19

## 2023-05-04 RX ORDER — HEPARIN SODIUM (PORCINE) LOCK FLUSH IV SOLN 100 UNIT/ML 100 UNIT/ML
5 SOLUTION INTRAVENOUS
Status: CANCELLED | OUTPATIENT
Start: 2023-05-12

## 2023-05-04 RX ORDER — ALBUTEROL SULFATE 90 UG/1
1-2 AEROSOL, METERED RESPIRATORY (INHALATION)
Status: CANCELLED
Start: 2023-05-12

## 2023-05-04 RX ORDER — DIPHENHYDRAMINE HCL 50 MG
50 CAPSULE ORAL ONCE
Status: CANCELLED
Start: 2023-05-05

## 2023-05-04 RX ORDER — MEPERIDINE HYDROCHLORIDE 25 MG/ML
25 INJECTION INTRAMUSCULAR; INTRAVENOUS; SUBCUTANEOUS EVERY 30 MIN PRN
Status: CANCELLED | OUTPATIENT
Start: 2023-05-12

## 2023-05-04 RX ORDER — ALBUTEROL SULFATE 0.83 MG/ML
2.5 SOLUTION RESPIRATORY (INHALATION)
Status: CANCELLED | OUTPATIENT
Start: 2023-05-12

## 2023-05-04 RX ORDER — MEPERIDINE HYDROCHLORIDE 25 MG/ML
25 INJECTION INTRAMUSCULAR; INTRAVENOUS; SUBCUTANEOUS EVERY 30 MIN PRN
Status: CANCELLED | OUTPATIENT
Start: 2023-05-26

## 2023-05-04 RX ORDER — DIPHENHYDRAMINE HCL 50 MG
50 CAPSULE ORAL
Status: CANCELLED
Start: 2023-05-26

## 2023-05-04 RX ORDER — EPINEPHRINE 1 MG/ML
0.3 INJECTION, SOLUTION INTRAMUSCULAR; SUBCUTANEOUS EVERY 5 MIN PRN
Status: CANCELLED | OUTPATIENT
Start: 2023-05-12

## 2023-05-04 RX ORDER — HEPARIN SODIUM,PORCINE 10 UNIT/ML
5 VIAL (ML) INTRAVENOUS
Status: CANCELLED | OUTPATIENT
Start: 2023-05-05

## 2023-05-04 RX ORDER — EPINEPHRINE 1 MG/ML
0.3 INJECTION, SOLUTION INTRAMUSCULAR; SUBCUTANEOUS EVERY 5 MIN PRN
Status: CANCELLED | OUTPATIENT
Start: 2023-05-19

## 2023-05-04 RX ORDER — METHYLPREDNISOLONE SODIUM SUCCINATE 125 MG/2ML
125 INJECTION, POWDER, LYOPHILIZED, FOR SOLUTION INTRAMUSCULAR; INTRAVENOUS
Status: CANCELLED
Start: 2023-05-12

## 2023-05-04 RX ORDER — DIPHENHYDRAMINE HYDROCHLORIDE 50 MG/ML
50 INJECTION INTRAMUSCULAR; INTRAVENOUS
Status: CANCELLED
Start: 2023-05-05

## 2023-05-04 RX ORDER — LORAZEPAM 2 MG/ML
0.5 INJECTION INTRAMUSCULAR EVERY 4 HOURS PRN
Status: CANCELLED | OUTPATIENT
Start: 2023-05-19

## 2023-05-04 RX ORDER — DIPHENHYDRAMINE HYDROCHLORIDE 50 MG/ML
50 INJECTION INTRAMUSCULAR; INTRAVENOUS
Status: CANCELLED
Start: 2023-05-12

## 2023-05-05 ENCOUNTER — INFUSION THERAPY VISIT (OUTPATIENT)
Dept: INFUSION THERAPY | Facility: HOSPITAL | Age: 48
End: 2023-05-05
Attending: INTERNAL MEDICINE
Payer: COMMERCIAL

## 2023-05-05 VITALS
TEMPERATURE: 97.4 F | DIASTOLIC BLOOD PRESSURE: 77 MMHG | SYSTOLIC BLOOD PRESSURE: 102 MMHG | OXYGEN SATURATION: 99 % | HEART RATE: 106 BPM | RESPIRATION RATE: 18 BRPM

## 2023-05-05 DIAGNOSIS — Z17.0 MALIGNANT NEOPLASM OF OVERLAPPING SITES OF RIGHT BREAST IN FEMALE, ESTROGEN RECEPTOR POSITIVE (H): ICD-10-CM

## 2023-05-05 DIAGNOSIS — C50.811 MALIGNANT NEOPLASM OF OVERLAPPING SITES OF RIGHT BREAST IN FEMALE, ESTROGEN RECEPTOR POSITIVE (H): ICD-10-CM

## 2023-05-05 DIAGNOSIS — T45.1X5S ADVERSE EFFECT OF ANTINEOPLASTIC AND IMMUNOSUPPRESSIVE DRUGS, SEQUELA: Primary | ICD-10-CM

## 2023-05-05 LAB
BASOPHILS # BLD AUTO: 0.1 10E3/UL (ref 0–0.2)
BASOPHILS NFR BLD AUTO: 1 %
EOSINOPHIL # BLD AUTO: 0.1 10E3/UL (ref 0–0.7)
EOSINOPHIL NFR BLD AUTO: 2 %
ERYTHROCYTE [DISTWIDTH] IN BLOOD BY AUTOMATED COUNT: 17.5 % (ref 10–15)
HCT VFR BLD AUTO: 31.9 % (ref 35–47)
HGB BLD-MCNC: 10.5 G/DL (ref 11.7–15.7)
IMM GRANULOCYTES # BLD: 0.1 10E3/UL
IMM GRANULOCYTES NFR BLD: 2 %
LYMPHOCYTES # BLD AUTO: 0.7 10E3/UL (ref 0.8–5.3)
LYMPHOCYTES NFR BLD AUTO: 14 %
MCH RBC QN AUTO: 29.1 PG (ref 26.5–33)
MCHC RBC AUTO-ENTMCNC: 32.9 G/DL (ref 31.5–36.5)
MCV RBC AUTO: 88 FL (ref 78–100)
MONOCYTES # BLD AUTO: 0.5 10E3/UL (ref 0–1.3)
MONOCYTES NFR BLD AUTO: 9 %
NEUTROPHILS # BLD AUTO: 3.8 10E3/UL (ref 1.6–8.3)
NEUTROPHILS NFR BLD AUTO: 72 %
NRBC # BLD AUTO: 0 10E3/UL
NRBC BLD AUTO-RTO: 0 /100
PLATELET # BLD AUTO: 428 10E3/UL (ref 150–450)
RBC # BLD AUTO: 3.61 10E6/UL (ref 3.8–5.2)
WBC # BLD AUTO: 5.2 10E3/UL (ref 4–11)

## 2023-05-05 PROCEDURE — 258N000003 HC RX IP 258 OP 636: Performed by: NURSE PRACTITIONER

## 2023-05-05 PROCEDURE — 96367 TX/PROPH/DG ADDL SEQ IV INF: CPT

## 2023-05-05 PROCEDURE — 250N000011 HC RX IP 250 OP 636: Performed by: NURSE PRACTITIONER

## 2023-05-05 PROCEDURE — 96375 TX/PRO/DX INJ NEW DRUG ADDON: CPT

## 2023-05-05 PROCEDURE — 96413 CHEMO IV INFUSION 1 HR: CPT

## 2023-05-05 PROCEDURE — 85025 COMPLETE CBC W/AUTO DIFF WBC: CPT | Performed by: NURSE PRACTITIONER

## 2023-05-05 PROCEDURE — 250N000013 HC RX MED GY IP 250 OP 250 PS 637: Performed by: NURSE PRACTITIONER

## 2023-05-05 RX ORDER — METHYLPREDNISOLONE SODIUM SUCCINATE 125 MG/2ML
125 INJECTION, POWDER, LYOPHILIZED, FOR SOLUTION INTRAMUSCULAR; INTRAVENOUS
Status: DISCONTINUED | OUTPATIENT
Start: 2023-05-05 | End: 2023-05-05 | Stop reason: HOSPADM

## 2023-05-05 RX ORDER — HEPARIN SODIUM (PORCINE) LOCK FLUSH IV SOLN 100 UNIT/ML 100 UNIT/ML
5 SOLUTION INTRAVENOUS
Status: DISCONTINUED | OUTPATIENT
Start: 2023-05-05 | End: 2023-05-05 | Stop reason: HOSPADM

## 2023-05-05 RX ORDER — ALBUTEROL SULFATE 0.83 MG/ML
2.5 SOLUTION RESPIRATORY (INHALATION)
Status: DISCONTINUED | OUTPATIENT
Start: 2023-05-05 | End: 2023-05-05 | Stop reason: HOSPADM

## 2023-05-05 RX ORDER — EPINEPHRINE 1 MG/ML
0.3 INJECTION, SOLUTION INTRAMUSCULAR; SUBCUTANEOUS EVERY 5 MIN PRN
Status: DISCONTINUED | OUTPATIENT
Start: 2023-05-05 | End: 2023-05-05 | Stop reason: HOSPADM

## 2023-05-05 RX ORDER — DIPHENHYDRAMINE HYDROCHLORIDE 50 MG/ML
50 INJECTION INTRAMUSCULAR; INTRAVENOUS
Status: DISCONTINUED | OUTPATIENT
Start: 2023-05-05 | End: 2023-05-05 | Stop reason: HOSPADM

## 2023-05-05 RX ORDER — DIPHENHYDRAMINE HCL 50 MG
50 CAPSULE ORAL ONCE
Status: COMPLETED | OUTPATIENT
Start: 2023-05-05 | End: 2023-05-05

## 2023-05-05 RX ORDER — MEPERIDINE HYDROCHLORIDE 25 MG/ML
25 INJECTION INTRAMUSCULAR; INTRAVENOUS; SUBCUTANEOUS EVERY 30 MIN PRN
Status: DISCONTINUED | OUTPATIENT
Start: 2023-05-05 | End: 2023-05-05 | Stop reason: HOSPADM

## 2023-05-05 RX ORDER — ALBUTEROL SULFATE 90 UG/1
1-2 AEROSOL, METERED RESPIRATORY (INHALATION)
Status: DISCONTINUED | OUTPATIENT
Start: 2023-05-05 | End: 2023-05-05 | Stop reason: HOSPADM

## 2023-05-05 RX ADMIN — FAMOTIDINE 20 MG: 10 INJECTION, SOLUTION INTRAVENOUS at 10:06

## 2023-05-05 RX ADMIN — DEXAMETHASONE SODIUM PHOSPHATE: 10 INJECTION, SOLUTION INTRAMUSCULAR; INTRAVENOUS at 10:13

## 2023-05-05 RX ADMIN — Medication 5 ML: at 11:44

## 2023-05-05 RX ADMIN — DIPHENHYDRAMINE HYDROCHLORIDE 50 MG: 50 CAPSULE ORAL at 09:56

## 2023-05-05 RX ADMIN — PACLITAXEL 159 MG: 6 INJECTION, SOLUTION INTRAVENOUS at 10:36

## 2023-05-05 RX ADMIN — SODIUM CHLORIDE 250 ML: 9 INJECTION, SOLUTION INTRAVENOUS at 10:05

## 2023-05-05 ASSESSMENT — PAIN SCALES - GENERAL: PAINLEVEL: NO PAIN (0)

## 2023-05-05 NOTE — PROGRESS NOTES
Pt here for labs prior to treatment.  Port accessed without difficulty.  Patient is very sensitive and becomes anxious when port is accessed.  Labs drawn without difficulty.

## 2023-05-05 NOTE — PROGRESS NOTES
Infusion Nursing Note:  Alicia Iniguez presents today for cycle 6 day 1 paclitaxel.    Patient seen by provider today: No   present during visit today: Not Applicable.    Note: Alicia arrived ambulatory and in stable condition accompanied by her friend. Port was accessed using sterile technique, good blood return noted, and labs collected. Medication reviewed with patient, and she verbalized understanding. She was premedicated and chemotherapy administered per orders. Port de-accessed upon completion and site covered with gauze and secured with tape.     Intravenous Access:  Labs drawn without difficulty.  Implanted Port.    Treatment Conditions:  Lab Results   Component Value Date    HGB 10.5 (L) 05/05/2023    WBC 5.2 05/05/2023    ANEU 5.6 04/28/2023    ANEUTAUTO 3.8 05/05/2023     05/05/2023      Lab Results   Component Value Date     03/31/2023    POTASSIUM 4.0 03/31/2023    CR 0.64 03/31/2023    KURT 9.2 03/31/2023    BILITOTAL 0.2 04/28/2023    ALBUMIN 4.2 04/28/2023    ALT 21 04/28/2023    AST 19 04/28/2023     Results reviewed, labs MET treatment parameters, ok to proceed with treatment.      Post Infusion Assessment:  Patient tolerated infusion without incident.  Blood return noted pre and post infusion.  Site patent and intact, free from redness, edema or discomfort.  No evidence of extravasations.  Access discontinued per protocol.       Discharge Plan:   Patient and/or family verbalized understanding of discharge instructions and all questions answered.  AVS to patient via [employer].  Patient will return 5/26 for next appointment.   Patient discharged in stable condition accompanied by: .  Departure Mode: Ambulatory.      Donna Schaefer RN

## 2023-05-12 ENCOUNTER — INFUSION THERAPY VISIT (OUTPATIENT)
Dept: INFUSION THERAPY | Facility: HOSPITAL | Age: 48
End: 2023-05-12
Attending: INTERNAL MEDICINE
Payer: COMMERCIAL

## 2023-05-12 ENCOUNTER — VIRTUAL VISIT (OUTPATIENT)
Dept: ONCOLOGY | Facility: CLINIC | Age: 48
End: 2023-05-12
Attending: SPECIALIST
Payer: COMMERCIAL

## 2023-05-12 VITALS
OXYGEN SATURATION: 97 % | RESPIRATION RATE: 16 BRPM | SYSTOLIC BLOOD PRESSURE: 120 MMHG | HEART RATE: 101 BPM | DIASTOLIC BLOOD PRESSURE: 69 MMHG | TEMPERATURE: 98.1 F

## 2023-05-12 DIAGNOSIS — C50.011 MALIGNANT NEOPLASM INVOLVING BOTH NIPPLE AND AREOLA OF RIGHT BREAST IN FEMALE, ESTROGEN RECEPTOR POSITIVE (H): Primary | ICD-10-CM

## 2023-05-12 DIAGNOSIS — C50.811 MALIGNANT NEOPLASM OF OVERLAPPING SITES OF RIGHT BREAST IN FEMALE, ESTROGEN RECEPTOR POSITIVE (H): ICD-10-CM

## 2023-05-12 DIAGNOSIS — Z17.0 MALIGNANT NEOPLASM OF OVERLAPPING SITES OF RIGHT BREAST IN FEMALE, ESTROGEN RECEPTOR POSITIVE (H): ICD-10-CM

## 2023-05-12 DIAGNOSIS — T45.1X5S ADVERSE EFFECT OF ANTINEOPLASTIC AND IMMUNOSUPPRESSIVE DRUGS, SEQUELA: Primary | ICD-10-CM

## 2023-05-12 DIAGNOSIS — Z80.42 FAMILY HISTORY OF MALIGNANT NEOPLASM OF PROSTATE: ICD-10-CM

## 2023-05-12 DIAGNOSIS — Z17.0 MALIGNANT NEOPLASM INVOLVING BOTH NIPPLE AND AREOLA OF RIGHT BREAST IN FEMALE, ESTROGEN RECEPTOR POSITIVE (H): Primary | ICD-10-CM

## 2023-05-12 DIAGNOSIS — Z80.3 FAMILY HISTORY OF MALIGNANT NEOPLASM OF BREAST: ICD-10-CM

## 2023-05-12 LAB
BASOPHILS # BLD AUTO: 0.1 10E3/UL (ref 0–0.2)
BASOPHILS NFR BLD AUTO: 1 %
EOSINOPHIL # BLD AUTO: 0.2 10E3/UL (ref 0–0.7)
EOSINOPHIL NFR BLD AUTO: 3 %
ERYTHROCYTE [DISTWIDTH] IN BLOOD BY AUTOMATED COUNT: 17.7 % (ref 10–15)
HCT VFR BLD AUTO: 31.5 % (ref 35–47)
HGB BLD-MCNC: 10.3 G/DL (ref 11.7–15.7)
IMM GRANULOCYTES # BLD: 0.1 10E3/UL
IMM GRANULOCYTES NFR BLD: 1 %
LYMPHOCYTES # BLD AUTO: 0.9 10E3/UL (ref 0.8–5.3)
LYMPHOCYTES NFR BLD AUTO: 16 %
MCH RBC QN AUTO: 29.3 PG (ref 26.5–33)
MCHC RBC AUTO-ENTMCNC: 32.7 G/DL (ref 31.5–36.5)
MCV RBC AUTO: 90 FL (ref 78–100)
MONOCYTES # BLD AUTO: 0.6 10E3/UL (ref 0–1.3)
MONOCYTES NFR BLD AUTO: 11 %
NEUTROPHILS # BLD AUTO: 3.8 10E3/UL (ref 1.6–8.3)
NEUTROPHILS NFR BLD AUTO: 68 %
NRBC # BLD AUTO: 0 10E3/UL
NRBC BLD AUTO-RTO: 0 /100
PLATELET # BLD AUTO: 463 10E3/UL (ref 150–450)
RBC # BLD AUTO: 3.51 10E6/UL (ref 3.8–5.2)
WBC # BLD AUTO: 5.5 10E3/UL (ref 4–11)

## 2023-05-12 PROCEDURE — 258N000003 HC RX IP 258 OP 636: Performed by: NURSE PRACTITIONER

## 2023-05-12 PROCEDURE — 96375 TX/PRO/DX INJ NEW DRUG ADDON: CPT

## 2023-05-12 PROCEDURE — 85025 COMPLETE CBC W/AUTO DIFF WBC: CPT | Performed by: NURSE PRACTITIONER

## 2023-05-12 PROCEDURE — 96413 CHEMO IV INFUSION 1 HR: CPT

## 2023-05-12 PROCEDURE — 96040 HC GENETIC COUNSELING, EACH 30 MINUTES: CPT | Mod: 95 | Performed by: GENETIC COUNSELOR, MS

## 2023-05-12 PROCEDURE — 250N000011 HC RX IP 250 OP 636: Performed by: NURSE PRACTITIONER

## 2023-05-12 RX ORDER — ONDANSETRON 2 MG/ML
8 INJECTION INTRAMUSCULAR; INTRAVENOUS ONCE
Status: COMPLETED | OUTPATIENT
Start: 2023-05-12 | End: 2023-05-12

## 2023-05-12 RX ORDER — HEPARIN SODIUM (PORCINE) LOCK FLUSH IV SOLN 100 UNIT/ML 100 UNIT/ML
5 SOLUTION INTRAVENOUS
Status: DISCONTINUED | OUTPATIENT
Start: 2023-05-12 | End: 2023-05-12 | Stop reason: HOSPADM

## 2023-05-12 RX ORDER — DIPHENHYDRAMINE HCL 50 MG
50 CAPSULE ORAL
Status: DISCONTINUED | OUTPATIENT
Start: 2023-05-12 | End: 2023-05-12 | Stop reason: HOSPADM

## 2023-05-12 RX ADMIN — PACLITAXEL 159 MG: 6 INJECTION, SOLUTION INTRAVENOUS at 12:18

## 2023-05-12 RX ADMIN — ONDANSETRON 8 MG: 2 INJECTION INTRAMUSCULAR; INTRAVENOUS at 11:46

## 2023-05-12 RX ADMIN — Medication 5 ML: at 13:21

## 2023-05-12 RX ADMIN — SODIUM CHLORIDE 250 ML: 9 INJECTION, SOLUTION INTRAVENOUS at 11:45

## 2023-05-12 ASSESSMENT — PAIN SCALES - GENERAL: PAINLEVEL: NO PAIN (0)

## 2023-05-12 NOTE — PROGRESS NOTES
2023    Referring Provider: Maribell Brunner MD    Presenting Information:   I met with Alicia for her video genetic counseling visit, through the Cancer Risk Management Program, to discuss her personal history of breast cancer and family history of breast and prostate cancer. Today we reviewed this history, cancer screening recommendations, and available genetic testing options.    Personal History:  Alicia is a 47 year old year old female. She was diagnosed with invasive ductal carcinoma (IDC) in her right breast at age 47 (ER/WA positive, HER2 negative); treatment included neoadjuvant chemotherapy. She had negative genetic testing for 11 genes through the Breast Actionable panel at the [facility] ([facility] at [facility]: TOÑO, BARD1, BRCA1, BRCA2, CDH1, CHEK2, NF1, PALB2, PTEN, STK11, and TP53.    She had her first menstrual period at age 11, her first child at age 24, and is premenopausal. Alicia has her right ovary, right fallopian tube, and uterus in place, but she had her left ovary and fallopian tube removed at age 35 due to an ovarian cyst. She reports that she has not used hormone replacement therapy. Her most recent mammogram in 2023 found a large, hypoechoic mass that was identified as IDC. Alicia has not had a colonoscopy. She does not regularly do any other cancer screening at this time.     Family History: (Please see scanned pedigree for detailed family history information)    Alicia's mother was diagnosed with breast cancer at age 56. She was diagnosed with another breast cancer that was triple negative in her 70's and  at age 72.    A maternal uncle was diagnosed with metastatic prostate cancer and  at age 58.    There is no reported family history on her paternal side of the family.    Her maternal ethnicity is [place]. Her paternal ethnicity is Polish. There is no known Ashkenazi Yazidi ancestry on either side of her family. There is no reported  consanguinity.    Discussion:    Alicia's personal and family history of breast and prostate cancer is suggestive of a hereditary cancer syndrome.    We reviewed the features of sporadic, familial, and hereditary cancers. We discussed that mutations in either BRCA1 or BRCA2 could be possible hereditary explanations for her family history of cancer. Mutations in the BRCA1 or BRCA2 gene are known to cause Hereditary Breast and Ovarian Cancer Syndrome (HBOC). HBOC typically presents with multiple family members diagnosed with breast cancer before age 50 and/or ovarian cancer. Other cancer risks associated with HBOC include male breast cancer, prostate cancer, pancreatic cancer, and melanoma.   We reviewed the BRCA1/2 genes and the other 9 genes, for which Alicia had previous negative genetic testing. Testing is also available for additional cancer risk genes via panel testing. The National Comprehensive Cancer Network (NCCN) guidelines also mention that there may be a role for multi-gene panel genetic testing for patients who have previously tested negative for a more limited hereditary cancer panel, but have a suspicious personal or family history.    We discussed the natural history and genetics of hereditary cancer. A detailed handout regarding hereditary cancer, along with the other information we discussed, will be mailed to Alicia at the end of our appointment today and can be found in the after visit summary. Topics included: inheritance pattern, cancer risks, cancer screening recommendations, and also risks, benefits and limitations of testing.    Based on her personal and family history, Alicia meets current National Comprehensive Cancer Network (NCCN) criteria for genetic testing of BRCA1/2 along with other high-penetrance breast cancer susceptibility genes (I.e. CDH1, PALB2, PTEN, and TP53).    We discussed that there are additional genes that could cause increased risk for breast and/or prostate cancer.  As many of these genes present with overlapping features in a family and accurate cancer risk cannot always be established based upon the pedigree analysis alone, it would be reasonable for Alicia to consider panel genetic testing to analyze multiple genes at once.    Genetic testing is available for 25 genes associated with hereditary gynecologic, breast, and related cancers: GynExpanded panel (TOÑO, BARD1, BRCA1, BRCA2, BRIP1, CDH1, CHEK2, DICER1, EPCAM, MLH1, MRE11, MSH2, MSH6, MUTYH, NBN, NF1, PALB2, PMS2, PTEN, RAD50, RAD51C, RAD51D, SMARCA4, STK11, and TP53).    We discussed that some of the genes in the GynExpanded panel are associated with specific hereditary cancer syndromes and published management guidelines: Hereditary Breast and Ovarian Cancer syndrome (BRCA1, BRCA2), De La Cruz syndrome (MLH1, MSH2, MSH6, PMS2, EPCAM), Hereditary Diffuse Gastric Cancer (CDH1), Cowden syndrome (PTEN), Li Fraumeni syndrome (TP53), Peutz-Jeghers syndrome (STK11), MUTYH Associated Polyposis (MUTYH), and Neurofibromatosis type 1 (NF1).      Risk-reducing salpingo-oophorectomy can be considered in women with mutations in BRIP1, RAD51C, or RAD51D. Breast and/or other cancer risk management guidelines are available for TOÑO, CHEK2, PALB2, NF1, and NBN.    The remaining genes (BARD1, DICER1, MRE11, RAD50, and SMARCA4) are associated with increased cancer risk and may allow us to make medical recommendations when mutations are identified.      Verbal consent was given over video and written on the consent form. Turnaround time is approximately 4 weeks.    Medical Management: For Alicia, we reviewed that the information from genetic testing may determine:    additional cancer screening for which Alicia may qualify (i.e. mammogram and breast MRI, more frequent colonoscopies, more frequent dermatologic exams, etc.),    options for risk reducing surgeries Alicia could consider (i.e. bilateral mastectomy, surgery to remove her ovaries  and/or uterus, etc.),      and targeted chemotherapies for Alicia's active cancer, or if she were to develop certain cancers in the future (i.e. immunotherapy for individuals with De La Cruz syndrome, PARP inhibitors, etc.).     These recommendations and possible targeted chemotherapies will be discussed in detail once genetic testing is completed.     Plan:  1) Today Alicia elected to proceed with the GynExpanded panel through [facility] ([facility] at [facility].  2) A copy of the consent form and the after visit summary will be mailed to Alicia.  3) This information should be available in approximately 4 weeks, once the lab receives the sample.  4) I will call Alicia with the results once they become available.    Time spent on video: 33 minutes    Rishabh Jarvis MS, [facility]  Licensed, Certified Genetic Counselor      Virtual Visit Details    Type of service:  Video Visit     Originating Location (pt. Location): Home  Distant Location (provider location):  Off-site  Platform used for Video Visit: Mike

## 2023-05-12 NOTE — LETTER
May 12, 2023    Alicia Iniguez  [address] [facility] [address]      Dear Alicia,    It was a pleasure speaking with you over video on 2023. Here is a copy of the progress note from our discussion. If you have any additional questions, please feel free to call.    Referring Provider: Maribell Brunner MD    Presenting Information:   I met with Alicia for her video genetic counseling visit, through the Cancer Risk Management Program, to discuss her personal history of breast cancer and family history of breast and prostate cancer. Today we reviewed this history, cancer screening recommendations, and available genetic testing options.    Personal History:  Alicia is a 47 year old year old female. She was diagnosed with invasive ductal carcinoma (IDC) in her right breast at age 47 (ER/PA positive, HER2 negative); treatment included neoadjuvant chemotherapy. She had negative genetic testing for 11 genes through the Breast Actionable panel at the [facility] ([facility] at [facility]: TOÑO, BARD1, BRCA1, BRCA2, CDH1, CHEK2, NF1, PALB2, PTEN, STK11, and TP53.    She had her first menstrual period at age 11, her first child at age 24, and is premenopausal. Alicia has her right ovary, right fallopian tube, and uterus in place, but she had her left ovary and fallopian tube removed at age 35 due to an ovarian cyst. She reports that she has not used hormone replacement therapy. Her most recent mammogram in 2023 found a large, hypoechoic mass that was identified as IDC. Alicia has not had a colonoscopy. She does not regularly do any other cancer screening at this time.     Family History: (Please see scanned pedigree for detailed family history information)    Alicia's mother was diagnosed with breast cancer at age 56. She was diagnosed with another breast cancer that was triple negative in her 70's and  at age 72.    A maternal uncle was diagnosed with metastatic prostate cancer and   at age 58.    There is no reported family history on her paternal side of the family.    Her maternal ethnicity is Spanish. Her paternal ethnicity is Polish. There is no known Ashkenazi Church ancestry on either side of her family. There is no reported consanguinity.      Discussion:    Alicia's personal and family history of breast and prostate cancer is suggestive of a hereditary cancer syndrome.    We reviewed the features of sporadic, familial, and hereditary cancers. We discussed that mutations in either BRCA1 or BRCA2 could be possible hereditary explanations for her family history of cancer. Mutations in the BRCA1 or BRCA2 gene are known to cause Hereditary Breast and Ovarian Cancer Syndrome (HBOC). HBOC typically presents with multiple family members diagnosed with breast cancer before age 50 and/or ovarian cancer. Other cancer risks associated with HBOC include male breast cancer, prostate cancer, pancreatic cancer, and melanoma.     We reviewed the BRCA1/2 genes and the other 9 genes, for which Alicia had previous negative genetic testing. Testing is also available for additional cancer risk genes via panel testing. The National Comprehensive Cancer Network (NCCN) guidelines also mention that there may be a role for multi-gene panel genetic testing for patients who have previously tested negative for a more limited hereditary cancer panel, but have a suspicious personal or family history.    We discussed the natural history and genetics of hereditary cancer. A detailed handout regarding hereditary cancer, along with the other information we discussed, will be mailed to Alicia at the end of our appointment today and can be found in the after visit summary. Topics included: inheritance pattern, cancer risks, cancer screening recommendations, and also risks, benefits and limitations of testing.    Based on her personal and family history, Alicia meets current National Comprehensive Cancer Network  (NCCN) criteria for genetic testing of BRCA1/2 along with other high-penetrance breast cancer susceptibility genes (I.e. CDH1, PALB2, PTEN, and TP53).    We discussed that there are additional genes that could cause increased risk for breast and/or prostate cancer. As many of these genes present with overlapping features in a family and accurate cancer risk cannot always be established based upon the pedigree analysis alone, it would be reasonable for Alicia to consider panel genetic testing to analyze multiple genes at once.    Genetic testing is available for 25 genes associated with hereditary gynecologic, breast, and related cancers: GynExpanded panel (TOÑO, BARD1, BRCA1, BRCA2, BRIP1, CDH1, CHEK2, DICER1, EPCAM, MLH1, MRE11, MSH2, MSH6, MUTYH, NBN, NF1, PALB2, PMS2, PTEN, RAD50, RAD51C, RAD51D, SMARCA4, STK11, and TP53).    We discussed that some of the genes in the GynExpanded panel are associated with specific hereditary cancer syndromes and published management guidelines: Hereditary Breast and Ovarian Cancer syndrome (BRCA1, BRCA2), De La Cruz syndrome (MLH1, MSH2, MSH6, PMS2, EPCAM), Hereditary Diffuse Gastric Cancer (CDH1), Cowden syndrome (PTEN), Li Fraumeni syndrome (TP53), Peutz-Jeghers syndrome (STK11), MUTYH Associated Polyposis (MUTYH), and Neurofibromatosis type 1 (NF1).      Risk-reducing salpingo-oophorectomy can be considered in women with mutations in BRIP1, RAD51C, or RAD51D. Breast and/or other cancer risk management guidelines are available for TOÑO, CHEK2, PALB2, NF1, and NBN.    The remaining genes (BARD1, DICER1, MRE11, RAD50, and SMARCA4) are associated with increased cancer risk and may allow us to make medical recommendations when mutations are identified.      Verbal consent was given over video and written on the consent form. Turnaround time is approximately 4 weeks.    Medical Management: For Alicia, we reviewed that the information from genetic testing may determine:    additional  cancer screening for which Alicia may qualify (i.e. mammogram and breast MRI, more frequent colonoscopies, more frequent dermatologic exams, etc.),    options for risk reducing surgeries Alicia could consider (i.e. bilateral mastectomy, surgery to remove her ovaries and/or uterus, etc.),      and targeted chemotherapies for Alicia's active cancer, or if she were to develop certain cancers in the future (i.e. immunotherapy for individuals with De La Cruz syndrome, PARP inhibitors, etc.).     These recommendations and possible targeted chemotherapies will be discussed in detail once genetic testing is completed.     Plan:  1) Today Alicia elected to proceed with the GynExpanded panel through [facility] ([facility] at [facility].  2) A copy of the consent form and the after visit summary will be mailed to Alicia.  3) This information should be available in approximately 4 weeks, once the lab receives the sample.  4) I will call Alicia with the results once they become available.    Time spent on video: 33 minutes    Rishabh Jarvis MS, [facility]  Licensed, Certified Genetic Counselor

## 2023-05-12 NOTE — NURSING NOTE
Is the patient currently in the state of [place]? YES    Visit mode:VIDEO    If the visit is dropped, the patient can be reconnected by: VIDEO VISIT: Text to cell phone: [phone]    Will anyone else be joining the visit? NO      How would you like to obtain your AVS? MyChart    Are changes needed to the allergy or medication list? NO    Reason for visit: No chief complaint on file.

## 2023-05-12 NOTE — NURSING NOTE
"Virtual Visit Details    Type of service:  Video Visit     Originating Location (pt. Location): {video visit patient location:382462::\"Home\"}  {PROVIDER LOCATION On-site should be selected for visits conducted from your clinic location or adjoining [facility] hospital, academic office, or other nearby [facility] building. Off-site should be selected for all other provider locations, including home:371963}  Distant Location (provider location):  {virtual location provider:044331}  Platform used for Video Visit: {Virtual Visit Platforms:055000::\"LifeShield\"}  "

## 2023-05-12 NOTE — PROGRESS NOTES
Pt arrived ambulatory to clinic for Cycle # 7 Day # 1 of her chemotherapy regimen.  Port was accessed using aseptic technique without difficulties with excellent blood return.  This was pt's first dose of Taxol without premedications, so instructed pt to notify staff if she notices any hypersensitivity symptoms.  Administered IVP Zofran and Taxol per MD order.  Pt tolerated infusion well, no s/s of infusion reaction.  Port was flushed with NS and Heparin then de-accessed using 2x2 and papertape.  Instructed pt to call clinic with any further questions or concerns.  Pt verbalized understanding of plan of care and return to clinic.

## 2023-05-12 NOTE — Clinical Note
"    5/12/2023         RE: Alicia Iniguez  [address] [facility] [address]        Dear Colleague,    Thank you for referring your patient, Alicia Iniguez, to the [facility]. Please see a copy of my visit note below.    Virtual Visit Details    Type of service:  Video Visit     Originating Location (pt. Location): {video visit patient location:799890::\"Home\"}  {PROVIDER LOCATION On-site should be selected for visits conducted from your clinic location or adjoining [facility] hospital, academic office, or other nearby [facility] building. Off-site should be selected for all other provider locations, including home:792929}  Distant Location (provider location):  {virtual location provider:418827}  Platform used for Video Visit: {Virtual Visit Platforms:770485::\"P[employer]llo\"}        5/12/2023    Referring Provider: ***    Presenting Information:   I met with Alicia for her video genetic counseling visit, through the Cancer Risk Management Program, to discuss her personal *** and family history of *** cancer. Today we reviewed this history, cancer screening recommendations, and available genetic testing options.    Personal History:  Alicia is a 47 year old year old female. She was diagnosed with ***; treatment included ***  / does not have any personal history of cancer.    ***She had her first menstrual period at age ***, her first child at age ***, and is ***.  ***Alicia has her ovaries, fallopian tubes and uterus in place, and she has had *** ovarian cancer screening to date. She reports that she *** hormone replacement therapy.      She has *** clinical breast exams and mammograms; her most recent mammogram in *** was ***. ***Alicia began having colonoscopies at the age of ***/Alicia has not had a colonoscopy. Her most recent colonoscopy in *** was *** and follow-up was recommended in ***. ***She does not regularly do any other cancer screening at this time. Alicia reported *** tobacco use " and *** alcohol use.    Family History: (Please see scanned pedigree for detailed family history information)    ***    ***    Her maternal ethnicity is ***. Her paternal ethnicity is ***. There is no known Ashkenazi Faith ancestry on either side of her family. There is no reported consanguinity.    Discussion:    Alicia's personal and family history of *** is suggestive of a hereditary cancer syndrome.    We reviewed the features of sporadic, familial, and hereditary cancers. ***    We discussed the natural history and genetics of hereditary cancer. A detailed handout regarding hereditary cancer, along with the other information we discussed, will be mailed to Alicia at the end of our appointment today and can be found in the after visit summary. Topics included: inheritance pattern, cancer risks, cancer screening recommendations, and also risks, benefits and limitations of testing.    Based on her personal and family history, Alicia meets current National Comprehensive Cancer Network (NCCN) criteria for genetic testing of ***.    We discussed that there are additional genes that could cause increased risk for *** cancer. As many of these genes present with overlapping features in a family and accurate cancer risk cannot always be established based upon the pedigree analysis alone, it would be reasonable for Alicia to consider panel genetic testing to analyze multiple genes at once.    ***    Alicia stated that she would prefer to submit a saliva kit for her genetic testing. ***Lyle will send a kit directly to her home with directions on how to collect a saliva sample. We discussed that there is a small chance for sample failure due to contamination of the sample. To help minimize this, she should follow the directions that are sent with the kit. Alicia verbalized understanding of this. Once the sample is collected, she will send it to ***[employer] using the return envelope and prepaid shipping label.      Verbal consent was given over video*** and written on the consent form. Turnaround time is approximately 4 weeks once the lab receives the sample.    Medical Management: For Alicia, we reviewed that the information from genetic testing may determine:    ***surgery to treat Alicia's active cancer diagnosis (i.e. lumpectomy versus bilateral mastectomy, partial versus total colectomy, etc.***),    additional cancer screening for which Alicia may qualify (i.e. mammogram and breast MRI, more frequent colonoscopies, more frequent dermatologic exams, etc.***),    options for risk reducing surgeries Alicia could consider (i.e. bilateral mastectomy, surgery to remove her ovaries and/or uterus, etc.***),      and targeted chemotherapies for Alicia's *** active cancer, or ***if she were to develop certain cancers in the future (i.e. immunotherapy for individuals with De La Cruz syndrome, PARP inhibitors, etc.***).     These recommendations and possible targeted chemotherapies will be discussed in detail once genetic testing is completed.     Plan:  1) Today Alicia elected to proceed with ***.  2) A copy of the consent form and the after visit summary will be mailed to Alicia.  3) This information should be available in approximately 4 weeks, once the lab receives the sample.  4) I will call Alicia with the results once they become available.    Time spent on video: 33 minutes    Rishabh Jarvis MS, [facility] [place]  Licensed, Certified Genetic Counselor    ***    Virtual Visit Details    Type of service:  Video Visit     Originating Location (pt. Location): Home  Distant Location (provider location):  Off-site  Platform used for Video Visit: AmWell          Again, thank you for allowing me to participate in the care of your patient.        Sincerely,        Rishabh Jarvis,

## 2023-05-19 ENCOUNTER — INFUSION THERAPY VISIT (OUTPATIENT)
Dept: INFUSION THERAPY | Facility: HOSPITAL | Age: 48
End: 2023-05-19
Attending: INTERNAL MEDICINE
Payer: COMMERCIAL

## 2023-05-19 VITALS — HEART RATE: 92 BPM | SYSTOLIC BLOOD PRESSURE: 138 MMHG | DIASTOLIC BLOOD PRESSURE: 72 MMHG | TEMPERATURE: 98.4 F

## 2023-05-19 DIAGNOSIS — Z17.0 MALIGNANT NEOPLASM OF OVERLAPPING SITES OF RIGHT BREAST IN FEMALE, ESTROGEN RECEPTOR POSITIVE (H): ICD-10-CM

## 2023-05-19 DIAGNOSIS — T45.1X5S ADVERSE EFFECT OF ANTINEOPLASTIC AND IMMUNOSUPPRESSIVE DRUGS, SEQUELA: Primary | ICD-10-CM

## 2023-05-19 DIAGNOSIS — C50.811 MALIGNANT NEOPLASM OF OVERLAPPING SITES OF RIGHT BREAST IN FEMALE, ESTROGEN RECEPTOR POSITIVE (H): ICD-10-CM

## 2023-05-19 LAB
BASOPHILS # BLD AUTO: 0.1 10E3/UL (ref 0–0.2)
BASOPHILS NFR BLD AUTO: 1 %
EOSINOPHIL # BLD AUTO: 0.2 10E3/UL (ref 0–0.7)
EOSINOPHIL NFR BLD AUTO: 4 %
ERYTHROCYTE [DISTWIDTH] IN BLOOD BY AUTOMATED COUNT: 17.5 % (ref 10–15)
HCT VFR BLD AUTO: 32.7 % (ref 35–47)
HGB BLD-MCNC: 10.4 G/DL (ref 11.7–15.7)
IMM GRANULOCYTES # BLD: 0 10E3/UL
IMM GRANULOCYTES NFR BLD: 1 %
LYMPHOCYTES # BLD AUTO: 0.7 10E3/UL (ref 0.8–5.3)
LYMPHOCYTES NFR BLD AUTO: 15 %
MCH RBC QN AUTO: 29.2 PG (ref 26.5–33)
MCHC RBC AUTO-ENTMCNC: 31.8 G/DL (ref 31.5–36.5)
MCV RBC AUTO: 92 FL (ref 78–100)
MONOCYTES # BLD AUTO: 0.4 10E3/UL (ref 0–1.3)
MONOCYTES NFR BLD AUTO: 10 %
NEUTROPHILS # BLD AUTO: 3.2 10E3/UL (ref 1.6–8.3)
NEUTROPHILS NFR BLD AUTO: 69 %
NRBC # BLD AUTO: 0 10E3/UL
NRBC BLD AUTO-RTO: 0 /100
PLATELET # BLD AUTO: 369 10E3/UL (ref 150–450)
RBC # BLD AUTO: 3.56 10E6/UL (ref 3.8–5.2)
WBC # BLD AUTO: 4.6 10E3/UL (ref 4–11)

## 2023-05-19 PROCEDURE — 96375 TX/PRO/DX INJ NEW DRUG ADDON: CPT

## 2023-05-19 PROCEDURE — 250N000011 HC RX IP 250 OP 636: Performed by: NURSE PRACTITIONER

## 2023-05-19 PROCEDURE — 85025 COMPLETE CBC W/AUTO DIFF WBC: CPT | Performed by: NURSE PRACTITIONER

## 2023-05-19 PROCEDURE — 258N000003 HC RX IP 258 OP 636: Performed by: NURSE PRACTITIONER

## 2023-05-19 PROCEDURE — 96413 CHEMO IV INFUSION 1 HR: CPT

## 2023-05-19 RX ORDER — HEPARIN SODIUM (PORCINE) LOCK FLUSH IV SOLN 100 UNIT/ML 100 UNIT/ML
5 SOLUTION INTRAVENOUS
Status: DISCONTINUED | OUTPATIENT
Start: 2023-05-19 | End: 2023-05-19 | Stop reason: HOSPADM

## 2023-05-19 RX ORDER — ONDANSETRON 2 MG/ML
8 INJECTION INTRAMUSCULAR; INTRAVENOUS ONCE
Status: COMPLETED | OUTPATIENT
Start: 2023-05-19 | End: 2023-05-19

## 2023-05-19 RX ADMIN — PACLITAXEL 159 MG: 6 INJECTION, SOLUTION INTRAVENOUS at 08:53

## 2023-05-19 RX ADMIN — Medication 5 ML: at 09:56

## 2023-05-19 RX ADMIN — ONDANSETRON 8 MG: 2 INJECTION INTRAMUSCULAR; INTRAVENOUS at 08:44

## 2023-05-19 RX ADMIN — SODIUM CHLORIDE 250 ML: 9 INJECTION, SOLUTION INTRAVENOUS at 08:44

## 2023-05-19 NOTE — PROGRESS NOTES
PT here ambulatory for txt. PT states doing well, no neuropathy/appetite good. PT fatigue continues. Port accessed and labs drawn/results approved for txt.txt reviewed and administered as ordered. PT tolerated txt without any problems. txt completed and port flushed/deaccessed with 2x2 to site. Follow up reviewed and pt dc'd steady gait.

## 2023-05-22 PROCEDURE — G0452 MOLECULAR PATHOLOGY INTERPR: HCPCS | Mod: 26 | Performed by: PATHOLOGY

## 2023-05-26 ENCOUNTER — LAB (OUTPATIENT)
Dept: INFUSION THERAPY | Facility: HOSPITAL | Age: 48
End: 2023-05-26
Attending: INTERNAL MEDICINE
Payer: COMMERCIAL

## 2023-05-26 ENCOUNTER — ONCOLOGY VISIT (OUTPATIENT)
Dept: ONCOLOGY | Facility: HOSPITAL | Age: 48
End: 2023-05-26
Attending: INTERNAL MEDICINE
Payer: COMMERCIAL

## 2023-05-26 VITALS
HEART RATE: 118 BPM | OXYGEN SATURATION: 98 % | SYSTOLIC BLOOD PRESSURE: 146 MMHG | TEMPERATURE: 98.5 F | BODY MASS INDEX: 32.16 KG/M2 | WEIGHT: 200.1 LBS | HEIGHT: 66 IN | RESPIRATION RATE: 18 BRPM | DIASTOLIC BLOOD PRESSURE: 73 MMHG

## 2023-05-26 DIAGNOSIS — Z17.0 MALIGNANT NEOPLASM OF OVERLAPPING SITES OF RIGHT BREAST IN FEMALE, ESTROGEN RECEPTOR POSITIVE (H): ICD-10-CM

## 2023-05-26 DIAGNOSIS — T45.1X5S ADVERSE EFFECT OF ANTINEOPLASTIC AND IMMUNOSUPPRESSIVE DRUGS, SEQUELA: Primary | ICD-10-CM

## 2023-05-26 DIAGNOSIS — R74.01 TRANSAMINITIS: ICD-10-CM

## 2023-05-26 DIAGNOSIS — C50.811 MALIGNANT NEOPLASM OF OVERLAPPING SITES OF RIGHT BREAST IN FEMALE, ESTROGEN RECEPTOR POSITIVE (H): ICD-10-CM

## 2023-05-26 DIAGNOSIS — D64.9 ANEMIA, UNSPECIFIED TYPE: ICD-10-CM

## 2023-05-26 LAB
ALBUMIN SERPL BCG-MCNC: 4.1 G/DL (ref 3.5–5.2)
ALP SERPL-CCNC: 73 U/L (ref 35–104)
ALT SERPL W P-5'-P-CCNC: 130 U/L (ref 10–35)
AST SERPL W P-5'-P-CCNC: 59 U/L (ref 10–35)
BASOPHILS # BLD AUTO: 0.1 10E3/UL (ref 0–0.2)
BASOPHILS NFR BLD AUTO: 1 %
BILIRUB DIRECT SERPL-MCNC: <0.2 MG/DL (ref 0–0.3)
BILIRUB SERPL-MCNC: 0.3 MG/DL
EOSINOPHIL # BLD AUTO: 0.1 10E3/UL (ref 0–0.7)
EOSINOPHIL NFR BLD AUTO: 3 %
ERYTHROCYTE [DISTWIDTH] IN BLOOD BY AUTOMATED COUNT: 16.8 % (ref 10–15)
HCT VFR BLD AUTO: 33.6 % (ref 35–47)
HGB BLD-MCNC: 10.7 G/DL (ref 11.7–15.7)
IMM GRANULOCYTES # BLD: 0 10E3/UL
IMM GRANULOCYTES NFR BLD: 1 %
LYMPHOCYTES # BLD AUTO: 0.7 10E3/UL (ref 0.8–5.3)
LYMPHOCYTES NFR BLD AUTO: 16 %
MCH RBC QN AUTO: 29.1 PG (ref 26.5–33)
MCHC RBC AUTO-ENTMCNC: 31.8 G/DL (ref 31.5–36.5)
MCV RBC AUTO: 91 FL (ref 78–100)
MONOCYTES # BLD AUTO: 0.3 10E3/UL (ref 0–1.3)
MONOCYTES NFR BLD AUTO: 6 %
NEUTROPHILS # BLD AUTO: 3.3 10E3/UL (ref 1.6–8.3)
NEUTROPHILS NFR BLD AUTO: 73 %
NRBC # BLD AUTO: 0 10E3/UL
NRBC BLD AUTO-RTO: 0 /100
PLATELET # BLD AUTO: 364 10E3/UL (ref 150–450)
PROT SERPL-MCNC: 6.8 G/DL (ref 6.4–8.3)
RBC # BLD AUTO: 3.68 10E6/UL (ref 3.8–5.2)
WBC # BLD AUTO: 4.5 10E3/UL (ref 4–11)

## 2023-05-26 PROCEDURE — 80076 HEPATIC FUNCTION PANEL: CPT | Performed by: NURSE PRACTITIONER

## 2023-05-26 PROCEDURE — 258N000003 HC RX IP 258 OP 636: Performed by: NURSE PRACTITIONER

## 2023-05-26 PROCEDURE — 99214 OFFICE O/P EST MOD 30 MIN: CPT | Performed by: INTERNAL MEDICINE

## 2023-05-26 PROCEDURE — 85025 COMPLETE CBC W/AUTO DIFF WBC: CPT | Performed by: NURSE PRACTITIONER

## 2023-05-26 PROCEDURE — 99213 OFFICE O/P EST LOW 20 MIN: CPT | Performed by: INTERNAL MEDICINE

## 2023-05-26 PROCEDURE — 250N000011 HC RX IP 250 OP 636: Performed by: NURSE PRACTITIONER

## 2023-05-26 PROCEDURE — 96375 TX/PRO/DX INJ NEW DRUG ADDON: CPT

## 2023-05-26 PROCEDURE — 96413 CHEMO IV INFUSION 1 HR: CPT

## 2023-05-26 RX ORDER — EPINEPHRINE 1 MG/ML
0.3 INJECTION, SOLUTION INTRAMUSCULAR; SUBCUTANEOUS EVERY 5 MIN PRN
Status: CANCELLED | OUTPATIENT
Start: 2023-06-09

## 2023-05-26 RX ORDER — METHYLPREDNISOLONE SODIUM SUCCINATE 125 MG/2ML
125 INJECTION, POWDER, LYOPHILIZED, FOR SOLUTION INTRAMUSCULAR; INTRAVENOUS
Status: CANCELLED
Start: 2023-06-02

## 2023-05-26 RX ORDER — ALBUTEROL SULFATE 90 UG/1
1-2 AEROSOL, METERED RESPIRATORY (INHALATION)
Status: CANCELLED
Start: 2023-06-23

## 2023-05-26 RX ORDER — HEPARIN SODIUM,PORCINE 10 UNIT/ML
5 VIAL (ML) INTRAVENOUS
Status: CANCELLED | OUTPATIENT
Start: 2023-06-16

## 2023-05-26 RX ORDER — HEPARIN SODIUM (PORCINE) LOCK FLUSH IV SOLN 100 UNIT/ML 100 UNIT/ML
5 SOLUTION INTRAVENOUS
Status: DISCONTINUED | OUTPATIENT
Start: 2023-05-26 | End: 2023-05-26 | Stop reason: HOSPADM

## 2023-05-26 RX ORDER — HEPARIN SODIUM (PORCINE) LOCK FLUSH IV SOLN 100 UNIT/ML 100 UNIT/ML
5 SOLUTION INTRAVENOUS
Status: CANCELLED | OUTPATIENT
Start: 2023-06-09

## 2023-05-26 RX ORDER — ONDANSETRON 2 MG/ML
8 INJECTION INTRAMUSCULAR; INTRAVENOUS ONCE
Status: CANCELLED | OUTPATIENT
Start: 2023-06-16

## 2023-05-26 RX ORDER — HEPARIN SODIUM (PORCINE) LOCK FLUSH IV SOLN 100 UNIT/ML 100 UNIT/ML
5 SOLUTION INTRAVENOUS
Status: CANCELLED | OUTPATIENT
Start: 2023-06-16

## 2023-05-26 RX ORDER — METHYLPREDNISOLONE SODIUM SUCCINATE 125 MG/2ML
125 INJECTION, POWDER, LYOPHILIZED, FOR SOLUTION INTRAMUSCULAR; INTRAVENOUS
Status: CANCELLED
Start: 2023-06-09

## 2023-05-26 RX ORDER — ALBUTEROL SULFATE 90 UG/1
1-2 AEROSOL, METERED RESPIRATORY (INHALATION)
Status: CANCELLED
Start: 2023-06-02

## 2023-05-26 RX ORDER — METHYLPREDNISOLONE SODIUM SUCCINATE 125 MG/2ML
125 INJECTION, POWDER, LYOPHILIZED, FOR SOLUTION INTRAMUSCULAR; INTRAVENOUS
Status: CANCELLED
Start: 2023-06-16

## 2023-05-26 RX ORDER — DIPHENHYDRAMINE HCL 50 MG
50 CAPSULE ORAL
Status: CANCELLED
Start: 2023-06-23

## 2023-05-26 RX ORDER — EPINEPHRINE 1 MG/ML
0.3 INJECTION, SOLUTION INTRAMUSCULAR; SUBCUTANEOUS EVERY 5 MIN PRN
Status: CANCELLED | OUTPATIENT
Start: 2023-06-02

## 2023-05-26 RX ORDER — ALBUTEROL SULFATE 90 UG/1
1-2 AEROSOL, METERED RESPIRATORY (INHALATION)
Status: CANCELLED
Start: 2023-06-09

## 2023-05-26 RX ORDER — ONDANSETRON 2 MG/ML
8 INJECTION INTRAMUSCULAR; INTRAVENOUS ONCE
Status: CANCELLED | OUTPATIENT
Start: 2023-06-09

## 2023-05-26 RX ORDER — MEPERIDINE HYDROCHLORIDE 25 MG/ML
25 INJECTION INTRAMUSCULAR; INTRAVENOUS; SUBCUTANEOUS EVERY 30 MIN PRN
Status: CANCELLED | OUTPATIENT
Start: 2023-06-09

## 2023-05-26 RX ORDER — HEPARIN SODIUM (PORCINE) LOCK FLUSH IV SOLN 100 UNIT/ML 100 UNIT/ML
5 SOLUTION INTRAVENOUS
Status: CANCELLED | OUTPATIENT
Start: 2023-06-23

## 2023-05-26 RX ORDER — ONDANSETRON 2 MG/ML
8 INJECTION INTRAMUSCULAR; INTRAVENOUS ONCE
Status: COMPLETED | OUTPATIENT
Start: 2023-05-26 | End: 2023-05-26

## 2023-05-26 RX ORDER — MEPERIDINE HYDROCHLORIDE 25 MG/ML
25 INJECTION INTRAMUSCULAR; INTRAVENOUS; SUBCUTANEOUS EVERY 30 MIN PRN
Status: DISCONTINUED | OUTPATIENT
Start: 2023-05-26 | End: 2023-05-26 | Stop reason: HOSPADM

## 2023-05-26 RX ORDER — MEPERIDINE HYDROCHLORIDE 25 MG/ML
25 INJECTION INTRAMUSCULAR; INTRAVENOUS; SUBCUTANEOUS EVERY 30 MIN PRN
Status: CANCELLED | OUTPATIENT
Start: 2023-06-02

## 2023-05-26 RX ORDER — LORAZEPAM 2 MG/ML
0.5 INJECTION INTRAMUSCULAR EVERY 4 HOURS PRN
Status: CANCELLED | OUTPATIENT
Start: 2023-06-16

## 2023-05-26 RX ORDER — LORAZEPAM 2 MG/ML
0.5 INJECTION INTRAMUSCULAR EVERY 4 HOURS PRN
Status: CANCELLED | OUTPATIENT
Start: 2023-06-09

## 2023-05-26 RX ORDER — EPINEPHRINE 1 MG/ML
0.3 INJECTION, SOLUTION INTRAMUSCULAR; SUBCUTANEOUS EVERY 5 MIN PRN
Status: CANCELLED | OUTPATIENT
Start: 2023-06-16

## 2023-05-26 RX ORDER — METHYLPREDNISOLONE SODIUM SUCCINATE 125 MG/2ML
125 INJECTION, POWDER, LYOPHILIZED, FOR SOLUTION INTRAMUSCULAR; INTRAVENOUS
Status: DISCONTINUED | OUTPATIENT
Start: 2023-05-26 | End: 2023-05-26 | Stop reason: HOSPADM

## 2023-05-26 RX ORDER — EPINEPHRINE 1 MG/ML
0.3 INJECTION, SOLUTION INTRAMUSCULAR; SUBCUTANEOUS EVERY 5 MIN PRN
Status: DISCONTINUED | OUTPATIENT
Start: 2023-05-26 | End: 2023-05-26 | Stop reason: HOSPADM

## 2023-05-26 RX ORDER — ALBUTEROL SULFATE 90 UG/1
1-2 AEROSOL, METERED RESPIRATORY (INHALATION)
Status: CANCELLED
Start: 2023-06-16

## 2023-05-26 RX ORDER — DIPHENHYDRAMINE HYDROCHLORIDE 50 MG/ML
50 INJECTION INTRAMUSCULAR; INTRAVENOUS
Status: CANCELLED
Start: 2023-06-02

## 2023-05-26 RX ORDER — HEPARIN SODIUM,PORCINE 10 UNIT/ML
5 VIAL (ML) INTRAVENOUS
Status: CANCELLED | OUTPATIENT
Start: 2023-06-09

## 2023-05-26 RX ORDER — ALBUTEROL SULFATE 0.83 MG/ML
2.5 SOLUTION RESPIRATORY (INHALATION)
Status: DISCONTINUED | OUTPATIENT
Start: 2023-05-26 | End: 2023-05-26 | Stop reason: HOSPADM

## 2023-05-26 RX ORDER — HEPARIN SODIUM,PORCINE 10 UNIT/ML
5 VIAL (ML) INTRAVENOUS
Status: CANCELLED | OUTPATIENT
Start: 2023-06-23

## 2023-05-26 RX ORDER — ONDANSETRON 2 MG/ML
8 INJECTION INTRAMUSCULAR; INTRAVENOUS ONCE
Status: CANCELLED | OUTPATIENT
Start: 2023-06-23

## 2023-05-26 RX ORDER — MEPERIDINE HYDROCHLORIDE 25 MG/ML
25 INJECTION INTRAMUSCULAR; INTRAVENOUS; SUBCUTANEOUS EVERY 30 MIN PRN
Status: CANCELLED | OUTPATIENT
Start: 2023-06-23

## 2023-05-26 RX ORDER — HEPARIN SODIUM,PORCINE 10 UNIT/ML
5 VIAL (ML) INTRAVENOUS
Status: CANCELLED | OUTPATIENT
Start: 2023-06-02

## 2023-05-26 RX ORDER — ALBUTEROL SULFATE 0.83 MG/ML
2.5 SOLUTION RESPIRATORY (INHALATION)
Status: CANCELLED | OUTPATIENT
Start: 2023-06-23

## 2023-05-26 RX ORDER — ALBUTEROL SULFATE 0.83 MG/ML
2.5 SOLUTION RESPIRATORY (INHALATION)
Status: CANCELLED | OUTPATIENT
Start: 2023-06-02

## 2023-05-26 RX ORDER — ONDANSETRON 2 MG/ML
8 INJECTION INTRAMUSCULAR; INTRAVENOUS ONCE
Status: CANCELLED | OUTPATIENT
Start: 2023-06-02

## 2023-05-26 RX ORDER — EPINEPHRINE 1 MG/ML
0.3 INJECTION, SOLUTION INTRAMUSCULAR; SUBCUTANEOUS EVERY 5 MIN PRN
Status: CANCELLED | OUTPATIENT
Start: 2023-06-23

## 2023-05-26 RX ORDER — DIPHENHYDRAMINE HYDROCHLORIDE 50 MG/ML
50 INJECTION INTRAMUSCULAR; INTRAVENOUS
Status: CANCELLED
Start: 2023-06-09

## 2023-05-26 RX ORDER — METHYLPREDNISOLONE SODIUM SUCCINATE 125 MG/2ML
125 INJECTION, POWDER, LYOPHILIZED, FOR SOLUTION INTRAMUSCULAR; INTRAVENOUS
Status: CANCELLED
Start: 2023-06-23

## 2023-05-26 RX ORDER — LORAZEPAM 2 MG/ML
0.5 INJECTION INTRAMUSCULAR EVERY 4 HOURS PRN
Status: CANCELLED | OUTPATIENT
Start: 2023-06-02

## 2023-05-26 RX ORDER — ALBUTEROL SULFATE 0.83 MG/ML
2.5 SOLUTION RESPIRATORY (INHALATION)
Status: CANCELLED | OUTPATIENT
Start: 2023-06-09

## 2023-05-26 RX ORDER — ALBUTEROL SULFATE 0.83 MG/ML
2.5 SOLUTION RESPIRATORY (INHALATION)
Status: CANCELLED | OUTPATIENT
Start: 2023-06-16

## 2023-05-26 RX ORDER — DIPHENHYDRAMINE HCL 50 MG
50 CAPSULE ORAL
Status: CANCELLED
Start: 2023-06-16

## 2023-05-26 RX ORDER — DIPHENHYDRAMINE HCL 50 MG
50 CAPSULE ORAL
Status: CANCELLED
Start: 2023-06-02

## 2023-05-26 RX ORDER — DIPHENHYDRAMINE HYDROCHLORIDE 50 MG/ML
50 INJECTION INTRAMUSCULAR; INTRAVENOUS
Status: DISCONTINUED | OUTPATIENT
Start: 2023-05-26 | End: 2023-05-26 | Stop reason: HOSPADM

## 2023-05-26 RX ORDER — DIPHENHYDRAMINE HYDROCHLORIDE 50 MG/ML
50 INJECTION INTRAMUSCULAR; INTRAVENOUS
Status: CANCELLED
Start: 2023-06-23

## 2023-05-26 RX ORDER — DIPHENHYDRAMINE HYDROCHLORIDE 50 MG/ML
50 INJECTION INTRAMUSCULAR; INTRAVENOUS
Status: CANCELLED
Start: 2023-06-16

## 2023-05-26 RX ORDER — ALBUTEROL SULFATE 90 UG/1
1-2 AEROSOL, METERED RESPIRATORY (INHALATION)
Status: DISCONTINUED | OUTPATIENT
Start: 2023-05-26 | End: 2023-05-26 | Stop reason: HOSPADM

## 2023-05-26 RX ORDER — HEPARIN SODIUM (PORCINE) LOCK FLUSH IV SOLN 100 UNIT/ML 100 UNIT/ML
5 SOLUTION INTRAVENOUS
Status: CANCELLED | OUTPATIENT
Start: 2023-06-02

## 2023-05-26 RX ORDER — LORAZEPAM 2 MG/ML
0.5 INJECTION INTRAMUSCULAR EVERY 4 HOURS PRN
Status: CANCELLED | OUTPATIENT
Start: 2023-06-23

## 2023-05-26 RX ORDER — DIPHENHYDRAMINE HCL 50 MG
50 CAPSULE ORAL
Status: CANCELLED
Start: 2023-06-09

## 2023-05-26 RX ORDER — MEPERIDINE HYDROCHLORIDE 25 MG/ML
25 INJECTION INTRAMUSCULAR; INTRAVENOUS; SUBCUTANEOUS EVERY 30 MIN PRN
Status: CANCELLED | OUTPATIENT
Start: 2023-06-16

## 2023-05-26 RX ADMIN — PACLITAXEL 159 MG: 6 INJECTION, SOLUTION, CONCENTRATE INTRAVENOUS at 11:08

## 2023-05-26 RX ADMIN — Medication 5 ML: at 12:12

## 2023-05-26 RX ADMIN — SODIUM CHLORIDE 250 ML: 9 INJECTION, SOLUTION INTRAVENOUS at 10:22

## 2023-05-26 RX ADMIN — ONDANSETRON 8 MG: 2 INJECTION INTRAMUSCULAR; INTRAVENOUS at 10:22

## 2023-05-26 ASSESSMENT — PAIN SCALES - GENERAL: PAINLEVEL: NO PAIN (0)

## 2023-05-26 NOTE — PROGRESS NOTES
[facility]ematology and Oncology Progress Note    Patient: Alicia Iniguez  MRN: [id number]  Date of Service: May 26, 2023          Reason for Visit    Right breast cancer    Primary Oncologist: Dr. Saleem    Assessment and Plan     Cancer Staging   Malignant neoplasm of overlapping sites of right breast in female, estrogen receptor positive (H)  Staging form: Breast, AJCC 8th Edition  - Clinical: Stage IIA (cT3, cN2, cM0, G2, ER+, TN+, HER2-) - Signed by Shanelle Weaver APRN CNP on 3/1/2023    1.  Locally Advanced Breast cancer, right side, stage IIa, T3 N2 M0, grade 2, ER/TN positive, HER2/janine negative:   Currently on neoadjuvant chemotherapy status post 4 cycles of dose dense AC.  Currently on weekly Taxol.  Has finished 4 weekly doses.  No significant side effects.  Her labs have been pretty stable.  Continues to have mild anemia with a hemoglobin around 10.5.  Platelet count is normal.  White count is normal.  She does have mild transaminitis with ALT of 130 and AST of 59.  Bilirubin is normal at 0.3.  This is not a contraindication for Taxol.  Proceed with week 5.  Continue weekly labs with Taxol.  I will see her back in 4 weeks.  At that time we will make a referral to see Dr. Brunner again to plan for surgery.  She is agreeable to the plan.      2.   Transaminitis  Elevated LFTs today.  , AST 59.  Normal bilirubin.  Etiology is not clear.  Could be related to Taxol.  To the level of elevation is not significant enough at this point in time to consider dose reduction or holding treatment.  We will continue to monitor closely.    She is agreeable to the plan.    ECOG Performance    0 - Independent    Problem List    Patient Active Problem List   Diagnosis     Malignant neoplasm of overlapping sites of right breast in female, estrogen receptor positive (H)     Malignant neoplasm involving both nipple and areola of right breast in female, estrogen receptor positive (H)     Adverse effect of  "antineoplastic and immunosuppressive drugs, sequela        ______________________________________________________________________________    History of Present Illness    Oncology history:  DIAGNOSIS: Jan, 2023 - Stage IIA (cT3-cN2-cM0) Right breast cancer with axillary node mets (ER/AZ+)   -Breast cancer, right side, patient had a palpable lump with some nipple and skin changes.  She was also found to have lymphadenopathy in the axilla.  Patient had an ultrasound and mammogram that showed a 4.1 cm tumor with axillary lymphadenopathy  -Biopsy  January 31 and it showed invasive ductal carcinoma.  Grade 2.  ER [place] positive and HER2/janine negative.  Biopsy was done of the lymph node as well and it was positive.    TREATMENT:   On neoadjuvant chemotherapy with dose dense Adriamycin and Cytoxan.  Finished 4 cycles from 3/2/2023 to 4/14/2023    Started weekly Taxol from 4/28/2023    INTERIM HISTORY:   She is status post 4 cycles of dose dense AC.  Currently on weekly Taxol.  Today is week 5.  Doing well.  No significant side effect from it.  Some arthralgias after each infusion.  Recently had some soreness in the mouth.  But no nausea or vomiting.  No significant peripheral neuropathy.  Still very active.  Oral intake is adequate.  Continues to notice shrinkage/softening of her right breast mass.      PHYSICAL EXAM  BP (!) 146/73   Pulse 118   Temp 98.5  F (36.9  C)   Resp 18   Ht 1.676 m (5' 6\")   Wt 90.8 kg (200 lb 1.6 oz)   SpO2 98%   BMI 32.30 kg/m      GENERAL: no acute distress. Cooperative in conversation. Here with .   RESP: Regular respiratory rate. Clear lung sounds.  HEART: RRR  LYMPH NODE: No clinically palpable bilateral axillary adenopathy  BREAST: Complete examination was not done today.  But right axilla lymphadenopathy is not clinically palpable.  NEURO: non focal. Alert and oriented x3.   PSYCH: within normal limits. No depression or anxiety.  SKIN: exposed skin is dry intact. No rash.    Lab " Results    Recent Results (from the past 168 hour(s))   Hepatic panel   Result Value Ref Range    Protein Total 6.8 6.4 - 8.3 g/dL    Albumin 4.1 3.5 - 5.2 g/dL    Bilirubin Total 0.3 <=1.2 mg/dL    Alkaline Phosphatase 73 35 - 104 U/L    AST 59 (H) 10 - 35 U/L     (H) 10 - 35 U/L    Bilirubin Direct <0.20 0.00 - 0.30 mg/dL   CBC with platelets and differential   Result Value Ref Range    WBC Count 4.5 4.0 - 11.0 10e3/uL    RBC Count 3.68 (L) 3.80 - 5.20 10e6/uL    Hemoglobin 10.7 (L) 11.7 - 15.7 g/dL    Hematocrit 33.6 (L) 35.0 - 47.0 %    MCV 91 78 - 100 fL    MCH 29.1 26.5 - 33.0 pg    MCHC 31.8 31.5 - 36.5 g/dL    RDW 16.8 (H) 10.0 - 15.0 %    Platelet Count 364 150 - 450 10e3/uL    % Neutrophils 73 %    % Lymphocytes 16 %    % Monocytes 6 %    % Eosinophils 3 %    % Basophils 1 %    % Immature Granulocytes 1 %    NRBCs per 100 WBC 0 <1 /100    Absolute Neutrophils 3.3 1.6 - 8.3 10e3/uL    Absolute Lymphocytes 0.7 (L) 0.8 - 5.3 10e3/uL    Absolute Monocytes 0.3 0.0 - 1.3 10e3/uL    Absolute Eosinophils 0.1 0.0 - 0.7 10e3/uL    Absolute Basophils 0.1 0.0 - 0.2 10e3/uL    Absolute Immature Granulocytes 0.0 <=0.4 10e3/uL    Absolute NRBCs 0.0 10e3/uL       Imaging    No results found.     On cytotoxic chemotherapy requiring intensive monitoring and follow-ups.  E5 visit.      Signed by: Paola Saleem MD

## 2023-05-26 NOTE — PROGRESS NOTES
Infusion Nursing Note:  Alicia Iniguez presents today for C9D1.    Patient seen by provider today: Yes: Dr. Saleem   present during visit today: Not Applicable.    Note: Alicia arrived ambulatory and in stable condition accompanied by her . Port was accessed using sterile technique, good blood return noted, and labs collected. Pt has an appointment with Dr. Saleem, AST/ALT elevated,  Dr. Saleem updated and spoke with pt at chairside.  Plan to re-check next week. Medication reviewed with patient, and she verbalized understanding. She was premedicated and chemotherapy administered per orders. Port de-accessed upon completion and site covered with gauze and secured with tape. Port continues to  be more tender at times, no redness noted at site.  Will return on 6/2 for next appointment.      Intravenous Access:  Labs drawn without difficulty.  Peripheral IV placed.    Treatment Conditions:  Lab Results   Component Value Date    HGB 10.7 (L) 05/26/2023    WBC 4.5 05/26/2023    ANEU 5.6 04/28/2023    ANEUTAUTO 3.3 05/26/2023     05/26/2023      Results reviewed, labs MET treatment parameters, ok to proceed with treatment.      Post Infusion Assessment:  Patient tolerated infusion without incident.  Blood return noted.  No evidence of extravasations.  Access discontinued per protocol.       Discharge Plan:   Discharge instructions reviewed with: Patient.  Patient and/or family verbalized understanding of discharge instructions and all questions answered.  Patient discharged in stable condition accompanied by: self and .  Departure Mode: Ambulatory.      Olivia Salmeron RN

## 2023-05-26 NOTE — LETTER
"    5/26/2023         RE: Alicia Iniguez  [address] [facility] [address]        Dear Colleague,    Thank you for referring your patient, Alicia Iniguez, to the [facility] CANCER [facility]. Please see a copy of my visit note below.    Oncology Rooming Note    May 26, 2023 9:36 AM   Alicia Iniguez is a 47 year old female who presents for:    Chief Complaint   Patient presents with     Oncology Clinic Visit     Malignant neoplasm of overlapping sites of right breast in female, estrogen receptor positive (H)  Malignant neoplasm involving both nipple and areola of right breast in female, estrogen receptor positive (H)     Initial Vitals: BP (!) 146/73   Pulse 118   Temp 98.5  F (36.9  C)   Resp 18   Ht 1.676 m (5' 6\")   Wt 90.8 kg (200 lb 1.6 oz)   SpO2 98%   BMI 32.30 kg/m   Estimated body mass index is 32.3 kg/m  as calculated from the following:    Height as of this encounter: 1.676 m (5' 6\").    Weight as of this encounter: 90.8 kg (200 lb 1.6 oz). Body surface area is 2.06 meters squared.  No Pain (0) Comment: Data Unavailable   No LMP recorded.  Allergies reviewed: Yes  Medications reviewed: Yes    Medications: Medication refills not needed today.  Pharmacy name entered into [facility]: [id number] [address] AT [address]    Clinical concerns: None       Darleen Kerns LPN              [facility] Hematology and Oncology Progress Note    Patient: Alicia Iniguez  MRN: [id number]  Date of Service: May 26, 2023          Reason for Visit    Right breast cancer    Primary Oncologist: Dr. Saleem    Assessment and Plan     Cancer Staging   Malignant neoplasm of overlapping sites of right breast in female, estrogen receptor positive (H)  Staging form: Breast, AJCC 8th Edition  - Clinical: Stage IIA (cT3, cN2, cM0, G2, ER+, NC+, HER2-) - Signed by Shanelle Weaver APRN CNP on 3/1/2023    1.  Locally Advanced Breast " cancer, right side, stage IIa, T3 N2 M0, grade 2, ER/MD positive, HER2/janine negative:   Currently on neoadjuvant chemotherapy status post 4 cycles of dose dense AC.  Currently on weekly Taxol.  Has finished 4 weekly doses.  No significant side effects.  Her labs have been pretty stable.  Continues to have mild anemia with a hemoglobin around 10.5.  Platelet count is normal.  White count is normal.  She does have mild transaminitis with ALT of 130 and AST of 59.  Bilirubin is normal at 0.3.  This is not a contraindication for Taxol.  Proceed with week 5.  Continue weekly labs with Taxol.  I will see her back in 4 weeks.  At that time we will make a referral to see Dr. Brunner again to plan for surgery.  She is agreeable to the plan.      2.   Transaminitis  Elevated LFTs today.  , AST 59.  Normal bilirubin.  Etiology is not clear.  Could be related to Taxol.  To the level of elevation is not significant enough at this point in time to consider dose reduction or holding treatment.  We will continue to monitor closely.    She is agreeable to the plan.    ECOG Performance    0 - Independent    Problem List    Patient Active Problem List   Diagnosis     Malignant neoplasm of overlapping sites of right breast in female, estrogen receptor positive (H)     Malignant neoplasm involving both nipple and areola of right breast in female, estrogen receptor positive (H)     Adverse effect of antineoplastic and immunosuppressive drugs, sequela        ______________________________________________________________________________    History of Present Illness    Oncology history:  DIAGNOSIS: Jan, 2023 - Stage IIA (cT3-cN2-cM0) Right breast cancer with axillary node mets (ER/MD+)   -Breast cancer, right side, patient had a palpable lump with some nipple and skin changes.  She was also found to have lymphadenopathy in the axilla.  Patient had an ultrasound and mammogram that showed a 4.1 cm tumor with axillary  "lymphadenopathy  -Biopsy  January 31 and it showed invasive ductal carcinoma.  Grade 2.  ER [place] positive and HER2/janine negative.  Biopsy was done of the lymph node as well and it was positive.    TREATMENT:   On neoadjuvant chemotherapy with dose dense Adriamycin and Cytoxan.  Finished 4 cycles from 3/2/2023 to 4/14/2023    Started weekly Taxol from 4/28/2023    INTERIM HISTORY:   She is status post 4 cycles of dose dense AC.  Currently on weekly Taxol.  Today is week 5.  Doing well.  No significant side effect from it.  Some arthralgias after each infusion.  Recently had some soreness in the mouth.  But no nausea or vomiting.  No significant peripheral neuropathy.  Still very active.  Oral intake is adequate.  Continues to notice shrinkage/softening of her right breast mass.      PHYSICAL EXAM  BP (!) 146/73   Pulse 118   Temp 98.5  F (36.9  C)   Resp 18   Ht 1.676 m (5' 6\")   Wt 90.8 kg (200 lb 1.6 oz)   SpO2 98%   BMI 32.30 kg/m      GENERAL: no acute distress. Cooperative in conversation. Here with .   RESP: Regular respiratory rate. Clear lung sounds.  HEART: RRR  LYMPH NODE: No clinically palpable bilateral axillary adenopathy  BREAST: Complete examination was not done today.  But right axilla lymphadenopathy is not clinically palpable.  NEURO: non focal. Alert and oriented x3.   PSYCH: within normal limits. No depression or anxiety.  SKIN: exposed skin is dry intact. No rash.    Lab Results    Recent Results (from the past 168 hour(s))   Hepatic panel   Result Value Ref Range    Protein Total 6.8 6.4 - 8.3 g/dL    Albumin 4.1 3.5 - 5.2 g/dL    Bilirubin Total 0.3 <=1.2 mg/dL    Alkaline Phosphatase 73 35 - 104 U/L    AST 59 (H) 10 - 35 U/L     (H) 10 - 35 U/L    Bilirubin Direct <0.20 0.00 - 0.30 mg/dL   CBC with platelets and differential   Result Value Ref Range    WBC Count 4.5 4.0 - 11.0 10e3/uL    RBC Count 3.68 (L) 3.80 - 5.20 10e6/uL    Hemoglobin 10.7 (L) 11.7 - 15.7 g/dL    " Hematocrit 33.6 (L) 35.0 - 47.0 %    MCV 91 78 - 100 fL    MCH 29.1 26.5 - 33.0 pg    MCHC 31.8 31.5 - 36.5 g/dL    RDW 16.8 (H) 10.0 - 15.0 %    Platelet Count 364 150 - 450 10e3/uL    % Neutrophils 73 %    % Lymphocytes 16 %    % Monocytes 6 %    % Eosinophils 3 %    % Basophils 1 %    % Immature Granulocytes 1 %    NRBCs per 100 WBC 0 <1 /100    Absolute Neutrophils 3.3 1.6 - 8.3 10e3/uL    Absolute Lymphocytes 0.7 (L) 0.8 - 5.3 10e3/uL    Absolute Monocytes 0.3 0.0 - 1.3 10e3/uL    Absolute Eosinophils 0.1 0.0 - 0.7 10e3/uL    Absolute Basophils 0.1 0.0 - 0.2 10e3/uL    Absolute Immature Granulocytes 0.0 <=0.4 10e3/uL    Absolute NRBCs 0.0 10e3/uL       Imaging    No results found.     On cytotoxic chemotherapy requiring intensive monitoring and follow-ups.  E5 visit.      Signed by: Paola Saleem MD      Again, thank you for allowing me to participate in the care of your patient.        Sincerely,        Paola Saleem MD

## 2023-05-26 NOTE — PROGRESS NOTES
"Oncology Rooming Note    May 26, 2023 9:36 AM   Alicia Iniguez is a 47 year old female who presents for:    Chief Complaint   Patient presents with     Oncology Clinic Visit     Malignant neoplasm of overlapping sites of right breast in female, estrogen receptor positive (H)  Malignant neoplasm involving both nipple and areola of right breast in female, estrogen receptor positive (H)     Initial Vitals: BP (!) 146/73   Pulse 118   Temp 98.5  F (36.9  C)   Resp 18   Ht 1.676 m (5' 6\")   Wt 90.8 kg (200 lb 1.6 oz)   SpO2 98%   BMI 32.30 kg/m   Estimated body mass index is 32.3 kg/m  as calculated from the following:    Height as of this encounter: 1.676 m (5' 6\").    Weight as of this encounter: 90.8 kg (200 lb 1.6 oz). Body surface area is 2.06 meters squared.  No Pain (0) Comment: Data Unavailable   No LMP recorded.  Allergies reviewed: Yes  Medications reviewed: Yes    Medications: Medication refills not needed today.  Pharmacy name entered into [facility]: [id number] - [facility] [address] AT [address]    Clinical concerns: None       Darleen Kerns LPN            "

## 2023-06-02 ENCOUNTER — LAB (OUTPATIENT)
Dept: INFUSION THERAPY | Facility: HOSPITAL | Age: 48
End: 2023-06-02
Attending: INTERNAL MEDICINE
Payer: COMMERCIAL

## 2023-06-02 VITALS
RESPIRATION RATE: 18 BRPM | DIASTOLIC BLOOD PRESSURE: 79 MMHG | TEMPERATURE: 98.1 F | OXYGEN SATURATION: 98 % | HEART RATE: 106 BPM | SYSTOLIC BLOOD PRESSURE: 142 MMHG

## 2023-06-02 DIAGNOSIS — Z17.0 MALIGNANT NEOPLASM OF OVERLAPPING SITES OF RIGHT BREAST IN FEMALE, ESTROGEN RECEPTOR POSITIVE (H): ICD-10-CM

## 2023-06-02 DIAGNOSIS — T45.1X5S ADVERSE EFFECT OF ANTINEOPLASTIC AND IMMUNOSUPPRESSIVE DRUGS, SEQUELA: Primary | ICD-10-CM

## 2023-06-02 DIAGNOSIS — C50.811 MALIGNANT NEOPLASM OF OVERLAPPING SITES OF RIGHT BREAST IN FEMALE, ESTROGEN RECEPTOR POSITIVE (H): ICD-10-CM

## 2023-06-02 LAB
ALBUMIN SERPL BCG-MCNC: 4.1 G/DL (ref 3.5–5.2)
ALP SERPL-CCNC: 73 U/L (ref 35–104)
ALT SERPL W P-5'-P-CCNC: 124 U/L (ref 10–35)
AST SERPL W P-5'-P-CCNC: 80 U/L (ref 10–35)
BASOPHILS # BLD AUTO: 0.1 10E3/UL (ref 0–0.2)
BASOPHILS NFR BLD AUTO: 1 %
BILIRUB DIRECT SERPL-MCNC: <0.2 MG/DL (ref 0–0.3)
BILIRUB SERPL-MCNC: 0.3 MG/DL
EOSINOPHIL # BLD AUTO: 0.2 10E3/UL (ref 0–0.7)
EOSINOPHIL NFR BLD AUTO: 4 %
ERYTHROCYTE [DISTWIDTH] IN BLOOD BY AUTOMATED COUNT: 16.2 % (ref 10–15)
HCT VFR BLD AUTO: 32.6 % (ref 35–47)
HGB BLD-MCNC: 10.7 G/DL (ref 11.7–15.7)
IMM GRANULOCYTES # BLD: 0.1 10E3/UL
IMM GRANULOCYTES NFR BLD: 1 %
LYMPHOCYTES # BLD AUTO: 0.9 10E3/UL (ref 0.8–5.3)
LYMPHOCYTES NFR BLD AUTO: 19 %
MCH RBC QN AUTO: 29.9 PG (ref 26.5–33)
MCHC RBC AUTO-ENTMCNC: 32.8 G/DL (ref 31.5–36.5)
MCV RBC AUTO: 91 FL (ref 78–100)
MONOCYTES # BLD AUTO: 0.4 10E3/UL (ref 0–1.3)
MONOCYTES NFR BLD AUTO: 8 %
NEUTROPHILS # BLD AUTO: 3.2 10E3/UL (ref 1.6–8.3)
NEUTROPHILS NFR BLD AUTO: 67 %
NRBC # BLD AUTO: 0 10E3/UL
NRBC BLD AUTO-RTO: 0 /100
PLATELET # BLD AUTO: 413 10E3/UL (ref 150–450)
PROT SERPL-MCNC: 6.6 G/DL (ref 6.4–8.3)
RBC # BLD AUTO: 3.58 10E6/UL (ref 3.8–5.2)
WBC # BLD AUTO: 4.8 10E3/UL (ref 4–11)

## 2023-06-02 PROCEDURE — 85025 COMPLETE CBC W/AUTO DIFF WBC: CPT | Performed by: INTERNAL MEDICINE

## 2023-06-02 PROCEDURE — 96375 TX/PRO/DX INJ NEW DRUG ADDON: CPT

## 2023-06-02 PROCEDURE — 96413 CHEMO IV INFUSION 1 HR: CPT

## 2023-06-02 PROCEDURE — 258N000003 HC RX IP 258 OP 636: Performed by: INTERNAL MEDICINE

## 2023-06-02 PROCEDURE — 82040 ASSAY OF SERUM ALBUMIN: CPT

## 2023-06-02 PROCEDURE — 36591 DRAW BLOOD OFF VENOUS DEVICE: CPT

## 2023-06-02 PROCEDURE — 250N000011 HC RX IP 250 OP 636: Performed by: INTERNAL MEDICINE

## 2023-06-02 RX ORDER — ALBUTEROL SULFATE 90 UG/1
1-2 AEROSOL, METERED RESPIRATORY (INHALATION)
Status: DISCONTINUED | OUTPATIENT
Start: 2023-06-02 | End: 2023-06-02 | Stop reason: HOSPADM

## 2023-06-02 RX ORDER — MEPERIDINE HYDROCHLORIDE 25 MG/ML
25 INJECTION INTRAMUSCULAR; INTRAVENOUS; SUBCUTANEOUS EVERY 30 MIN PRN
Status: DISCONTINUED | OUTPATIENT
Start: 2023-06-02 | End: 2023-06-02 | Stop reason: HOSPADM

## 2023-06-02 RX ORDER — HEPARIN SODIUM (PORCINE) LOCK FLUSH IV SOLN 100 UNIT/ML 100 UNIT/ML
5 SOLUTION INTRAVENOUS
Status: DISCONTINUED | OUTPATIENT
Start: 2023-06-02 | End: 2023-06-02 | Stop reason: HOSPADM

## 2023-06-02 RX ORDER — ALBUTEROL SULFATE 0.83 MG/ML
2.5 SOLUTION RESPIRATORY (INHALATION)
Status: DISCONTINUED | OUTPATIENT
Start: 2023-06-02 | End: 2023-06-02 | Stop reason: HOSPADM

## 2023-06-02 RX ORDER — DIPHENHYDRAMINE HYDROCHLORIDE 50 MG/ML
50 INJECTION INTRAMUSCULAR; INTRAVENOUS
Status: DISCONTINUED | OUTPATIENT
Start: 2023-06-02 | End: 2023-06-02 | Stop reason: HOSPADM

## 2023-06-02 RX ORDER — METHYLPREDNISOLONE SODIUM SUCCINATE 125 MG/2ML
125 INJECTION, POWDER, LYOPHILIZED, FOR SOLUTION INTRAMUSCULAR; INTRAVENOUS
Status: DISCONTINUED | OUTPATIENT
Start: 2023-06-02 | End: 2023-06-02 | Stop reason: HOSPADM

## 2023-06-02 RX ORDER — EPINEPHRINE 1 MG/ML
0.3 INJECTION, SOLUTION INTRAMUSCULAR; SUBCUTANEOUS EVERY 5 MIN PRN
Status: DISCONTINUED | OUTPATIENT
Start: 2023-06-02 | End: 2023-06-02 | Stop reason: HOSPADM

## 2023-06-02 RX ORDER — ONDANSETRON 2 MG/ML
8 INJECTION INTRAMUSCULAR; INTRAVENOUS ONCE
Status: COMPLETED | OUTPATIENT
Start: 2023-06-02 | End: 2023-06-02

## 2023-06-02 RX ORDER — LORATADINE 10 MG/1
10 TABLET ORAL DAILY PRN
COMMUNITY
End: 2023-11-13

## 2023-06-02 RX ADMIN — Medication 5 ML: at 11:30

## 2023-06-02 RX ADMIN — SODIUM CHLORIDE 250 ML: 9 INJECTION, SOLUTION INTRAVENOUS at 10:18

## 2023-06-02 RX ADMIN — ONDANSETRON 8 MG: 2 INJECTION INTRAMUSCULAR; INTRAVENOUS at 11:25

## 2023-06-02 RX ADMIN — PACLITAXEL 159 MG: 6 INJECTION, SOLUTION INTRAVENOUS at 10:18

## 2023-06-02 NOTE — PROGRESS NOTES
Infusion Nursing Note:  Alicia Iniguez presents today for cycle 10 day 1 treatment with Paclitaxel.    Patient seen by provider today: No   present during visit today: Not Applicable.    Note: VSS.  Pt assessed and is feeling well today.  Alicia was educated on her plan of care and medication given prior to administration.      Intravenous Access:  Implanted Port.    Treatment Conditions:  Results reviewed, labs MET treatment parameters, ok to proceed with treatment.      Post Infusion Assessment:  Patient tolerated infusion without incident.  Blood return noted pre and post infusion.  Site patent and intact, free from redness, edema or discomfort.  No evidence of extravasations.  Access discontinued per protocol.       Discharge Plan:   Patient discharged in stable condition accompanied by: sister.  Departure Mode: Ambulatory.      Radha Patel RN

## 2023-06-09 ENCOUNTER — LAB (OUTPATIENT)
Dept: INFUSION THERAPY | Facility: HOSPITAL | Age: 48
End: 2023-06-09
Attending: INTERNAL MEDICINE
Payer: COMMERCIAL

## 2023-06-09 VITALS
HEART RATE: 103 BPM | RESPIRATION RATE: 18 BRPM | SYSTOLIC BLOOD PRESSURE: 138 MMHG | DIASTOLIC BLOOD PRESSURE: 75 MMHG | OXYGEN SATURATION: 98 % | TEMPERATURE: 98.2 F

## 2023-06-09 DIAGNOSIS — Z17.0 MALIGNANT NEOPLASM OF OVERLAPPING SITES OF RIGHT BREAST IN FEMALE, ESTROGEN RECEPTOR POSITIVE (H): ICD-10-CM

## 2023-06-09 DIAGNOSIS — C50.811 MALIGNANT NEOPLASM OF OVERLAPPING SITES OF RIGHT BREAST IN FEMALE, ESTROGEN RECEPTOR POSITIVE (H): ICD-10-CM

## 2023-06-09 DIAGNOSIS — T45.1X5S ADVERSE EFFECT OF ANTINEOPLASTIC AND IMMUNOSUPPRESSIVE DRUGS, SEQUELA: Primary | ICD-10-CM

## 2023-06-09 LAB
ALBUMIN SERPL BCG-MCNC: 3.9 G/DL (ref 3.5–5.2)
ALP SERPL-CCNC: 75 U/L (ref 35–104)
ALT SERPL W P-5'-P-CCNC: 79 U/L (ref 10–35)
AST SERPL W P-5'-P-CCNC: 30 U/L (ref 10–35)
BASOPHILS # BLD AUTO: 0.1 10E3/UL (ref 0–0.2)
BASOPHILS NFR BLD AUTO: 1 %
BILIRUB DIRECT SERPL-MCNC: <0.2 MG/DL (ref 0–0.3)
BILIRUB SERPL-MCNC: 0.3 MG/DL
EOSINOPHIL # BLD AUTO: 0.1 10E3/UL (ref 0–0.7)
EOSINOPHIL NFR BLD AUTO: 3 %
ERYTHROCYTE [DISTWIDTH] IN BLOOD BY AUTOMATED COUNT: 15.5 % (ref 10–15)
HCT VFR BLD AUTO: 32.7 % (ref 35–47)
HGB BLD-MCNC: 10.7 G/DL (ref 11.7–15.7)
IMM GRANULOCYTES # BLD: 0 10E3/UL
IMM GRANULOCYTES NFR BLD: 1 %
LYMPHOCYTES # BLD AUTO: 0.9 10E3/UL (ref 0.8–5.3)
LYMPHOCYTES NFR BLD AUTO: 20 %
MCH RBC QN AUTO: 29.9 PG (ref 26.5–33)
MCHC RBC AUTO-ENTMCNC: 32.7 G/DL (ref 31.5–36.5)
MCV RBC AUTO: 91 FL (ref 78–100)
MONOCYTES # BLD AUTO: 0.5 10E3/UL (ref 0–1.3)
MONOCYTES NFR BLD AUTO: 11 %
NEUTROPHILS # BLD AUTO: 2.7 10E3/UL (ref 1.6–8.3)
NEUTROPHILS NFR BLD AUTO: 64 %
NRBC # BLD AUTO: 0 10E3/UL
NRBC BLD AUTO-RTO: 0 /100
PLATELET # BLD AUTO: 462 10E3/UL (ref 150–450)
PROT SERPL-MCNC: 6.5 G/DL (ref 6.4–8.3)
RBC # BLD AUTO: 3.58 10E6/UL (ref 3.8–5.2)
WBC # BLD AUTO: 4.3 10E3/UL (ref 4–11)

## 2023-06-09 PROCEDURE — 36591 DRAW BLOOD OFF VENOUS DEVICE: CPT

## 2023-06-09 PROCEDURE — 80076 HEPATIC FUNCTION PANEL: CPT

## 2023-06-09 PROCEDURE — 85025 COMPLETE CBC W/AUTO DIFF WBC: CPT | Performed by: INTERNAL MEDICINE

## 2023-06-09 PROCEDURE — 258N000003 HC RX IP 258 OP 636: Performed by: INTERNAL MEDICINE

## 2023-06-09 PROCEDURE — 96413 CHEMO IV INFUSION 1 HR: CPT

## 2023-06-09 PROCEDURE — 250N000011 HC RX IP 250 OP 636: Performed by: INTERNAL MEDICINE

## 2023-06-09 PROCEDURE — 96375 TX/PRO/DX INJ NEW DRUG ADDON: CPT

## 2023-06-09 RX ORDER — ONDANSETRON 2 MG/ML
8 INJECTION INTRAMUSCULAR; INTRAVENOUS ONCE
Status: COMPLETED | OUTPATIENT
Start: 2023-06-09 | End: 2023-06-09

## 2023-06-09 RX ORDER — ALBUTEROL SULFATE 0.83 MG/ML
2.5 SOLUTION RESPIRATORY (INHALATION)
Status: DISCONTINUED | OUTPATIENT
Start: 2023-06-09 | End: 2023-08-28

## 2023-06-09 RX ORDER — DIPHENHYDRAMINE HYDROCHLORIDE 50 MG/ML
50 INJECTION INTRAMUSCULAR; INTRAVENOUS
Status: DISCONTINUED | OUTPATIENT
Start: 2023-06-09 | End: 2023-08-28

## 2023-06-09 RX ORDER — METHYLPREDNISOLONE SODIUM SUCCINATE 125 MG/2ML
125 INJECTION, POWDER, LYOPHILIZED, FOR SOLUTION INTRAMUSCULAR; INTRAVENOUS
Status: DISCONTINUED | OUTPATIENT
Start: 2023-06-09 | End: 2023-08-28

## 2023-06-09 RX ORDER — ALBUTEROL SULFATE 90 UG/1
1-2 AEROSOL, METERED RESPIRATORY (INHALATION)
Status: DISCONTINUED | OUTPATIENT
Start: 2023-06-09 | End: 2023-08-28

## 2023-06-09 RX ORDER — EPINEPHRINE 1 MG/ML
0.3 INJECTION, SOLUTION INTRAMUSCULAR; SUBCUTANEOUS EVERY 5 MIN PRN
Status: DISCONTINUED | OUTPATIENT
Start: 2023-06-09 | End: 2023-08-28

## 2023-06-09 RX ORDER — HEPARIN SODIUM (PORCINE) LOCK FLUSH IV SOLN 100 UNIT/ML 100 UNIT/ML
5 SOLUTION INTRAVENOUS
Status: DISCONTINUED | OUTPATIENT
Start: 2023-06-09 | End: 2023-08-28

## 2023-06-09 RX ORDER — MEPERIDINE HYDROCHLORIDE 25 MG/ML
25 INJECTION INTRAMUSCULAR; INTRAVENOUS; SUBCUTANEOUS EVERY 30 MIN PRN
Status: DISCONTINUED | OUTPATIENT
Start: 2023-06-09 | End: 2023-08-28

## 2023-06-09 RX ADMIN — Medication 5 ML: at 12:04

## 2023-06-09 RX ADMIN — ONDANSETRON 8 MG: 2 INJECTION INTRAMUSCULAR; INTRAVENOUS at 10:31

## 2023-06-09 RX ADMIN — SODIUM CHLORIDE 250 ML: 9 INJECTION, SOLUTION INTRAVENOUS at 10:31

## 2023-06-09 RX ADMIN — PACLITAXEL 159 MG: 6 INJECTION, SOLUTION, CONCENTRATE INTRAVENOUS at 11:04

## 2023-06-09 ASSESSMENT — PAIN SCALES - GENERAL: PAINLEVEL: NO PAIN (0)

## 2023-06-09 NOTE — PROGRESS NOTES
Infusion Nursing Note:  Alicia Iniguez presents today for C11D1.    Patient seen by provider today: No   present during visit today: Not Applicable.      Note: Alicia arrived ambulatory and in stable condition accompanied by her . Port was accessed using sterile technique, good blood return noted, and labs collected.  AST/ALT labs are trending down. She was premedicated and chemotherapy administered per orders. Port de-accessed upon completion and site covered with gauze and secured with tape. Port continues to  be more tender at times, no redness noted at site.  Will return on 6/ for next appointment 6/16.  .      Intravenous Access:  Labs drawn without difficulty.  Peripheral IV placed.        Post Infusion Assessment:  Patient tolerated infusion without incident.  Blood return noted.  No evidence of extravasations.  Access discontinued per protocol.       Discharge Plan:   Discharge instructions reviewed with: Patient.  Patient and/or family verbalized understanding of discharge instructions and all questions answered.  Patient discharged in stable condition accompanied by: self and .  Departure Mode: Ambulatory.      Olivia Salmeron RN

## 2023-06-16 ENCOUNTER — LAB (OUTPATIENT)
Dept: INFUSION THERAPY | Facility: HOSPITAL | Age: 48
End: 2023-06-16
Attending: INTERNAL MEDICINE
Payer: COMMERCIAL

## 2023-06-16 VITALS
SYSTOLIC BLOOD PRESSURE: 127 MMHG | TEMPERATURE: 98.3 F | OXYGEN SATURATION: 97 % | RESPIRATION RATE: 18 BRPM | HEART RATE: 106 BPM | DIASTOLIC BLOOD PRESSURE: 78 MMHG

## 2023-06-16 DIAGNOSIS — Z17.0 MALIGNANT NEOPLASM OF OVERLAPPING SITES OF RIGHT BREAST IN FEMALE, ESTROGEN RECEPTOR POSITIVE (H): ICD-10-CM

## 2023-06-16 DIAGNOSIS — T45.1X5S ADVERSE EFFECT OF ANTINEOPLASTIC AND IMMUNOSUPPRESSIVE DRUGS, SEQUELA: Primary | ICD-10-CM

## 2023-06-16 DIAGNOSIS — T45.1X5S ADVERSE EFFECT OF ANTINEOPLASTIC AND IMMUNOSUPPRESSIVE DRUGS, SEQUELA: ICD-10-CM

## 2023-06-16 DIAGNOSIS — C50.811 MALIGNANT NEOPLASM OF OVERLAPPING SITES OF RIGHT BREAST IN FEMALE, ESTROGEN RECEPTOR POSITIVE (H): ICD-10-CM

## 2023-06-16 LAB
ALBUMIN SERPL BCG-MCNC: 4.2 G/DL (ref 3.5–5.2)
ALP SERPL-CCNC: 71 U/L (ref 35–104)
ALT SERPL W P-5'-P-CCNC: 87 U/L (ref 0–50)
AST SERPL W P-5'-P-CCNC: 44 U/L (ref 0–45)
BASOPHILS # BLD AUTO: 0.1 10E3/UL (ref 0–0.2)
BASOPHILS NFR BLD AUTO: 1 %
BILIRUB DIRECT SERPL-MCNC: <0.2 MG/DL (ref 0–0.3)
BILIRUB SERPL-MCNC: 0.3 MG/DL
EOSINOPHIL # BLD AUTO: 0.1 10E3/UL (ref 0–0.7)
EOSINOPHIL NFR BLD AUTO: 3 %
ERYTHROCYTE [DISTWIDTH] IN BLOOD BY AUTOMATED COUNT: 15 % (ref 10–15)
HCT VFR BLD AUTO: 34.3 % (ref 35–47)
HGB BLD-MCNC: 11.1 G/DL (ref 11.7–15.7)
IMM GRANULOCYTES # BLD: 0 10E3/UL
IMM GRANULOCYTES NFR BLD: 1 %
LYMPHOCYTES # BLD AUTO: 1 10E3/UL (ref 0.8–5.3)
LYMPHOCYTES NFR BLD AUTO: 23 %
MCH RBC QN AUTO: 29.8 PG (ref 26.5–33)
MCHC RBC AUTO-ENTMCNC: 32.4 G/DL (ref 31.5–36.5)
MCV RBC AUTO: 92 FL (ref 78–100)
MONOCYTES # BLD AUTO: 0.4 10E3/UL (ref 0–1.3)
MONOCYTES NFR BLD AUTO: 9 %
NEUTROPHILS # BLD AUTO: 2.6 10E3/UL (ref 1.6–8.3)
NEUTROPHILS NFR BLD AUTO: 63 %
NRBC # BLD AUTO: 0 10E3/UL
NRBC BLD AUTO-RTO: 0 /100
PLATELET # BLD AUTO: 435 10E3/UL (ref 150–450)
PROT SERPL-MCNC: 6.8 G/DL (ref 6.4–8.3)
RBC # BLD AUTO: 3.72 10E6/UL (ref 3.8–5.2)
WBC # BLD AUTO: 4.2 10E3/UL (ref 4–11)

## 2023-06-16 PROCEDURE — 80076 HEPATIC FUNCTION PANEL: CPT

## 2023-06-16 PROCEDURE — 96413 CHEMO IV INFUSION 1 HR: CPT

## 2023-06-16 PROCEDURE — 258N000003 HC RX IP 258 OP 636: Performed by: INTERNAL MEDICINE

## 2023-06-16 PROCEDURE — 36591 DRAW BLOOD OFF VENOUS DEVICE: CPT

## 2023-06-16 PROCEDURE — 96375 TX/PRO/DX INJ NEW DRUG ADDON: CPT

## 2023-06-16 PROCEDURE — 250N000011 HC RX IP 250 OP 636: Performed by: INTERNAL MEDICINE

## 2023-06-16 PROCEDURE — 85004 AUTOMATED DIFF WBC COUNT: CPT | Performed by: INTERNAL MEDICINE

## 2023-06-16 RX ORDER — EPINEPHRINE 1 MG/ML
0.3 INJECTION, SOLUTION INTRAMUSCULAR; SUBCUTANEOUS EVERY 5 MIN PRN
Status: DISCONTINUED | OUTPATIENT
Start: 2023-06-16 | End: 2023-06-16 | Stop reason: HOSPADM

## 2023-06-16 RX ORDER — HEPARIN SODIUM (PORCINE) LOCK FLUSH IV SOLN 100 UNIT/ML 100 UNIT/ML
5 SOLUTION INTRAVENOUS
Status: DISCONTINUED | OUTPATIENT
Start: 2023-06-16 | End: 2023-06-16 | Stop reason: HOSPADM

## 2023-06-16 RX ORDER — ONDANSETRON 2 MG/ML
8 INJECTION INTRAMUSCULAR; INTRAVENOUS ONCE
Status: COMPLETED | OUTPATIENT
Start: 2023-06-16 | End: 2023-06-16

## 2023-06-16 RX ORDER — METHYLPREDNISOLONE SODIUM SUCCINATE 125 MG/2ML
125 INJECTION, POWDER, LYOPHILIZED, FOR SOLUTION INTRAMUSCULAR; INTRAVENOUS
Status: DISCONTINUED | OUTPATIENT
Start: 2023-06-16 | End: 2023-06-16 | Stop reason: HOSPADM

## 2023-06-16 RX ORDER — ALBUTEROL SULFATE 90 UG/1
1-2 AEROSOL, METERED RESPIRATORY (INHALATION)
Status: DISCONTINUED | OUTPATIENT
Start: 2023-06-16 | End: 2023-06-16 | Stop reason: HOSPADM

## 2023-06-16 RX ORDER — MEPERIDINE HYDROCHLORIDE 25 MG/ML
25 INJECTION INTRAMUSCULAR; INTRAVENOUS; SUBCUTANEOUS EVERY 30 MIN PRN
Status: DISCONTINUED | OUTPATIENT
Start: 2023-06-16 | End: 2023-06-16 | Stop reason: HOSPADM

## 2023-06-16 RX ORDER — DIPHENHYDRAMINE HYDROCHLORIDE 50 MG/ML
50 INJECTION INTRAMUSCULAR; INTRAVENOUS
Status: DISCONTINUED | OUTPATIENT
Start: 2023-06-16 | End: 2023-06-16 | Stop reason: HOSPADM

## 2023-06-16 RX ORDER — ALBUTEROL SULFATE 0.83 MG/ML
2.5 SOLUTION RESPIRATORY (INHALATION)
Status: DISCONTINUED | OUTPATIENT
Start: 2023-06-16 | End: 2023-06-16 | Stop reason: HOSPADM

## 2023-06-16 RX ADMIN — ONDANSETRON 8 MG: 2 INJECTION INTRAMUSCULAR; INTRAVENOUS at 10:32

## 2023-06-16 RX ADMIN — Medication 5 ML: at 12:13

## 2023-06-16 RX ADMIN — SODIUM CHLORIDE 250 ML: 9 INJECTION, SOLUTION INTRAVENOUS at 10:30

## 2023-06-16 RX ADMIN — PACLITAXEL 159 MG: 6 INJECTION, SOLUTION, CONCENTRATE INTRAVENOUS at 11:10

## 2023-06-16 ASSESSMENT — PAIN SCALES - GENERAL: PAINLEVEL: NO PAIN (0)

## 2023-06-16 NOTE — PROGRESS NOTES
Infusion Nursing Note:  Alicia Iniguez presents today for C12D1.    Patient seen by provider today: No   present during visit today: Not Applicable.    Note: Alicia arrived ambulatory and in stable condition accompanied by her daughters. Port was accessed using sterile technique, good blood return noted, and labs collected.   She was premedicated and chemotherapy administered per orders. Port de-accessed upon completion and site covered with gauze and secured with tape. Port is still be tender at times, no redness noted at site.  Will return on 6/ for next appointment 6/23      Intravenous Access:  Labs drawn without difficulty.  Implanted Port.    Treatment Conditions:  Lab Results   Component Value Date    HGB 11.1 (L) 06/16/2023    WBC 4.2 06/16/2023    ANEU 5.6 04/28/2023    ANEUTAUTO 2.6 06/16/2023     06/16/2023      Lab Results   Component Value Date     03/31/2023    POTASSIUM 4.0 03/31/2023    CR 0.64 03/31/2023    KURT 9.2 03/31/2023    BILITOTAL 0.3 06/16/2023    ALBUMIN 4.2 06/16/2023    ALT 87 (H) 06/16/2023    AST 44 06/16/2023     Results reviewed, labs MET treatment parameters, ok to proceed with treatment.      Post Infusion Assessment:  Patient tolerated infusion without incident.  Blood return noted pre and post infusion.  No evidence of extravasations.  Access discontinued per protocol.       Discharge Plan:   Discharge instructions reviewed with: Patient.  Patient and/or family verbalized understanding of discharge instructions and all questions answered.  Patient discharged in stable condition accompanied by: self.  Departure Mode: Ambulatory.      Olivia Salmeron RN

## 2023-06-23 ENCOUNTER — INFUSION THERAPY VISIT (OUTPATIENT)
Dept: INFUSION THERAPY | Facility: HOSPITAL | Age: 48
End: 2023-06-23
Attending: INTERNAL MEDICINE
Payer: COMMERCIAL

## 2023-06-23 VITALS
SYSTOLIC BLOOD PRESSURE: 127 MMHG | RESPIRATION RATE: 18 BRPM | DIASTOLIC BLOOD PRESSURE: 73 MMHG | TEMPERATURE: 97.8 F | HEART RATE: 105 BPM | OXYGEN SATURATION: 97 %

## 2023-06-23 DIAGNOSIS — T45.1X5S ADVERSE EFFECT OF ANTINEOPLASTIC AND IMMUNOSUPPRESSIVE DRUGS, SEQUELA: Primary | ICD-10-CM

## 2023-06-23 DIAGNOSIS — Z17.0 MALIGNANT NEOPLASM OF OVERLAPPING SITES OF RIGHT BREAST IN FEMALE, ESTROGEN RECEPTOR POSITIVE (H): ICD-10-CM

## 2023-06-23 DIAGNOSIS — C50.811 MALIGNANT NEOPLASM OF OVERLAPPING SITES OF RIGHT BREAST IN FEMALE, ESTROGEN RECEPTOR POSITIVE (H): ICD-10-CM

## 2023-06-23 LAB
ALBUMIN SERPL BCG-MCNC: 3.9 G/DL (ref 3.5–5.2)
ALP SERPL-CCNC: 65 U/L (ref 35–104)
ALT SERPL W P-5'-P-CCNC: 66 U/L (ref 0–50)
AST SERPL W P-5'-P-CCNC: 45 U/L (ref 0–45)
BASOPHILS # BLD AUTO: 0 10E3/UL (ref 0–0.2)
BASOPHILS NFR BLD AUTO: 1 %
BILIRUB DIRECT SERPL-MCNC: <0.2 MG/DL (ref 0–0.3)
BILIRUB SERPL-MCNC: 0.3 MG/DL
EOSINOPHIL # BLD AUTO: 0.1 10E3/UL (ref 0–0.7)
EOSINOPHIL NFR BLD AUTO: 2 %
ERYTHROCYTE [DISTWIDTH] IN BLOOD BY AUTOMATED COUNT: 14.8 % (ref 10–15)
HCT VFR BLD AUTO: 34.5 % (ref 35–47)
HGB BLD-MCNC: 11.2 G/DL (ref 11.7–15.7)
IMM GRANULOCYTES # BLD: 0 10E3/UL
IMM GRANULOCYTES NFR BLD: 1 %
LYMPHOCYTES # BLD AUTO: 0.9 10E3/UL (ref 0.8–5.3)
LYMPHOCYTES NFR BLD AUTO: 21 %
MCH RBC QN AUTO: 29.9 PG (ref 26.5–33)
MCHC RBC AUTO-ENTMCNC: 32.5 G/DL (ref 31.5–36.5)
MCV RBC AUTO: 92 FL (ref 78–100)
MONOCYTES # BLD AUTO: 0.5 10E3/UL (ref 0–1.3)
MONOCYTES NFR BLD AUTO: 11 %
NEUTROPHILS # BLD AUTO: 2.9 10E3/UL (ref 1.6–8.3)
NEUTROPHILS NFR BLD AUTO: 64 %
NRBC # BLD AUTO: 0 10E3/UL
NRBC BLD AUTO-RTO: 0 /100
PLATELET # BLD AUTO: 396 10E3/UL (ref 150–450)
PROT SERPL-MCNC: 6.5 G/DL (ref 6.4–8.3)
RBC # BLD AUTO: 3.75 10E6/UL (ref 3.8–5.2)
WBC # BLD AUTO: 4.4 10E3/UL (ref 4–11)

## 2023-06-23 PROCEDURE — 80076 HEPATIC FUNCTION PANEL: CPT | Performed by: INTERNAL MEDICINE

## 2023-06-23 PROCEDURE — 250N000011 HC RX IP 250 OP 636: Performed by: INTERNAL MEDICINE

## 2023-06-23 PROCEDURE — 96375 TX/PRO/DX INJ NEW DRUG ADDON: CPT

## 2023-06-23 PROCEDURE — 85025 COMPLETE CBC W/AUTO DIFF WBC: CPT | Performed by: INTERNAL MEDICINE

## 2023-06-23 PROCEDURE — 258N000003 HC RX IP 258 OP 636: Performed by: INTERNAL MEDICINE

## 2023-06-23 PROCEDURE — 96413 CHEMO IV INFUSION 1 HR: CPT

## 2023-06-23 RX ORDER — ONDANSETRON 2 MG/ML
8 INJECTION INTRAMUSCULAR; INTRAVENOUS ONCE
Status: COMPLETED | OUTPATIENT
Start: 2023-06-23 | End: 2023-06-23

## 2023-06-23 RX ORDER — HEPARIN SODIUM (PORCINE) LOCK FLUSH IV SOLN 100 UNIT/ML 100 UNIT/ML
5 SOLUTION INTRAVENOUS
Status: DISCONTINUED | OUTPATIENT
Start: 2023-06-23 | End: 2023-06-23 | Stop reason: HOSPADM

## 2023-06-23 RX ORDER — MEPERIDINE HYDROCHLORIDE 25 MG/ML
25 INJECTION INTRAMUSCULAR; INTRAVENOUS; SUBCUTANEOUS EVERY 30 MIN PRN
Status: DISCONTINUED | OUTPATIENT
Start: 2023-06-23 | End: 2023-06-23 | Stop reason: HOSPADM

## 2023-06-23 RX ORDER — DIPHENHYDRAMINE HYDROCHLORIDE 50 MG/ML
50 INJECTION INTRAMUSCULAR; INTRAVENOUS
Status: DISCONTINUED | OUTPATIENT
Start: 2023-06-23 | End: 2023-06-23 | Stop reason: HOSPADM

## 2023-06-23 RX ORDER — ALBUTEROL SULFATE 90 UG/1
1-2 AEROSOL, METERED RESPIRATORY (INHALATION)
Status: DISCONTINUED | OUTPATIENT
Start: 2023-06-23 | End: 2023-06-23 | Stop reason: HOSPADM

## 2023-06-23 RX ORDER — METHYLPREDNISOLONE SODIUM SUCCINATE 125 MG/2ML
125 INJECTION, POWDER, LYOPHILIZED, FOR SOLUTION INTRAMUSCULAR; INTRAVENOUS
Status: DISCONTINUED | OUTPATIENT
Start: 2023-06-23 | End: 2023-06-23 | Stop reason: HOSPADM

## 2023-06-23 RX ORDER — ALBUTEROL SULFATE 0.83 MG/ML
2.5 SOLUTION RESPIRATORY (INHALATION)
Status: DISCONTINUED | OUTPATIENT
Start: 2023-06-23 | End: 2023-06-23 | Stop reason: HOSPADM

## 2023-06-23 RX ORDER — EPINEPHRINE 1 MG/ML
0.3 INJECTION, SOLUTION INTRAMUSCULAR; SUBCUTANEOUS EVERY 5 MIN PRN
Status: DISCONTINUED | OUTPATIENT
Start: 2023-06-23 | End: 2023-06-23 | Stop reason: HOSPADM

## 2023-06-23 RX ADMIN — Medication 5 ML: at 11:52

## 2023-06-23 RX ADMIN — SODIUM CHLORIDE 250 ML: 9 INJECTION, SOLUTION INTRAVENOUS at 10:28

## 2023-06-23 RX ADMIN — PACLITAXEL 159 MG: 6 INJECTION, SOLUTION, CONCENTRATE INTRAVENOUS at 10:45

## 2023-06-23 RX ADMIN — ONDANSETRON 8 MG: 2 INJECTION INTRAMUSCULAR; INTRAVENOUS at 10:28

## 2023-06-23 NOTE — PROGRESS NOTES
Infusion Nursing Note:  Alicia Iniguez presents today for cycle 13 day 1 paclitaxel.    Patient seen by provider today: No   present during visit today: Not Applicable.    Note: Alicia arrived ambulatory and in stable condition. Port accessed using sterile technique, good blood return noted, and labs collected. She was premedicated and treatment administered per orders. Will return on 6/30 for next appointment.      Intravenous Access:  Labs drawn without difficulty.  Implanted Port.    Treatment Conditions:  Lab Results   Component Value Date    HGB 11.2 (L) 06/23/2023    WBC 4.4 06/23/2023    ANEU 5.6 04/28/2023    ANEUTAUTO 2.9 06/23/2023     06/23/2023      Lab Results   Component Value Date     03/31/2023    POTASSIUM 4.0 03/31/2023    CR 0.64 03/31/2023    KURT 9.2 03/31/2023    BILITOTAL 0.3 06/23/2023    ALBUMIN 3.9 06/23/2023    ALT 66 (H) 06/23/2023    AST 45 06/23/2023     Results reviewed, labs MET treatment parameters, ok to proceed with treatment.      Post Infusion Assessment:  Patient tolerated infusion without incident.  Blood return noted pre and post infusion.  Site patent and intact, free from redness, edema or discomfort.  No evidence of extravasations.  Access discontinued per protocol.       Discharge Plan:   Patient and/or family verbalized understanding of discharge instructions and all questions answered.  AVS to patient via [employer].  Patient will return 6/30 for next appointment.   Patient discharged in stable condition accompanied by: friend.  Departure Mode: Ambulatory.      Rita Edwards RN

## 2023-06-30 ENCOUNTER — INFUSION THERAPY VISIT (OUTPATIENT)
Dept: INFUSION THERAPY | Facility: HOSPITAL | Age: 48
End: 2023-06-30
Attending: INTERNAL MEDICINE
Payer: COMMERCIAL

## 2023-06-30 ENCOUNTER — ONCOLOGY VISIT (OUTPATIENT)
Dept: ONCOLOGY | Facility: HOSPITAL | Age: 48
End: 2023-06-30
Attending: INTERNAL MEDICINE
Payer: COMMERCIAL

## 2023-06-30 VITALS
SYSTOLIC BLOOD PRESSURE: 144 MMHG | BODY MASS INDEX: 33.01 KG/M2 | TEMPERATURE: 98.3 F | HEART RATE: 115 BPM | RESPIRATION RATE: 18 BRPM | OXYGEN SATURATION: 100 % | WEIGHT: 204.5 LBS | DIASTOLIC BLOOD PRESSURE: 56 MMHG

## 2023-06-30 DIAGNOSIS — Z17.0 MALIGNANT NEOPLASM OF OVERLAPPING SITES OF RIGHT BREAST IN FEMALE, ESTROGEN RECEPTOR POSITIVE (H): ICD-10-CM

## 2023-06-30 DIAGNOSIS — C50.811 MALIGNANT NEOPLASM OF OVERLAPPING SITES OF RIGHT BREAST IN FEMALE, ESTROGEN RECEPTOR POSITIVE (H): ICD-10-CM

## 2023-06-30 DIAGNOSIS — T45.1X5S ADVERSE EFFECT OF ANTINEOPLASTIC AND IMMUNOSUPPRESSIVE DRUGS, SEQUELA: ICD-10-CM

## 2023-06-30 DIAGNOSIS — T45.1X5S ADVERSE EFFECT OF ANTINEOPLASTIC AND IMMUNOSUPPRESSIVE DRUGS, SEQUELA: Primary | ICD-10-CM

## 2023-06-30 DIAGNOSIS — Z17.0 MALIGNANT NEOPLASM OF OVERLAPPING SITES OF RIGHT BREAST IN FEMALE, ESTROGEN RECEPTOR POSITIVE (H): Primary | ICD-10-CM

## 2023-06-30 DIAGNOSIS — C50.811 MALIGNANT NEOPLASM OF OVERLAPPING SITES OF RIGHT BREAST IN FEMALE, ESTROGEN RECEPTOR POSITIVE (H): Primary | ICD-10-CM

## 2023-06-30 LAB
ALBUMIN SERPL BCG-MCNC: 4.1 G/DL (ref 3.5–5.2)
ALP SERPL-CCNC: 61 U/L (ref 35–104)
ALT SERPL W P-5'-P-CCNC: 63 U/L (ref 0–50)
AST SERPL W P-5'-P-CCNC: 37 U/L (ref 0–45)
BASOPHILS # BLD AUTO: 0.1 10E3/UL (ref 0–0.2)
BASOPHILS NFR BLD AUTO: 1 %
BILIRUB DIRECT SERPL-MCNC: <0.2 MG/DL (ref 0–0.3)
BILIRUB SERPL-MCNC: 0.2 MG/DL
EOSINOPHIL # BLD AUTO: 0.1 10E3/UL (ref 0–0.7)
EOSINOPHIL NFR BLD AUTO: 3 %
ERYTHROCYTE [DISTWIDTH] IN BLOOD BY AUTOMATED COUNT: 14.8 % (ref 10–15)
HCT VFR BLD AUTO: 34.4 % (ref 35–47)
HGB BLD-MCNC: 11.1 G/DL (ref 11.7–15.7)
IMM GRANULOCYTES # BLD: 0 10E3/UL
IMM GRANULOCYTES NFR BLD: 1 %
LYMPHOCYTES # BLD AUTO: 0.9 10E3/UL (ref 0.8–5.3)
LYMPHOCYTES NFR BLD AUTO: 22 %
MCH RBC QN AUTO: 29.8 PG (ref 26.5–33)
MCHC RBC AUTO-ENTMCNC: 32.3 G/DL (ref 31.5–36.5)
MCV RBC AUTO: 92 FL (ref 78–100)
MONOCYTES # BLD AUTO: 0.3 10E3/UL (ref 0–1.3)
MONOCYTES NFR BLD AUTO: 9 %
NEUTROPHILS # BLD AUTO: 2.5 10E3/UL (ref 1.6–8.3)
NEUTROPHILS NFR BLD AUTO: 64 %
NRBC # BLD AUTO: 0 10E3/UL
NRBC BLD AUTO-RTO: 0 /100
PLATELET # BLD AUTO: 421 10E3/UL (ref 150–450)
PROT SERPL-MCNC: 6.5 G/DL (ref 6.4–8.3)
RBC # BLD AUTO: 3.73 10E6/UL (ref 3.8–5.2)
WBC # BLD AUTO: 3.8 10E3/UL (ref 4–11)

## 2023-06-30 PROCEDURE — 258N000003 HC RX IP 258 OP 636: Performed by: NURSE PRACTITIONER

## 2023-06-30 PROCEDURE — 85025 COMPLETE CBC W/AUTO DIFF WBC: CPT | Performed by: NURSE PRACTITIONER

## 2023-06-30 PROCEDURE — 99214 OFFICE O/P EST MOD 30 MIN: CPT | Performed by: NURSE PRACTITIONER

## 2023-06-30 PROCEDURE — 96375 TX/PRO/DX INJ NEW DRUG ADDON: CPT

## 2023-06-30 PROCEDURE — 99213 OFFICE O/P EST LOW 20 MIN: CPT | Mod: 25 | Performed by: NURSE PRACTITIONER

## 2023-06-30 PROCEDURE — 96413 CHEMO IV INFUSION 1 HR: CPT

## 2023-06-30 PROCEDURE — 250N000011 HC RX IP 250 OP 636: Mod: JZ | Performed by: NURSE PRACTITIONER

## 2023-06-30 PROCEDURE — 36591 DRAW BLOOD OFF VENOUS DEVICE: CPT

## 2023-06-30 PROCEDURE — 82040 ASSAY OF SERUM ALBUMIN: CPT

## 2023-06-30 RX ORDER — DIPHENHYDRAMINE HYDROCHLORIDE 50 MG/ML
50 INJECTION INTRAMUSCULAR; INTRAVENOUS
Status: CANCELLED
Start: 2023-07-07

## 2023-06-30 RX ORDER — MEPERIDINE HYDROCHLORIDE 25 MG/ML
25 INJECTION INTRAMUSCULAR; INTRAVENOUS; SUBCUTANEOUS EVERY 30 MIN PRN
Status: CANCELLED | OUTPATIENT
Start: 2023-07-14

## 2023-06-30 RX ORDER — DIPHENHYDRAMINE HCL 50 MG
50 CAPSULE ORAL
Status: CANCELLED
Start: 2023-06-30

## 2023-06-30 RX ORDER — ONDANSETRON 2 MG/ML
8 INJECTION INTRAMUSCULAR; INTRAVENOUS ONCE
Status: CANCELLED | OUTPATIENT
Start: 2023-06-30

## 2023-06-30 RX ORDER — ALBUTEROL SULFATE 0.83 MG/ML
2.5 SOLUTION RESPIRATORY (INHALATION)
Status: DISCONTINUED | OUTPATIENT
Start: 2023-06-30 | End: 2023-06-30 | Stop reason: HOSPADM

## 2023-06-30 RX ORDER — DIPHENHYDRAMINE HCL 50 MG
50 CAPSULE ORAL
Status: CANCELLED
Start: 2023-07-14

## 2023-06-30 RX ORDER — ALBUTEROL SULFATE 90 UG/1
1-2 AEROSOL, METERED RESPIRATORY (INHALATION)
Status: DISCONTINUED | OUTPATIENT
Start: 2023-06-30 | End: 2023-06-30 | Stop reason: HOSPADM

## 2023-06-30 RX ORDER — LORAZEPAM 2 MG/ML
0.5 INJECTION INTRAMUSCULAR EVERY 4 HOURS PRN
Status: CANCELLED | OUTPATIENT
Start: 2023-07-14

## 2023-06-30 RX ORDER — HEPARIN SODIUM,PORCINE 10 UNIT/ML
5 VIAL (ML) INTRAVENOUS
Status: CANCELLED | OUTPATIENT
Start: 2023-07-07

## 2023-06-30 RX ORDER — HEPARIN SODIUM (PORCINE) LOCK FLUSH IV SOLN 100 UNIT/ML 100 UNIT/ML
5 SOLUTION INTRAVENOUS
Status: DISCONTINUED | OUTPATIENT
Start: 2023-06-30 | End: 2023-06-30 | Stop reason: HOSPADM

## 2023-06-30 RX ORDER — HEPARIN SODIUM (PORCINE) LOCK FLUSH IV SOLN 100 UNIT/ML 100 UNIT/ML
5 SOLUTION INTRAVENOUS
Status: CANCELLED | OUTPATIENT
Start: 2023-07-07

## 2023-06-30 RX ORDER — EPINEPHRINE 1 MG/ML
0.3 INJECTION, SOLUTION INTRAMUSCULAR; SUBCUTANEOUS EVERY 5 MIN PRN
Status: CANCELLED | OUTPATIENT
Start: 2023-07-07

## 2023-06-30 RX ORDER — ONDANSETRON 2 MG/ML
8 INJECTION INTRAMUSCULAR; INTRAVENOUS ONCE
Status: CANCELLED | OUTPATIENT
Start: 2023-07-14

## 2023-06-30 RX ORDER — LORAZEPAM 2 MG/ML
0.5 INJECTION INTRAMUSCULAR EVERY 4 HOURS PRN
Status: CANCELLED | OUTPATIENT
Start: 2023-06-30

## 2023-06-30 RX ORDER — MEPERIDINE HYDROCHLORIDE 25 MG/ML
25 INJECTION INTRAMUSCULAR; INTRAVENOUS; SUBCUTANEOUS EVERY 30 MIN PRN
Status: DISCONTINUED | OUTPATIENT
Start: 2023-06-30 | End: 2023-06-30 | Stop reason: HOSPADM

## 2023-06-30 RX ORDER — ALBUTEROL SULFATE 0.83 MG/ML
2.5 SOLUTION RESPIRATORY (INHALATION)
Status: CANCELLED | OUTPATIENT
Start: 2023-07-07

## 2023-06-30 RX ORDER — ALBUTEROL SULFATE 90 UG/1
1-2 AEROSOL, METERED RESPIRATORY (INHALATION)
Status: CANCELLED
Start: 2023-07-07

## 2023-06-30 RX ORDER — DIPHENHYDRAMINE HYDROCHLORIDE 50 MG/ML
50 INJECTION INTRAMUSCULAR; INTRAVENOUS
Status: CANCELLED
Start: 2023-06-30

## 2023-06-30 RX ORDER — ONDANSETRON 2 MG/ML
8 INJECTION INTRAMUSCULAR; INTRAVENOUS ONCE
Status: CANCELLED | OUTPATIENT
Start: 2023-07-07

## 2023-06-30 RX ORDER — DIPHENHYDRAMINE HCL 50 MG
50 CAPSULE ORAL
Status: CANCELLED
Start: 2023-07-07

## 2023-06-30 RX ORDER — HEPARIN SODIUM (PORCINE) LOCK FLUSH IV SOLN 100 UNIT/ML 100 UNIT/ML
5 SOLUTION INTRAVENOUS
Status: CANCELLED | OUTPATIENT
Start: 2023-07-14

## 2023-06-30 RX ORDER — METHYLPREDNISOLONE SODIUM SUCCINATE 125 MG/2ML
125 INJECTION, POWDER, LYOPHILIZED, FOR SOLUTION INTRAMUSCULAR; INTRAVENOUS
Status: CANCELLED
Start: 2023-07-14

## 2023-06-30 RX ORDER — MEPERIDINE HYDROCHLORIDE 25 MG/ML
25 INJECTION INTRAMUSCULAR; INTRAVENOUS; SUBCUTANEOUS EVERY 30 MIN PRN
Status: CANCELLED | OUTPATIENT
Start: 2023-07-07

## 2023-06-30 RX ORDER — ALBUTEROL SULFATE 0.83 MG/ML
2.5 SOLUTION RESPIRATORY (INHALATION)
Status: CANCELLED | OUTPATIENT
Start: 2023-07-14

## 2023-06-30 RX ORDER — ALBUTEROL SULFATE 0.83 MG/ML
2.5 SOLUTION RESPIRATORY (INHALATION)
Status: CANCELLED | OUTPATIENT
Start: 2023-06-30

## 2023-06-30 RX ORDER — ONDANSETRON 2 MG/ML
8 INJECTION INTRAMUSCULAR; INTRAVENOUS ONCE
Status: COMPLETED | OUTPATIENT
Start: 2023-06-30 | End: 2023-06-30

## 2023-06-30 RX ORDER — METHYLPREDNISOLONE SODIUM SUCCINATE 125 MG/2ML
125 INJECTION, POWDER, LYOPHILIZED, FOR SOLUTION INTRAMUSCULAR; INTRAVENOUS
Status: DISCONTINUED | OUTPATIENT
Start: 2023-06-30 | End: 2023-06-30 | Stop reason: HOSPADM

## 2023-06-30 RX ORDER — MEPERIDINE HYDROCHLORIDE 25 MG/ML
25 INJECTION INTRAMUSCULAR; INTRAVENOUS; SUBCUTANEOUS EVERY 30 MIN PRN
Status: CANCELLED | OUTPATIENT
Start: 2023-06-30

## 2023-06-30 RX ORDER — ALBUTEROL SULFATE 90 UG/1
1-2 AEROSOL, METERED RESPIRATORY (INHALATION)
Status: CANCELLED
Start: 2023-07-14

## 2023-06-30 RX ORDER — DIPHENHYDRAMINE HYDROCHLORIDE 50 MG/ML
50 INJECTION INTRAMUSCULAR; INTRAVENOUS
Status: CANCELLED
Start: 2023-07-14

## 2023-06-30 RX ORDER — EPINEPHRINE 1 MG/ML
0.3 INJECTION, SOLUTION INTRAMUSCULAR; SUBCUTANEOUS EVERY 5 MIN PRN
Status: CANCELLED | OUTPATIENT
Start: 2023-06-30

## 2023-06-30 RX ORDER — DIPHENHYDRAMINE HYDROCHLORIDE 50 MG/ML
50 INJECTION INTRAMUSCULAR; INTRAVENOUS
Status: DISCONTINUED | OUTPATIENT
Start: 2023-06-30 | End: 2023-06-30 | Stop reason: HOSPADM

## 2023-06-30 RX ORDER — EPINEPHRINE 1 MG/ML
0.3 INJECTION, SOLUTION INTRAMUSCULAR; SUBCUTANEOUS EVERY 5 MIN PRN
Status: DISCONTINUED | OUTPATIENT
Start: 2023-06-30 | End: 2023-06-30 | Stop reason: HOSPADM

## 2023-06-30 RX ORDER — ALBUTEROL SULFATE 90 UG/1
1-2 AEROSOL, METERED RESPIRATORY (INHALATION)
Status: CANCELLED
Start: 2023-06-30

## 2023-06-30 RX ORDER — HEPARIN SODIUM,PORCINE 10 UNIT/ML
5 VIAL (ML) INTRAVENOUS
Status: CANCELLED | OUTPATIENT
Start: 2023-07-14

## 2023-06-30 RX ORDER — LORAZEPAM 2 MG/ML
0.5 INJECTION INTRAMUSCULAR EVERY 4 HOURS PRN
Status: CANCELLED | OUTPATIENT
Start: 2023-07-07

## 2023-06-30 RX ORDER — HEPARIN SODIUM (PORCINE) LOCK FLUSH IV SOLN 100 UNIT/ML 100 UNIT/ML
5 SOLUTION INTRAVENOUS
Status: CANCELLED | OUTPATIENT
Start: 2023-06-30

## 2023-06-30 RX ORDER — METHYLPREDNISOLONE SODIUM SUCCINATE 125 MG/2ML
125 INJECTION, POWDER, LYOPHILIZED, FOR SOLUTION INTRAMUSCULAR; INTRAVENOUS
Status: CANCELLED
Start: 2023-07-07

## 2023-06-30 RX ORDER — EPINEPHRINE 1 MG/ML
0.3 INJECTION, SOLUTION INTRAMUSCULAR; SUBCUTANEOUS EVERY 5 MIN PRN
Status: CANCELLED | OUTPATIENT
Start: 2023-07-14

## 2023-06-30 RX ORDER — HEPARIN SODIUM,PORCINE 10 UNIT/ML
5 VIAL (ML) INTRAVENOUS
Status: CANCELLED | OUTPATIENT
Start: 2023-06-30

## 2023-06-30 RX ORDER — METHYLPREDNISOLONE SODIUM SUCCINATE 125 MG/2ML
125 INJECTION, POWDER, LYOPHILIZED, FOR SOLUTION INTRAMUSCULAR; INTRAVENOUS
Status: CANCELLED
Start: 2023-06-30

## 2023-06-30 RX ADMIN — SODIUM CHLORIDE 250 ML: 9 INJECTION, SOLUTION INTRAVENOUS at 10:09

## 2023-06-30 RX ADMIN — ONDANSETRON 8 MG: 2 INJECTION INTRAMUSCULAR; INTRAVENOUS at 10:09

## 2023-06-30 RX ADMIN — Medication 5 ML: at 11:51

## 2023-06-30 RX ADMIN — PACLITAXEL 159 MG: 6 INJECTION, SOLUTION, CONCENTRATE INTRAVENOUS at 10:40

## 2023-06-30 ASSESSMENT — PAIN SCALES - GENERAL: PAINLEVEL: NO PAIN (0)

## 2023-06-30 NOTE — PROGRESS NOTES
[facility] Hematology and Oncology Progress Note    Patient: Alicia Iniguez  MRN: [id number]  Date of Service: Jun 30, 2023          Reason for Visit    Chief Complaint   Patient presents with     Oncology Clinic Visit     Malignant neoplasm of overlapping sites of right breast in female, estrogen receptor positive (H)       Assessment and Plan     Cancer Staging   Malignant neoplasm of overlapping sites of right breast in female, estrogen receptor positive (H)  Staging form: Breast, AJCC 8th Edition  - Clinical: Stage IIA (cT3, cN2, cM0, G2, ER+, VT+, HER2-) - Signed by Shanelle Weaver APRN CNP on 3/1/2023    1.  Locally Advanced Breast cancer, right side, stage IIa, T3 N2 M0, grade 2, ER/VT positive, HER2/janine negative: Patient has started neoadjuvant chemotherapy with dose dense Adriamycin and Cytoxan.  Received 4 cycles, now on weekly Taxol. Today is week 10 out of a planned 12 weeks.  She will see Dr. Brunner in the next week or 2 to discuss surgery.  I would then like her to be seen by Dr. Saleem 1 to 2 weeks after her surgery is completed.    2.  Generalized bone pain: getting worse with each week of treatment.  Using heat, Tylenol.  No need for anything stronger.  This will hopefully improve once her chemo finishes up.  Continue to monitor.    3. Elevated liver function tests: likely from chemotherapy. These are mild. We will continue to monitor and make adjustments as necessary.     4. Peripheral neuropathy: chemo induced.  Mild in her feet.  Not affecting hands.  Not currently affecting ADL's. No need for pain medications.  In cold socks during treatment.  Continue to monitior and adjust chemo as necessary.         ECOG Performance    0 - Independent    Distress Screening (within last 30 days)    1. How concerned are you about your ability to eat? : 0  2. How concerned are you about unintended weight loss or your current weight? : 0  3. How concerned are you about feeling depressed or very sad?  : 0  4. How concerned are you about feeling anxious or very scared? : 5  5. Do you struggle with the loss of meaning and tiago in your life? : Not at all  6. How concerned are you about work and home life issues that may be affected by your cancer? : 0  7. How concerned are you about knowing what resources are available to help you? : 0  8. Do you currently have what you would describe as Moravian or spiritual struggles?            : Not at all       Pain  Pain Score: No Pain (0)    Problem List    Patient Active Problem List   Diagnosis     Malignant neoplasm of overlapping sites of right breast in female, estrogen receptor positive (H)     Malignant neoplasm involving both nipple and areola of right breast in female, estrogen receptor positive (H)     Adverse effect of antineoplastic and immunosuppressive drugs, sequela        ______________________________________________________________________________    History of Present Illness    Oncology history:  DIAGNOSIS: Breast cancer, right side, patient had a palpable lump with some nipple and skin changes.  She was also found to have lymphadenopathy in the axilla.  Patient had an ultrasound and mammogram that showed a 4.1 cm tumor with axillary lymphadenopathy  Biopsy was done January 31 and it showed invasive ductal carcinoma.  Grade 2.  ER OR positive and HER2/janine negative.  Biopsy was done of the lymph node as well and it was positive.    TREATMENT: Patient has started neoadjuvant chemotherapy with dose dense Adriamycin and Cytoxan for 4 cycles and then 12 weekly Taxol. Today is week 10 of Taxol.     INTERIM HISTORY:   Is here today to continue treatment.  She states that overall is going pretty well.  She is getting really excited to be done with treatment.  Basically she is noticing more generalized bone pain.  She states that just everything kind of hurts.  She has some mild fatigue.  She also is noticing some slightly worsening neuropathy in her toes.  Mainly  altered sensation with some tingling.  Nothing in her hands.  Denies any nausea or vomiting.  Weight is stable.    Review of Systems    Pertinent items are noted in HPI.    Past History    No past medical history on file.    PHYSICAL EXAM  BP (!) 144/56   Pulse 115   Temp 98.3  F (36.8  C)   Resp 18   Wt 92.8 kg (204 lb 8 oz)   SpO2 100%   BMI 33.01 kg/m      GENERAL: no acute distress. Cooperative in conversation. Here with daughter. Mask on  RESP: Regular respiratory rate. No expiratory wheezes   MUSCULOSKELETAL: no bilateral leg swelling  NEURO: non focal. Alert and oriented x3.   PSYCH: within normal limits. No depression or anxiety.  SKIN: exposed skin is dry intact.     Lab Results    Recent Results (from the past 168 hour(s))   Hepatic panel (Albumin, ALT, AST, Bili, Alk Phos, TP)   Result Value Ref Range    Protein Total 6.5 6.4 - 8.3 g/dL    Albumin 4.1 3.5 - 5.2 g/dL    Bilirubin Total 0.2 <=1.2 mg/dL    Alkaline Phosphatase 61 35 - 104 U/L    AST 37 0 - 45 U/L    ALT 63 (H) 0 - 50 U/L    Bilirubin Direct <0.20 0.00 - 0.30 mg/dL   CBC with platelets and differential   Result Value Ref Range    WBC Count 3.8 (L) 4.0 - 11.0 10e3/uL    RBC Count 3.73 (L) 3.80 - 5.20 10e6/uL    Hemoglobin 11.1 (L) 11.7 - 15.7 g/dL    Hematocrit 34.4 (L) 35.0 - 47.0 %    MCV 92 78 - 100 fL    MCH 29.8 26.5 - 33.0 pg    MCHC 32.3 31.5 - 36.5 g/dL    RDW 14.8 10.0 - 15.0 %    Platelet Count 421 150 - 450 10e3/uL    % Neutrophils 64 %    % Lymphocytes 22 %    % Monocytes 9 %    % Eosinophils 3 %    % Basophils 1 %    % Immature Granulocytes 1 %    NRBCs per 100 WBC 0 <1 /100    Absolute Neutrophils 2.5 1.6 - 8.3 10e3/uL    Absolute Lymphocytes 0.9 0.8 - 5.3 10e3/uL    Absolute Monocytes 0.3 0.0 - 1.3 10e3/uL    Absolute Eosinophils 0.1 0.0 - 0.7 10e3/uL    Absolute Basophils 0.1 0.0 - 0.2 10e3/uL    Absolute Immature Granulocytes 0.0 <=0.4 10e3/uL    Absolute NRBCs 0.0 10e3/uL       Imaging    No results found.        Signed  by: KYLEIGH Fernandez CNP

## 2023-06-30 NOTE — PROGRESS NOTES
Infusion Nursing Note:  Alicia Inigeuz presents today for cycle 14 day 1 paclitaxel.    Patient seen by provider today: Yes: Shanelle Weaver NP   present during visit today: Not Applicable.    Note: Alciia arrived ambulatory and in stable condition accompanied by her daughter. Reported some pain and swelling around port following last week's infusion, but this has gone away now. Port site assessed and WNL. Port accessed using sterile technique, good blood return noted, and labs collected. She was premedicated and treatment administered per orders. Port de-accessed upon completion and site covered with gauze and secured with tape. Will return on 7/7 for next appointment.      Intravenous Access:  Labs drawn without difficulty.  Implanted Port.    Treatment Conditions:  Lab Results   Component Value Date    HGB 11.1 (L) 06/30/2023    WBC 3.8 (L) 06/30/2023    ANEU 5.6 04/28/2023    ANEUTAUTO 2.5 06/30/2023     06/30/2023      Lab Results   Component Value Date     03/31/2023    POTASSIUM 4.0 03/31/2023    CR 0.64 03/31/2023    KURT 9.2 03/31/2023    BILITOTAL 0.2 06/30/2023    ALBUMIN 4.1 06/30/2023    ALT 63 (H) 06/30/2023    AST 37 06/30/2023     Results reviewed, labs MET treatment parameters, ok to proceed with treatment.      Post Infusion Assessment:  Patient tolerated infusion without incident.  Blood return noted pre and post infusion.  Site patent and intact, free from redness, edema or discomfort.  No evidence of extravasations.  Access discontinued per protocol.       Discharge Plan:   Patient and/or family verbalized understanding of discharge instructions and all questions answered.  AVS to patient via "[employer]T.  Patient will return 7/7 for next appointment.   Patient discharged in stable condition accompanied by: daughter.  Departure Mode: Ambulatory.      Rita Edwards RN

## 2023-06-30 NOTE — PROGRESS NOTES
"Oncology Rooming Note    June 30, 2023 9:47 AM   Alicia Iniguez is a 48 year old female who presents for:    Chief Complaint   Patient presents with     Oncology Clinic Visit     Malignant neoplasm of overlapping sites of right breast in female, estrogen receptor positive (H)     Initial Vitals: BP (!) 144/56   Pulse 115   Temp 98.3  F (36.8  C)   Resp 18   Wt 92.8 kg (204 lb 8 oz)   SpO2 100%   BMI 33.01 kg/m   Estimated body mass index is 33.01 kg/m  as calculated from the following:    Height as of 5/26/23: 1.676 m (5' 6\").    Weight as of this encounter: 92.8 kg (204 lb 8 oz). Body surface area is 2.08 meters squared.  No Pain (0) Comment: Data Unavailable   No LMP recorded.  Allergies reviewed: Yes  Medications reviewed: Yes    Medications: Medication refills not needed today.  Pharmacy name entered into [employer]: [id number] [address] [facility] AT [address]    Clinical concerns: None       Darleen Kerns LPN            "

## 2023-06-30 NOTE — LETTER
6/30/2023         RE: Alicia Iniguez  [address] [facility] [address]        Dear Colleague,    Thank you for referring your patient, Alicia Iniguez, to the [facility] [place]. Please see a copy of my visit note below.    [facility] Hematology and Oncology Progress Note    Patient: Alicia Iniguez  MRN: [id number]  Date of Service: Jun 30, 2023          Reason for Visit    Chief Complaint   Patient presents with     Oncology Clinic Visit     Malignant neoplasm of overlapping sites of right breast in female, estrogen receptor positive (H)       Assessment and Plan     Cancer Staging   Malignant neoplasm of overlapping sites of right breast in female, estrogen receptor positive (H)  Staging form: Breast, AJCC 8th Edition  - Clinical: Stage IIA (cT3, cN2, cM0, G2, ER+, PA+, HER2-) - Signed by Shanelle Weaver APRN CNP on 3/1/2023    1.  Locally Advanced Breast cancer, right side, stage IIa, T3 N2 M0, grade 2, ER/PA positive, HER2/janine negative: Patient has started neoadjuvant chemotherapy with dose dense Adriamycin and Cytoxan.  Received 4 cycles, now on weekly Taxol. Today is week 10 out of a planned 12 weeks.  She will see Dr. Brunner in the next week or 2 to discuss surgery.  I would then like her to be seen by Dr. Saleem 1 to 2 weeks after her surgery is completed.    2.  Generalized bone pain: getting worse with each week of treatment.  Using heat, Tylenol.  No need for anything stronger.  This will hopefully improve once her chemo finishes up.  Continue to monitor.    3. Elevated liver function tests: likely from chemotherapy. These are mild. We will continue to monitor and make adjustments as necessary.     4. Peripheral neuropathy: chemo induced.  Mild in her feet.  Not affecting hands.  Not currently affecting ADL's. No need for pain medications.  In cold socks during treatment.  Continue to monitior and adjust chemo as necessary.         ECOG Performance    0 -  Independent    Distress Screening (within last 30 days)    1. How concerned are you about your ability to eat? : 0  2. How concerned are you about unintended weight loss or your current weight? : 0  3. How concerned are you about feeling depressed or very sad? : 0  4. How concerned are you about feeling anxious or very scared? : 5  5. Do you struggle with the loss of meaning and tiago in your life? : Not at all  6. How concerned are you about work and home life issues that may be affected by your cancer? : 0  7. How concerned are you about knowing what resources are available to help you? : 0  8. Do you currently have what you would describe as Jew or spiritual struggles?            : Not at all       Pain  Pain Score: No Pain (0)    Problem List    Patient Active Problem List   Diagnosis     Malignant neoplasm of overlapping sites of right breast in female, estrogen receptor positive (H)     Malignant neoplasm involving both nipple and areola of right breast in female, estrogen receptor positive (H)     Adverse effect of antineoplastic and immunosuppressive drugs, sequela        ______________________________________________________________________________    History of Present Illness    Oncology history:  DIAGNOSIS: Breast cancer, right side, patient had a palpable lump with some nipple and skin changes.  She was also found to have lymphadenopathy in the axilla.  Patient had an ultrasound and mammogram that showed a 4.1 cm tumor with axillary lymphadenopathy  Biopsy was done January 31 and it showed invasive ductal carcinoma.  Grade 2.  ER MO positive and HER2/janine negative.  Biopsy was done of the lymph node as well and it was positive.    TREATMENT: Patient has started neoadjuvant chemotherapy with dose dense Adriamycin and Cytoxan for 4 cycles and then 12 weekly Taxol. Today is week 10 of Taxol.     INTERIM HISTORY:   Is here today to continue treatment.  She states that overall is going pretty well.  She  is getting really excited to be done with treatment.  Basically she is noticing more generalized bone pain.  She states that just everything kind of hurts.  She has some mild fatigue.  She also is noticing some slightly worsening neuropathy in her toes.  Mainly altered sensation with some tingling.  Nothing in her hands.  Denies any nausea or vomiting.  Weight is stable.    Review of Systems    Pertinent items are noted in HPI.    Past History    No past medical history on file.    PHYSICAL EXAM  BP (!) 144/56   Pulse 115   Temp 98.3  F (36.8  C)   Resp 18   Wt 92.8 kg (204 lb 8 oz)   SpO2 100%   BMI 33.01 kg/m      GENERAL: no acute distress. Cooperative in conversation. Here with daughter. Mask on  RESP: Regular respiratory rate. No expiratory wheezes   MUSCULOSKELETAL: no bilateral leg swelling  NEURO: non focal. Alert and oriented x3.   PSYCH: within normal limits. No depression or anxiety.  SKIN: exposed skin is dry intact.     Lab Results    Recent Results (from the past 168 hour(s))   Hepatic panel (Albumin, ALT, AST, Bili, Alk Phos, TP)   Result Value Ref Range    Protein Total 6.5 6.4 - 8.3 g/dL    Albumin 4.1 3.5 - 5.2 g/dL    Bilirubin Total 0.2 <=1.2 mg/dL    Alkaline Phosphatase 61 35 - 104 U/L    AST 37 0 - 45 U/L    ALT 63 (H) 0 - 50 U/L    Bilirubin Direct <0.20 0.00 - 0.30 mg/dL   CBC with platelets and differential   Result Value Ref Range    WBC Count 3.8 (L) 4.0 - 11.0 10e3/uL    RBC Count 3.73 (L) 3.80 - 5.20 10e6/uL    Hemoglobin 11.1 (L) 11.7 - 15.7 g/dL    Hematocrit 34.4 (L) 35.0 - 47.0 %    MCV 92 78 - 100 fL    MCH 29.8 26.5 - 33.0 pg    MCHC 32.3 31.5 - 36.5 g/dL    RDW 14.8 10.0 - 15.0 %    Platelet Count 421 150 - 450 10e3/uL    % Neutrophils 64 %    % Lymphocytes 22 %    % Monocytes 9 %    % Eosinophils 3 %    % Basophils 1 %    % Immature Granulocytes 1 %    NRBCs per 100 WBC 0 <1 /100    Absolute Neutrophils 2.5 1.6 - 8.3 10e3/uL    Absolute Lymphocytes 0.9 0.8 - 5.3 10e3/uL     "Absolute Monocytes 0.3 0.0 - 1.3 10e3/uL    Absolute Eosinophils 0.1 0.0 - 0.7 10e3/uL    Absolute Basophils 0.1 0.0 - 0.2 10e3/uL    Absolute Immature Granulocytes 0.0 <=0.4 10e3/uL    Absolute NRBCs 0.0 10e3/uL       Imaging    No results found.        Signed by: KYLEIGH Fernandez CNP    Oncology Rooming Note    June 30, 2023 9:47 AM   Alicia Iniguez is a 48 year old female who presents for:    Chief Complaint   Patient presents with     Oncology Clinic Visit     Malignant neoplasm of overlapping sites of right breast in female, estrogen receptor positive (H)     Initial Vitals: BP (!) 144/56   Pulse 115   Temp 98.3  F (36.8  C)   Resp 18   Wt 92.8 kg (204 lb 8 oz)   SpO2 100%   BMI 33.01 kg/m   Estimated body mass index is 33.01 kg/m  as calculated from the following:    Height as of 5/26/23: 1.676 m (5' 6\").    Weight as of this encounter: 92.8 kg (204 lb 8 oz). Body surface area is 2.08 meters squared.  No Pain (0) Comment: Data Unavailable   No LMP recorded.  Allergies reviewed: Yes  Medications reviewed: Yes    Medications: Medication refills not needed today.  Pharmacy name entered into [employer]: [id number] [address] [facility] [address]    Clinical concerns: None       Darleen Kerns LPN                Again, thank you for allowing me to participate in the care of your patient.        Sincerely,        KYLEIGH Fernnadez CNP    "

## 2023-07-07 ENCOUNTER — LAB (OUTPATIENT)
Dept: INFUSION THERAPY | Facility: HOSPITAL | Age: 48
End: 2023-07-07
Attending: INTERNAL MEDICINE
Payer: COMMERCIAL

## 2023-07-07 VITALS
SYSTOLIC BLOOD PRESSURE: 122 MMHG | TEMPERATURE: 98 F | HEART RATE: 102 BPM | DIASTOLIC BLOOD PRESSURE: 63 MMHG | RESPIRATION RATE: 18 BRPM | OXYGEN SATURATION: 98 %

## 2023-07-07 DIAGNOSIS — Z17.0 MALIGNANT NEOPLASM OF OVERLAPPING SITES OF RIGHT BREAST IN FEMALE, ESTROGEN RECEPTOR POSITIVE (H): ICD-10-CM

## 2023-07-07 DIAGNOSIS — C50.811 MALIGNANT NEOPLASM OF OVERLAPPING SITES OF RIGHT BREAST IN FEMALE, ESTROGEN RECEPTOR POSITIVE (H): ICD-10-CM

## 2023-07-07 DIAGNOSIS — T45.1X5S ADVERSE EFFECT OF ANTINEOPLASTIC AND IMMUNOSUPPRESSIVE DRUGS, SEQUELA: Primary | ICD-10-CM

## 2023-07-07 LAB
ALBUMIN SERPL BCG-MCNC: 4.1 G/DL (ref 3.5–5.2)
ALP SERPL-CCNC: 60 U/L (ref 35–104)
ALT SERPL W P-5'-P-CCNC: 45 U/L (ref 0–50)
AST SERPL W P-5'-P-CCNC: 29 U/L (ref 0–45)
BASOPHILS # BLD AUTO: 0.1 10E3/UL (ref 0–0.2)
BASOPHILS NFR BLD AUTO: 1 %
BILIRUB DIRECT SERPL-MCNC: <0.2 MG/DL (ref 0–0.3)
BILIRUB SERPL-MCNC: 0.2 MG/DL
EOSINOPHIL # BLD AUTO: 0.1 10E3/UL (ref 0–0.7)
EOSINOPHIL NFR BLD AUTO: 4 %
ERYTHROCYTE [DISTWIDTH] IN BLOOD BY AUTOMATED COUNT: 14.7 % (ref 10–15)
HCT VFR BLD AUTO: 34.9 % (ref 35–47)
HGB BLD-MCNC: 11 G/DL (ref 11.7–15.7)
IMM GRANULOCYTES # BLD: 0 10E3/UL
IMM GRANULOCYTES NFR BLD: 1 %
LYMPHOCYTES # BLD AUTO: 0.8 10E3/UL (ref 0.8–5.3)
LYMPHOCYTES NFR BLD AUTO: 21 %
MCH RBC QN AUTO: 28.9 PG (ref 26.5–33)
MCHC RBC AUTO-ENTMCNC: 31.5 G/DL (ref 31.5–36.5)
MCV RBC AUTO: 92 FL (ref 78–100)
MONOCYTES # BLD AUTO: 0.4 10E3/UL (ref 0–1.3)
MONOCYTES NFR BLD AUTO: 11 %
NEUTROPHILS # BLD AUTO: 2.2 10E3/UL (ref 1.6–8.3)
NEUTROPHILS NFR BLD AUTO: 62 %
NRBC # BLD AUTO: 0 10E3/UL
NRBC BLD AUTO-RTO: 0 /100
PLATELET # BLD AUTO: 401 10E3/UL (ref 150–450)
PROT SERPL-MCNC: 6.5 G/DL (ref 6.4–8.3)
RBC # BLD AUTO: 3.81 10E6/UL (ref 3.8–5.2)
WBC # BLD AUTO: 3.6 10E3/UL (ref 4–11)

## 2023-07-07 PROCEDURE — 96375 TX/PRO/DX INJ NEW DRUG ADDON: CPT

## 2023-07-07 PROCEDURE — 85004 AUTOMATED DIFF WBC COUNT: CPT | Performed by: NURSE PRACTITIONER

## 2023-07-07 PROCEDURE — 250N000011 HC RX IP 250 OP 636: Mod: JZ | Performed by: NURSE PRACTITIONER

## 2023-07-07 PROCEDURE — 80076 HEPATIC FUNCTION PANEL: CPT

## 2023-07-07 PROCEDURE — 96413 CHEMO IV INFUSION 1 HR: CPT

## 2023-07-07 PROCEDURE — 36591 DRAW BLOOD OFF VENOUS DEVICE: CPT

## 2023-07-07 PROCEDURE — 258N000003 HC RX IP 258 OP 636: Performed by: NURSE PRACTITIONER

## 2023-07-07 RX ORDER — EPINEPHRINE 1 MG/ML
0.3 INJECTION, SOLUTION INTRAMUSCULAR; SUBCUTANEOUS EVERY 5 MIN PRN
Status: DISCONTINUED | OUTPATIENT
Start: 2023-07-07 | End: 2023-07-07 | Stop reason: HOSPADM

## 2023-07-07 RX ORDER — MEPERIDINE HYDROCHLORIDE 25 MG/ML
25 INJECTION INTRAMUSCULAR; INTRAVENOUS; SUBCUTANEOUS EVERY 30 MIN PRN
Status: DISCONTINUED | OUTPATIENT
Start: 2023-07-07 | End: 2023-07-07 | Stop reason: HOSPADM

## 2023-07-07 RX ORDER — ALBUTEROL SULFATE 0.83 MG/ML
2.5 SOLUTION RESPIRATORY (INHALATION)
Status: DISCONTINUED | OUTPATIENT
Start: 2023-07-07 | End: 2023-07-07 | Stop reason: HOSPADM

## 2023-07-07 RX ORDER — DIPHENHYDRAMINE HYDROCHLORIDE 50 MG/ML
50 INJECTION INTRAMUSCULAR; INTRAVENOUS
Status: DISCONTINUED | OUTPATIENT
Start: 2023-07-07 | End: 2023-07-07 | Stop reason: HOSPADM

## 2023-07-07 RX ORDER — HEPARIN SODIUM (PORCINE) LOCK FLUSH IV SOLN 100 UNIT/ML 100 UNIT/ML
5 SOLUTION INTRAVENOUS
Status: DISCONTINUED | OUTPATIENT
Start: 2023-07-07 | End: 2023-07-07 | Stop reason: HOSPADM

## 2023-07-07 RX ORDER — ONDANSETRON 2 MG/ML
8 INJECTION INTRAMUSCULAR; INTRAVENOUS ONCE
Status: COMPLETED | OUTPATIENT
Start: 2023-07-07 | End: 2023-07-07

## 2023-07-07 RX ORDER — ALBUTEROL SULFATE 90 UG/1
1-2 AEROSOL, METERED RESPIRATORY (INHALATION)
Status: DISCONTINUED | OUTPATIENT
Start: 2023-07-07 | End: 2023-07-07 | Stop reason: HOSPADM

## 2023-07-07 RX ORDER — METHYLPREDNISOLONE SODIUM SUCCINATE 125 MG/2ML
125 INJECTION, POWDER, LYOPHILIZED, FOR SOLUTION INTRAMUSCULAR; INTRAVENOUS
Status: DISCONTINUED | OUTPATIENT
Start: 2023-07-07 | End: 2023-07-07 | Stop reason: HOSPADM

## 2023-07-07 RX ADMIN — Medication 5 ML: at 11:57

## 2023-07-07 RX ADMIN — ONDANSETRON 8 MG: 2 INJECTION INTRAMUSCULAR; INTRAVENOUS at 10:35

## 2023-07-07 RX ADMIN — SODIUM CHLORIDE 250 ML: 9 INJECTION, SOLUTION INTRAVENOUS at 10:34

## 2023-07-07 RX ADMIN — PACLITAXEL 159 MG: 6 INJECTION, SOLUTION INTRAVENOUS at 10:52

## 2023-07-07 NOTE — PROGRESS NOTES
Infusion Nursing Note:  Alicia Iniguez presents today for C15D1.    Patient seen by provider today: No   present during visit today: Not Applicable.    Note: Alicia arrived ambulatory and in stable condition accompanied by her . Reported some pain and swelling around port following last few treatments, no redness or bruising noted today, Port site assessed and WNL. Port accessed using sterile technique, good blood return noted, and labs collected. She was premedicated and treatment administered per orders. Port de-accessed upon completion and site covered with gauze and secured with tape. Will return on 7/14 for next appointment.      Intravenous Access:  Labs drawn without difficulty.  Implanted Port.    Treatment Conditions:  Lab Results   Component Value Date    HGB 11.0 (L) 07/07/2023    WBC 3.6 (L) 07/07/2023    ANEU 5.6 04/28/2023    ANEUTAUTO 2.2 07/07/2023     07/07/2023      Results reviewed, labs MET treatment parameters, ok to proceed with treatment.    .      Post Infusion Assessment:  Patient tolerated infusion without incident.  Blood return noted pre and post infusion.  No evidence of extravasations.  Access discontinued per protocol.       Discharge Plan:   Discharge instructions reviewed with: Patient.  Patient and/or family verbalized understanding of discharge instructions and all questions answered.  Patient discharged in stable condition accompanied by: self.  Departure Mode: Ambulatory.      Olivia Salmeron RN

## 2023-07-13 NOTE — PROGRESS NOTES
Alicia comes in for follow up of her right breast cancer for which she has been receiving loulou-adjuvant chemotherapy. She only has 1 more dose of Taxol which she is getting today.  She has noticed some change in the mass since she started chemotherapy.  The mass has gotten quite a bit smaller but she still notices the change in the skin around the nipple.  She has overall tolerated her chemotherapy fairly well.    Patient Active Problem List   Diagnosis     Malignant neoplasm of overlapping sites of right breast in female, estrogen receptor positive (H)     Malignant neoplasm involving both nipple and areola of right breast in female, estrogen receptor positive (H)     Adverse effect of antineoplastic and immunosuppressive drugs, sequela       Past Surgical History:   Procedure Laterality Date     BREAST CYST ASPIRATION       INSERT PORT VASCULAR ACCESS Left 2/22/2023    Procedure: Port Placement;  Surgeon: Maribell Brunner MD;  Location: [facility] OR       Current Outpatient Medications   Medication Instructions     acetaminophen (TYLENOL) 500-1,000 mg, Oral, PRN     cyclobenzaprine (FLEXERIL) 5-10 mg, Oral, 3 TIMES DAILY PRN     dexamethasone (DECADRON) 8 mg, Oral, DAILY, Start on Day 2 of Cycles 1 through 4.     lidocaine-prilocaine (EMLA) 2.5-2.5 % external cream Topical, PRN, Place over port 1 hour before coming to clinic     loratadine (CLARITIN) 10 mg, Oral, DAILY PRN     minocycline (MINOCIN) 50 mg, 2 TIMES DAILY     ondansetron (ZOFRAN) 8 mg, Oral, EVERY 8 HOURS PRN     Probiotic Product (PROBIOTIC PO) 1 chew tab, Oral, DAILY     prochlorperazine (COMPAZINE) 10 mg, Oral, EVERY 6 HOURS PRN          Allergies   Allergen Reactions     Seasonal Allergies      Nasal congestion   Watery eyes        Social History     Tobacco Use     Smoking status: Never     Smokeless tobacco: Never   Substance Use Topics     Alcohol use: Not Currently     Drug use: Never       Physical exam:  There were no vitals taken for  this visit.  GENERAL: healthy, alert, and no distress, mildly overweight.  RESP: Clear to ausculation bilaterally without wheezes or crackles. Normal BS in all fields.  CV: RRR normal S1S2 without  murmurs, rubs or gallops.   BREAST: Persistent palpable central mass in the right breast but significantly smaller than it was.  Still some skin changes of the areola.  No palpable masses on the left.  LYMPH: No palpable adenopathy where previously she had palpable lymph nodes.  SKIN: no suspicious lesions or rashes  NEURO: Alert and Oriented x 3, Gait normal. Reflexes normal and symmetric. Sensation grossly WNL.    Impression: Right Breast Cancer, finishing up neoadjuvant chemotherapy with a good clinical response.  The patient was originally T3N2.  Went over with the patient again the options of lumpectomy versus mastectomy.  Explained that with a lumpectomy, she will absolutely need radiation.  With a mastectomy, if her lymph nodes are negative then she may not need radiation.  We talked about the options for reconstruction if she does choose a mastectomy.  The patient is a still not a great candidate for breast conservation therapy.  She has already decided that she not only wants a mastectomy but wants bilateral mastectomies.  I explained to her that this does not improve her survival but she is quite adamant.  We then talked about the options for reconstruction.  She is definitely going to need radiation so explained to her that we will only be able to put in tissue expanders.  She understands that.  Did explain there is a higher risk of problems but she would like to go ahead with that.  Discussed sentinel lymph node biopsy with the patient.  Because she has had a very good clinical response it is very reasonable to do a sentinel lymph node biopsy.  We will need to have a localizer placed into the lymph node that was previously biopsied under her arm and then we will also do a sentinel node injection on that day.   I will get frozen sections on those lymph nodes.  If there is anything in the lymph nodes then I will need to do a further lymph node dissection but if they are negative then I will not need to remove further lymph nodes.    Plan: Bilateral mastectomies with sentinel lymph node biopsy on the right with both a localizer placement and sentinel node injection.  Will be combined with reconstruction.  Specifically have asked the patient to see Dr. Hernandez.  Explained that all of her surgeries are now done as an outpatient.  She asked appropriate questions.  We will get her set up in the near future.  Looking at getting set up about 3 to 4 weeks from now.

## 2023-07-13 NOTE — PATIENT INSTRUCTIONS
Assessing Cancer Risk  Cancer is a common diagnosis which impacts many families.  Individuals may develop cancer due to environmental factors (such as exposures and lifestyle), aging, genetic predisposition, or a combination of these factors.      Only about 5-10% of cancers are thought to be due to an inherited cancer susceptibility gene.    These families often have:  Several people with the same or related types of cancer  Cancers diagnosed at a young age (before age 50)  Individuals with more than one primary cancer  Multiple generations of the family affected with cancer    Comprehensive Breast and Gynecologic Cancer Panel  We each inherit two copies of every gene in our bodies: one from our mother, and one from our father. Each gene has a specific job to do.  When a gene has a mistake or  mutation  in it, it does not work like it should.     Some people may be candidates for genetic testing of more than one gene.  For these families, genetic testing using a cancer panel may be offered. These panels will test different genes at once known to increase the risk for breast, ovarian, uterine, and/or other cancers.    This handout will review common hereditary breast and gynecologic cancer syndromes. The genes that will be discussed in this handout are: TOÑO, BRCA1, BRCA2, BRIP1, CDH1, CHEK2, MLH1, MSH2, MSH6, PMS2, EPCAM, PTEN, PALB2, RAD51C, RAD51D, and TP53.    The purpose of this handout is to serve as a brief summary of the breast and gynecologic cancer risk genes that have published clinical management guidelines for individuals who are found to carry a mutation. Inheriting a mutation does not mean a person will develop cancer, but it does significantly increase their risk above the general population risk.     ______________________________________________________________________________    Hereditary Breast and Ovarian Cancer Syndrome (BRCA1 and BRCA2)  A single mutation in one of the copies of BRCA1 or  BRCA2 increases the risk for breast and ovarian cancer, among others.  The risk for pancreatic cancer and melanoma may also be slightly increased in some families.  The chart below shows the chance that someone with a BRCA mutation would develop cancer in his or her lifetime1,2,3,4.       Lifetime Cancer Risks    General Population BRCA1  BRCA2   Breast  12% >60% >60%   Ovarian  1-2% 39-58% 13-29%   Prostate 12% 7-26% 19-61%   Male Breast 0.1% 0.2-1.2% 1.8-7.1%   Pancreas 1-2% Up to 5% 5-10%     A person s ethnic background is also important to consider, as individuals of Ashkenazi Adventist ancestry have a higher chance of having a BRCA gene mutation.  There are three BRCA mutations that occur more frequently in this population.      De La Cruz Syndrome (MLH1, MSH2, MSH6, PMS2, and EPCAM)  Currently five genes are known to cause De La Cruz Syndrome: MLH1, MSH2, MSH6, PMS2, and EPCAM.  A single mutation in one of the De La Cruz Syndrome genes increases the risk for colon, endometrial, ovarian, and stomach cancers.  Other cancers that occur less commonly in De La Cruz Syndrome include urinary tract, skin, and brain cancers.  The chart below shows the chance that a person with De La Cruz syndrome would develop cancer in his or her lifetime5.      Lifetime Cancer Risks    General Population De La Cruz Syndrome   Colon 5% 10-61%   Endometrial 3% 13-57%   Ovarian 1-2% 1-38%   Stomach <1% 1-9%   *Cancer risk varies depending on De La Cruz syndrome gene found      Cowden Syndrome (PTEN)  Cowden syndrome is a hereditary condition that increases the risk for breast, thyroid, endometrial, colon, and kidney cancer.  Cowden syndrome is caused by a mutation in the PTEN gene.  A single mutation in one of the copies of PTEN causes Cowden syndrome and increases cancer risk.  The chart below shows the chance that someone with a PTEN mutation would develop cancer in their lifetime6,7.  Other benign features seen in some individuals with Cowden syndrome include benign  skin lesions (facial papules, keratoses, lipomas), learning disability, autism, thyroid nodules, colon polyps, and larger head size.     Lifetime Cancer Risks    General Population Cowden   Breast 12% 40-60%*   Thyroid 1% Up to 38%   Renal 1-2% Up to 35%   Endometrial 3% Up to 28%   Colon 5% Up to 9%   Melanoma 2-3% Up to 6%   *Emerging data suggests the risk for breast cancer could be greater than 60%               Li-Fraumeni Syndrome (TP53)  Li-Fraumeni Syndrome (LFS) is a cancer predisposition syndrome caused by a mutation in the TP53 gene. A single mutation in one of the copies of TP53 increases the risk for multiple cancers. Individuals with LFS are at an increased risk for developing cancer at a young age. The lifetime risk for development of a LFS-associated cancer is 50% by age 30 and 90% by age 60.   Core Cancers: Sarcomas, Breast, Brain, Lung, Leukemias/Lymphomas, Adrenocortical carcinomas  Other Cancers: Gastrointestinal, Thyroid, Skin, Genitourinary       Hereditary Diffuse Gastric Cancer (CDH1)  Currently, one gene is known to cause hereditary diffuse gastric cancer (HDGC): CDH1.  Individuals with HDGC are at increased risk for diffuse gastric cancer and lobular breast cancer. Of people diagnosed with HDGC, 30-50% have a mutation in the CDH1 gene.  This suggests there are likely other genes that may cause HDGC that have not been identified yet.      Lifetime Cancer Risks    General Population HDGC   Diffuse Gastric  <1% ~80%   Breast 12% 41-60%       Additional Genes    TOÑO  TOÑO is a moderate-risk breast cancer gene. Women who have a mutation in TOÑO can have between a 2-4 fold increased risk for breast cancer compared to the general population8. TOÑO mutations have also been associated with increased risk for pancreatic cancer between 5-10%9. Individuals who inherit two TOÑO mutations have a condition called ataxia-telangiectasia (AT).  This rare autosomal recessive condition affects the nervous system  and immune system, and is associated with progressive cerebellar ataxia beginning in childhood. Individuals with ataxia-telangiectasia often have a weakened immune system and have an increased risk for childhood cancers.    PALB2  Mutations in PALB2 have been shown to increase the risk of breast cancer up to 41-60% in some families; where individuals fall within this risk range is dependent upon family [id number]. PALB2 mutations have also been associated with increased risk for pancreatic cancer between 5-10%.  Individuals who inherit two PALB2 mutations--one from their mother and one from their father--have a condition called Fanconi Anemia.  This rare autosomal recessive condition is associated with short stature, developmental delay, bone marrow failure, and increased risk for childhood cancers.    CHEK2   CHEK2 is a moderate-risk breast cancer gene.  Women who have a mutation in CHEK2 have around a 2-4 fold increased risk for breast cancer compared to the general population, and this risk may be higher depending upon family history.11,12,13 The risk of colon cancer may be twice as high as the general population risk of colon cancer of 5%. Mutations in CHEK2 have also been shown to increase the risk of other cancers, including prostate, however these cancer risks are currently not well understood.    BRIP1, RAD51C and RAD51D  Mutations in RAD51C and RAD51D have been shown to increase the risk of ovarian cancer and breast cancer 14,. Mutations in BRIP1 have been shown to increase the risk of ovarian cancer and possibly female breast cancer 15 .       Lifetime Cancer Risk    General Population        BRIP1   RAD51C  RAD51D   Breast 12% Not well defined 20-40% 20-40%   Ovarian 1-2% 5-15% 10-15% 10-20%     ______________________________________________________________  Inheritance  All of the cancer syndromes reviewed above are inherited in an autosomal dominant pattern.  This means that if a parent has a mutation,  each of their children will have a 50% chance of inheriting that same mutation. Therefore, each child --male or female-- would have a 50% chance of being at increased risk for developing cancer.    Image obtained from Genetics Home Reference, 2013     Mutations in some genes can occur de vish, which means that a person s mutation occurred for the first time in them and was not inherited from a parent.  Now that they have the mutation, however, it can be passed on to future generations.    Genetic Testing  Genetic testing involves a blood test and will look for any harmful mutations that are associated with increased cancer risk.  If possible, it is recommended that the person(s) who has had cancer be tested before other family members.  That person will give us the most useful information about whether or not a specific gene is associated with the cancer in the family.    Results  There are three possible results of genetic testing:  Positive--a harmful mutation was identified in one or more of the genes  Negative--no mutations were identified in any of the genes tested  Variant of unknown significance--a variation in one of the genes was identified, but it is unclear how this impacts cancer risk in the family    Advantages and Disadvantages   There are advantages and disadvantages to genetic testing.    Advantages  May clarify your cancer risk  Can help you make medical decisions  May explain the cancers in your family  May give useful information to your family members (if you share your results)    Disadvantages  Possible negative emotional impact of learning about inherited cancer risk  Uncertainty in interpreting a negative test result in some situations  Possible genetic discrimination concerns (see below)    Genetic Information Nondiscrimination Act (JUSTICE)  The Genetic Information Nondiscrimination Act of 2008 (JUSTICE) is a federal law that protects individuals from health insurance or employment discrimination  based on a genetic test result alone (with some exceptions, including employers with fewer than 15 employees, and ).  Although rare, JUSTICE  does not cover discrimination protections in terms of life insurance, long term care, or disability insurances.  Visit the National Human [employer] website to learn more.    Reducing Cancer Risk  All of the genes described in this handout have nationally recognized cancer screening guidelines that would be recommended for individuals who test positive.  In addition to increased cancer screening, surgeries may be offered or recommended to reduce cancer risk.  Recommendations are based upon an individual s genetic test result as well as their personal and family history of cancer.    Questions to Think About Regarding Genetic Testing:  What effect will the test result have on me and my relationship with my family members if I have an inherited gene mutation?  If I don t have a gene mutation?  Should I share my test results, and how will my family react to this news, which may also affect them?  Are my children ready to learn new information that may one day affect their own health?    Hereditary Cancer Resources    FORCE: Facing Our Risk of Cancer Empowered facingourrisk.org   Bright Pink bebrightpink.org   Li-Fraumeni Syndrome Association lfsassociation.org   PTEN World PTENworld.com   No stomach for cancer, Inc. nostomachforcancer.org   Stomach cancer relief network Scrnet.org   Collaborative Group of the Americas on Inherited Colorectal Cancer (CGA) cgaicc.com    Cancer Care cancercare.org   American Cancer Society (ACS) cancer.org   National Cancer [employer] (NCI) cancer.gov     Please call us if you have any questions or concerns.   Cancer Risk Management Program [phone]-CANCER ([phone])  Rishabh Jarvis, MS [facility]  [phone]  Saritha Soto, MS, [facility] [phone]  Carmen Mcneill, MS, [facility]  [phone]  Olinda Figueroa, MS, [facility]  [phone]  Erinn Salamanca,  MS, [facility]  [phone]  Mary Geiger, MS, [facility] [phone]  Melba Benitez, MS, [facility] [phone]    References  Mary Powers PDP, Sarah S, Martha SMALLS, Calvin JE, Sara JL, Carlos Enrique N, Maicol H, Eduardo O, Toby A, Pasini B, Radimaximino P, Manyamil S, Veronique DM, Ford N, Wilfrid E, Ramirez H, Wynne E, Ga J, Gronagapito J, Lilly B, Tulinius H, Thorlacius S, Eerola H, Nevanlinna H, Shahida K, Rosy OP. Average risks of breast and ovarian cancer associated with BRCA1 or BRCA2 mutations detected in case series unselected for family history: a combined analysis of 222 studies. Am J Hum Lore. 2003;72:1117-30.  Samreen N, Padma M, Philly G.  BRCA1 and BRCA2 Hereditary Breast and Ovarian Cancer. Gene Reviews online. 2013.  Akil YC, Shahnaz S, Charmaine G, Siu S. Breast cancer risk among male BRCA1 and BRCA2 mutation carriers. J Natl Cancer Inst. 2007;99:1811-4.  Tye ANDERSON, Mary I, Osei J, You E, Sandie ER, Estefanía F. Risk of breast cancer in male BRCA2 carriers. J Med Lore. 2010;47:710-1.  National Comprehensive Cancer Network. Clinical practice guidelines in oncology, colorectal cancer screening. Available online (registration required). 2015.  Venkat MH, Korina J, Eduard J, Kierra IQBAL, Nazanin MS, Eng C. Lifetime cancer risks in individuals with germline PTEN mutations. Clin Cancer Res. 2012;18:400-7.  Tay R. Cowden Syndrome: A Critical Review of the Clinical Literature. J Lore . 2009:18:13-27.  Mendel XIAO, Brendan BAL, Hesham S, Renee P, Chacha T, Espinoza M, Ruben B, Alen H, Jose R, Marta K, Shirin L, Tye ANDERSON, Veronique BAL, Frank DF, Ann MR, The Breast Cancer Susceptibility Collaboration (UK) & Max BRAND. TOÑO mutations that cause ataxia-telangiectasia are breast cancer susceptibility alleles. Nature Genetics. 2006;38:873-875  Jonathan N , Sree Y, Saige J, Angie L, Shelton LEE , Larisa ML, Lexy S, Mesfin AG, Tegan S, Tiffany ML, Racquel J , Kashmir R, Charis CERON, Kelly  JR, Jaylan VE, Aidan M, Vosergiostein B, Judson N, Emily RH, Shahnaz KW, and Deann AP. TOÑO mutations in patients with hereditary pancreatic cancer. Cancer Discover. 2012;2:41-46  Sarah CLAROS., et al. Breast-Cancer Risk in Families with Mutations in PALB2. NEJM. 2014; 371(6):497-506.  CHEK2 Breast Cancer Case-Control Consortium. CHEK2*1100delC and susceptibility to breast cancer: A collaborative analysis involving 10,860 breast cancer cases and 9,065 controls from 10 studies. Am J Hum Lore, 74 (2004), pp. 7007-2873  Judd T, Darinel S, Jose L K, et al. Spectrum of Mutations in BRCA1, BRCA2, CHEK2, and TP53 in Families at High Risk of Breast Cancer. CORAZON. 2006;295(12):4527-8950.   Renaldo C, Shayan D, Vasu XIAO, et al. Risk of breast cancer in women with a CHEK2 mutation with and without a family history of breast cancer. J Clin Oncol. 2011;29:0763-9982.  Song H, Jigars E, Ramus SJ, et al. Contribution of germline mutations in the RAD51B, RAD51C, and RAD51D genes to ovarian cancer in the population. J Clin Oncol. 2015;33(26):1943-6695. Doi:10.1200/JCO.2015.61.2408.  Benson T, Matilde DF, Nasreen P, et al. Mutations in BRIP1 confer high risk of ovarian cancer. Angie Lore. 2011;43(11):4319-1763. doi:10.1038/ng.955.

## 2023-07-14 ENCOUNTER — OFFICE VISIT (OUTPATIENT)
Dept: SURGERY | Facility: CLINIC | Age: 48
End: 2023-07-14
Attending: SPECIALIST
Payer: COMMERCIAL

## 2023-07-14 ENCOUNTER — INFUSION THERAPY VISIT (OUTPATIENT)
Dept: INFUSION THERAPY | Facility: HOSPITAL | Age: 48
End: 2023-07-14
Attending: SPECIALIST
Payer: COMMERCIAL

## 2023-07-14 VITALS
DIASTOLIC BLOOD PRESSURE: 77 MMHG | SYSTOLIC BLOOD PRESSURE: 138 MMHG | HEART RATE: 105 BPM | RESPIRATION RATE: 18 BRPM | TEMPERATURE: 98.5 F | OXYGEN SATURATION: 99 %

## 2023-07-14 DIAGNOSIS — Z17.0 MALIGNANT NEOPLASM OF OVERLAPPING SITES OF RIGHT BREAST IN FEMALE, ESTROGEN RECEPTOR POSITIVE (H): ICD-10-CM

## 2023-07-14 DIAGNOSIS — C50.811 MALIGNANT NEOPLASM OF OVERLAPPING SITES OF RIGHT BREAST IN FEMALE, ESTROGEN RECEPTOR POSITIVE (H): ICD-10-CM

## 2023-07-14 DIAGNOSIS — T45.1X5S ADVERSE EFFECT OF ANTINEOPLASTIC AND IMMUNOSUPPRESSIVE DRUGS, SEQUELA: Primary | ICD-10-CM

## 2023-07-14 LAB
ALBUMIN SERPL BCG-MCNC: 4 G/DL (ref 3.5–5.2)
ALP SERPL-CCNC: 63 U/L (ref 35–104)
ALT SERPL W P-5'-P-CCNC: 44 U/L (ref 0–50)
AST SERPL W P-5'-P-CCNC: 30 U/L (ref 0–45)
BASOPHILS # BLD AUTO: 0 10E3/UL (ref 0–0.2)
BASOPHILS NFR BLD AUTO: 1 %
BILIRUB DIRECT SERPL-MCNC: <0.2 MG/DL (ref 0–0.3)
BILIRUB SERPL-MCNC: 0.2 MG/DL
EOSINOPHIL # BLD AUTO: 0.1 10E3/UL (ref 0–0.7)
EOSINOPHIL NFR BLD AUTO: 2 %
ERYTHROCYTE [DISTWIDTH] IN BLOOD BY AUTOMATED COUNT: 14.7 % (ref 10–15)
HCT VFR BLD AUTO: 35 % (ref 35–47)
HGB BLD-MCNC: 11.2 G/DL (ref 11.7–15.7)
IMM GRANULOCYTES # BLD: 0 10E3/UL
IMM GRANULOCYTES NFR BLD: 1 %
LYMPHOCYTES # BLD AUTO: 0.9 10E3/UL (ref 0.8–5.3)
LYMPHOCYTES NFR BLD AUTO: 22 %
MCH RBC QN AUTO: 29.2 PG (ref 26.5–33)
MCHC RBC AUTO-ENTMCNC: 32 G/DL (ref 31.5–36.5)
MCV RBC AUTO: 91 FL (ref 78–100)
MONOCYTES # BLD AUTO: 0.4 10E3/UL (ref 0–1.3)
MONOCYTES NFR BLD AUTO: 10 %
NEUTROPHILS # BLD AUTO: 2.8 10E3/UL (ref 1.6–8.3)
NEUTROPHILS NFR BLD AUTO: 64 %
NRBC # BLD AUTO: 0 10E3/UL
NRBC BLD AUTO-RTO: 0 /100
PLATELET # BLD AUTO: 441 10E3/UL (ref 150–450)
PROT SERPL-MCNC: 6.6 G/DL (ref 6.4–8.3)
RBC # BLD AUTO: 3.84 10E6/UL (ref 3.8–5.2)
WBC # BLD AUTO: 4.3 10E3/UL (ref 4–11)

## 2023-07-14 PROCEDURE — 85025 COMPLETE CBC W/AUTO DIFF WBC: CPT | Performed by: NURSE PRACTITIONER

## 2023-07-14 PROCEDURE — 99211 OFF/OP EST MAY X REQ PHY/QHP: CPT | Performed by: SPECIALIST

## 2023-07-14 PROCEDURE — 80076 HEPATIC FUNCTION PANEL: CPT

## 2023-07-14 PROCEDURE — 99213 OFFICE O/P EST LOW 20 MIN: CPT | Performed by: SPECIALIST

## 2023-07-14 PROCEDURE — 258N000003 HC RX IP 258 OP 636: Performed by: NURSE PRACTITIONER

## 2023-07-14 PROCEDURE — 96375 TX/PRO/DX INJ NEW DRUG ADDON: CPT

## 2023-07-14 PROCEDURE — 96413 CHEMO IV INFUSION 1 HR: CPT

## 2023-07-14 PROCEDURE — 36591 DRAW BLOOD OFF VENOUS DEVICE: CPT

## 2023-07-14 PROCEDURE — 250N000011 HC RX IP 250 OP 636: Mod: JZ | Performed by: NURSE PRACTITIONER

## 2023-07-14 RX ORDER — DIPHENHYDRAMINE HYDROCHLORIDE 50 MG/ML
50 INJECTION INTRAMUSCULAR; INTRAVENOUS
Status: DISCONTINUED | OUTPATIENT
Start: 2023-07-14 | End: 2023-07-14 | Stop reason: HOSPADM

## 2023-07-14 RX ORDER — HEPARIN SODIUM (PORCINE) LOCK FLUSH IV SOLN 100 UNIT/ML 100 UNIT/ML
5 SOLUTION INTRAVENOUS
Status: DISCONTINUED | OUTPATIENT
Start: 2023-07-14 | End: 2023-07-14 | Stop reason: HOSPADM

## 2023-07-14 RX ORDER — EPINEPHRINE 1 MG/ML
0.3 INJECTION, SOLUTION INTRAMUSCULAR; SUBCUTANEOUS EVERY 5 MIN PRN
Status: DISCONTINUED | OUTPATIENT
Start: 2023-07-14 | End: 2023-07-14 | Stop reason: HOSPADM

## 2023-07-14 RX ORDER — CEFAZOLIN SODIUM/WATER 2 G/20 ML
2 SYRINGE (ML) INTRAVENOUS
Status: CANCELLED | OUTPATIENT
Start: 2023-08-09

## 2023-07-14 RX ORDER — METHYLPREDNISOLONE SODIUM SUCCINATE 125 MG/2ML
125 INJECTION, POWDER, LYOPHILIZED, FOR SOLUTION INTRAMUSCULAR; INTRAVENOUS
Status: DISCONTINUED | OUTPATIENT
Start: 2023-07-14 | End: 2023-07-14 | Stop reason: HOSPADM

## 2023-07-14 RX ORDER — CEFAZOLIN SODIUM/WATER 2 G/20 ML
2 SYRINGE (ML) INTRAVENOUS SEE ADMIN INSTRUCTIONS
Status: CANCELLED | OUTPATIENT
Start: 2023-08-09

## 2023-07-14 RX ORDER — MEPERIDINE HYDROCHLORIDE 25 MG/ML
25 INJECTION INTRAMUSCULAR; INTRAVENOUS; SUBCUTANEOUS EVERY 30 MIN PRN
Status: DISCONTINUED | OUTPATIENT
Start: 2023-07-14 | End: 2023-07-14 | Stop reason: HOSPADM

## 2023-07-14 RX ORDER — ALBUTEROL SULFATE 90 UG/1
1-2 AEROSOL, METERED RESPIRATORY (INHALATION)
Status: DISCONTINUED | OUTPATIENT
Start: 2023-07-14 | End: 2023-07-14 | Stop reason: HOSPADM

## 2023-07-14 RX ORDER — ALBUTEROL SULFATE 0.83 MG/ML
2.5 SOLUTION RESPIRATORY (INHALATION)
Status: DISCONTINUED | OUTPATIENT
Start: 2023-07-14 | End: 2023-07-14 | Stop reason: HOSPADM

## 2023-07-14 RX ORDER — ONDANSETRON 2 MG/ML
8 INJECTION INTRAMUSCULAR; INTRAVENOUS ONCE
Status: COMPLETED | OUTPATIENT
Start: 2023-07-14 | End: 2023-07-14

## 2023-07-14 RX ADMIN — PACLITAXEL 159 MG: 6 INJECTION, SOLUTION, CONCENTRATE INTRAVENOUS at 11:22

## 2023-07-14 RX ADMIN — SODIUM CHLORIDE 250 ML: 9 INJECTION, SOLUTION INTRAVENOUS at 11:02

## 2023-07-14 RX ADMIN — Medication 5 ML: at 12:28

## 2023-07-14 RX ADMIN — ONDANSETRON 8 MG: 2 INJECTION INTRAMUSCULAR; INTRAVENOUS at 11:04

## 2023-07-14 NOTE — NURSING NOTE
Alicia presents to [facility] for a surgical consult with Dr. Brunner  regarding her [place] breast cancer.  She is accompanied by her  for consult.  She has been receiving NAC with Dr. Saleem. RN assessment and EMR update.  Patient given a Breast Cancer Packet, contents reviewed.  She met with Dr. Brunner  see dictation for details of visit. She will plan bilateral mastectomies with reconstruction. Called   Plastic surgery to call her with an appointment.  Dr. Brunner refers to Dr. Hernandez.  Pre and post op teaching complete.  Support provided, invited calls.  RN time 15 mins.

## 2023-07-14 NOTE — LETTER
7/14/2023         RE: Alicia Iniguez  [address] [place] [facility] [address]        Dear Colleague,    Thank you for referring your patient, Alicia Iniguez, to the [facility] [place]. Please see a copy of my visit note below.    Alicia comes in for follow up of her right breast cancer for which she has been receiving loulou-adjuvant chemotherapy. She only has 1 more dose of Taxol which she is getting today.  She has noticed some change in the mass since she started chemotherapy.  The mass has gotten quite a bit smaller but she still notices the change in the skin around the nipple.  She has overall tolerated her chemotherapy fairly well.    Patient Active Problem List   Diagnosis     Malignant neoplasm of overlapping sites of right breast in female, estrogen receptor positive (H)     Malignant neoplasm involving both nipple and areola of right breast in female, estrogen receptor positive (H)     Adverse effect of antineoplastic and immunosuppressive drugs, sequela       Past Surgical History:   Procedure Laterality Date     BREAST CYST ASPIRATION       INSERT PORT VASCULAR ACCESS Left 2/22/2023    Procedure: Port Placement;  Surgeon: Maribell Brunner MD;  Location: [facility]       Current Outpatient Medications   Medication Instructions     acetaminophen (TYLENOL) 500-1,000 mg, Oral, PRN     cyclobenzaprine (FLEXERIL) 5-10 mg, Oral, 3 TIMES DAILY PRN     dexamethasone (DECADRON) 8 mg, Oral, DAILY, Start on Day 2 of Cycles 1 through 4.     lidocaine-prilocaine (EMLA) 2.5-2.5 % external cream Topical, PRN, Place over port 1 hour before coming to clinic     loratadine (CLARITIN) 10 mg, Oral, DAILY PRN     minocycline (MINOCIN) 50 mg, 2 TIMES DAILY     ondansetron (ZOFRAN) 8 mg, Oral, EVERY 8 HOURS PRN     Probiotic Product (PROBIOTIC PO) 1 chew tab, Oral, DAILY     prochlorperazine (COMPAZINE) 10 mg, Oral, EVERY 6 HOURS PRN          Allergies   Allergen Reactions     Seasonal Allergies       Nasal congestion   Watery eyes        Social History     Tobacco Use     Smoking status: Never     Smokeless tobacco: Never   Substance Use Topics     Alcohol use: Not Currently     Drug use: Never       Physical exam:  There were no vitals taken for this visit.  GENERAL: healthy, alert, and no distress, mildly overweight.  RESP: Clear to ausculation bilaterally without wheezes or crackles. Normal BS in all fields.  CV: RRR normal S1S2 without  murmurs, rubs or gallops.   BREAST: Persistent palpable central mass in the right breast but significantly smaller than it was.  Still some skin changes of the areola.  No palpable masses on the left.  LYMPH: No palpable adenopathy where previously she had palpable lymph nodes.  SKIN: no suspicious lesions or rashes  NEURO: Alert and Oriented x 3, Gait normal. Reflexes normal and symmetric. Sensation grossly WNL.    Impression: Right Breast Cancer, finishing up neoadjuvant chemotherapy with a good clinical response.  The patient was originally T3N2.  Went over with the patient again the options of lumpectomy versus mastectomy.  Explained that with a lumpectomy, she will absolutely need radiation.  With a mastectomy, if her lymph nodes are negative then she may not need radiation.  We talked about the options for reconstruction if she does choose a mastectomy.  The patient is a still not a great candidate for breast conservation therapy.  She has already decided that she not only wants a mastectomy but wants bilateral mastectomies.  I explained to her that this does not improve her survival but she is quite adamant.  We then talked about the options for reconstruction.  She is definitely going to need radiation so explained to her that we will only be able to put in tissue expanders.  She understands that.  Did explain there is a higher risk of problems but she would like to go ahead with that.  Discussed sentinel lymph node biopsy with the patient.  Because she has had a  very good clinical response it is very reasonable to do a sentinel lymph node biopsy.  We will need to have a localizer placed into the lymph node that was previously biopsied under her arm and then we will also do a sentinel node injection on that day.  I will get frozen sections on those lymph nodes.  If there is anything in the lymph nodes then I will need to do a further lymph node dissection but if they are negative then I will not need to remove further lymph nodes.    Plan: Bilateral mastectomies with sentinel lymph node biopsy on the right with both a localizer placement and sentinel node injection.  Will be combined with reconstruction.  Specifically have asked the patient to see Dr. Hernandez.  Explained that all of her surgeries are now done as an outpatient.  She asked appropriate questions.  We will get her set up in the near future.  Looking at getting set up about 3 to 4 weeks from now.                Again, thank you for allowing me to participate in the care of your patient.        Sincerely,        Maribell Brunner MD

## 2023-07-14 NOTE — PROGRESS NOTES
Infusion Nursing Note:  Alicia Iniguez presents today for cycle 16 day 1 paclitaxel.    Patient seen by provider today: No   present during visit today: Not Applicable.    Note: Alicia arrived ambulatory and in stable condition accompanied by her . Port accessed using sterile technique, good blood return noted, and labs collected. She was premedicated and treatment administered per orders. Port de-accessed upon completion and site covered with gauze and secured with tape.       Intravenous Access:  Labs drawn without difficulty.  Implanted Port.    Treatment Conditions:  Lab Results   Component Value Date    HGB 11.2 (L) 07/14/2023    WBC 4.3 07/14/2023    ANEU 5.6 04/28/2023    ANEUTAUTO 2.8 07/14/2023     07/14/2023      Lab Results   Component Value Date     03/31/2023    POTASSIUM 4.0 03/31/2023    CR 0.64 03/31/2023    KURT 9.2 03/31/2023    BILITOTAL 0.2 07/14/2023    ALBUMIN 4.0 07/14/2023    ALT 44 07/14/2023    AST 30 07/14/2023     Results reviewed, labs MET treatment parameters, ok to proceed with treatment.      Post Infusion Assessment:  Patient tolerated infusion without incident.  Blood return noted pre and post infusion.  Site patent and intact, free from redness, edema or discomfort.  No evidence of extravasations.  Access discontinued per protocol.       Discharge Plan:   Patient and/or family verbalized understanding of discharge instructions and all questions answered.  AVS to patient via MYCHART.   Patient discharged in stable condition accompanied by: .  Departure Mode: Ambulatory.      Rita Edwards RN

## 2023-07-17 ENCOUNTER — DOCUMENTATION ONLY (OUTPATIENT)
Dept: SURGERY | Facility: CLINIC | Age: 48
End: 2023-07-17
Payer: COMMERCIAL

## 2023-07-17 NOTE — PROGRESS NOTES
Patient called and left a message she is scheduled to see Dr. Hernandez on Thursday, 7-20-23 at 0800.  Msg sent to Dr. Brunner and Ines with this information.

## 2023-07-18 ENCOUNTER — TELEPHONE (OUTPATIENT)
Dept: SURGERY | Facility: CLINIC | Age: 48
End: 2023-07-18
Payer: COMMERCIAL

## 2023-07-18 NOTE — TELEPHONE ENCOUNTER
Left a message regarding surgery scheduled (co surgery with Dr. Clifford office)  Letter sent via [employer], invited patient to call me with any questions.

## 2023-07-18 NOTE — LETTER
We've received instruction to get you scheduled for surgery with Dr Brunner. We have that arranged as follows:     Pre-op Physical:  Call your primary clinic to schedule.    Pre Surgical Localizer Placement Appointment: 8/3/2023 at 9:30 am at the [facility] [address], [facility] [address] [place] [address]    Surgery Date: 8/9/2023     Location: [place] [address]    Approximate Arrival Time: 10:30 am  (Unless instructed differently by the pre-op call nurse)     Prep Tasks and Info:     A pre-op physical with your primary care doctor is required before surgery. This must be 10-30 days before surgery.  Since it required by anesthesia, your surgery will be cancelled if it's not done. Call your clinic asap to get this scheduled.    Review your medications with your primary care or prescribing physician; they will advise you which meds to stop and when, and when you can resume taking.  Certain medications like blood thinners need to be stopped in advance of surgery to proceed safely.    Please shower the evening before and morning of surgery with Hibiclens soap.  This can be found at your local pharmacy.      Fasting instructions will be provided by the pre-op nurse who will call you 1-3 days before surgery.  Typically we advise normal food up to 8 hours before you arrive for surgery. Clear liquids only from then until 2 hours before you arrive surgery then nothing at all by mouth.  The nurse will review your specific instructions with you at the call.     Smoking impacts your body's ability to heal properly.  If you are a smoker, we strongly urge you to stop smoking. Plastic surgery patients are required to be nicotine free for at least 8 weeks before surgery.     You will need an adult to drive you home and stay with you 24 hours after surgery. Public transportation or Medical Van Services are not permitted.    Visitor restrictions are subject to  change, please verify with the pre-op nurse how many people may accompany you when they call.    We always encourage you to notify your insurance any time you have medical tests or procedures scheduled including surgery. The number is usually right on the back of your insurance card. Please call [facility] Cost of Care at [phone]  if you'd like a surgery quote.       Call our office if you have any questions! Thank you!                    There are no Wet Read(s) to document.

## 2023-07-26 ENCOUNTER — OFFICE VISIT (OUTPATIENT)
Dept: FAMILY MEDICINE | Facility: CLINIC | Age: 48
End: 2023-07-26
Payer: COMMERCIAL

## 2023-07-26 VITALS
HEIGHT: 66 IN | TEMPERATURE: 98 F | BODY MASS INDEX: 32.62 KG/M2 | HEART RATE: 119 BPM | WEIGHT: 203 LBS | SYSTOLIC BLOOD PRESSURE: 122 MMHG | DIASTOLIC BLOOD PRESSURE: 81 MMHG | OXYGEN SATURATION: 96 % | RESPIRATION RATE: 20 BRPM

## 2023-07-26 DIAGNOSIS — Z17.0 MALIGNANT NEOPLASM INVOLVING BOTH NIPPLE AND AREOLA OF RIGHT BREAST IN FEMALE, ESTROGEN RECEPTOR POSITIVE (H): ICD-10-CM

## 2023-07-26 DIAGNOSIS — C50.011 MALIGNANT NEOPLASM INVOLVING BOTH NIPPLE AND AREOLA OF RIGHT BREAST IN FEMALE, ESTROGEN RECEPTOR POSITIVE (H): ICD-10-CM

## 2023-07-26 DIAGNOSIS — Z01.818 PRE-OP EXAM: Primary | ICD-10-CM

## 2023-07-26 PROCEDURE — 99214 OFFICE O/P EST MOD 30 MIN: CPT | Performed by: PHYSICIAN ASSISTANT

## 2023-07-26 ASSESSMENT — PAIN SCALES - GENERAL: PAINLEVEL: NO PAIN (0)

## 2023-07-26 NOTE — H&P (VIEW-ONLY)
[facility]  [address] [address]  Phone: [phone]  Primary Provider: Suha Post  Pre-op Performing Provider: CLAIRE MOODY      PREOPERATIVE EVALUATION:  Today's date: 7/26/2023    Alicia Iniguez is a 48 year old female who presents for a preoperative evaluation.       No data to display              Surgical Information:  Surgery/Procedure: Bilateral mastectomies; Right sentinel lymph node biopsy with localizer and gamma probe   Surgery Location: [facility] OR  Surgeon:   Surgery Date: 08/09/2023  Time of Surgery: n/a  Where patient plans to recover: At home with family  Fax number for surgical facility: Note does not need to be faxed, will be available electronically in Epic.    Assessment & Plan     The proposed surgical procedure is considered LOW risk.      ICD-10-CM    1. Pre-op exam  Z01.818       2. Malignant neoplasm involving both nipple and areola of right breast in female, estrogen receptor positive (H)  C50.011     Z17.0          Implanted Device:  Port     - No identified additional risk factors other than previously addressed    Antiplatelet or Anticoagulation Medication Instructions:   - Patient is on no antiplatelet or anticoagulation medications.    Additional Medication Instructions:  Patient is on no additional chronic medications    RECOMMENDATION:  APPROVAL GIVEN to proceed with proposed procedure, without further diagnostic evaluation.      Subjective       HPI related to upcoming procedure: history of breast cancer and has gone through chemo and now have bilateral mastectomy performed.           7/26/2023     6:49 AM   Preop Questions   1. Have you ever had a heart attack or stroke? No   2. Have you ever had surgery on your heart or blood vessels, such as a stent placement, a coronary artery bypass, or surgery on an artery in your head, neck, heart, or legs? No   3. Do you have chest pain with activity? No   4. Do  you have a history of  heart failure? No   5. Do you currently have a cold, bronchitis or symptoms of other infection? No   6. Do you have a cough, shortness of breath, or wheezing? No   7. Do you or anyone in your family have previous history of blood clots? No   8. Do you or does anyone in your family have a serious bleeding problem such as prolonged bleeding following surgeries or cuts? No   9. Have you ever had problems with anemia or been told to take iron pills? No   10. Have you had any abnormal blood loss such as black, tarry or bloody stools, or abnormal vaginal bleeding? No   11. Have you ever had a blood transfusion? No   12. Are you willing to have a blood transfusion if it is medically needed before, during, or after your surgery? Yes   13. Have you or any of your relatives ever had problems with anesthesia? No   14. Do you have sleep apnea, excessive snoring or daytime drowsiness? No   15. Do you have any artifical heart valves or other implanted medical devices like a pacemaker, defibrillator, or continuous glucose monitor? YES - chemo port   15a. What type of device do you have? port   15b. Name of the clinic that manages your device:  [facility]   16. Do you have artificial joints? No   17. Are you allergic to latex? No   18. Is there any chance that you may be pregnant? No     Health Care Directive:  Patient does not have a Health Care Directive or Living Will: Discussed advance care planning with patient; information given to patient to review.    Preoperative Review of :   reviewed - no record of controlled substances prescribed.      Status of Chronic Conditions:  See problem list for active medical problems.  Problems all longstanding and stable, except as noted/documented.  See ROS for pertinent symptoms related to these conditions.    Review of Systems  CONSTITUTIONAL: NEGATIVE for fever, chills, change in weight  INTEGUMENTARY/SKIN: NEGATIVE for worrisome rashes, moles or  lesions  EYES: NEGATIVE for vision changes or irritation  ENT/MOUTH: NEGATIVE for ear, mouth and throat problems  RESP: NEGATIVE for significant cough or SOB  CV: NEGATIVE for chest pain, palpitations or peripheral edema  GI: NEGATIVE for nausea, abdominal pain, heartburn, or change in bowel habits  : NEGATIVE for frequency, dysuria, or hematuria  MUSCULOSKELETAL: NEGATIVE for significant arthralgias or myalgia  NEURO: NEGATIVE for weakness, dizziness or paresthesias  ENDOCRINE: NEGATIVE for temperature intolerance, skin/hair changes  HEME: NEGATIVE for bleeding problems  PSYCHIATRIC: NEGATIVE for changes in mood or affect    Patient Active Problem List    Diagnosis Date Noted    Adverse effect of antineoplastic and immunosuppressive drugs, sequela 02/21/2023     Priority: Medium    Malignant neoplasm of overlapping sites of right breast in female, estrogen receptor positive (H) 02/17/2023     Priority: Medium    Malignant neoplasm involving both nipple and areola of right breast in female, estrogen receptor positive (H) 02/17/2023     Priority: Medium     Added automatically from request for surgery [id number]        History reviewed. No pertinent past medical history.  Past Surgical History:   Procedure Laterality Date    BREAST CYST ASPIRATION      INSERT PORT VASCULAR ACCESS Left 2/22/2023    Procedure: Port Placement;  Surgeon: Maribell Brunner MD;  Location: [facility] OR     Current Outpatient Medications   Medication Sig Dispense Refill    acetaminophen (TYLENOL) 500 MG tablet Take 500-1,000 mg by mouth as needed for mild pain      cyclobenzaprine (FLEXERIL) 5 MG tablet Take 1-2 tablets (5-10 mg) by mouth 3 times daily as needed for muscle spasms or other (back pain) (Patient not taking: Reported on 4/28/2023) 20 tablet 0    dexamethasone (DECADRON) 4 MG tablet Take 2 tablets (8 mg) by mouth daily for 3 days Start on Day 2 of Cycles 1 through 4. 6 tablet 3    lidocaine-prilocaine (EMLA) 2.5-2.5 % external  "cream Apply topically as needed for moderate pain (4-6) Place over port 1 hour before coming to clinic 30 g 1    loratadine (CLARITIN) 10 MG tablet Take 10 mg by mouth daily as needed for allergies      minocycline (MINOCIN) 50 MG capsule Take 50 mg by mouth 2 times daily Take for 3 months (Patient not taking: Reported on 4/28/2023)      ondansetron (ZOFRAN) 8 MG tablet Take 1 tablet (8 mg) by mouth every 8 hours as needed for nausea (vomiting) 30 tablet 2    Probiotic Product (PROBIOTIC PO) Take 1 chew tab by mouth daily      prochlorperazine (COMPAZINE) 10 MG tablet Take 1 tablet (10 mg) by mouth every 6 hours as needed for nausea or vomiting (Patient not taking: Reported on 6/16/2023) 30 tablet 2       Allergies   Allergen Reactions    Seasonal Allergies      Nasal congestion   Watery eyes        Social History     Tobacco Use    Smoking status: Never    Smokeless tobacco: Never   Substance Use Topics    Alcohol use: Not Currently     Family History   Problem Relation Age of Onset    Breast Cancer Mother      History   Drug Use Unknown         Objective     /81   Pulse 119   Temp 98  F (36.7  C) (Tympanic)   Resp 20   Ht 1.676 m (5' 6\")   Wt 92.1 kg (203 lb)   SpO2 96%   BMI 32.77 kg/m      Physical Exam  GENERAL APPEARANCE: healthy, alert and no distress  HENT: ear canals and TM's normal and nose and mouth without ulcers or lesions  RESP: lungs clear to auscultation - no rales, rhonchi or wheezes  CV: regular rate and rhythm, normal S1 S2, no S3 or S4 and no murmur, click or rub   ABDOMEN: soft, nontender, no HSM or masses and bowel sounds normal  NEURO: Normal strength and tone, sensory exam grossly normal, mentation intact and speech normal    Recent Labs   Lab Test 07/14/23  1025 07/07/23  0932 04/14/23  0904 03/31/23  0817 03/17/23  1248 03/02/23  1044   HGB 11.2* 11.0*   < > 11.8   < > 12.5    401   < > 241   < > 358   NA  --   --   --  141  --  138   POTASSIUM  --   --   --  4.0  --  " 4.0   CR  --   --   --  0.64  --  0.61    < > = values in this interval not displayed.        Diagnostics:  Recent Results (from the past 720 hour(s))   Hepatic panel (Albumin, ALT, AST, Bili, Alk Phos, TP)    Collection Time: 06/30/23  9:17 AM   Result Value Ref Range    Protein Total 6.5 6.4 - 8.3 g/dL    Albumin 4.1 3.5 - 5.2 g/dL    Bilirubin Total 0.2 <=1.2 mg/dL    Alkaline Phosphatase 61 35 - 104 U/L    AST 37 0 - 45 U/L    ALT 63 (H) 0 - 50 U/L    Bilirubin Direct <0.20 0.00 - 0.30 mg/dL   CBC with platelets and differential    Collection Time: 06/30/23  9:17 AM   Result Value Ref Range    WBC Count 3.8 (L) 4.0 - 11.0 10e3/uL    RBC Count 3.73 (L) 3.80 - 5.20 10e6/uL    Hemoglobin 11.1 (L) 11.7 - 15.7 g/dL    Hematocrit 34.4 (L) 35.0 - 47.0 %    MCV 92 78 - 100 fL    MCH 29.8 26.5 - 33.0 pg    MCHC 32.3 31.5 - 36.5 g/dL    RDW 14.8 10.0 - 15.0 %    Platelet Count 421 150 - 450 10e3/uL    % Neutrophils 64 %    % Lymphocytes 22 %    % Monocytes 9 %    % Eosinophils 3 %    % Basophils 1 %    % Immature Granulocytes 1 %    NRBCs per 100 WBC 0 <1 /100    Absolute Neutrophils 2.5 1.6 - 8.3 10e3/uL    Absolute Lymphocytes 0.9 0.8 - 5.3 10e3/uL    Absolute Monocytes 0.3 0.0 - 1.3 10e3/uL    Absolute Eosinophils 0.1 0.0 - 0.7 10e3/uL    Absolute Basophils 0.1 0.0 - 0.2 10e3/uL    Absolute Immature Granulocytes 0.0 <=0.4 10e3/uL    Absolute NRBCs 0.0 10e3/uL   Hepatic panel (Albumin, ALT, AST, Bili, Alk Phos, TP)    Collection Time: 07/07/23  9:32 AM   Result Value Ref Range    Protein Total 6.5 6.4 - 8.3 g/dL    Albumin 4.1 3.5 - 5.2 g/dL    Bilirubin Total 0.2 <=1.2 mg/dL    Alkaline Phosphatase 60 35 - 104 U/L    AST 29 0 - 45 U/L    ALT 45 0 - 50 U/L    Bilirubin Direct <0.20 0.00 - 0.30 mg/dL   CBC with platelets and differential    Collection Time: 07/07/23  9:32 AM   Result Value Ref Range    WBC Count 3.6 (L) 4.0 - 11.0 10e3/uL    RBC Count 3.81 3.80 - 5.20 10e6/uL    Hemoglobin 11.0 (L) 11.7 - 15.7 g/dL     Hematocrit 34.9 (L) 35.0 - 47.0 %    MCV 92 78 - 100 fL    MCH 28.9 26.5 - 33.0 pg    MCHC 31.5 31.5 - 36.5 g/dL    RDW 14.7 10.0 - 15.0 %    Platelet Count 401 150 - 450 10e3/uL    % Neutrophils 62 %    % Lymphocytes 21 %    % Monocytes 11 %    % Eosinophils 4 %    % Basophils 1 %    % Immature Granulocytes 1 %    NRBCs per 100 WBC 0 <1 /100    Absolute Neutrophils 2.2 1.6 - 8.3 10e3/uL    Absolute Lymphocytes 0.8 0.8 - 5.3 10e3/uL    Absolute Monocytes 0.4 0.0 - 1.3 10e3/uL    Absolute Eosinophils 0.1 0.0 - 0.7 10e3/uL    Absolute Basophils 0.1 0.0 - 0.2 10e3/uL    Absolute Immature Granulocytes 0.0 <=0.4 10e3/uL    Absolute NRBCs 0.0 10e3/uL   Hepatic panel (Albumin, ALT, AST, Bili, Alk Phos, TP)    Collection Time: 07/14/23 10:25 AM   Result Value Ref Range    Protein Total 6.6 6.4 - 8.3 g/dL    Albumin 4.0 3.5 - 5.2 g/dL    Bilirubin Total 0.2 <=1.2 mg/dL    Alkaline Phosphatase 63 35 - 104 U/L    AST 30 0 - 45 U/L    ALT 44 0 - 50 U/L    Bilirubin Direct <0.20 0.00 - 0.30 mg/dL   CBC with platelets and differential    Collection Time: 07/14/23 10:25 AM   Result Value Ref Range    WBC Count 4.3 4.0 - 11.0 10e3/uL    RBC Count 3.84 3.80 - 5.20 10e6/uL    Hemoglobin 11.2 (L) 11.7 - 15.7 g/dL    Hematocrit 35.0 35.0 - 47.0 %    MCV 91 78 - 100 fL    MCH 29.2 26.5 - 33.0 pg    MCHC 32.0 31.5 - 36.5 g/dL    RDW 14.7 10.0 - 15.0 %    Platelet Count 441 150 - 450 10e3/uL    % Neutrophils 64 %    % Lymphocytes 22 %    % Monocytes 10 %    % Eosinophils 2 %    % Basophils 1 %    % Immature Granulocytes 1 %    NRBCs per 100 WBC 0 <1 /100    Absolute Neutrophils 2.8 1.6 - 8.3 10e3/uL    Absolute Lymphocytes 0.9 0.8 - 5.3 10e3/uL    Absolute Monocytes 0.4 0.0 - 1.3 10e3/uL    Absolute Eosinophils 0.1 0.0 - 0.7 10e3/uL    Absolute Basophils 0.0 0.0 - 0.2 10e3/uL    Absolute Immature Granulocytes 0.0 <=0.4 10e3/uL    Absolute NRBCs 0.0 10e3/uL      No EKG required for low risk surgery (cataract, skin procedure, breast biopsy,  etc).    Revised Cardiac Risk Index (RCRI):  The patient has the following serious cardiovascular risks for perioperative complications:   - No serious cardiac risks = 0 points     RCRI Interpretation: 0 points: Class I (very low risk - 0.4% complication rate)         Signed Electronically by: Mayco Browne PA-C  Copy of this evaluation report is provided to requesting physician.

## 2023-07-26 NOTE — PROGRESS NOTES
[facility]  [address] [facility] [address]  Phone: [phone]  Primary Provider: Suha Post  Pre-op Performing Provider: CLAIRE MOODY      PREOPERATIVE EVALUATION:  Today's date: 7/26/2023    Alicia Iniguez is a 48 year old female who presents for a preoperative evaluation.       No data to display              Surgical Information:  Surgery/Procedure: Bilateral mastectomies; Right sentinel lymph node biopsy with localizer and gamma probe   Surgery Location: [facility] OR  Surgeon:   Surgery Date: 08/09/2023  Time of Surgery: n/a  Where patient plans to recover: At home with family  Fax number for surgical facility: Note does not need to be faxed, will be available electronically in Epic.    Assessment & Plan     The proposed surgical procedure is considered LOW risk.      ICD-10-CM    1. Pre-op exam  Z01.818       2. Malignant neoplasm involving both nipple and areola of right breast in female, estrogen receptor positive (H)  C50.011     Z17.0          Implanted Device:  Port     - No identified additional risk factors other than previously addressed    Antiplatelet or Anticoagulation Medication Instructions:   - Patient is on no antiplatelet or anticoagulation medications.    Additional Medication Instructions:  Patient is on no additional chronic medications    RECOMMENDATION:  APPROVAL GIVEN to proceed with proposed procedure, without further diagnostic evaluation.      Subjective       HPI related to upcoming procedure: history of breast cancer and has gone through chemo and now have bilateral mastectomy performed.           7/26/2023     6:49 AM   Preop Questions   1. Have you ever had a heart attack or stroke? No   2. Have you ever had surgery on your heart or blood vessels, such as a stent placement, a coronary artery bypass, or surgery on an artery in your head, neck, heart, or legs? No   3. Do you have chest pain with activity? No   4. Do  you have a history of  heart failure? No   5. Do you currently have a cold, bronchitis or symptoms of other infection? No   6. Do you have a cough, shortness of breath, or wheezing? No   7. Do you or anyone in your family have previous history of blood clots? No   8. Do you or does anyone in your family have a serious bleeding problem such as prolonged bleeding following surgeries or cuts? No   9. Have you ever had problems with anemia or been told to take iron pills? No   10. Have you had any abnormal blood loss such as black, tarry or bloody stools, or abnormal vaginal bleeding? No   11. Have you ever had a blood transfusion? No   12. Are you willing to have a blood transfusion if it is medically needed before, during, or after your surgery? Yes   13. Have you or any of your relatives ever had problems with anesthesia? No   14. Do you have sleep apnea, excessive snoring or daytime drowsiness? No   15. Do you have any artifical heart valves or other implanted medical devices like a pacemaker, defibrillator, or continuous glucose monitor? YES - chemo port   15a. What type of device do you have? port   15b. Name of the clinic that manages your device:  [facility]   16. Do you have artificial joints? No   17. Are you allergic to latex? No   18. Is there any chance that you may be pregnant? No     Health Care Directive:  Patient does not have a Health Care Directive or Living Will: Discussed advance care planning with patient; information given to patient to review.    Preoperative Review of :   reviewed - no record of controlled substances prescribed.      Status of Chronic Conditions:  See problem list for active medical problems.  Problems all longstanding and stable, except as noted/documented.  See ROS for pertinent symptoms related to these conditions.    Review of Systems  CONSTITUTIONAL: NEGATIVE for fever, chills, change in weight  INTEGUMENTARY/SKIN: NEGATIVE for worrisome rashes, moles or  lesions  EYES: NEGATIVE for vision changes or irritation  ENT/MOUTH: NEGATIVE for ear, mouth and throat problems  RESP: NEGATIVE for significant cough or SOB  CV: NEGATIVE for chest pain, palpitations or peripheral edema  GI: NEGATIVE for nausea, abdominal pain, heartburn, or change in bowel habits  : NEGATIVE for frequency, dysuria, or hematuria  MUSCULOSKELETAL: NEGATIVE for significant arthralgias or myalgia  NEURO: NEGATIVE for weakness, dizziness or paresthesias  ENDOCRINE: NEGATIVE for temperature intolerance, skin/hair changes  HEME: NEGATIVE for bleeding problems  PSYCHIATRIC: NEGATIVE for changes in mood or affect    Patient Active Problem List    Diagnosis Date Noted    Adverse effect of antineoplastic and immunosuppressive drugs, sequela 02/21/2023     Priority: Medium    Malignant neoplasm of overlapping sites of right breast in female, estrogen receptor positive (H) 02/17/2023     Priority: Medium    Malignant neoplasm involving both nipple and areola of right breast in female, estrogen receptor positive (H) 02/17/2023     Priority: Medium     Added automatically from request for surgery [id number]        History reviewed. No pertinent past medical history.  Past Surgical History:   Procedure Laterality Date    BREAST CYST ASPIRATION      INSERT PORT VASCULAR ACCESS Left 2/22/2023    Procedure: Port Placement;  Surgeon: Maribell Brunner MD;  Location: [facility] OR     Current Outpatient Medications   Medication Sig Dispense Refill    acetaminophen (TYLENOL) 500 MG tablet Take 500-1,000 mg by mouth as needed for mild pain      cyclobenzaprine (FLEXERIL) 5 MG tablet Take 1-2 tablets (5-10 mg) by mouth 3 times daily as needed for muscle spasms or other (back pain) (Patient not taking: Reported on 4/28/2023) 20 tablet 0    dexamethasone (DECADRON) 4 MG tablet Take 2 tablets (8 mg) by mouth daily for 3 days Start on Day 2 of Cycles 1 through 4. 6 tablet 3    lidocaine-prilocaine (EMLA) 2.5-2.5 % external  "cream Apply topically as needed for moderate pain (4-6) Place over port 1 hour before coming to clinic 30 g 1    loratadine (CLARITIN) 10 MG tablet Take 10 mg by mouth daily as needed for allergies      minocycline (MINOCIN) 50 MG capsule Take 50 mg by mouth 2 times daily Take for 3 months (Patient not taking: Reported on 4/28/2023)      ondansetron (ZOFRAN) 8 MG tablet Take 1 tablet (8 mg) by mouth every 8 hours as needed for nausea (vomiting) 30 tablet 2    Probiotic Product (PROBIOTIC PO) Take 1 chew tab by mouth daily      prochlorperazine (COMPAZINE) 10 MG tablet Take 1 tablet (10 mg) by mouth every 6 hours as needed for nausea or vomiting (Patient not taking: Reported on 6/16/2023) 30 tablet 2       Allergies   Allergen Reactions    Seasonal Allergies      Nasal congestion   Watery eyes        Social History     Tobacco Use    Smoking status: Never    Smokeless tobacco: Never   Substance Use Topics    Alcohol use: Not Currently     Family History   Problem Relation Age of Onset    Breast Cancer Mother      History   Drug Use Unknown         Objective     /81   Pulse 119   Temp 98  F (36.7  C) (Tympanic)   Resp 20   Ht 1.676 m (5' 6\")   Wt 92.1 kg (203 lb)   SpO2 96%   BMI 32.77 kg/m      Physical Exam  GENERAL APPEARANCE: healthy, alert and no distress  HENT: ear canals and TM's normal and nose and mouth without ulcers or lesions  RESP: lungs clear to auscultation - no rales, rhonchi or wheezes  CV: regular rate and rhythm, normal S1 S2, no S3 or S4 and no murmur, click or rub   ABDOMEN: soft, nontender, no HSM or masses and bowel sounds normal  NEURO: Normal strength and tone, sensory exam grossly normal, mentation intact and speech normal    Recent Labs   Lab Test 07/14/23  1025 07/07/23  0932 04/14/23  0904 03/31/23  0817 03/17/23  1248 03/02/23  1044   HGB 11.2* 11.0*   < > 11.8   < > 12.5    401   < > 241   < > 358   NA  --   --   --  141  --  138   POTASSIUM  --   --   --  4.0  --  " 4.0   CR  --   --   --  0.64  --  0.61    < > = values in this interval not displayed.        Diagnostics:  Recent Results (from the past 720 hour(s))   Hepatic panel (Albumin, ALT, AST, Bili, Alk Phos, TP)    Collection Time: 06/30/23  9:17 AM   Result Value Ref Range    Protein Total 6.5 6.4 - 8.3 g/dL    Albumin 4.1 3.5 - 5.2 g/dL    Bilirubin Total 0.2 <=1.2 mg/dL    Alkaline Phosphatase 61 35 - 104 U/L    AST 37 0 - 45 U/L    ALT 63 (H) 0 - 50 U/L    Bilirubin Direct <0.20 0.00 - 0.30 mg/dL   CBC with platelets and differential    Collection Time: 06/30/23  9:17 AM   Result Value Ref Range    WBC Count 3.8 (L) 4.0 - 11.0 10e3/uL    RBC Count 3.73 (L) 3.80 - 5.20 10e6/uL    Hemoglobin 11.1 (L) 11.7 - 15.7 g/dL    Hematocrit 34.4 (L) 35.0 - 47.0 %    MCV 92 78 - 100 fL    MCH 29.8 26.5 - 33.0 pg    MCHC 32.3 31.5 - 36.5 g/dL    RDW 14.8 10.0 - 15.0 %    Platelet Count 421 150 - 450 10e3/uL    % Neutrophils 64 %    % Lymphocytes 22 %    % Monocytes 9 %    % Eosinophils 3 %    % Basophils 1 %    % Immature Granulocytes 1 %    NRBCs per 100 WBC 0 <1 /100    Absolute Neutrophils 2.5 1.6 - 8.3 10e3/uL    Absolute Lymphocytes 0.9 0.8 - 5.3 10e3/uL    Absolute Monocytes 0.3 0.0 - 1.3 10e3/uL    Absolute Eosinophils 0.1 0.0 - 0.7 10e3/uL    Absolute Basophils 0.1 0.0 - 0.2 10e3/uL    Absolute Immature Granulocytes 0.0 <=0.4 10e3/uL    Absolute NRBCs 0.0 10e3/uL   Hepatic panel (Albumin, ALT, AST, Bili, Alk Phos, TP)    Collection Time: 07/07/23  9:32 AM   Result Value Ref Range    Protein Total 6.5 6.4 - 8.3 g/dL    Albumin 4.1 3.5 - 5.2 g/dL    Bilirubin Total 0.2 <=1.2 mg/dL    Alkaline Phosphatase 60 35 - 104 U/L    AST 29 0 - 45 U/L    ALT 45 0 - 50 U/L    Bilirubin Direct <0.20 0.00 - 0.30 mg/dL   CBC with platelets and differential    Collection Time: 07/07/23  9:32 AM   Result Value Ref Range    WBC Count 3.6 (L) 4.0 - 11.0 10e3/uL    RBC Count 3.81 3.80 - 5.20 10e6/uL    Hemoglobin 11.0 (L) 11.7 - 15.7 g/dL     Hematocrit 34.9 (L) 35.0 - 47.0 %    MCV 92 78 - 100 fL    MCH 28.9 26.5 - 33.0 pg    MCHC 31.5 31.5 - 36.5 g/dL    RDW 14.7 10.0 - 15.0 %    Platelet Count 401 150 - 450 10e3/uL    % Neutrophils 62 %    % Lymphocytes 21 %    % Monocytes 11 %    % Eosinophils 4 %    % Basophils 1 %    % Immature Granulocytes 1 %    NRBCs per 100 WBC 0 <1 /100    Absolute Neutrophils 2.2 1.6 - 8.3 10e3/uL    Absolute Lymphocytes 0.8 0.8 - 5.3 10e3/uL    Absolute Monocytes 0.4 0.0 - 1.3 10e3/uL    Absolute Eosinophils 0.1 0.0 - 0.7 10e3/uL    Absolute Basophils 0.1 0.0 - 0.2 10e3/uL    Absolute Immature Granulocytes 0.0 <=0.4 10e3/uL    Absolute NRBCs 0.0 10e3/uL   Hepatic panel (Albumin, ALT, AST, Bili, Alk Phos, TP)    Collection Time: 07/14/23 10:25 AM   Result Value Ref Range    Protein Total 6.6 6.4 - 8.3 g/dL    Albumin 4.0 3.5 - 5.2 g/dL    Bilirubin Total 0.2 <=1.2 mg/dL    Alkaline Phosphatase 63 35 - 104 U/L    AST 30 0 - 45 U/L    ALT 44 0 - 50 U/L    Bilirubin Direct <0.20 0.00 - 0.30 mg/dL   CBC with platelets and differential    Collection Time: 07/14/23 10:25 AM   Result Value Ref Range    WBC Count 4.3 4.0 - 11.0 10e3/uL    RBC Count 3.84 3.80 - 5.20 10e6/uL    Hemoglobin 11.2 (L) 11.7 - 15.7 g/dL    Hematocrit 35.0 35.0 - 47.0 %    MCV 91 78 - 100 fL    MCH 29.2 26.5 - 33.0 pg    MCHC 32.0 31.5 - 36.5 g/dL    RDW 14.7 10.0 - 15.0 %    Platelet Count 441 150 - 450 10e3/uL    % Neutrophils 64 %    % Lymphocytes 22 %    % Monocytes 10 %    % Eosinophils 2 %    % Basophils 1 %    % Immature Granulocytes 1 %    NRBCs per 100 WBC 0 <1 /100    Absolute Neutrophils 2.8 1.6 - 8.3 10e3/uL    Absolute Lymphocytes 0.9 0.8 - 5.3 10e3/uL    Absolute Monocytes 0.4 0.0 - 1.3 10e3/uL    Absolute Eosinophils 0.1 0.0 - 0.7 10e3/uL    Absolute Basophils 0.0 0.0 - 0.2 10e3/uL    Absolute Immature Granulocytes 0.0 <=0.4 10e3/uL    Absolute NRBCs 0.0 10e3/uL      No EKG required for low risk surgery (cataract, skin procedure, breast biopsy,  etc).    Revised Cardiac Risk Index (RCRI):  The patient has the following serious cardiovascular risks for perioperative complications:   - No serious cardiac risks = 0 points     RCRI Interpretation: 0 points: Class I (very low risk - 0.4% complication rate)         Signed Electronically by: Mayco Browne PA-C  Copy of this evaluation report is provided to requesting physician.

## 2023-08-03 ENCOUNTER — ANCILLARY PROCEDURE (OUTPATIENT)
Dept: MAMMOGRAPHY | Facility: CLINIC | Age: 48
End: 2023-08-03
Attending: SPECIALIST
Payer: COMMERCIAL

## 2023-08-03 ENCOUNTER — TELEPHONE (OUTPATIENT)
Dept: SURGERY | Facility: CLINIC | Age: 48
End: 2023-08-03
Payer: COMMERCIAL

## 2023-08-03 DIAGNOSIS — C50.811 MALIGNANT NEOPLASM OF OVERLAPPING SITES OF RIGHT BREAST IN FEMALE, ESTROGEN RECEPTOR POSITIVE (H): ICD-10-CM

## 2023-08-03 DIAGNOSIS — Z17.0 MALIGNANT NEOPLASM OF OVERLAPPING SITES OF RIGHT BREAST IN FEMALE, ESTROGEN RECEPTOR POSITIVE (H): ICD-10-CM

## 2023-08-03 PROCEDURE — A4648 IMPLANTABLE TISSUE MARKER: HCPCS

## 2023-08-03 PROCEDURE — 272N000431 US BREAST LOCALIZER PLACEMENT 1ST LESION RIGHT

## 2023-08-03 PROCEDURE — 250N000009 HC RX 250: Performed by: SPECIALIST

## 2023-08-03 PROCEDURE — 999N000065 MA POST PROCEDURE RIGHT

## 2023-08-03 RX ADMIN — LIDOCAINE HYDROCHLORIDE 10 ML: 10 INJECTION, SOLUTION INFILTRATION; PERINEURAL at 09:58

## 2023-08-03 NOTE — TELEPHONE ENCOUNTER
Returned patient's phone call with questions she had regarding her upcoming surgery.  LMOM for her to return my call if she still has questions.

## 2023-08-08 NOTE — OR NURSING
Called Radiology-Lymph node injection appt. changed to 1000 per Nuc. med. (was 1100, time of surgery)

## 2023-08-09 ENCOUNTER — HOSPITAL ENCOUNTER (OUTPATIENT)
Dept: NUCLEAR MEDICINE | Facility: HOSPITAL | Age: 48
Discharge: HOME | End: 2023-08-09
Attending: SPECIALIST
Payer: COMMERCIAL

## 2023-08-09 ENCOUNTER — ANESTHESIA (OUTPATIENT)
Dept: SURGERY | Facility: HOSPITAL | Age: 48
End: 2023-08-09
Payer: COMMERCIAL

## 2023-08-09 ENCOUNTER — HOSPITAL ENCOUNTER (OUTPATIENT)
Facility: HOSPITAL | Age: 48
Discharge: HOME | End: 2023-08-09
Attending: SPECIALIST | Admitting: SPECIALIST
Payer: COMMERCIAL

## 2023-08-09 ENCOUNTER — ANESTHESIA EVENT (OUTPATIENT)
Dept: SURGERY | Facility: HOSPITAL | Age: 48
End: 2023-08-09
Payer: COMMERCIAL

## 2023-08-09 VITALS
DIASTOLIC BLOOD PRESSURE: 69 MMHG | RESPIRATION RATE: 16 BRPM | WEIGHT: 199.3 LBS | BODY MASS INDEX: 32.17 KG/M2 | SYSTOLIC BLOOD PRESSURE: 126 MMHG | HEART RATE: 96 BPM | TEMPERATURE: 97.2 F | OXYGEN SATURATION: 96 %

## 2023-08-09 DIAGNOSIS — C50.811 MALIGNANT NEOPLASM OF OVERLAPPING SITES OF RIGHT BREAST IN FEMALE, ESTROGEN RECEPTOR POSITIVE (H): ICD-10-CM

## 2023-08-09 DIAGNOSIS — Z17.0 MALIGNANT NEOPLASM OF OVERLAPPING SITES OF RIGHT BREAST IN FEMALE, ESTROGEN RECEPTOR POSITIVE (H): ICD-10-CM

## 2023-08-09 DIAGNOSIS — Z17.0 MALIGNANT NEOPLASM INVOLVING BOTH NIPPLE AND AREOLA OF RIGHT BREAST IN FEMALE, ESTROGEN RECEPTOR POSITIVE (H): Primary | ICD-10-CM

## 2023-08-09 DIAGNOSIS — C50.011 MALIGNANT NEOPLASM INVOLVING BOTH NIPPLE AND AREOLA OF RIGHT BREAST IN FEMALE, ESTROGEN RECEPTOR POSITIVE (H): Primary | ICD-10-CM

## 2023-08-09 PROBLEM — R07.9 CHEST PAIN, UNSPECIFIED: Status: ACTIVE | Noted: 2023-08-09

## 2023-08-09 PROBLEM — E78.5 HYPERLIPIDEMIA: Status: ACTIVE | Noted: 2023-08-09

## 2023-08-09 PROCEDURE — 250N000009 HC RX 250: Performed by: PLASTIC SURGERY

## 2023-08-09 PROCEDURE — 343N000001 HC RX 343: Performed by: SPECIALIST

## 2023-08-09 PROCEDURE — 88342 IMHCHEM/IMCYTCHM 1ST ANTB: CPT | Mod: 26 | Performed by: PATHOLOGY

## 2023-08-09 PROCEDURE — 250N000013 HC RX MED GY IP 250 OP 250 PS 637: Performed by: ANESTHESIOLOGY

## 2023-08-09 PROCEDURE — 250N000009 HC RX 250: Performed by: ANESTHESIOLOGY

## 2023-08-09 PROCEDURE — 250N000025 HC SEVOFLURANE, PER MIN: Performed by: SPECIALIST

## 2023-08-09 PROCEDURE — 88307 TISSUE EXAM BY PATHOLOGIST: CPT | Mod: 26 | Performed by: PATHOLOGY

## 2023-08-09 PROCEDURE — 88305 TISSUE EXAM BY PATHOLOGIST: CPT | Mod: TC | Performed by: SPECIALIST

## 2023-08-09 PROCEDURE — 250N000011 HC RX IP 250 OP 636: Performed by: PLASTIC SURGERY

## 2023-08-09 PROCEDURE — 250N000012 HC RX MED GY IP 250 OP 636 PS 637: Performed by: ANESTHESIOLOGY

## 2023-08-09 PROCEDURE — 88360 TUMOR IMMUNOHISTOCHEM/MANUAL: CPT | Mod: 26 | Performed by: PATHOLOGY

## 2023-08-09 PROCEDURE — 258N000003 HC RX IP 258 OP 636: Performed by: ANESTHESIOLOGY

## 2023-08-09 PROCEDURE — 88305 TISSUE EXAM BY PATHOLOGIST: CPT | Mod: 26 | Performed by: PATHOLOGY

## 2023-08-09 PROCEDURE — 710N000012 HC RECOVERY PHASE 2, PER MINUTE: Performed by: SPECIALIST

## 2023-08-09 PROCEDURE — 19303 MAST SIMPLE COMPLETE: CPT | Mod: LT | Performed by: SPECIALIST

## 2023-08-09 PROCEDURE — 88331 PATH CONSLTJ SURG 1 BLK 1SPC: CPT | Mod: TC | Performed by: SPECIALIST

## 2023-08-09 PROCEDURE — 360N000076 HC SURGERY LEVEL 3, PER MIN: Performed by: SPECIALIST

## 2023-08-09 PROCEDURE — 88331 PATH CONSLTJ SURG 1 BLK 1SPC: CPT | Mod: 26 | Performed by: PATHOLOGY

## 2023-08-09 PROCEDURE — L8699 PROSTHETIC IMPLANT NOS: HCPCS | Performed by: SPECIALIST

## 2023-08-09 PROCEDURE — A9520 TC99 TILMANOCEPT DIAG 0.5MCI: HCPCS | Performed by: SPECIALIST

## 2023-08-09 PROCEDURE — 250N000011 HC RX IP 250 OP 636: Performed by: ANESTHESIOLOGY

## 2023-08-09 PROCEDURE — 88332 PATH CONSLTJ SURG EA ADD BLK: CPT | Mod: 26 | Performed by: PATHOLOGY

## 2023-08-09 PROCEDURE — 272N000001 HC OR GENERAL SUPPLY STERILE: Performed by: SPECIALIST

## 2023-08-09 PROCEDURE — 999N000141 HC STATISTIC PRE-PROCEDURE NURSING ASSESSMENT: Performed by: SPECIALIST

## 2023-08-09 PROCEDURE — 88309 TISSUE EXAM BY PATHOLOGIST: CPT | Mod: 26 | Performed by: PATHOLOGY

## 2023-08-09 PROCEDURE — 250N000011 HC RX IP 250 OP 636: Performed by: SPECIALIST

## 2023-08-09 PROCEDURE — 19307 MAST MOD RAD: CPT | Mod: RT | Performed by: SPECIALIST

## 2023-08-09 PROCEDURE — 370N000017 HC ANESTHESIA TECHNICAL FEE, PER MIN: Performed by: SPECIALIST

## 2023-08-09 PROCEDURE — 250N000013 HC RX MED GY IP 250 OP 250 PS 637: Performed by: PHYSICIAN ASSISTANT

## 2023-08-09 PROCEDURE — 710N000009 HC RECOVERY PHASE 1, LEVEL 1, PER MIN: Performed by: SPECIALIST

## 2023-08-09 PROCEDURE — 38792 RA TRACER ID OF SENTINL NODE: CPT

## 2023-08-09 PROCEDURE — C9290 INJ, BUPIVACAINE LIPOSOME: HCPCS | Performed by: PLASTIC SURGERY

## 2023-08-09 DEVICE — GRAFT ALLODERM 21.5X10.7CM CHARGE PER SQ CM= 164 UNITS: Type: IMPLANTABLE DEVICE | Site: BREAST | Status: FUNCTIONAL

## 2023-08-09 DEVICE — NATRELLE TE SMOOTH 133S-MV-13-T (US)
Type: IMPLANTABLE DEVICE | Site: BREAST | Status: FUNCTIONAL
Brand: NATRELLE 133S TISSUE EXPANDERS

## 2023-08-09 RX ORDER — HYDROMORPHONE HYDROCHLORIDE 1 MG/ML
0.4 INJECTION, SOLUTION INTRAMUSCULAR; INTRAVENOUS; SUBCUTANEOUS EVERY 5 MIN PRN
Status: DISCONTINUED | OUTPATIENT
Start: 2023-08-09 | End: 2023-08-09 | Stop reason: HOSPADM

## 2023-08-09 RX ORDER — FENTANYL CITRATE 50 UG/ML
25 INJECTION, SOLUTION INTRAMUSCULAR; INTRAVENOUS EVERY 5 MIN PRN
Status: DISCONTINUED | OUTPATIENT
Start: 2023-08-09 | End: 2023-08-09 | Stop reason: HOSPADM

## 2023-08-09 RX ORDER — CEPHALEXIN 500 MG/1
500 CAPSULE ORAL 4 TIMES DAILY
Qty: 28 CAPSULE | Refills: 0 | Status: SHIPPED | OUTPATIENT
Start: 2023-08-09 | End: 2023-08-16

## 2023-08-09 RX ORDER — OXYCODONE HYDROCHLORIDE 5 MG/1
10 TABLET ORAL
Status: DISCONTINUED | OUTPATIENT
Start: 2023-08-09 | End: 2023-08-09 | Stop reason: HOSPADM

## 2023-08-09 RX ORDER — SCOLOPAMINE TRANSDERMAL SYSTEM 1 MG/1
1 PATCH, EXTENDED RELEASE TRANSDERMAL
Status: DISCONTINUED | OUTPATIENT
Start: 2023-08-09 | End: 2023-08-09 | Stop reason: HOSPADM

## 2023-08-09 RX ORDER — SENNOSIDES 8.6 MG
1 TABLET ORAL DAILY
COMMUNITY
End: 2023-11-13

## 2023-08-09 RX ORDER — SODIUM CHLORIDE, SODIUM LACTATE, POTASSIUM CHLORIDE, CALCIUM CHLORIDE 600; 310; 30; 20 MG/100ML; MG/100ML; MG/100ML; MG/100ML
INJECTION, SOLUTION INTRAVENOUS CONTINUOUS
Status: DISCONTINUED | OUTPATIENT
Start: 2023-08-09 | End: 2023-08-09 | Stop reason: HOSPADM

## 2023-08-09 RX ORDER — OXYCODONE HYDROCHLORIDE 5 MG/1
5 TABLET ORAL
Status: DISCONTINUED | OUTPATIENT
Start: 2023-08-09 | End: 2023-08-09

## 2023-08-09 RX ORDER — ACETAMINOPHEN 325 MG/1
975 TABLET ORAL ONCE
Status: COMPLETED | OUTPATIENT
Start: 2023-08-09 | End: 2023-08-09

## 2023-08-09 RX ORDER — MAGNESIUM SULFATE 4 G/50ML
4 INJECTION INTRAVENOUS ONCE
Status: COMPLETED | OUTPATIENT
Start: 2023-08-09 | End: 2023-08-09

## 2023-08-09 RX ORDER — NALOXONE HYDROCHLORIDE 0.4 MG/ML
0.4 INJECTION, SOLUTION INTRAMUSCULAR; INTRAVENOUS; SUBCUTANEOUS
Status: DISCONTINUED | OUTPATIENT
Start: 2023-08-09 | End: 2023-08-09 | Stop reason: HOSPADM

## 2023-08-09 RX ORDER — CEFAZOLIN SODIUM/WATER 2 G/20 ML
2 SYRINGE (ML) INTRAVENOUS SEE ADMIN INSTRUCTIONS
Status: DISCONTINUED | OUTPATIENT
Start: 2023-08-09 | End: 2023-08-09 | Stop reason: HOSPADM

## 2023-08-09 RX ORDER — ONDANSETRON 2 MG/ML
INJECTION INTRAMUSCULAR; INTRAVENOUS PRN
Status: DISCONTINUED | OUTPATIENT
Start: 2023-08-09 | End: 2023-08-09

## 2023-08-09 RX ORDER — HYDROMORPHONE HYDROCHLORIDE 1 MG/ML
0.2 INJECTION, SOLUTION INTRAMUSCULAR; INTRAVENOUS; SUBCUTANEOUS EVERY 5 MIN PRN
Status: DISCONTINUED | OUTPATIENT
Start: 2023-08-09 | End: 2023-08-09 | Stop reason: HOSPADM

## 2023-08-09 RX ORDER — PROPOFOL 10 MG/ML
INJECTION, EMULSION INTRAVENOUS PRN
Status: DISCONTINUED | OUTPATIENT
Start: 2023-08-09 | End: 2023-08-09

## 2023-08-09 RX ORDER — OXYCODONE HYDROCHLORIDE 5 MG/1
5-10 TABLET ORAL EVERY 4 HOURS PRN
Qty: 20 TABLET | Refills: 0 | Status: SHIPPED | OUTPATIENT
Start: 2023-08-09 | End: 2023-10-18

## 2023-08-09 RX ORDER — FENTANYL CITRATE 50 UG/ML
INJECTION, SOLUTION INTRAMUSCULAR; INTRAVENOUS PRN
Status: DISCONTINUED | OUTPATIENT
Start: 2023-08-09 | End: 2023-08-09

## 2023-08-09 RX ORDER — ONDANSETRON 2 MG/ML
4 INJECTION INTRAMUSCULAR; INTRAVENOUS EVERY 30 MIN PRN
Status: DISCONTINUED | OUTPATIENT
Start: 2023-08-09 | End: 2023-08-09 | Stop reason: HOSPADM

## 2023-08-09 RX ORDER — OXYCODONE HYDROCHLORIDE 5 MG/1
5 TABLET ORAL
Status: DISCONTINUED | OUTPATIENT
Start: 2023-08-09 | End: 2023-08-09 | Stop reason: HOSPADM

## 2023-08-09 RX ORDER — IBUPROFEN 200 MG
600 TABLET ORAL
Status: DISCONTINUED | OUTPATIENT
Start: 2023-08-09 | End: 2023-08-09

## 2023-08-09 RX ORDER — LIDOCAINE HYDROCHLORIDE 10 MG/ML
INJECTION, SOLUTION INFILTRATION; PERINEURAL PRN
Status: DISCONTINUED | OUTPATIENT
Start: 2023-08-09 | End: 2023-08-09

## 2023-08-09 RX ORDER — DEXAMETHASONE SODIUM PHOSPHATE 10 MG/ML
INJECTION, SOLUTION INTRAMUSCULAR; INTRAVENOUS PRN
Status: DISCONTINUED | OUTPATIENT
Start: 2023-08-09 | End: 2023-08-09

## 2023-08-09 RX ORDER — ONDANSETRON 4 MG/1
4 TABLET, ORALLY DISINTEGRATING ORAL EVERY 30 MIN PRN
Status: DISCONTINUED | OUTPATIENT
Start: 2023-08-09 | End: 2023-08-09 | Stop reason: HOSPADM

## 2023-08-09 RX ORDER — HYDROXYZINE HYDROCHLORIDE 25 MG/1
25 TABLET, FILM COATED ORAL
Status: DISCONTINUED | OUTPATIENT
Start: 2023-08-09 | End: 2023-08-09

## 2023-08-09 RX ORDER — LIDOCAINE 40 MG/G
CREAM TOPICAL
Status: DISCONTINUED | OUTPATIENT
Start: 2023-08-09 | End: 2023-08-09 | Stop reason: HOSPADM

## 2023-08-09 RX ORDER — IBUPROFEN 200 MG
600 TABLET ORAL
Status: COMPLETED | OUTPATIENT
Start: 2023-08-09 | End: 2023-08-09

## 2023-08-09 RX ORDER — FENTANYL CITRATE 50 UG/ML
50 INJECTION, SOLUTION INTRAMUSCULAR; INTRAVENOUS EVERY 5 MIN PRN
Status: DISCONTINUED | OUTPATIENT
Start: 2023-08-09 | End: 2023-08-09 | Stop reason: HOSPADM

## 2023-08-09 RX ORDER — HYDROMORPHONE HYDROCHLORIDE 1 MG/ML
0.4 INJECTION, SOLUTION INTRAMUSCULAR; INTRAVENOUS; SUBCUTANEOUS ONCE
Status: COMPLETED | OUTPATIENT
Start: 2023-08-09 | End: 2023-08-09

## 2023-08-09 RX ORDER — NALOXONE HYDROCHLORIDE 0.4 MG/ML
0.2 INJECTION, SOLUTION INTRAMUSCULAR; INTRAVENOUS; SUBCUTANEOUS
Status: DISCONTINUED | OUTPATIENT
Start: 2023-08-09 | End: 2023-08-09 | Stop reason: HOSPADM

## 2023-08-09 RX ORDER — CEFAZOLIN SODIUM/WATER 2 G/20 ML
2 SYRINGE (ML) INTRAVENOUS
Status: COMPLETED | OUTPATIENT
Start: 2023-08-09 | End: 2023-08-09

## 2023-08-09 RX ORDER — KETAMINE HYDROCHLORIDE 10 MG/ML
INJECTION INTRAMUSCULAR; INTRAVENOUS PRN
Status: DISCONTINUED | OUTPATIENT
Start: 2023-08-09 | End: 2023-08-09

## 2023-08-09 RX ORDER — APREPITANT 40 MG/1
40 CAPSULE ORAL ONCE
Status: COMPLETED | OUTPATIENT
Start: 2023-08-09 | End: 2023-08-09

## 2023-08-09 RX ORDER — ONDANSETRON 4 MG/1
4 TABLET, ORALLY DISINTEGRATING ORAL
Status: DISCONTINUED | OUTPATIENT
Start: 2023-08-09 | End: 2023-08-09

## 2023-08-09 RX ORDER — OXYCODONE HYDROCHLORIDE 5 MG/1
5 TABLET ORAL
Status: COMPLETED | OUTPATIENT
Start: 2023-08-09 | End: 2023-08-09

## 2023-08-09 RX ORDER — PROPOFOL 10 MG/ML
INJECTION, EMULSION INTRAVENOUS CONTINUOUS PRN
Status: DISCONTINUED | OUTPATIENT
Start: 2023-08-09 | End: 2023-08-09

## 2023-08-09 RX ORDER — GLYCOPYRROLATE 0.2 MG/ML
INJECTION, SOLUTION INTRAMUSCULAR; INTRAVENOUS PRN
Status: DISCONTINUED | OUTPATIENT
Start: 2023-08-09 | End: 2023-08-09

## 2023-08-09 RX ORDER — ACETAMINOPHEN 650 MG/1
650 SUPPOSITORY RECTAL ONCE
Status: COMPLETED | OUTPATIENT
Start: 2023-08-09 | End: 2023-08-09

## 2023-08-09 RX ADMIN — HYDROMORPHONE HYDROCHLORIDE 0.4 MG: 1 INJECTION, SOLUTION INTRAMUSCULAR; INTRAVENOUS; SUBCUTANEOUS at 14:13

## 2023-08-09 RX ADMIN — PHENYLEPHRINE HYDROCHLORIDE 100 MCG: 10 INJECTION INTRAVENOUS at 12:24

## 2023-08-09 RX ADMIN — MIDAZOLAM 2 MG: 1 INJECTION INTRAMUSCULAR; INTRAVENOUS at 10:36

## 2023-08-09 RX ADMIN — HYDROMORPHONE HYDROCHLORIDE 0.2 MG: 1 INJECTION, SOLUTION INTRAMUSCULAR; INTRAVENOUS; SUBCUTANEOUS at 13:53

## 2023-08-09 RX ADMIN — FENTANYL CITRATE 25 MCG: 50 INJECTION, SOLUTION INTRAMUSCULAR; INTRAVENOUS at 13:23

## 2023-08-09 RX ADMIN — PHENYLEPHRINE HYDROCHLORIDE 100 MCG: 10 INJECTION INTRAVENOUS at 11:35

## 2023-08-09 RX ADMIN — PROPOFOL 160 MG: 10 INJECTION, EMULSION INTRAVENOUS at 10:47

## 2023-08-09 RX ADMIN — FENTANYL CITRATE 100 MCG: 50 INJECTION, SOLUTION INTRAMUSCULAR; INTRAVENOUS at 10:47

## 2023-08-09 RX ADMIN — GLYCOPYRROLATE 0.2 MG: 0.2 INJECTION INTRAMUSCULAR; INTRAVENOUS at 10:47

## 2023-08-09 RX ADMIN — APREPITANT 40 MG: 40 CAPSULE ORAL at 10:30

## 2023-08-09 RX ADMIN — SUGAMMADEX 200 MG: 100 INJECTION, SOLUTION INTRAVENOUS at 12:54

## 2023-08-09 RX ADMIN — ROCURONIUM BROMIDE 10 MG: 50 INJECTION, SOLUTION INTRAVENOUS at 12:19

## 2023-08-09 RX ADMIN — FENTANYL CITRATE 25 MCG: 50 INJECTION, SOLUTION INTRAMUSCULAR; INTRAVENOUS at 13:11

## 2023-08-09 RX ADMIN — SODIUM CHLORIDE, POTASSIUM CHLORIDE, SODIUM LACTATE AND CALCIUM CHLORIDE: 600; 310; 30; 20 INJECTION, SOLUTION INTRAVENOUS at 10:21

## 2023-08-09 RX ADMIN — MAGNESIUM SULFATE HEPTAHYDRATE 4 G: 80 INJECTION, SOLUTION INTRAVENOUS at 10:34

## 2023-08-09 RX ADMIN — ONDANSETRON 4 MG: 2 INJECTION INTRAMUSCULAR; INTRAVENOUS at 12:24

## 2023-08-09 RX ADMIN — PHENYLEPHRINE HYDROCHLORIDE 100 MCG: 10 INJECTION INTRAVENOUS at 12:13

## 2023-08-09 RX ADMIN — DEXAMETHASONE SODIUM PHOSPHATE 10 MG: 10 INJECTION, SOLUTION INTRAMUSCULAR; INTRAVENOUS at 10:47

## 2023-08-09 RX ADMIN — SODIUM CHLORIDE, POTASSIUM CHLORIDE, SODIUM LACTATE AND CALCIUM CHLORIDE: 600; 310; 30; 20 INJECTION, SOLUTION INTRAVENOUS at 13:10

## 2023-08-09 RX ADMIN — ROCURONIUM BROMIDE 50 MG: 50 INJECTION, SOLUTION INTRAVENOUS at 10:47

## 2023-08-09 RX ADMIN — FENTANYL CITRATE 25 MCG: 50 INJECTION, SOLUTION INTRAMUSCULAR; INTRAVENOUS at 13:30

## 2023-08-09 RX ADMIN — Medication 2 G: at 10:36

## 2023-08-09 RX ADMIN — ROCURONIUM BROMIDE 10 MG: 50 INJECTION, SOLUTION INTRAVENOUS at 11:53

## 2023-08-09 RX ADMIN — HYDROMORPHONE HYDROCHLORIDE 0.4 MG: 1 INJECTION, SOLUTION INTRAMUSCULAR; INTRAVENOUS; SUBCUTANEOUS at 15:00

## 2023-08-09 RX ADMIN — IBUPROFEN 600 MG: 200 TABLET, FILM COATED ORAL at 15:03

## 2023-08-09 RX ADMIN — ONDANSETRON 4 MG: 2 INJECTION INTRAMUSCULAR; INTRAVENOUS at 13:07

## 2023-08-09 RX ADMIN — FENTANYL CITRATE 25 MCG: 50 INJECTION, SOLUTION INTRAMUSCULAR; INTRAVENOUS at 13:18

## 2023-08-09 RX ADMIN — SCOPALAMINE 1 PATCH: 1 PATCH, EXTENDED RELEASE TRANSDERMAL at 10:31

## 2023-08-09 RX ADMIN — SODIUM CHLORIDE, POTASSIUM CHLORIDE, SODIUM LACTATE AND CALCIUM CHLORIDE: 600; 310; 30; 20 INJECTION, SOLUTION INTRAVENOUS at 11:42

## 2023-08-09 RX ADMIN — OXYCODONE HYDROCHLORIDE 5 MG: 5 TABLET ORAL at 17:23

## 2023-08-09 RX ADMIN — KETAMINE HYDROCHLORIDE 50 MG: 10 INJECTION, SOLUTION INTRAMUSCULAR; INTRAVENOUS at 10:47

## 2023-08-09 RX ADMIN — PHENYLEPHRINE HYDROCHLORIDE 0.4 MCG/KG/MIN: 10 INJECTION INTRAVENOUS at 12:31

## 2023-08-09 RX ADMIN — LIDOCAINE HYDROCHLORIDE 50 MG: 10 INJECTION, SOLUTION INFILTRATION; PERINEURAL at 10:47

## 2023-08-09 RX ADMIN — HYDROMORPHONE HYDROCHLORIDE 0.2 MG: 1 INJECTION, SOLUTION INTRAMUSCULAR; INTRAVENOUS; SUBCUTANEOUS at 14:02

## 2023-08-09 RX ADMIN — ACETAMINOPHEN 975 MG: 325 TABLET ORAL at 10:30

## 2023-08-09 RX ADMIN — PROPOFOL 200 MCG/KG/MIN: 10 INJECTION, EMULSION INTRAVENOUS at 10:51

## 2023-08-09 RX ADMIN — TILMANOCEPT 0.51 MILLICURIE: KIT at 10:07

## 2023-08-09 RX ADMIN — ROCURONIUM BROMIDE 30 MG: 50 INJECTION, SOLUTION INTRAVENOUS at 11:19

## 2023-08-09 RX ADMIN — PHENYLEPHRINE HYDROCHLORIDE 100 MCG: 10 INJECTION INTRAVENOUS at 11:53

## 2023-08-09 ASSESSMENT — ACTIVITIES OF DAILY LIVING (ADL)
ADLS_ACUITY_SCORE: 18

## 2023-08-09 NOTE — ANESTHESIA POSTPROCEDURE EVALUATION
Patient: Alicia Iniguez    Procedure: Procedure(s):  Bilateral mastectomies;  Right sentinel lymph node biopsy with localizer and gamma probe  BILATERAL BREAST RECONSTRUCTION WITH TISSUE EXPANDERS       Anesthesia Type:  General    Note:  Disposition: Inpatient   Postop Pain Control: Uneventful            Sign Out: Well controlled pain   PONV: No   Neuro/Psych: Uneventful            Sign Out: Acceptable/Baseline neuro status   Airway/Respiratory: Uneventful            Sign Out: Acceptable/Baseline resp. status   CV/Hemodynamics: Uneventful            Sign Out: Acceptable CV status; No obvious hypovolemia; No obvious fluid overload   Other NRE:    DID A NON-ROUTINE EVENT OCCUR?            Last vitals:  Vitals Value Taken Time   /73 08/09/23 1415   Temp 36.2  C (97.2  F) 08/09/23 1415   Pulse 109 08/09/23 1426   Resp 29 08/09/23 1426   SpO2 95 % 08/09/23 1426   Vitals shown include unvalidated device data.    Electronically Signed By: Hermelindo Brian MD  August 9, 2023  2:30 PM

## 2023-08-09 NOTE — ANESTHESIA PREPROCEDURE EVALUATION
Anesthesia Pre-Procedure Evaluation    Patient: Alicia Iniguez   MRN: [id number] : 1975        Procedure : Procedure(s):  Bilateral mastectomies;  Right sentinel lymph node biopsy with localizer and gamma probe  BILATERAL BREAST RECONSTRUCTION WITH TISSUE EXPANDERS          Past Medical History:   Diagnosis Date    Breast cancer (H)     right      Past Surgical History:   Procedure Laterality Date    AS REMOVAL OF OVARY(S) Left     BREAST CYST ASPIRATION      INSERT PORT VASCULAR ACCESS Left 2023    Procedure: Port Placement;  Surgeon: Maribell Brunner MD;  Location: [facility]      Allergies   Allergen Reactions    Seasonal Allergies      Nasal congestion   Watery eyes      Social History     Tobacco Use    Smoking status: Former     Types: Cigarettes     Quit date:      Years since quittin.6    Smokeless tobacco: Never   Substance Use Topics    Alcohol use: Not Currently      Wt Readings from Last 1 Encounters:   23 90.4 kg (199 lb 4.8 oz)        Anesthesia Evaluation            ROS/MED HX  ENT/Pulmonary:  - neg pulmonary ROS     Neurologic:  - neg neurologic ROS     Cardiovascular:  - neg cardiovascular ROS     METS/Exercise Tolerance: >4 METS    Hematologic:  - neg hematologic  ROS     Musculoskeletal:  - neg musculoskeletal ROS     GI/Hepatic:  - neg GI/hepatic ROS     Renal/Genitourinary:  - neg Renal ROS     Endo:  - neg endo ROS     Psychiatric/Substance Use:  - neg psychiatric ROS     Infectious Disease:  - neg infectious disease ROS     Malignancy:   (+) Malignancy, History of Breast.    Other:  - neg other ROS          Physical Exam    Airway  airway exam normal      Mallampati: II       Respiratory Devices and Support         Dental  no notable dental history         Cardiovascular   cardiovascular exam normal       Rhythm and rate: regular and normal     Pulmonary   pulmonary exam normal        breath sounds clear to auscultation           OUTSIDE LABS:  CBC:   Lab  Results   Component Value Date    WBC 4.3 07/14/2023    WBC 3.6 (L) 07/07/2023    HGB 11.2 (L) 07/14/2023    HGB 11.0 (L) 07/07/2023    HCT 35.0 07/14/2023    HCT 34.9 (L) 07/07/2023     07/14/2023     07/07/2023     BMP:   Lab Results   Component Value Date     03/31/2023     03/02/2023    POTASSIUM 4.0 03/31/2023    POTASSIUM 4.0 03/02/2023    CHLORIDE 105 03/31/2023    CHLORIDE 106 03/02/2023    CO2 25 03/31/2023    CO2 22 03/02/2023    BUN 8.8 03/31/2023    BUN 7.5 03/02/2023    CR 0.64 03/31/2023    CR 0.61 03/02/2023     (H) 03/31/2023     (H) 03/02/2023     COAGS: No results found for: PTT, INR, FIBR  POC: No results found for: BGM, HCG, HCGS  HEPATIC:   Lab Results   Component Value Date    ALBUMIN 4.0 07/14/2023    PROTTOTAL 6.6 07/14/2023    ALT 44 07/14/2023    AST 30 07/14/2023    ALKPHOS 63 07/14/2023    BILITOTAL 0.2 07/14/2023     OTHER:   Lab Results   Component Value Date    KURT 9.2 03/31/2023       Anesthesia Plan    ASA Status:  2       Anesthesia Type: General.     - Airway: ETT   Induction: Intravenous, Propofol.   Maintenance: Balanced.   Techniques and Equipment:     - Lines/Monitors: 2nd IV     Consents    Anesthesia Plan(s) and associated risks, benefits, and realistic alternatives discussed. Questions answered and patient/representative(s) expressed understanding.     - Discussed:     - Discussed with:  Patient      - Extended Intubation/Ventilatory Support Discussed: No.      - Patient is DNR/DNI Status: No     Use of blood products discussed: No .     Postoperative Care    Pain management: IV analgesics, Oral pain medications, Multi-modal analgesia.   PONV prophylaxis: Ondansetron (or other 5HT-3), Dexamethasone or Solumedrol     Comments:    Other Comments: GAETT  Antiemetics  Magnesium cassia op infusion  Ketamine after induction  Tylenol po pre op  Scopolamine  Emend  TIVA or background propofol  Toradol at the end f the case if okay with the  surgeon              Hermelindo Brian MD

## 2023-08-09 NOTE — OP NOTE
Operative Note    Name:  Alicia Iniguez  PCP:  Suha Post  Procedure Date:  8/9/2023       Procedure:  Procedure(s):  Bilateral mastectomies;  Right sentinel lymph node biopsy with localizer and gamma probe  BILATERAL BREAST RECONSTRUCTION WITH TISSUE EXPANDERS     Pre-Procedure Diagnosis:  Malignant neoplasm of overlapping sites of right breast in female, estrogen receptor positive (H) [C50.811, Z17.0]  Status post bilateral mastectomy [Z90.13]  Personal history of malignant neoplasm of breast [Z85.3]     Post-Procedure Diagnosis:    Same     Surgeon(s):  Maribell Brunner MD Bruce, Ned Christopher, PA-C Heinrich, Cherrie A, MD      Assistant: Claudia Dorantes PA-C       Anesthesia Type:  General       Findings:  Extranodal metastatic disease in the sentinel lymph node tissue.    Operative Report:    The patient was properly identified and brought to the operating suite where she was placed in the supine position.  Gen. anesthesia was administered.  The patient was prepped draped in a sterile fashion.    An elliptical shaped incision encompassing the right nipple was made and skin flaps raised superiorly to the pectoralis major, medial to the sternum, inferior to the rectus sheath and laterally to the lateral chest wall.  The breast tissue was swept  from the chest wall using electrocautery.  I then used the localizer probe to identify the lymph node that had previously been biopsied to be positive.  This was removed with electrocautery and once I confirm there is no radioactive activity, it was sent for frozen section.  I then used the gamma probe and found what seemed to be a cluster of lymph nodes superior to that lymph node that had previously removed.  It was not 1 clear-cut node.  It was more of a cluster of nodes.  I removed a portion that had the activity and sent these for frozen section also.  There is nothing else palpable of concern in the axilla specifically and no further activity with  the gamma probe.  The wound was inspected for hemostasis.  The wound was packed open with a saline soaked gauze.  Attention was then turned the left where a mirror incision was made and skin flaps raised superior to the pectoralis nucleus, medially to the sternum, inferior to the rectus sheath and lateral to lateral chest wall.  The breast tissue was swept from the chest wall  using electrocautery.    The wound was inspected for hemostasis.  It was then packed open with a saline soaked gauze.  The procedure was turned over to Dr. Hernandez for immediate reconstruction.  For she completed her reconstruction pathology did report there appeared to be some extranodal metastatic disease in the second lymph node specimen.  Nothing in the initial 1.  Because this appeared to be extranodal disease I felt doing a more completed lymph node dissection was prudent.  I was able to grasp the chanel tissue and remove the level 1 and 2 lymph nodes primarily with electrocautery.  Branches of the axillary vein and artery were clipped with hemoclips.  The intercostal brachial nerve as well as the long thoracic nerve and the thoracodorsal bundle were left completely intact.  I did not have to go all the way up to level 3 nodes there was nothing palpable of concern in the axilla.  I mostly removed the tissue around that previous clump of lymph nodes that I had labeled as sentinel lymph node #2.  I then assisted with the closure for Dr. Hernandez closing the subcutaneous tissues with 3-0 PDS, the immediate subcutaneous tissues with 4-0 Monocryl and the skin with strata fix.  Sterile dressings were placed.  The patient tolerated procedure well.  My assistant, Claudia Dorantes assisted with exposure and retraction through my initial portion of the case.    Estimated Blood Loss:   25 cc    Specimens:    ID Type Source Tests Collected by Time Destination   1 : Right breast stitch lateral 623 g Tissue Breast, Right SURGICAL PATHOLOGY EXAM Myesha  Maribell WELLS MD 8/9/2023 11:30 AM    2 : Raymond lymph node # 1 Tissue Lymph Node(s) Raymond, Breast, Right SURGICAL PATHOLOGY EXAM Maribell Brunner MD 8/9/2023 11:37 AM    3 : Right sentinel node # 2 Tissue Lymph Node(s) Raymond, Breast, Right SURGICAL PATHOLOGY EXAM Maribell Brunner MD 8/9/2023 11:40 AM    4 : Left breast stitch lateral 533gms Tissue Breast, Left SURGICAL PATHOLOGY EXAM Maribell Brunner MD 8/9/2023 12:06 PM    5 : Additional Right Axillary Lymph Node Tissue Lymph Node(s), Axillary, Right SURGICAL PATHOLOGY EXAM Maribell Brunner MD 8/9/2023 12:33 PM            Drains:   Closed/Suction Drain 1 Left Breast Bulb 15 Malian (Active)       Closed/Suction Drain 2 Right Breast Bulb 15 Malian (Active)       Complications:    None    Maribell Brunner MD     Date: 8/9/2023  Time: 12:53 PM  Raymond Node Biopsy for Breast Cancer - Right  Operation performed with curative intent Yes   Tracer(s) used to identify sentinel nodes in the upfront surgery (non-neoadjuvant) setting N/A   Tracer(s) used to identify sentinel nodes in the neoadjuvant setting Radioactive tracer   All nodes (colored or non-colored) present at the end of a dye-filled lymphatic channel were removed N/A   All significantly radioactive nodes were removed Yes   All palpably suspicious nodes were removed N/A   Biopsy-proven positive nodes marked with clips prior to chemotherapy were identified and removed Yes

## 2023-08-09 NOTE — ANESTHESIA CARE TRANSFER NOTE
Patient: Alicia Iniguez    Procedure: Procedure(s):  Bilateral mastectomies;  Right sentinel lymph node biopsy with localizer and gamma probe  BILATERAL BREAST RECONSTRUCTION WITH TISSUE EXPANDERS       Diagnosis: Malignant neoplasm of overlapping sites of right breast in female, estrogen receptor positive (H) [C50.811, Z17.0]  Status post bilateral mastectomy [Z90.13]  Personal history of malignant neoplasm of breast [Z85.3]  Diagnosis Additional Information: No value filed.    Anesthesia Type:   General     Note:    Oropharynx: oropharynx clear of all foreign objects and spontaneously breathing  Level of Consciousness: awake  Oxygen Supplementation: face mask  Level of Supplemental Oxygen (L/min / FiO2): 10  Independent Airway: airway patency satisfactory and stable  Dentition: dentition unchanged  Vital Signs Stable: post-procedure vital signs reviewed and stable  Report to RN Given: handoff report given  Patient transferred to: PACU    Handoff Report: Identifed the Patient, Identified the Reponsible Provider, Reviewed the pertinent medical history, Discussed the surgical course, Reviewed Intra-OP anesthesia mangement and issues during anesthesia, Set expectations for post-procedure period and Allowed opportunity for questions and acknowledgement of understanding      Vitals:  Vitals Value Taken Time   /57 08/09/23 1304   Temp 96.4 F 8/09/23 1304   Pulse 103 08/09/23 1304   Resp 18 08/09/23 1304   SpO2 100 % 08/09/23 1304   Vitals shown include unvalidated device data.    Electronically Signed By: KYLEIGH Chapman CRNA  August 9, 2023  1:06 PM

## 2023-08-09 NOTE — INTERVAL H&P NOTE
"I have reviewed the surgical (or preoperative) H&P that is linked to this encounter, and examined the patient. There are no significant changes    Clinical Conditions Present on Arrival:  Clinically Significant Risk Factors Present on Admission                  # Obesity: Estimated body mass index is 32.17 kg/m  as calculated from the following:    Height as of 7/26/23: 1.676 m (5' 6\").    Weight as of this encounter: 90.4 kg (199 lb 4.8 oz).       "

## 2023-08-09 NOTE — OP NOTE
Preoperative Diagnosis:  Right breast cancer  Postoperative Diagnosis:  Right breast cancer    Indications for Procedure:    Procedure:    bilateral breast reconstruction with tissue expanders  Bilateral alloderm sling    Indications for procedure:  Patient with new right breast cancer planning to undergo bilateral mastectomy has opted for bilateral tissue expander reconstruction.  Will most likely need postop radiation    Operative Surgeon: Becky Hernandez MD    First Assistant:  Jessee BOO, [place] assisted in retraction suturing to decrease and facilitate intraoperative time, NO residents were available.    Description of Procedure:  Patient was identified in the preoperative area, taken to the operating room, after and adequate level of general anesthesia was obtained, she underwent bilateral mastectomies by DR Brunner please see separate dictated note.  We proceede to our portion of the procedure.  The wounds were irrigated with antibiotic irrigation, exparel was injected along the chest wall.  We took tissue expanders wrapped the lower pole with alloderm medium contour.  Sutured the tabs in along the chest wall, filled the expanders to 200cc of fluid.  Two 15 round drains were placed.  We then closed with 3-0 PDS deep sutures, followed by 3-0 monocryl deep dermal and 4-0 monocryl subcuticular.  Prineo, soft dresing and a bra were placed.  Pt was awaken and taken to recovery room.    EBL 5cc    Drains and packs were correct

## 2023-08-09 NOTE — ANESTHESIA PROCEDURE NOTES
Airway       Patient location during procedure: OR       Procedure Start/Stop Times: 8/9/2023 10:51 AM  Staff -        Anesthesiologist:  Hermelindo Brian MD       CRNA: Zohra Chiang APRN CRNA       Performed By: CRNA  Consent for Airway        Urgency: elective  Indications and Patient Condition       Indications for airway management: cassia-procedural       Induction type:intravenous       Mask difficulty assessment: 2 - vent by mask + OA or adjuvant +/- NMBA    Final Airway Details       Final airway type: endotracheal airway       Successful airway: ETT - single and Oral  Endotracheal Airway Details        ETT size (mm): 7.0       Cuffed: yes       Cuff volume (mL): 7       Successful intubation technique: direct laryngoscopy       DL Blade Type: Krueger 2       Grade View of Cords: 1       Adjucts: stylet       Position: Right       Measured from: gums/teeth       Secured at (cm): 22       Bite block used: None    Post intubation assessment        Placement verified by: capnometry, equal breath sounds and chest rise        Number of attempts at approach: 1       Number of other approaches attempted: 0       Secured with: silk tape       Ease of procedure: easy       Dentition: Intact and Unchanged       Dental guard used and removed. Dental Guard Type: Proguard Red.    Medication(s) Administered   Medication Administration Time: 8/9/2023 10:51 AM

## 2023-08-16 ENCOUNTER — ONCOLOGY VISIT (OUTPATIENT)
Dept: ONCOLOGY | Facility: HOSPITAL | Age: 48
End: 2023-08-16
Attending: INTERNAL MEDICINE
Payer: COMMERCIAL

## 2023-08-16 VITALS
WEIGHT: 196.7 LBS | SYSTOLIC BLOOD PRESSURE: 144 MMHG | DIASTOLIC BLOOD PRESSURE: 76 MMHG | BODY MASS INDEX: 31.75 KG/M2 | OXYGEN SATURATION: 100 % | RESPIRATION RATE: 16 BRPM | HEART RATE: 109 BPM | TEMPERATURE: 98.5 F

## 2023-08-16 DIAGNOSIS — C50.811 MALIGNANT NEOPLASM OF OVERLAPPING SITES OF RIGHT BREAST IN FEMALE, ESTROGEN RECEPTOR POSITIVE (H): Primary | ICD-10-CM

## 2023-08-16 DIAGNOSIS — Z17.0 MALIGNANT NEOPLASM OF OVERLAPPING SITES OF RIGHT BREAST IN FEMALE, ESTROGEN RECEPTOR POSITIVE (H): Primary | ICD-10-CM

## 2023-08-16 PROCEDURE — 99213 OFFICE O/P EST LOW 20 MIN: CPT | Performed by: INTERNAL MEDICINE

## 2023-08-16 PROCEDURE — 99215 OFFICE O/P EST HI 40 MIN: CPT | Performed by: INTERNAL MEDICINE

## 2023-08-16 RX ORDER — DIAZEPAM 5 MG
1 TABLET ORAL EVERY 6 HOURS PRN
COMMUNITY
Start: 2023-08-09 | End: 2023-11-13

## 2023-08-16 ASSESSMENT — PAIN SCALES - GENERAL: PAINLEVEL: MILD PAIN (2)

## 2023-08-16 NOTE — PROGRESS NOTES
[facility]ology [facility] On[facility] Progress Note    Patient: Alicia Iniguez  MRN: [id number]  Date of Service: Aug 16, 2023          Reason for Visit    No chief complaint on file.      Assessment and Plan     Cancer Staging   Malignant neoplasm of overlapping sites of right breast in female, estrogen receptor positive (H)  Staging form: Breast, AJCC 8th Edition  - Clinical: Stage IIA (cT3, cN2, cM0, G2, ER+, RI+, HER2-) - Signed by Shanelle Weaver APRN CNP on 3/1/2023    1.  Locally Advanced Breast cancer, right side, stage IIa, T3 N2 M0, grade 2, ER/RI positive, HER2/janine negative: Status post neoadjuvant chemotherapy with dose dense Adriamycin and Cytoxan for 4 cycles, followed by weekly Taxol.     Tolerated pretty well without whole lot of side effects.  Some peripheral neuropathy and some elevated liver function tests.  Underwent bilateral mastectomy on 8/9/2023.  I reviewed the surgical path report in detail today.  She did have significant residual disease as expected with minimum treatment effect.  Residual disease measured 5.6 x 3.2 x 2.7 cm in greatest dimensions.  Grade 2.  There was evidence of dermal infiltration with focal invasion of epidermis.  There was focal lymphovascular invasion within the dermal connective tissue.  No evidence of DCIS.  Margins were all negative.  Out of 20 lymph nodes examined 2 were positive for macrometastasis (sentinel lymph nodes).  No evidence of extranodal extension.  ypT3, ypN1a.     Reviewed further management in this setting.  Due to grossly positive lymph nodes and large tumor, she would benefit from adjuvant radiation.  We will refer her to radiation oncology.      From the medical oncology standpoint due to her hormone receptor positivity she will definitely need endocrine therapy and considering that the tumor is more than 5 cm in size and with positive lymph nodes I think she would also qualify for adjuvant CDK 4/6 inhibitor based on Dolores WAYNE and  LAYNE trials.  Currently abemaciclib is approved in this setting.  However ribociclib could also be considered.  The clinical trials have shown improvement in disease-free survival with both of these medications.  However overall survival data is still immature.  Since she was perimenopausal probably would also recommend ovarian suppression with an AI rather than tamoxifen.    I reviewed side effects and complications associated with this strategy.  We will have to start adjuvant endocrine therapy and targeted therapy after she finishes radiation anyway.    We will see her back in 1 month.        ECOG Performance    0 - Independent    Distress Screening (within last 30 days)    1. How concerned are you about your ability to eat? : 0  2. How concerned are you about unintended weight loss or your current weight? : 0  3. How concerned are you about feeling depressed or very sad? : 0  4. How concerned are you about feeling anxious or very scared? : 5  5. Do you struggle with the loss of meaning and tiago in your life? : Not at all  6. How concerned are you about work and home life issues that may be affected by your cancer? : 0  7. How concerned are you about knowing what resources are available to help you? : 0  8. Do you currently have what you would describe as Advent or spiritual struggles?            : Not at all       Pain       Problem List    Patient Active Problem List   Diagnosis    Malignant neoplasm of overlapping sites of right breast in female, estrogen receptor positive (H)    Malignant neoplasm involving both nipple and areola of right breast in female, estrogen receptor positive (H)    Adverse effect of antineoplastic and immunosuppressive drugs, sequela    Chest pain, unspecified    Hyperlipidemia        ______________________________________________________________________________    History of Present Illness    Oncology history:  DIAGNOSIS: Breast cancer, right side, patient had a palpable lump  with some nipple and skin changes.  She was also found to have lymphadenopathy in the axilla.  Patient had an ultrasound and mammogram that showed a 4.1 cm tumor with axillary lymphadenopathy  Biopsy was done January 31 and it showed invasive ductal carcinoma.  Grade 2.  ER OH positive and HER2/janine negative.  Biopsy was done of the lymph node as well and it was positive.    TREATMENT: Patient has started neoadjuvant chemotherapy with dose dense Adriamycin and Cytoxan for 4 cycles and then 12 weekly Taxol. Today is week 10 of Taxol.     INTERIM HISTORY:   48-year-old female with right-sided locally advanced breast cancer status post neoadjuvant chemotherapy with AC followed by T who recently underwent bilateral mastectomy is here to discuss further management for the same.    She finished neoadjuvant chemotherapy with AC followed by T with minimum side effects.  Had some peripheral neuropathy symptoms along with LFT elevation during the treatment.  Recently underwent bilateral mastectomy.  Is recovering well from surgery.  Still has drain in.    Review of Systems    Pertinent items are noted in HPI.    Past History    Past Medical History:   Diagnosis Date    Breast cancer (H)     right       PHYSICAL EXAM  LMP 03/20/2023 (Approximate)     GENERAL: no acute distress. Cooperative in conversation.  MUSCULOSKELETAL: no bilateral leg swelling  NEURO: non focal. Alert and oriented x3.   PSYCH: within normal limits. No depression or anxiety.  SKIN: exposed skin is dry intact.     Lab Results    Recent Results (from the past 168 hour(s))   Surgical Pathology Exam   Result Value Ref Range    Case Report       Surgical Pathology Report                         Case: [id number]                                  Authorizing Provider:  Maribell Brunner MD         Collected:           08/09/2023 11:30 AM          Ordering Location:     [facility]      Received:            08/09/2023 11:55 AM                                  [facility]                                                               Pathologist:           Ata Abebe MD                                                        Intraop:               Ata Abebe MD                                                        Specimens:   A) - Breast, Right, Right breast stitch lateral 623 g                                               B) - Lymph Node(s) [place], Breast, Right, [place] lymph node # 1                                 C) - Lymph Node(s) [place], Breast, Right, Right sentinel node # 2                                 D) - Breast, Left, Left breast stitch lateral 533gms                                                 E) - Lymph Node(s), Axillary, Right, Additional Right Axillary Lymph Node                  Addendum       This addendum is issued in order to report the result of an immunohistochemical stain.  The result is as follows:    Slide A11:  E-cadherin: Positive, consistent with ductal differentiation    The original diagnosis is unchanged.    All controls stain appropriately.        Final Diagnosis       A) RIGHT BREAST, MASTECTOMY:  -INVASIVE DUCTAL CARCINOMA (5.6 x 3.2 x 2.7 IN GREATEST DIMENSIONS)  -TUMOR EXTENSIVELY INFILTRATES THE DERMAL CONNECTIVE TISSUE AND FOCALLY INVADES OVERLYING EPIDERMIS  -FOCAL LYMPHOVASCULAR INVASION IS IDENTIFIED WITHIN THE DERMAL CONNECTIVE TISSUE  -NO EVIDENCE OF DUCTAL CARCINOMA IN SITU  -HISTOLOGICALLY UNREMARKABLE SURGICAL MARGINS; TUMOR IS LOCATED APPROXIMATELY 0.5 MM FROM THE ANTEROMEDIAL CUTANEOUS MARGIN  -BIOPSY SITE CHANGES  -ONE LYMPH NODE; NEGATIVE FOR MALIGNANCY (0/1)    B) RIGHT BREAST SENTINEL LYMPH NODE #1, BIOPSY:  -TWO LYMPH NODES; NEGATIVE FOR MALIGNANCY (0/2)    C) RIGHT BREAST SENTINEL LYMPH NODE #2, BIOPSY:  -METASTATIC CARCINOMA INVOLVING TWO LYMPH NODES OF TWO EXAMINED (2/2)  -NO EVIDENCE OF EXTRACAPSULAR EXTENSION    D) LEFT BREAST, MASTECTOMY:  -FIBROADENOMA  -DILATED CYSTS WITH FOCAL  EVIDENCE OF RUPTURE, CHRONIC INFLAMMATION  -CALCIFICATIONS IDENTIFIED IN AREAS OF NONPROLIFERATIVE FIBROCYSTIC CHANGE  -FIBROADENOMATOUS CHANGES  -BENIGN NIPPLE AND SKIN  -HISTOLOGICALLY UNREMARKABLE SURGICAL MARGINS    E) RIGHT AXILLARY LYMPH NODES, DISSECTION:  -FIFTEEN LYMPH NODES; NEGATIVE FOR MALIGNANCY (0/15)  -DERMATOPATHIC LYMPHADENOPATHY        Comment       Please see case [id number], [facility], significant for invasive carcinoma of the right breast with metastasis to an axillary lymph node.    A substantial amount of viable tumor is present (at least 90% of the mass).  Numerous neoplastic ducts are slightly contracted or distorted, and these alterations most likely reflect mild treatment effect.        Synoptic Checklist       INVASIVE CARCINOMA OF THE BREAST: Resection   INVASIVE CARCINOMA OF THE BREAST: COMPLETE EXCISION - All Specimens   8th Edition - Protocol posted: 12/14/2022      SPECIMEN      Procedure:    Total mastectomy       Specimen Laterality:    Right       TUMOR      Tumor Site:    Central and subareolar.       Histologic Type:    Invasive carcinoma of no special type (ductal)       Histologic Grade (Gurpreet Histologic Score):            Glandular (Acinar) / Tubular Differentiation:    Score 3         Nuclear Pleomorphism:    Score 2         Mitotic Rate:    Score 1         Overall Grade:    Grade 2 (scores of 6 or 7)       Tumor Size:    Greatest dimension of largest invasive focus (Millimeters): 56 mm        Additional Dimension (Millimeters):    32 mm        Additional Dimension (Millimeters):    27 mm      Ductal Carcinoma In Situ (DCIS):    Present         :    Negative for extensive intraductal component (EIC)         Size (Extent) of DCIS:    Cannot be determined: Scattered small foci of DCIS are present.         Architectural Patterns:    Paget disease (DCIS involving nipple skin)         Architectural Patterns:    Cribriform          Architectural Patterns:    Papillary         Architectural Patterns:    Solid         Nuclear Grade:    Grade II (intermediate)         Necrosis:    Not identified         Number of Blocks with DCIS:    8         Number of Blocks Examined:    29       Tumor Extent:    Skin is present and involved         Skin Invasion:    Carcinoma directly invades into the dermis or epidermis without skin ulceration (this does not change the T classification)         Skin Satellite Foci:    Satellite foci not identified     Lymphatic and / or Vascular Invasion:    Present       :    Focal     Dermal Lymphovascular Invasion:    Present     Microcalcifications:    Present in invasive carcinoma     Microcalcifications:    Present in non-neoplastic tissue     Treatment Effect in the Breast:    Probable or definite response to presurgical therapy in the invasive carcinoma     Treatment Effect in the Lymph Nodes:    No definite response to presurgical therapy in metastatic carcinoma       MARGINS    Margin Status for Invasive Carcinoma:    All margins negative for invasive carcinoma       Distance from Invasive Carcinoma to Closest Margin:    0.5 mm      Closest Margin(s) to Invasive Carcinoma:    Anteromedial cutaneous margin, lower inner quadrant.       Distance from Invasive Carcinoma to Anterior Margin:    0.5 mm      Distance from Invasive Carcinoma to Posterior Margin:    Greater than: 10 mm      Distance from Invasive Carcinoma to Superior Margin:    Greater than: 10 mm      Distance from Invasive Carcinoma to Inferior Margin:    Greater than: 10 mm      Distance from Invasive Carcinoma to Medial Margin:    Greater than: 10 mm      Distance from Invasive Carcinoma to Lateral Margin:    Greater than: 10 mm    Margin Status for DCIS:    All margins negative for DCIS       Distance from DCIS to Closest Margin:    Greater than: 5 mm      Closest Margin(s) to DCIS:    Anteromedial margin, lower inner quadrant.       Distance from DCIS  to Posterior Margin:    Greater than: 10 mm      Distance from DCIS to Superior Margin:    Greater than: 10 mm      Distance from DCIS to Inferior Margin:    Greater than: 10 mm      Distance from DCIS to Medial Margin:    Greater than: 10 mm      Distance from DCIS to Lateral Margin:    Greater than: 10 mm      REGIONAL LYMPH NODES    Regional Lymph Node Status:          :    Tumor present in regional lymph node(s)         Number of Lymph Nodes with Macrometastases:    2         Number of Lymph Nodes with Micrometastases:    0         Size of Largest Walter Metastatic Deposit:    3 mm        Extranodal Extension:    Not identified       Total Number of Lymph Nodes Examined (sentinel and non-sentinel):    20       Number of [place] Nodes Examined:    4       pTNM CLASSIFICATION (AJCC 8th Edition)      Reporting of pT, pN, and (when applicable) pM categories is based on information available to the pathologist at the time the report is issued. As per the AJCC (Chapter 1, 8th Ed.) it is the managing physician s responsibility to establish the final pathologic stage based upon all pertinent information, including but potentially not limited to this pathology report.    Modified Classification:    y     pT Category:    pT3     pN Category:    pN1a     N Suffix:    (sn)       ADDITIONAL FINDINGS    Additional Findings:    Biopsy site changes.       Clinical Information       Procedure:  Bilateral mastectomies; - Bilateral  Right sentinel lymph node biopsy with localizer and gamma probe - Right  BILATERAL BREAST RECONSTRUCTION WITH TISSUE EXPANDERS - Bilateral  Pre-op Diagnosis: Malignant neoplasm of overlapping sites of right breast in female, estrogen receptor positive (H) [C50.811, Z17.0]  Status post bilateral mastectomy [Z90.13]  Personal history of malignant neoplasm of breast [Z85.3]  Post-op Diagnosis: C50.811, Z17.0 - Malignant neoplasm of overlapping sites of right breast in female, estrogen receptor positive  (H) [ICD-10-CM]  Z90.13 - Status post bilateral mastectomy [ICD-10-CM]  Z85.3 - Personal history of malignant neoplasm of breast [ICD-10-CM]      Intraoperative Consultation       B(2). Lymph Node(s) [place], Breast, Right, [place] lymph node # 1:  BFS1, BFS2, BFS3:  No tumor seen.  Multiple tissue blocks frozen.  Reported to Dr. Brunner at 12:25 pm on 8/9/2023.    [facility]  C(3). Lymph Node(s) [place], Breast, Right, Right sentinel node # 2:  CFS1, CFS2, CFS3:  Positive for carcinoma.  Multiple tissue blocks frozen.    Reported to Dr. Brunner at 12:25 on 8/9/2023.    [facility]      Gross Description       A(1). Breast, Right, Right breast stitch lateral 623 g:  Received unfixed, labeled with the patient's name and right breast, is an oriented, 623 gram, 21.5 cm from medial to lateral, 17.6 cm from superior to inferior and 5.5 cm from anterior to posterior product of a right breast mastectomy.  There is a black stitch designating the lateral margin, per the surgeon.  The specimen consists of yellow-white to pink fibrofatty tissue which is anteriorly surfaced by an 8.6 x 5.3 cm, tan-white, wrinkled skin ellipse.  The ellipse has a central, palpably firm, 1.2 x 0.2 cm, slightly everted nipple.  The skin surrounding the nipple displays a white-pink to gray, probably dense and glistening at presentation extending from the 4-7 to 11 o'clock position.  There is slightly unusual presentation extends to the nearest 7:00 margin.  There is 1.2 cm of unremarkable tissue between of this area and the margin.  No additional, suspicious cutaneous lesions are identified upon inspection of the tan-white, wrinkled and faintly hairbearing ellipse.  The specimen is inked black on the deep-posterior margin, blue on the soft tissue superior to the ellipse and green on the soft tissue inferior to the ellipse.  Specimen is serially sectioned from lateral to medial.    Sectioning subjacent to the skin displays a 5.6 x 3.2 x 2.7 cm, white-pink to  yellow-gray, partially indurated and granular nodule.  This nodule extends from medial to lateral, superior to inferior and anterior to posterior, respectively.  The nodule is predominantly identified in the subareolar tissue and appears to involve the inferior aspect of the nipple and surrounding skin.  This lesion extends to 0.5 cm of the inferior-anterior margin, 1.3 cm from the superior-anterior margin, 4.7 cm from the medial margin, 5.8 cm from the lateral margin, 4.9 cm from the inferior margin, 8.9 cm from the superior margin and is 1.6 cm from the deep-posterior margin.  The tumor is abutted, laterally, by dense bands of fibrous tissue and multiple, minute to 0.7 cm, tan-pink, cloudy fluid-filled cysts.    The remaining cut surface displays a predominantly homogeneous, yellow lobulated fat which occupies approximately 70% of the specimen.  There are additional, thin to focally dense bands of fibrous tissue with interspersed, minute to 0.3 cm cysts identified.  No additional indurated or suspicious granular lesions are identified.  There is a 0.4 cm, tan-pink lymph node identified on the lateral aspect of the specimen.  RS-30C    SUMMARY OF CASSETTES:  A1) Most medial aspect of palpably firm skin; A2, A3-A5, A6-A8, A9, A10-A12, A13, A14-A15, A16-A17) Representative from a single cross-section from medial to lateral to include tumor to nearest anterior margins and skin/nipple; A18-A20) Representative central tumor; A21) Tumor to deep-posterior margin; A22-A23) Lateral aspect of tumor with abutting cysts; A24) Unremarkable upper inner quadrant; A25) Unremarkable upper outer quadrant; A26) Unremarkable lower outer quadrant; A27) Unremarkable lower inner quadrant; A28) Unremarkable upper inner quadrant; A29) Unremarkable mid breast; A30) Single lymph node bisected.    NOTE:  The specimen is collected at 1130 and placed into formalin at 1153, 08/09/2023.    NIKOLAY Corona:aka-d  B(2). Lymph Node(s) [place],  "Breast, Right, [place] lymph node # 1:  The specimen is received fresh with proper patient identification labeled \"sentinel lymph node #1.\"  The specimen consists of a 4.2 x 2.5 x 1.5 cm portion of tan-yellow, soft and lobulated adipose tissue with two tan-pink, semifirm lymph nodes 1.0-2.5 cm in greatest dimension.  The largest lymph node is remarkable for a 0.8 x 0.5 x 0.5 cm tan-pink and smooth biopsy site containing a radiofrequency device.  The cut surfaces of both lymph nodes are tan-pink, smooth and homogenous with no grossly identifiable masses or lesions.  Both lymph nodes are sectioned and entirely submitted for frozen section diagnosis.  The remnants of the larger lymph node are entirely submitted in cassettes BFS1-BFS2 and the smaller lymph node is submitted in cassette BFS3.  C(3). Lymph Node(s) [place], Breast, Right, Right sentinel node # 2:  The specimen is received fresh with proper patient identification labeled \"right sentinel lymph node #2.\"  The specimen consists of a 4.0 x 3.5 x 2.0 cm portion of tan-yellow, soft and lobulated adipose tissue with two tan-pink, semifirm lymph nodes (1.0-3.0 cm in greatest dimension).  Sectioning through the lymph nodes shows tan-pink, smooth homogenous cut surfaces with no grossly identifiable mass or lesions.  Both lymph nodes are sectioned and entirely submitted for frozen section diagnosis.  The frozen section remnant of the larger lymph node is submitted entirely in cassettes CFS1-CFS2.  The frozen section remnant of the smaller lymph node is submitted entirely in cassette CFS3.  NIKOLAY Pinto:aka-d  D(4). Breast, Left, Left breast stitch lateral 533gms:  Received unfixed, labeled with the patient's name and left breast, is an oriented, 533 gram, 18.7 cm from medial to lateral, 17.6 cm from superior to inferior and 4.8 cm from anterior to posterior product of a left breast mastectomy.  There is a black suture designating the lateral margin, per the " surgeon.  The specimen consists of yellow-white to pink fibrofatty tissue which is anteriorly surfaced by a 7.0 x 4.5 cm, tan-white, wrinkled and hairbearing skin ellipse.  The ellipse has a central 1.4 x 0.6 cm, everted nipple.  A tan-brown 0.4 x 0.2 cm macule is identified 0.9 cm superior-lateral from the nipple.  This macula is 1.2 cm from the nearest superior-lateral cutaneous margin.  No additional cutaneous lesions are identified.  The specimen is inked black on the deep-posterior margin, blue on the soft tissue superior to the ellipse and green on the soft tissue inferior to the ellipse.  The specimen is serially sectioned from medial to lateral.    Sectioning through the medial aspect of the specimen displays a well-demarcated, white-pink, rubbery and whorled 1.3 x 1.1 x 0.8 cm nodule. The nodule is 1.1 cm from the anterior margin, 2.3 cm from the posterior margin and is 2.9 cm from the medial margin.  No indurated lesions or suspicious areas of hemorrhagic necrosis are identified upon sectioning of the nodule.    The remaining cut surface displays approximately 60%, homogeneous, yellow lobulated fat.  There are intervening bands of thin to focally dense fibrous tissue with multiple, interspersed, 0.1 cm to 0.6 cm, yellow-pink, cloudy fluid filled cysts.  No excrescences, indurated lesions or suspicious granular foci are identified.  No lymph nodes are identified upon sectioning or palpation.    SUMMARY OF CASSETTES:  D1-D2) Nipple; D3) Cutaneous lesion to nearest superior margin; D4-D6) Medially located, whorled and rubbery nodule; D7) Nearest medial margin to nodule, en face; D8) Upper inner quadrant; D9) Lower inner quadrant; D10) Lower outer quadrant; D11) Upper outer quadrant; D12-D15) Mid breast from medial to lateral.     NOTE:  The specimen is collected at 1206 and placed into formalin at 1223, 08/09/2023.  E(5). Lymph Node(s), Axillary, Right, Additional Right Axillary Lymph Node:  Received in  formalin, labeled with the patient's name and additional right axillary lymph nodes, is a 7.5 x 5.3 x 2.4 cm aggregate of yellow lobulated fat containing multiple, 0.1 cm to 1.4 x 1.3 x 0.7 cm, tan-pink to red, congested lymph nodes.  The lymph nodes are submitted in their entirety for evaluation.  RS-9C    SUMMARY OF CASSETTES:  E1) Three lymph nodes in toto; E2) Single lymph node quadrisected; E3) Single lymph node quadrisected; E4) Single lymph node quadrisected; E5) Single lymph node serially sectioned; E6) Single lymph node quadrisected; E7) Single lymph node quadrisected; E8-E9) Largest lymph node serially sectioned.    NOTE:  The specimen is collected at 1233 and placed into formalin at 1240, 08/09/2023.  NIKOLAY Corona:zeferino      Microscopic Description       Microscopic examination performed, substantiating the above diagnosis.       MCRS Yes (A) N/A    Performing Labs       The technical component of this testing was completed at [facility]      Case Images         Imaging    NM Lymphoscintigraphy Injection only    Result Date: 8/9/2023  INDICATION: Breast Carcinoma. Pre-operative identification of the right sentinel lymph node. PROCEDURE: Informed consent was obtained from the patient. The skin was prepped with ChloraPrep. 510uCi 99mTc Lymphoseek was injected subdermally along the upper outer aspect of the areolar margin. The patient tolerated this well.  The procedure was performed by ALEYDA CARLOS, on 8/9/2023, at 9:44 AM.    IMPRESSION: [place] lymph node injection.     US Breast Localizer Placement 1st Lesion Right    Result Date: 8/3/2023  RIGHT BREAST ULTRASOUND-GUIDED LOCALIZER LOCALIZATION 8/3/2023 10:44 AM CDT INDICATION: Preoperative localization of previously biopsied metastatic lymph node in the right axilla. PROCEDURE: Informed consent was obtained from the patient. The axilla was prepped and draped in usual sterile fashion.  1% lidocaine was administered subcutaneously for local anesthesia. The biopsy site was localized and 11-gauge guiding needle was advanced under direct ultrasound guidance with real-time imaging. A localizer device RF tag [id number] was then deployed. Postprocedure mammograms performed in a separate room demonstrate the localizer in appropriate position. The patient tolerated this well.     IMPRESSION: Ultrasound-guided radiofrequency localizer localization of right axillary lymph node which was previously biopsied demonstrating metastatic disease.    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 PROCEDURE: Informed consent was obtained from the patient. The axilla was prepped and draped in usual sterile fashion. 1% lidocaine was administered subcutaneously for local anesthesia. The biopsy site was localized and 11-gauge guiding needle was advanced under direct ultrasound guidance with real-time imaging. A localizer device RF tag [id number] was then deployed. Postprocedure mammograms performed in a separate room demonstrate the localizer in appropriate position. The patient tolerated this well.     IMPRESSION: Ultrasound-guided radiofrequency localizer localization of right axillary lymph node which was previously biopsied demonstrating metastatic disease.     Complex patient.    A total of 40 min were spent today on this visit which included face to face conversation with the patient, EMR review, counseling and co-ordination of care and medical documentation.      Signed by: Paola Saleem MD

## 2023-08-16 NOTE — LETTER
8/16/2023         RE: Alicia Iniguez  [address] [facility] [address]        Dear Colleague,    Thank you for referring your patient, Alicia Iniguez, to the [facility]. Please see a copy of my visit note below.    [facility] Hematology and Oncology Progress Note    Patient: Alicia Iniguez  MRN: [id number]  Date of Service: Aug 16, 2023          Reason for Visit    No chief complaint on file.      Assessment and Plan     Cancer Staging   Malignant neoplasm of overlapping sites of right breast in female, estrogen receptor positive (H)  Staging form: Breast, AJCC 8th Edition  - Clinical: Stage IIA (cT3, cN2, cM0, G2, ER+, FL+, HER2-) - Signed by Shanelle Weaver APRN CNP on 3/1/2023    1.  Locally Advanced Breast cancer, right side, stage IIa, T3 N2 M0, grade 2, ER/FL positive, HER2/janine negative: Status post neoadjuvant chemotherapy with dose dense Adriamycin and Cytoxan for 4 cycles, followed by weekly Taxol.     Tolerated pretty well without whole lot of side effects.  Some peripheral neuropathy and some elevated liver function tests.  Underwent bilateral mastectomy on 8/9/2023.  I reviewed the surgical path report in detail today.  She did have significant residual disease as expected with minimum treatment effect.  Residual disease measured 5.6 x 3.2 x 2.7 cm in greatest dimensions.  Grade 2.  There was evidence of dermal infiltration with focal invasion of epidermis.  There was focal lymphovascular invasion within the dermal connective tissue.  No evidence of DCIS.  Margins were all negative.  Out of 20 lymph nodes examined 2 were positive for macrometastasis (sentinel lymph nodes).  No evidence of extranodal extension.  ypT3, ypN1a.     Reviewed further management in this setting.  Due to grossly positive lymph nodes and large tumor, she would benefit from adjuvant radiation.  We will refer her to radiation oncology.      From the medical oncology standpoint  due to her hormone receptor positivity she will definitely need endocrine therapy and considering that the tumor is more than 5 cm in size and with positive lymph nodes I think she would also qualify for adjuvant CDK 4/6 inhibitor based on [place] E and LAYNE trials.  Currently abemaciclib is approved in this setting.  However ribociclib could also be considered.  The clinical trials have shown improvement in disease-free survival with both of these medications.  However overall survival data is still immature.  Since she was perimenopausal probably would also recommend ovarian suppression with an AI rather than tamoxifen.    I reviewed side effects and complications associated with this strategy.  We will have to start adjuvant endocrine therapy and targeted therapy after she finishes radiation anyway.    We will see her back in 1 month.        ECOG Performance    0 - Independent    Distress Screening (within last 30 days)    1. How concerned are you about your ability to eat? : 0  2. How concerned are you about unintended weight loss or your current weight? : 0  3. How concerned are you about feeling depressed or very sad? : 0  4. How concerned are you about feeling anxious or very scared? : 5  5. Do you struggle with the loss of meaning and tiago in your life? : Not at all  6. How concerned are you about work and home life issues that may be affected by your cancer? : 0  7. How concerned are you about knowing what resources are available to help you? : 0  8. Do you currently have what you would describe as Restorationist or spiritual struggles?            : Not at all       Pain       Problem List    Patient Active Problem List   Diagnosis     Malignant neoplasm of overlapping sites of right breast in female, estrogen receptor positive (H)     Malignant neoplasm involving both nipple and areola of right breast in female, estrogen receptor positive (H)     Adverse effect of antineoplastic and immunosuppressive drugs,  sequela     Chest pain, unspecified     Hyperlipidemia        ______________________________________________________________________________    History of Present Illness    Oncology history:  DIAGNOSIS: Breast cancer, right side, patient had a palpable lump with some nipple and skin changes.  She was also found to have lymphadenopathy in the axilla.  Patient had an ultrasound and mammogram that showed a 4.1 cm tumor with axillary lymphadenopathy  Biopsy was done January 31 and it showed invasive ductal carcinoma.  Grade 2.  ER WI positive and HER2/janine negative.  Biopsy was done of the lymph node as well and it was positive.    TREATMENT: Patient has started neoadjuvant chemotherapy with dose dense Adriamycin and Cytoxan for 4 cycles and then 12 weekly Taxol. Today is week 10 of Taxol.     INTERIM HISTORY:   48-year-old female with right-sided locally advanced breast cancer status post neoadjuvant chemotherapy with AC followed by T who recently underwent bilateral mastectomy is here to discuss further management for the same.    She finished neoadjuvant chemotherapy with AC followed by T with minimum side effects.  Had some peripheral neuropathy symptoms along with LFT elevation during the treatment.  Recently underwent bilateral mastectomy.  Is recovering well from surgery.  Still has drain in.    Review of Systems    Pertinent items are noted in HPI.    Past History    Past Medical History:   Diagnosis Date     Breast cancer (H)     right       PHYSICAL EXAM  LMP 03/20/2023 (Approximate)     GENERAL: no acute distress. Cooperative in conversation.  MUSCULOSKELETAL: no bilateral leg swelling  NEURO: non focal. Alert and oriented x3.   PSYCH: within normal limits. No depression or anxiety.  SKIN: exposed skin is dry intact.     Lab Results    Recent Results (from the past 168 hour(s))   Surgical Pathology Exam   Result Value Ref Range    Case Report       Surgical Pathology Report                         Case:  [id number]                                  Authorizing Provider:  Maribell Brunner MD         Collected:           08/09/2023 11:30 AM          Ordering Location:     [facility]      Received:            08/09/2023 11:55 AM                                 [facility]                                                               Pathologist:           Ata Abebe MD                                                        Intraop:               Ata Abebe MD                                                        Specimens:   A) - Breast, Right, Right breast stitch lateral 623 g                                               B) - Lymph Node(s) [place], Breast, Right, [place] lymph node # 1                                 C) - Lymph Node(s) [place], Breast, Right, Right sentinel node # 2                                 D) - Breast, Left, Left breast stitch lateral 533gms                                                 E) - Lymph Node(s), Axillary, Right, Additional Right Axillary Lymph Node                  Addendum       This addendum is issued in order to report the result of an immunohistochemical stain.  The result is as follows:    Slide A11:  E-cadherin: Positive, consistent with ductal differentiation    The original diagnosis is unchanged.    All controls stain appropriately.        Final Diagnosis       A) RIGHT BREAST, MASTECTOMY:  -INVASIVE DUCTAL CARCINOMA (5.6 x 3.2 x 2.7 IN GREATEST DIMENSIONS)  -TUMOR EXTENSIVELY INFILTRATES THE DERMAL CONNECTIVE TISSUE AND FOCALLY INVADES OVERLYING EPIDERMIS  -FOCAL LYMPHOVASCULAR INVASION IS IDENTIFIED WITHIN THE DERMAL CONNECTIVE TISSUE  -NO EVIDENCE OF DUCTAL CARCINOMA IN SITU  -HISTOLOGICALLY UNREMARKABLE SURGICAL MARGINS; TUMOR IS LOCATED APPROXIMATELY 0.5 MM FROM THE ANTEROMEDIAL CUTANEOUS MARGIN  -BIOPSY SITE CHANGES  -ONE LYMPH NODE; NEGATIVE FOR MALIGNANCY (0/1)    B) RIGHT BREAST SENTINEL LYMPH NODE #1, BIOPSY:  -TWO LYMPH NODES;  NEGATIVE FOR MALIGNANCY (0/2)    C) RIGHT BREAST SENTINEL LYMPH NODE #2, BIOPSY:  -METASTATIC CARCINOMA INVOLVING TWO LYMPH NODES OF TWO EXAMINED (2/2)  -NO EVIDENCE OF EXTRACAPSULAR EXTENSION    D) LEFT BREAST, MASTECTOMY:  -FIBROADENOMA  -DILATED CYSTS WITH FOCAL EVIDENCE OF RUPTURE, CHRONIC INFLAMMATION  -CALCIFICATIONS IDENTIFIED IN AREAS OF NONPROLIFERATIVE FIBROCYSTIC CHANGE  -FIBROADENOMATOUS CHANGES  -BENIGN NIPPLE AND SKIN  -HISTOLOGICALLY UNREMARKABLE SURGICAL MARGINS    E) RIGHT AXILLARY LYMPH NODES, DISSECTION:  -FIFTEEN LYMPH NODES; NEGATIVE FOR MALIGNANCY (0/15)  -DERMATOPATHIC LYMPHADENOPATHY        Comment       Please see case [id number], [facility], significant for invasive carcinoma of the right breast with metastasis to an axillary lymph node.    A substantial amount of viable tumor is present (at least 90% of the mass).  Numerous neoplastic ducts are slightly contracted or distorted, and these alterations most likely reflect mild treatment effect.        Synoptic Checklist       INVASIVE CARCINOMA OF THE BREAST: Resection   INVASIVE CARCINOMA OF THE BREAST: COMPLETE EXCISION - All Specimens   8th Edition - Protocol posted: 12/14/2022      SPECIMEN      Procedure:    Total mastectomy       Specimen Laterality:    Right       TUMOR      Tumor Site:    Central and subareolar.       Histologic Type:    Invasive carcinoma of no special type (ductal)       Histologic Grade (Gurpreet Histologic Score):            Glandular (Acinar) / Tubular Differentiation:    Score 3         Nuclear Pleomorphism:    Score 2         Mitotic Rate:    Score 1         Overall Grade:    Grade 2 (scores of 6 or 7)       Tumor Size:    Greatest dimension of largest invasive focus (Millimeters): 56 mm        Additional Dimension (Millimeters):    32 mm        Additional Dimension (Millimeters):    27 mm      Ductal Carcinoma In Situ (DCIS):    Present         :    Negative for  extensive intraductal component (EIC)         Size (Extent) of DCIS:    Cannot be determined: Scattered small foci of DCIS are present.         Architectural Patterns:    Paget disease (DCIS involving nipple skin)         Architectural Patterns:    Cribriform         Architectural Patterns:    Papillary         Architectural Patterns:    Solid         Nuclear Grade:    Grade II (intermediate)         Necrosis:    Not identified         Number of Blocks with DCIS:    8         Number of Blocks Examined:    29       Tumor Extent:    Skin is present and involved         Skin Invasion:    Carcinoma directly invades into the dermis or epidermis without skin ulceration (this does not change the T classification)         Skin Satellite Foci:    Satellite foci not identified     Lymphatic and / or Vascular Invasion:    Present       :    Focal     Dermal Lymphovascular Invasion:    Present     Microcalcifications:    Present in invasive carcinoma     Microcalcifications:    Present in non-neoplastic tissue     Treatment Effect in the Breast:    Probable or definite response to presurgical therapy in the invasive carcinoma     Treatment Effect in the Lymph Nodes:    No definite response to presurgical therapy in metastatic carcinoma       MARGINS    Margin Status for Invasive Carcinoma:    All margins negative for invasive carcinoma       Distance from Invasive Carcinoma to Closest Margin:    0.5 mm      Closest Margin(s) to Invasive Carcinoma:    Anteromedial cutaneous margin, lower inner quadrant.       Distance from Invasive Carcinoma to Anterior Margin:    0.5 mm      Distance from Invasive Carcinoma to Posterior Margin:    Greater than: 10 mm      Distance from Invasive Carcinoma to Superior Margin:    Greater than: 10 mm      Distance from Invasive Carcinoma to Inferior Margin:    Greater than: 10 mm      Distance from Invasive Carcinoma to Medial Margin:    Greater than: 10 mm      Distance from Invasive Carcinoma to  Lateral Margin:    Greater than: 10 mm    Margin Status for DCIS:    All margins negative for DCIS       Distance from DCIS to Closest Margin:    Greater than: 5 mm      Closest Margin(s) to DCIS:    Anteromedial margin, lower inner quadrant.       Distance from DCIS to Posterior Margin:    Greater than: 10 mm      Distance from DCIS to Superior Margin:    Greater than: 10 mm      Distance from DCIS to Inferior Margin:    Greater than: 10 mm      Distance from DCIS to Medial Margin:    Greater than: 10 mm      Distance from DCIS to Lateral Margin:    Greater than: 10 mm      REGIONAL LYMPH NODES    Regional Lymph Node Status:          :    Tumor present in regional lymph node(s)         Number of Lymph Nodes with Macrometastases:    2         Number of Lymph Nodes with Micrometastases:    0         Size of Largest Walter Metastatic Deposit:    3 mm        Extranodal Extension:    Not identified       Total Number of Lymph Nodes Examined (sentinel and non-sentinel):    20       Number of [place] Nodes Examined:    4       pTNM CLASSIFICATION (AJCC 8th Edition)      Reporting of pT, pN, and (when applicable) pM categories is based on information available to the pathologist at the time the report is issued. As per the AJCC (Chapter 1, 8th Ed.) it is the managing physician s responsibility to establish the final pathologic stage based upon all pertinent information, including but potentially not limited to this pathology report.    Modified Classification:    y     pT Category:    pT3     pN Category:    pN1a     N Suffix:    (sn)       ADDITIONAL FINDINGS    Additional Findings:    Biopsy site changes.       Clinical Information       Procedure:  Bilateral mastectomies; - Bilateral  Right sentinel lymph node biopsy with localizer and gamma probe - Right  BILATERAL BREAST RECONSTRUCTION WITH TISSUE EXPANDERS - Bilateral  Pre-op Diagnosis: Malignant neoplasm of overlapping sites of right breast in female, estrogen  receptor positive (H) [C50.811, Z17.0]  Status post bilateral mastectomy [Z90.13]  Personal history of malignant neoplasm of breast [Z85.3]  Post-op Diagnosis: C50.811, Z17.0 - Malignant neoplasm of overlapping sites of right breast in female, estrogen receptor positive (H) [ICD-10-CM]  Z90.13 - Status post bilateral mastectomy [ICD-10-CM]  Z85.3 - Personal history of malignant neoplasm of breast [ICD-10-CM]      Intraoperative Consultation       B(2). Lymph Node(s) [place], Breast, Right, [place] lymph node # 1:  BFS1, BFS2, BFS3:  No tumor seen.  Multiple tissue blocks frozen.  Reported to Dr. Brunner at 12:25 pm on 8/9/2023.    [facility]  C(3). Lymph Node(s) [place], Breast, Right, Right sentinel node # 2:  CFS1, CFS2, CFS3:  Positive for carcinoma.  Multiple tissue blocks frozen.    Reported to Dr. Brunner at 12:25 on 8/9/2023.    [facility]      Gross Description       A(1). Breast, Right, Right breast stitch lateral 623 g:  Received unfixed, labeled with the patient's name and right breast, is an oriented, 623 gram, 21.5 cm from medial to lateral, 17.6 cm from superior to inferior and 5.5 cm from anterior to posterior product of a right breast mastectomy.  There is a black stitch designating the lateral margin, per the surgeon.  The specimen consists of yellow-white to pink fibrofatty tissue which is anteriorly surfaced by an 8.6 x 5.3 cm, tan-white, wrinkled skin ellipse.  The ellipse has a central, palpably firm, 1.2 x 0.2 cm, slightly everted nipple.  The skin surrounding the nipple displays a white-pink to gray, probably dense and glistening at presentation extending from the 4-7 to 11 o'clock position.  There is slightly unusual presentation extends to the nearest 7:00 margin.  There is 1.2 cm of unremarkable tissue between of this area and the margin.  No additional, suspicious cutaneous lesions are identified upon inspection of the tan-white, wrinkled and faintly hairbearing ellipse.  The specimen is inked black on  the deep-posterior margin, blue on the soft tissue superior to the ellipse and green on the soft tissue inferior to the ellipse.  Specimen is serially sectioned from lateral to medial.    Sectioning subjacent to the skin displays a 5.6 x 3.2 x 2.7 cm, white-pink to yellow-gray, partially indurated and granular nodule.  This nodule extends from medial to lateral, superior to inferior and anterior to posterior, respectively.  The nodule is predominantly identified in the subareolar tissue and appears to involve the inferior aspect of the nipple and surrounding skin.  This lesion extends to 0.5 cm of the inferior-anterior margin, 1.3 cm from the superior-anterior margin, 4.7 cm from the medial margin, 5.8 cm from the lateral margin, 4.9 cm from the inferior margin, 8.9 cm from the superior margin and is 1.6 cm from the deep-posterior margin.  The tumor is abutted, laterally, by dense bands of fibrous tissue and multiple, minute to 0.7 cm, tan-pink, cloudy fluid-filled cysts.    The remaining cut surface displays a predominantly homogeneous, yellow lobulated fat which occupies approximately 70% of the specimen.  There are additional, thin to focally dense bands of fibrous tissue with interspersed, minute to 0.3 cm cysts identified.  No additional indurated or suspicious granular lesions are identified.  There is a 0.4 cm, tan-pink lymph node identified on the lateral aspect of the specimen.  RS-30C    SUMMARY OF CASSETTES:  A1) Most medial aspect of palpably firm skin; A2, A3-A5, A6-A8, A9, A10-A12, A13, A14-A15, A16-A17) Representative from a single cross-section from medial to lateral to include tumor to nearest anterior margins and skin/nipple; A18-A20) Representative central tumor; A21) Tumor to deep-posterior margin; A22-A23) Lateral aspect of tumor with abutting cysts; A24) Unremarkable upper inner quadrant; A25) Unremarkable upper outer quadrant; A26) Unremarkable lower outer quadrant; A27) Unremarkable lower  "inner quadrant; A28) Unremarkable upper inner quadrant; A29) Unremarkable mid breast; A30) Single lymph node bisected.    NOTE:  The specimen is collected at 1130 and placed into formalin at 1153, 08/09/2023.    NIKOLAY Corona:aka-d  B(2). Lymph Node(s) [place], Breast, Right, [place] lymph node # 1:  The specimen is received fresh with proper patient identification labeled \"sentinel lymph node #1.\"  The specimen consists of a 4.2 x 2.5 x 1.5 cm portion of tan-yellow, soft and lobulated adipose tissue with two tan-pink, semifirm lymph nodes 1.0-2.5 cm in greatest dimension.  The largest lymph node is remarkable for a 0.8 x 0.5 x 0.5 cm tan-pink and smooth biopsy site containing a radiofrequency device.  The cut surfaces of both lymph nodes are tan-pink, smooth and homogenous with no grossly identifiable masses or lesions.  Both lymph nodes are sectioned and entirely submitted for frozen section diagnosis.  The remnants of the larger lymph node are entirely submitted in cassettes BFS1-BFS2 and the smaller lymph node is submitted in cassette BFS3.  C(3). Lymph Node(s) [place], Breast, Right, Right sentinel node # 2:  The specimen is received fresh with proper patient identification labeled \"right sentinel lymph node #2.\"  The specimen consists of a 4.0 x 3.5 x 2.0 cm portion of tan-yellow, soft and lobulated adipose tissue with two tan-pink, semifirm lymph nodes (1.0-3.0 cm in greatest dimension).  Sectioning through the lymph nodes shows tan-pink, smooth homogenous cut surfaces with no grossly identifiable mass or lesions.  Both lymph nodes are sectioned and entirely submitted for frozen section diagnosis.  The frozen section remnant of the larger lymph node is submitted entirely in cassettes CFS1-CFS2.  The frozen section remnant of the smaller lymph node is submitted entirely in cassette CFS3.  NIKOLAY Pinto:aka-d  D(4). Breast, Left, Left breast stitch lateral 533gms:  Received unfixed, labeled with the " patient's name and left breast, is an oriented, 533 gram, 18.7 cm from medial to lateral, 17.6 cm from superior to inferior and 4.8 cm from anterior to posterior product of a left breast mastectomy.  There is a black suture designating the lateral margin, per the surgeon.  The specimen consists of yellow-white to pink fibrofatty tissue which is anteriorly surfaced by a 7.0 x 4.5 cm, tan-white, wrinkled and hairbearing skin ellipse.  The ellipse has a central 1.4 x 0.6 cm, everted nipple.  A tan-brown 0.4 x 0.2 cm macule is identified 0.9 cm superior-lateral from the nipple.  This macula is 1.2 cm from the nearest superior-lateral cutaneous margin.  No additional cutaneous lesions are identified.  The specimen is inked black on the deep-posterior margin, blue on the soft tissue superior to the ellipse and green on the soft tissue inferior to the ellipse.  The specimen is serially sectioned from medial to lateral.    Sectioning through the medial aspect of the specimen displays a well-demarcated, white-pink, rubbery and whorled 1.3 x 1.1 x 0.8 cm nodule. The nodule is 1.1 cm from the anterior margin, 2.3 cm from the posterior margin and is 2.9 cm from the medial margin.  No indurated lesions or suspicious areas of hemorrhagic necrosis are identified upon sectioning of the nodule.    The remaining cut surface displays approximately 60%, homogeneous, yellow lobulated fat.  There are intervening bands of thin to focally dense fibrous tissue with multiple, interspersed, 0.1 cm to 0.6 cm, yellow-pink, cloudy fluid filled cysts.  No excrescences, indurated lesions or suspicious granular foci are identified.  No lymph nodes are identified upon sectioning or palpation.    SUMMARY OF CASSETTES:  D1-D2) Nipple; D3) Cutaneous lesion to nearest superior margin; D4-D6) Medially located, whorled and rubbery nodule; D7) Nearest medial margin to nodule, en face; D8) Upper inner quadrant; D9) Lower inner quadrant; D10) Lower outer  quadrant; D11) Upper outer quadrant; D12-D15) Mid breast from medial to lateral.     NOTE:  The specimen is collected at 1206 and placed into formalin at 1223, 08/09/2023.  E(5). Lymph Node(s), Axillary, Right, Additional Right Axillary Lymph Node:  Received in formalin, labeled with the patient's name and additional right axillary lymph nodes, is a 7.5 x 5.3 x 2.4 cm aggregate of yellow lobulated fat containing multiple, 0.1 cm to 1.4 x 1.3 x 0.7 cm, tan-pink to red, congested lymph nodes.  The lymph nodes are submitted in their entirety for evaluation.  RS-9C    SUMMARY OF CASSETTES:  E1) Three lymph nodes in toto; E2) Single lymph node quadrisected; E3) Single lymph node quadrisected; E4) Single lymph node quadrisected; E5) Single lymph node serially sectioned; E6) Single lymph node quadrisected; E7) Single lymph node quadrisected; E8-E9) Largest lymph node serially sectioned.    NOTE:  The specimen is collected at 1233 and placed into formalin at 1240, 08/09/2023.  NIKOLAY Corona:zeferino      Microscopic Description       Microscopic examination performed, substantiating the above diagnosis.       MCRS Yes (A) N/A    Performing Labs       The technical component of this testing was completed at [facility]      Case Images         Imaging    NM Lymphoscintigraphy Injection only    Result Date: 8/9/2023  INDICATION: Breast Carcinoma. Pre-operative identification of the right sentinel lymph node. PROCEDURE: Informed consent was obtained from the patient. The skin was prepped with ChloraPrep. 510uCi 99mTc Lymphoseek was injected subdermally along the upper outer aspect of the areolar margin. The patient tolerated this well.  The procedure was performed by ALEYDA CARLOS, on 8/9/2023, at 9:44 AM.    IMPRESSION: [place] lymph node injection.     US Breast Localizer Placement 1st Lesion Right    Result Date: 8/3/2023  RIGHT BREAST ULTRASOUND-GUIDED  LOCALIZER LOCALIZATION 8/3/2023 10:44 AM CDT INDICATION: Preoperative localization of previously biopsied metastatic lymph node in the right axilla. PROCEDURE: Informed consent was obtained from the patient. The axilla was prepped and draped in usual sterile fashion. 1% lidocaine was administered subcutaneously for local anesthesia. The biopsy site was localized and 11-gauge guiding needle was advanced under direct ultrasound guidance with real-time imaging. A localizer device RF tag [id number] was then deployed. Postprocedure mammograms performed in a separate room demonstrate the localizer in appropriate position. The patient tolerated this well.     IMPRESSION: Ultrasound-guided radiofrequency localizer localization of right axillary lymph node which was previously biopsied demonstrating metastatic disease.    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 PROCEDURE: Informed consent was obtained from the patient. The axilla was prepped and draped in usual sterile fashion. 1% lidocaine was administered subcutaneously for local anesthesia. The biopsy site was localized and 11-gauge guiding needle was advanced under direct ultrasound guidance with real-time imaging. A localizer device RF tag [id number] was then deployed. Postprocedure mammograms performed in a separate room demonstrate the localizer in appropriate position. The patient tolerated this well.     IMPRESSION: Ultrasound-guided radiofrequency localizer localization of right axillary lymph node which was previously biopsied demonstrating metastatic disease.     Complex patient.    A total of 40 min were spent today on this visit which included face to face conversation with the patient, EMR review, counseling and co-ordination of care and medical documentation.      Signed by:  "Paola Saleem MD    Oncology Rooming Note    August 16, 2023 8:33 AM   Alicia Iniguez is a 48 year old female who presents for:    Chief Complaint   Patient presents with     Oncology Clinic Visit     Initial Vitals: BP (!) 144/76   Pulse 109   Temp 98.5  F (36.9  C) (Oral)   Resp 16   Wt 89.2 kg (196 lb 11.2 oz)   LMP 03/20/2023 (Approximate)   SpO2 100%   BMI 31.75 kg/m   Estimated body mass index is 31.75 kg/m  as calculated from the following:    Height as of 7/26/23: 1.676 m (5' 6\").    Weight as of this encounter: 89.2 kg (196 lb 11.2 oz). Body surface area is 2.04 meters squared.  Mild Pain (2) Comment: Data Unavailable   Patient's last menstrual period was 03/20/2023 (approximate).  Allergies reviewed: Yes  Medications reviewed: Yes    Medications: Medication refills not needed today.  Pharmacy [facility] entered into [facility]: [id number] [address] [place] [address] AT SEC OF [facility] [place]    Clinical concerns: Alicia is here for discussion of plan with  and sister.      Renée Ortiz, STEPHANIE                Again, thank you for allowing me to participate in the care of your patient.        Sincerely,        Paola Saleem MD  "

## 2023-08-16 NOTE — PROGRESS NOTES
"Oncology Rooming Note    August 16, 2023 8:33 AM   Alicia Iniguez is a 48 year old female who presents for:    Chief Complaint   Patient presents with    Oncology Clinic Visit     Initial Vitals: BP (!) 144/76   Pulse 109   Temp 98.5  F (36.9  C) (Oral)   Resp 16   Wt 89.2 kg (196 lb 11.2 oz)   LMP 03/20/2023 (Approximate)   SpO2 100%   BMI 31.75 kg/m   Estimated body mass index is 31.75 kg/m  as calculated from the following:    Height as of 7/26/23: 1.676 m (5' 6\").    Weight as of this encounter: 89.2 kg (196 lb 11.2 oz). Body surface area is 2.04 meters squared.  Mild Pain (2) Comment: Data Unavailable   Patient's last menstrual period was 03/20/2023 (approximate).  Allergies reviewed: Yes  Medications reviewed: Yes    Medications: Medication refills not needed today.  Pharmacy name entered into Sh[facility]: [id number] [address] [place] [address] AT SEC [address]    Clinical concerns: Alicia is here for discussion of plan with  and sister.      Renée Ortiz, RN              "

## 2023-08-21 NOTE — PROGRESS NOTES
[facility] Radiation Oncology Consult Note     Patient: Alicia Iniguez  MRN: [id number]  Date of Service: 08/22/2023          Paola Saleem MD  [address] [place] [address]       Dear Dr. Saleem:    Thank you very much for referring this patient for consideration of radiotherapy. As you know Ms. Iniguez is a 48 year old female with a diagnosis of stage IIA ER/CT+ HER-2 negative right breast cancer s/p neoadjuvant DDAC ->T and mastectomy with residual 56mm of disease and 2/4SLN and 1/15 ALN. Expanders placed.  with  She was seen today for consideration of adjuvant radiation therapy. .     HISTORY OF PRESENT ILLNESS:   Ms. Iniguez is a 48 year old female who self palpated a mass in the right breast. Imaging showed a right breast and axilla mass. Biopsy showed a ER/CT+ HER-2 invasive ductal carcinoma. Staging CT showed the known mass with subcentimeter LN.      She went on to receive neoadjuvant chemotherapy consisting of dose dense AC following by 12 weeks of Taxol, the last of which was 7/14/2023.     She went on to have a mastectomy SLN,ALN and expander placement with Dr Brunner and Dr Snell 8/9/2023. Pathology showed residual 56mm of grade II invasive ductal carcinoma in right breast and 2/4 macrometastases in SLN and 0/15 ALN. There was invasion of the overlying epidermis, focal LVI in dermal connective tissue, margins negative.     CHEMOTHERAPY HISTORY: Concurrent Chemotherapy: No    RADIATION THERAPY HISTORY: Prior Radiation: No    IMPLANTED CARDIAC DEVICE: none     PREGNANCY: The patient is informed not to be pregnant during the radiation therapy.  She is post menopausal or had tubal ligation or her  had vasectomy.  has had vasectomy.     Current Outpatient Medications   Medication Sig Dispense Refill    acetaminophen (TYLENOL) 500 MG tablet Take 500-1,000 mg by mouth as needed for mild pain      diazepam (VALIUM) 5 MG tablet Take 1 tablet by mouth every 6 hours as needed       lidocaine-prilocaine (EMLA) 2.5-2.5 % external cream Apply topically as needed for moderate pain (4-6) Place over port 1 hour before coming to clinic 30 g 1    loratadine (CLARITIN) 10 MG tablet Take 10 mg by mouth daily as needed for allergies      Probiotic Product (PROBIOTIC PO) Take 1 chew tab by mouth daily      sennosides (SENOKOT) 8.6 MG tablet Take 1 tablet by mouth daily      oxyCODONE (ROXICODONE) 5 MG tablet Take 1-2 tablets (5-10 mg) by mouth every 4 hours as needed for moderate to severe pain (Patient not taking: Reported on 2023) 20 tablet 0     Past Medical History:   Diagnosis Date    Breast cancer (H)     right     Past Surgical History:   Procedure Laterality Date    AS REMOVAL OF OVARY(S) Left     BIOPSY NODE SENTINEL Right 2023    Procedure: Right sentinel lymph node biopsy with localizer and gamma probe;  Surgeon: Maribell Brunner MD;  Location: [facility] OR    BREAST CYST ASPIRATION      INSERT PORT VASCULAR ACCESS Left 2023    Procedure: Port Placement;  Surgeon: Maribell Brunner MD;  Location: [facility] OR    MASTECTOMY SIMPLE Bilateral 2023    Procedure: Bilateral mastectomies;;  Surgeon: Maribell Brunner MD;  Location: [facility] OR    RECONSTRUCT BREAST, INSERT TISSUE EXPANDER, COMBINED Bilateral 2023    Procedure: BILATERAL BREAST RECONSTRUCTION WITH TISSUE EXPANDERS;  Surgeon: Becky Hernandez MD;  Location: [facility] OR     Allergies   Allergen Reactions    Seasonal Allergies      Nasal congestion   Watery eyes     Family History   Problem Relation Age of Onset    Breast Cancer Mother      Social History     Socioeconomic History    Marital status:      Spouse name: Not on file    Number of children: Not on file    Years of education: Not on file    Highest education level: Not on file   Occupational History    Not on file   Tobacco Use    Smoking status: Former     Types: Cigarettes     Quit date:      Years since quittin.6     Smokeless tobacco: Never   Substance and Sexual Activity    Alcohol use: Not Currently    Drug use: Not Currently     Comment: Gummies THC 5mg at bedtime as needed.    Sexual activity: Not on file   Other Topics Concern    Not on file   Social History Narrative    Not on file     Social Determinants of Health     Financial Resource Strain: Not on file   Food Insecurity: Not on file   Transportation Needs: Not on file   Physical Activity: Not on file   Stress: Not on file   Social Connections: Not on file   Intimate Partner Violence: Not on file   Housing Stability: Not on file        REVIEW OF SYMPTOMS:  A full 14-point review of systems was performed. Pertinent findings are noted in the HPI.    General  Constitutional  Constitutional (WDL): All constitutional elements are within defined limits  EENT  Eye Disorders  Eye Disorder (WDL): All eye disorder elements are within defined limits  Ear Disorders  Ear Disorder (WDL): All ear disorder elements are within defined limits  Respiratory  Respiratory  Respiratory (WDL): All respiratory elements are within defined limits  Cardiovascular  Cardiovascular  Cardiovascular (WDL): All cardiovascular elements are within defined limits  Gastrointestinal  Gastrointestinal  Gastrointestinal (WDL): Exceptions to WDL  Constipation: Occasional or intermittent symptoms OR occasional use of stool softeners, laxatives, dietary modification, or enema  Musculoskeletal  Musculoskeletal and Connective Tissue Disorders  Musculoskeletal & Connective (WDL): All musculoskeletal & connective elements are within defined limits  Integumentary  Integumentary  Integumentary (WDL): Exceptions to WDL (healing mastectomy incisions)  Alopecia: Hair loss of greater than or equal to 50% normal for that individual that is readily apparent to others OR a wig or hair piece is necessary if the patient desires to completely camouflage the hair loss OR associated with psychosocial  impact  Neurological  Neurosensory  Neurosensory (WDL): All neurosensory elements are within defined limits  Genitourinary/Reproductive  Genitourinary  Genitourinary (WDL): All genitourinary elements are within defined limits  Lymphatic  Lymph System Disorders  Lymph (WDL): Exceptions to WDL  Lymphedema: Trace thickening or faint discoloration (pt states surgeon is sending her to Lymphedema PT)  Pain  Pain Score: Mild Pain (3)  AUA Assessment                                                              Accompanied by  Accompanied By: family    ECOG Status: 0 - Independent        Imaging:   EXAM: MA DIAGNOSTIC BILATERAL W/ LILIYA, US BREAST RIGHT LIMITED 1-3 QUADRANTS  LOCATION: [facility]  DATE/TIME: 1/25/2023 9:14 AM     INDICATION: 47-year-old female presents with skin changes involving the right nipple for a few months. The patient also reports an area of firmness deep to the right nipple and reports an episode of bleeding from the right nipple after it was bumped.  COMPARISON: 10/27/2017, 7/20/2016, 4/26/2010     MAMMOGRAPHIC FINDINGS: Bilateral full-field digital diagnostic mammograms performed. The breasts are heterogeneously dense, which may obscure small masses. Images evaluated with the assistance of CAD. Breast tomosynthesis was used in interpretation.      There is a large, irregular, high density mass with associated obscured margins and architectural distortion within the right central breast at middle depth. There is associated retraction of the right nipple. A few possibly thickened lymph nodes are   also demonstrated within the right axilla. Recommend sonographic evaluation of the right central breast and right axilla for further evaluation.     There are no new or suspicious masses, calcifications, or architectural distortion within the left breast. There is a benign-appearing, mammographically stable mass within the left breast at the 9:00 position.     ULTRASOUND FINDINGS:  Visual inspection of the right breast demonstrates erythema, scaling, and open sores involving the right nipple. The right nipple is also retracted. Physical examination of the right breast demonstrates large, firm mass measuring at   least 5 cm immediately deep to the left nipple within the central breast.     Sonographic evaluation of the right central breast was performed. There is a large, hypoechoic mass with indistinct margins and associated posterior shadowing located immediately deep to the right nipple. It is difficult to accurately measure this mass   given its size and indistinct margins, however, measures at least 4.1 x 2.1 cm. This mass also extends superficially to involve the overlying nipple.     Sonographic evaluation of the right axilla was performed demonstrating a few morphologically abnormal lymph nodes demonstrating loss of normal fatty rochelle and thickened cortices. The largest visualized lymph node measures 1.4 x 1.0 x 0.9 cm.                                                                      IMPRESSION:   1.  Suspicious, irregular, hypoechoic mass with indistinct margins within the central right breast, immediately deep to the nipple measures at least 4.1 x 2.1 cm, although is likely larger given physical examination findings. This mass extends into and   involves the overlying right nipple. Given the constellation of imaging and physical examination findings of a suspicious mass and the right nipple skin changes, Paget's disease of the breasts is suspected. Recommend ultrasound-guided biopsy of the mass   immediately deep to the right nipple. Also recommend surgical and oncological consultation.  2.  Several morphologically abnormal right axillary lymph nodes. Recommend a single, representative ultrasound-guided biopsy of one of these abnormal lymph nodes.     ACR BI-RADS Category 5: Highly Suggestive of Malignancy.     I personally scanned and discussed the findings and recommendations  with the patient at the conclusion of the examination.     EXAM: CT CHEST/ABDOMEN/PELVIS W CONTRAST  LOCATION: [facility]  DATE/TIME: 2/13/2023 12:22 PM     INDICATION: Pt with advanced Right Breast Cancer. High risk for metastatic disease. Insurance company would not offer a PET as initial evaluation.  COMPARISON: 04/27/2022 CT abdomen and pelvis.  TECHNIQUE: CT scan of the chest, abdomen, and pelvis was performed following injection of IV contrast. Multiplanar reformats were obtained. Dose reduction techniques were used.   CONTRAST: 100ml IV Isovue 370.     FINDINGS:   LUNGS AND PLEURA: Normal.     MEDIASTINUM/AXILLAE: There is a lobulated hyperdense mass in the central right breast involving the nipple with prominent but subcentimeter mildly hyperdense right axillary lymph nodes largest measuring 7 mm in short axis dimension.     CORONARY ARTERY CALCIFICATION: None.     HEPATOBILIARY: Tiny presumed cyst in the left hepatic lobe.     PANCREAS: Normal.     SPLEEN: Normal.     ADRENAL GLANDS: Normal.     KIDNEYS/BLADDER: Small nonobstructing stone lower pole left kidney. No ureteric stone or hydronephrosis.     BOWEL: Normal.     LYMPH NODES: Normal.     VASCULATURE: Unremarkable.     PELVIC ORGANS: Normal.     MUSCULOSKELETAL: Normal.                                                                      IMPRESSION:  1.  Lobulated hyperdense mass in the central right breast extending to the nipple where there is some retraction. There are subcentimeter but mildly hyperdense right axillary lymph nodes having the same attenuation as the primary breast mass. Largest   node measures 7 mm in short axis dimension.     2.  Tiny presumed hepatic cyst.    Pathology:     Pathology Results            Malignant - Mastectomy, Right - 8/9/2023        Pathology Code Malignancy Type    Invasive ductal carcinoma Invasive    Lymphovascular invasion Invasive    Paget's disease (of the nipple) Other    Ductal  carcinoma in situ intermediate nuclear grade Non-Invasive    Axillary chanel with metastatic carcinoma Other              Additional Information              Concordance: Not Entered Primary Tumor: T3      Regional Lymph Nodes: N1    Stage: 3A      Histology Grade: G2 Margin Status: Uninvolved      Closest Margin: Anterior    Nodes Biopsied: Zuni, Axillary Closest Margin Distance: .5 mm    Removed: 20 Nipple Involved: Yes    Positive: 2     Extracapsular Extension: Absent           Invasive: 56 mm           Had Neoadjuvant Chemotherapy: Yes     Completely Submitted for Microscopic Analysis: Yes     External: No                    Malignant - Ultrasound Guided Biopsy, Right - 1/30/2023        Pathology Code Malignancy Type    Axillary chanel with metastatic carcinoma Other              Additional Information              Concordance: Concordant Estrogen: Positive      Progesterone: Positive    HER2/janine IHC: 2+     HER2/janine FISH: Negative           External: No                    Malignant - Ultrasound Guided Biopsy, Right - 1/30/2023        Pathology Code Malignancy Type    Invasive ductal carcinoma Invasive    Ductal carcinoma in situ intermediate nuclear grade Non-Invasive              Additional Information              Concordance: Concordant Estrogen: Positive      Progesterone: Positive    Histology Grade: G2     HER2/janine IHC: 1+     HER2/janine FISH: Negative           External: No                             Objective:          PHYSICAL EXAMINATION:    /73   Pulse 111   Temp 98.7  F (37.1  C)   Resp 16   Wt 88.7 kg (195 lb 8 oz)   LMP 03/20/2023 (Approximate)   SpO2 97%   BMI 31.55 kg/m    Physical Exam     Head: Normocephalic, without obvious abnormality, atraumatic  Neck: no adenopathy and supple, symmetrical, trachea midline  Lungs: clear to auscultation bilaterally  CW:  expected post operative changes, no masses skin changes suggestive of recurrence or infection. Expanders in place, minimal  expansion.   Heart: regular rate and rhythm  Skin: Skin color, texture, turgor normal. No rashes or lesions  Lymph nodes: Cervical, supraclavicular, and axillary nodes normal.  Extremities: No lymphedema,restricted ROM    Neurologic: Grossly normal  Pysch: affect normal, thought content appropriate.     Intent of Therapy: Curative  Patient on concurrent Herceptin No  Adjuvant hormonal therapy: Yes   Start: During radiation start date next week   Chemotherapy: DD AC followed by T  Intended fractionation schedule: standard with boost. Standard fractionation due to reconstruction (NCCN guidelines)     Breast cancer risk factors: mother with breast cancer. Genetic testing negative.     LMP Dates from Last 4 Encounters:   LMP: 2023       We recommend adjuvant radiation as part of her breast conserving therapy to decrease her chance of local recurrence to the single digits. ROM stretches and skin care were discussed with the patient. She understands that these maneuvers need to continue for 6 months following completion of radiation as skin and muscle fibrosis continue to form for weeks to months following completion of therapy.      Side effects that may occur during or within weeks after radiation therapy    Fatigue and general weakness  Darkening, irritation, itchiness, redness, dryness, erythema, peeling, scabbing, ulceration and contraction of the skin of the breast and chest  Swelling of the breast  Loss of armpit hair  Lung irritation  Decrease in appetite    Side effects that may occur months or years after radiation therapy    Development of another tumor or cancer  Thickening, telangiectasias (development of spider like blood vessels in the skin) and ulceration of the skin of the breast and chest  Firming, fibrosis (scar tissue), fat necrosis, and distortion of the breast  Poor healing after a trauma or surgery in the irradiated area  Nerve damage resulting in loss of arm strength and  "sensation  Lung inflammation of fibrosis causing cough, fever and shortness of breath  Fracture of the ribs  Swellingof an arm and hand    The risks, benefits and alternatives to radiation therapy were outlined with the patient. All questions were answered and a consent was signed.     Impression   (C50.811,  Z17.0) Malignant neoplasm of overlapping sites of right breast in female, estrogen receptor positive (H)  (primary encounter diagnosis)       Cancer Staging   Malignant neoplasm of overlapping sites of right breast in female, estrogen receptor positive (H)  Staging form: Breast, AJCC 8th Edition  - Clinical: Stage IIA (cT3, cN2, cM0, G2, ER+, RI+, HER2-) - Signed by Shanelle Weaver APRN CNP on 3/1/2023  - Pathologic stage from 8/21/2023: No Stage Recommended (ypT3, pN1a(sn), cM0, G2, ER+, RI+, HER2-) - Signed by Ruth Orellana MD on 8/21/2023      Assessment & Plan:   Right ER/RI+ HER-2 negative IDC breast cancer s/p neoadjuvant chemotherapy with a 56mm residual with 2/4 SLN and 0/15 ALN on mastectomy. Negative in situ and invasive margins. Extensive dermal invasion, but no skin ulceration so not T4, EIC scattered DCIS.    ADDENDUM: discussed at breast tumor board. Dr Brunner in order to get around tumor anteriorly left a thin rim of tissue so patient may not be able to achieve \"C\" cup as desired without dehiscence. Alicia lives in [place] and was hoping to get hypofractionation, but given immediate reconstruction and desired result, proceed with standard fractionation with boost. May be weeks before ROM and fill adequate for radiation. As such Dr Saleem will start AI with ovarian suppression next week.  Called patient and let hr know tumor board findings    1) Surgery 8/6/2023 so still struggling with ROM. Has seen PT closer to home ([place]) encouraged her to continue ROM.  She knows can't start XRT until full ROM.  She will ask therapist she is seeing for lymphedema prevention including " "sleeve. Will add additional orders as indicated.     2) Wants a \"C\" cup after reconstruction complete, so has a way to go before fill of tissue expander is complete. We discussed that will likely have to have contralateral left expander be considerably smaller to get beam under right CW without excessive dose to left CW.     3) Since will be a while before we start XRT, hormonal therapy with ovarian suppression to start in next week or two     4) Since getting reconstructed, will proceed with standard fractionation without boost rather than hypofractionation (NCCN guidelines) Insitu and invasive margins negative but extensive dermal invasion so will boost scar. She lives in [place] so discussed hypofractionation, but feel could compromise cosmetic result.     5) Follow up with me after  full expansion of right tissue expander and full ROM. Sooner if questions arise. May need adjustment of left tissue expander     Total time of this visit, including time spent face-to-face with patient 60 minutes , and also in preparing for today's visit for MDM and documentation. Medical decision-making included consideration and possible diagnoses, management options, complex record review, review of diagnostic tests and information, consideration and discussion of significant complications based on comorbidities, discussion with providers involved in the care of the patient.    Sincerely,      Ruth Orellana MD  Department of Radiation Oncology   [facility]  Tel: [phone]  Page: [phone]    [facility]  [address] [place] [address]     [facility]   [address] [place] [address]    CC:  Patient Care Team:  Suha Post MD as PCP - General (OB/Gyn)  Maribell Brunner MD as Assigned Surgical Provider  Paola Saleem MD as MD (Medical Oncology)  Gisella Mauricio, RN as Specialty Care Coordinator (Hematology & Oncology)  Paola Saleem MD as " Assigned Cancer Care Provider  Ruth Orellana MD as MD (Radiation Oncology)

## 2023-08-21 NOTE — TELEPHONE ENCOUNTER
RECORDS STATUS - BREAST    RECORDS REQUESTED FROM: Epic   DATE REQUESTED: 8/21   NOTES DETAILS STATUS   OFFICE NOTE from referring provider Epic Dr. Paola Saleem   OFFICE NOTE from medical oncologist [facility] Dr. Saleem: 8/16/23   OFFICE NOTE from surgeon Epic Dr. Shonna Man: 8/14/23   DISCHARGE SUMMARY from hospital [facility] 8/9/23   OPERATIVE REPORT Epic 8/9/23: Bilateral Mastectomies  2/22/23: Port Placement   MEDICATION LIST [facility] 8/9/23   CHEMOTHERAPY [facility] 7/14/23   LABS     PATHOLOGY REPORTS  (Tissue diagnosis, Stage, ER/MO percentage positive and intensity of staining, HER2 IHC, FISH, and all biopsies from breast and any distant metastasis)                 Epic 8/9/23, 1/30/23: Surg Path   GENONOMIC TESTING     TYPE:   (Next Generation Sequencing, including Foundation One testing, and Oncotype score) Epic 56/22/23, 3/13/23, 2/13/23   IMAGING (NEED IMAGES & REPORT)     CT SCANS PACS Epic   MAMMO PACS Epic   ULTRASOUND PACS Epic   BONE SCAN PACS Epic

## 2023-08-22 ENCOUNTER — PRE VISIT (OUTPATIENT)
Dept: RADIATION ONCOLOGY | Facility: HOSPITAL | Age: 48
End: 2023-08-22
Payer: COMMERCIAL

## 2023-08-22 ENCOUNTER — OFFICE VISIT (OUTPATIENT)
Dept: RADIATION ONCOLOGY | Facility: HOSPITAL | Age: 48
End: 2023-08-22
Attending: INTERNAL MEDICINE
Payer: COMMERCIAL

## 2023-08-22 VITALS
HEART RATE: 111 BPM | SYSTOLIC BLOOD PRESSURE: 119 MMHG | OXYGEN SATURATION: 97 % | BODY MASS INDEX: 31.55 KG/M2 | DIASTOLIC BLOOD PRESSURE: 73 MMHG | RESPIRATION RATE: 16 BRPM | WEIGHT: 195.5 LBS | TEMPERATURE: 98.7 F

## 2023-08-22 DIAGNOSIS — C50.811 MALIGNANT NEOPLASM OF OVERLAPPING SITES OF RIGHT BREAST IN FEMALE, ESTROGEN RECEPTOR POSITIVE (H): Primary | ICD-10-CM

## 2023-08-22 DIAGNOSIS — Z17.0 MALIGNANT NEOPLASM OF OVERLAPPING SITES OF RIGHT BREAST IN FEMALE, ESTROGEN RECEPTOR POSITIVE (H): Primary | ICD-10-CM

## 2023-08-22 PROCEDURE — 99214 OFFICE O/P EST MOD 30 MIN: CPT | Performed by: RADIOLOGY

## 2023-08-22 PROCEDURE — 99205 OFFICE O/P NEW HI 60 MIN: CPT | Performed by: RADIOLOGY

## 2023-08-22 ASSESSMENT — PAIN SCALES - GENERAL: PAINLEVEL: MILD PAIN (3)

## 2023-08-22 NOTE — PROGRESS NOTES
"Oncology Rooming Note    August 22, 2023 9:27 AM   Alicia Iniguez is a 48 year old female who presents for:    Chief Complaint   Patient presents with    Oncology Clinic Visit    Breast Cancer     Rad Onc consult     Initial Vitals: /73   Pulse 111   Temp 98.7  F (37.1  C)   Resp 16   Wt 88.7 kg (195 lb 8 oz)   LMP 03/20/2023 (Approximate)   SpO2 97%   BMI 31.55 kg/m   Estimated body mass index is 31.55 kg/m  as calculated from the following:    Height as of 7/26/23: 1.676 m (5' 6\").    Weight as of this encounter: 88.7 kg (195 lb 8 oz). Body surface area is 2.03 meters squared.  Mild Pain (3) Comment: Data Unavailable   Patient's last menstrual period was 03/20/2023 (approximate).  Allergies reviewed: Yes  Medications reviewed: Yes    Medications: Medication refills not needed today.  Pharmacy name entered into [employer]: [facility][id number] [address] AT [facility] [place]    Clinical concerns: Feeling well post-chemo and post-mastectomies. She does not yet have full ROM but pain is subsiding. She states she will be seeing a lymphedema PT per Surgeon. She has bilateral expanders and is scheduled for weekly fills x4 starting in six days time. She does know that we will have to pause fills at some point for simulation through the end of radiation and contralateral side may need to be deflated. She was getting regular menstrual cycles up until March 2023 and chemotherapy, but none since. Per Dr. Orellana, no UPT needed as pt and spouse are not currently sexually active and  has also had a vasectomy.        Radiation Therapy Patient Education    Person involved with teaching: Patient and     Patient educational needs for self management of treatment-related side effects assessment completed.  [facility] Patient Ed tab contains Patient Learning Assessment    Education Materials Given  Radiation Treatment For Cancer, Radiation Therapy to the Breast " Guidelines, Oncology Supportive Care Services , Welcome Letter, Insurance PA Information, and breast-specific RT handout.     Educational Topics Discussed  Side effects expected, Skin care, and Activity    Response To Teaching  Verbalizes understanding    GYN Only  Vaginal Dilator-given and educated: N/A    Referrals sent: None    Chemotherapy?  Yes: Dr. Saleem    No prior RT  No ICD  No UPT needed per Dr. Orellana, see above note.   Prefers tx at [facility] over [facility]      Divina Pena, RN, MAN, OCN  Radiation Oncology Nurse  [place]

## 2023-08-22 NOTE — LETTER
8/22/2023         RE: Alicia Iniguez  [address] [facility] [address]        Dear Colleague,    Thank you for referring your patient, Alicia Iniguez, to the [facility] RADIATION ONCOLOGY [place]. Please see a copy of my visit note below.    [facility] Radiation Oncology Consult Note     Patient: Alicia Iniguez  MRN: [id number]  Date of Service: 08/22/2023          Paola Saleem MD  [address] [place] [address]       Dear Dr. Saleem:    Thank you very much for referring this patient for consideration of radiotherapy. As you know Ms. Iniguez is a 48 year old female with a diagnosis of stage IIA ER/RI+ HER-2 negative right breast cancer s/p neoadjuvant DDAC ->T and mastectomy with residual 56mm of disease and 2/4SLN and 1/15 ALN. Expanders placed.  with  She was seen today for consideration of adjuvant radiation therapy. .     HISTORY OF PRESENT ILLNESS:   Ms. Iniguez is a 48 year old female who self palpated a mass in the right breast. Imaging showed a right breast and axilla mass. Biopsy showed a ER/RI+ HER-2 invasive ductal carcinoma. Staging CT showed the known mass with subcentimeter LN.      She went on to receive neoadjuvant chemotherapy consisting of dose dense AC following by 12 weeks of Taxol, the last of which was 7/14/2023.     She went on to have a mastectomy SLN,ALN and expander placement with Dr Brunner and Dr Snell 8/9/2023. Pathology showed residual 56mm of grade II invasive ductal carcinoma in right breast and 2/4 macrometastases in SLN and 0/15 ALN. There was invasion of the overlying epidermis, focal LVI in dermal connective tissue, margins negative.     CHEMOTHERAPY HISTORY: Concurrent Chemotherapy: No    RADIATION THERAPY HISTORY: Prior Radiation: No    IMPLANTED CARDIAC DEVICE: none     PREGNANCY: The patient is informed not to be pregnant during the radiation therapy.  She is post menopausal or had tubal ligation or her  had vasectomy.   has had vasectomy.     Current Outpatient Medications   Medication Sig Dispense Refill     acetaminophen (TYLENOL) 500 MG tablet Take 500-1,000 mg by mouth as needed for mild pain       diazepam (VALIUM) 5 MG tablet Take 1 tablet by mouth every 6 hours as needed       lidocaine-prilocaine (EMLA) 2.5-2.5 % external cream Apply topically as needed for moderate pain (4-6) Place over port 1 hour before coming to clinic 30 g 1     loratadine (CLARITIN) 10 MG tablet Take 10 mg by mouth daily as needed for allergies       Probiotic Product (PROBIOTIC PO) Take 1 chew tab by mouth daily       sennosides (SENOKOT) 8.6 MG tablet Take 1 tablet by mouth daily       oxyCODONE (ROXICODONE) 5 MG tablet Take 1-2 tablets (5-10 mg) by mouth every 4 hours as needed for moderate to severe pain (Patient not taking: Reported on 8/22/2023) 20 tablet 0     Past Medical History:   Diagnosis Date     Breast cancer (H)     right     Past Surgical History:   Procedure Laterality Date     AS REMOVAL OF OVARY(S) Left      BIOPSY NODE SENTINEL Right 8/9/2023    Procedure: Right sentinel lymph node biopsy with localizer and gamma probe;  Surgeon: Maribell Brunner MD;  Location: [facility] OR     BREAST CYST ASPIRATION       INSERT PORT VASCULAR ACCESS Left 02/22/2023    Procedure: Port Placement;  Surgeon: Maribell Brunner MD;  Location: [facility] OR     MASTECTOMY SIMPLE Bilateral 8/9/2023    Procedure: Bilateral mastectomies;;  Surgeon: Maribell Brunner MD;  Location: [facility] OR     RECONSTRUCT BREAST, INSERT TISSUE EXPANDER, COMBINED Bilateral 8/9/2023    Procedure: BILATERAL BREAST RECONSTRUCTION WITH TISSUE EXPANDERS;  Surgeon: Becky Hernandez MD;  Location: [facility] OR     Allergies   Allergen Reactions     Seasonal Allergies      Nasal congestion   Watery eyes     Family History   Problem Relation Age of Onset     Breast Cancer Mother      Social History     Socioeconomic History     Marital status:      Spouse  name: Not on file     Number of children: Not on file     Years of education: Not on file     Highest education level: Not on file   Occupational History     Not on file   Tobacco Use     Smoking status: Former     Types: Cigarettes     Quit date:      Years since quittin.6     Smokeless tobacco: Never   Substance and Sexual Activity     Alcohol use: Not Currently     Drug use: Not Currently     Comment: Gummies THC 5mg at bedtime as needed.     Sexual activity: Not on file   Other Topics Concern     Not on file   Social History Narrative     Not on file     Social Determinants of Health     Financial Resource Strain: Not on file   Food Insecurity: Not on file   Transportation Needs: Not on file   Physical Activity: Not on file   Stress: Not on file   Social Connections: Not on file   Intimate Partner Violence: Not on file   Housing Stability: Not on file        REVIEW OF SYMPTOMS:  A full 14-point review of systems was performed. Pertinent findings are noted in the HPI.    General  Constitutional  Constitutional (WDL): All constitutional elements are within defined limits  EENT  Eye Disorders  Eye Disorder (WDL): All eye disorder elements are within defined limits  Ear Disorders  Ear Disorder (WDL): All ear disorder elements are within defined limits  Respiratory  Respiratory  Respiratory (WDL): All respiratory elements are within defined limits  Cardiovascular  Cardiovascular  Cardiovascular (WDL): All cardiovascular elements are within defined limits  Gastrointestinal  Gastrointestinal  Gastrointestinal (WDL): Exceptions to WDL  Constipation: Occasional or intermittent symptoms OR occasional use of stool softeners, laxatives, dietary modification, or enema  Musculoskeletal  Musculoskeletal and Connective Tissue Disorders  Musculoskeletal & Connective (WDL): All musculoskeletal & connective elements are within defined limits  Integumentary  Integumentary  Integumentary (WDL): Exceptions to WDL (healing  mastectomy incisions)  Alopecia: Hair loss of greater than or equal to 50% normal for that individual that is readily apparent to others OR a wig or hair piece is necessary if the patient desires to completely camouflage the hair loss OR associated with psychosocial impact  Neurological  Neurosensory  Neurosensory (WDL): All neurosensory elements are within defined limits  Genitourinary/Reproductive  Genitourinary  Genitourinary (WDL): All genitourinary elements are within defined limits  Lymphatic  Lymph System Disorders  Lymph (WDL): Exceptions to WDL  Lymphedema: Trace thickening or faint discoloration (pt states surgeon is sending her to Lymphedema PT)  Pain  Pain Score: Mild Pain (3)  AUA Assessment                                                              Accompanied by  Accompanied By: family    ECOG Status: 0 - Independent        Imaging:   EXAM: MA DIAGNOSTIC BILATERAL W/ LILIYA, US BREAST RIGHT LIMITED 1-3 QUADRANTS  LOCATION: [facility]  DATE/TIME: 1/25/2023 9:14 AM     INDICATION: 47-year-old female presents with skin changes involving the right nipple for a few months. The patient also reports an area of firmness deep to the right nipple and reports an episode of bleeding from the right nipple after it was bumped.  COMPARISON: 10/27/2017, 7/20/2016, 4/26/2010     MAMMOGRAPHIC FINDINGS: Bilateral full-field digital diagnostic mammograms performed. The breasts are heterogeneously dense, which may obscure small masses. Images evaluated with the assistance of CAD. Breast tomosynthesis was used in interpretation.      There is a large, irregular, high density mass with associated obscured margins and architectural distortion within the right central breast at middle depth. There is associated retraction of the right nipple. A few possibly thickened lymph nodes are   also demonstrated within the right axilla. Recommend sonographic evaluation of the right central breast and right  axilla for further evaluation.     There are no new or suspicious masses, calcifications, or architectural distortion within the left breast. There is a benign-appearing, mammographically stable mass within the left breast at the 9:00 position.     ULTRASOUND FINDINGS: Visual inspection of the right breast demonstrates erythema, scaling, and open sores involving the right nipple. The right nipple is also retracted. Physical examination of the right breast demonstrates large, firm mass measuring at   least 5 cm immediately deep to the left nipple within the central breast.     Sonographic evaluation of the right central breast was performed. There is a large, hypoechoic mass with indistinct margins and associated posterior shadowing located immediately deep to the right nipple. It is difficult to accurately measure this mass   given its size and indistinct margins, however, measures at least 4.1 x 2.1 cm. This mass also extends superficially to involve the overlying nipple.     Sonographic evaluation of the right axilla was performed demonstrating a few morphologically abnormal lymph nodes demonstrating loss of normal fatty rochelle and thickened cortices. The largest visualized lymph node measures 1.4 x 1.0 x 0.9 cm.                                                                      IMPRESSION:   1.  Suspicious, irregular, hypoechoic mass with indistinct margins within the central right breast, immediately deep to the nipple measures at least 4.1 x 2.1 cm, although is likely larger given physical examination findings. This mass extends into and   involves the overlying right nipple. Given the constellation of imaging and physical examination findings of a suspicious mass and the right nipple skin changes, Paget's disease of the breasts is suspected. Recommend ultrasound-guided biopsy of the mass   immediately deep to the right nipple. Also recommend surgical and oncological consultation.  2.  Several morphologically  abnormal right axillary lymph nodes. Recommend a single, representative ultrasound-guided biopsy of one of these abnormal lymph nodes.     ACR BI-RADS Category 5: Highly Suggestive of Malignancy.     I personally scanned and discussed the findings and recommendations with the patient at the conclusion of the examination.     EXAM: CT CHEST/ABDOMEN/PELVIS W CONTRAST  LOCATION: [facility]  DATE/TIME: 2/13/2023 12:22 PM     INDICATION: Pt with advanced Right Breast Cancer. High risk for metastatic disease. Insurance company would not offer a PET as initial evaluation.  COMPARISON: 04/27/2022 CT abdomen and pelvis.  TECHNIQUE: CT scan of the chest, abdomen, and pelvis was performed following injection of IV contrast. Multiplanar reformats were obtained. Dose reduction techniques were used.   CONTRAST: 100ml IV Isovue 370.     FINDINGS:   LUNGS AND PLEURA: Normal.     MEDIASTINUM/AXILLAE: There is a lobulated hyperdense mass in the central right breast involving the nipple with prominent but subcentimeter mildly hyperdense right axillary lymph nodes largest measuring 7 mm in short axis dimension.     CORONARY ARTERY CALCIFICATION: None.     HEPATOBILIARY: Tiny presumed cyst in the left hepatic lobe.     PANCREAS: Normal.     SPLEEN: Normal.     ADRENAL GLANDS: Normal.     KIDNEYS/BLADDER: Small nonobstructing stone lower pole left kidney. No ureteric stone or hydronephrosis.     BOWEL: Normal.     LYMPH NODES: Normal.     VASCULATURE: Unremarkable.     PELVIC ORGANS: Normal.     MUSCULOSKELETAL: Normal.                                                                      IMPRESSION:  1.  Lobulated hyperdense mass in the central right breast extending to the nipple where there is some retraction. There are subcentimeter but mildly hyperdense right axillary lymph nodes having the same attenuation as the primary breast mass. Largest   node measures 7 mm in short axis dimension.     2.  Tiny presumed  hepatic cyst.    Pathology:     Pathology Results            Malignant - Mastectomy, Right - 8/9/2023        Pathology Code Malignancy Type    Invasive ductal carcinoma Invasive    Lymphovascular invasion Invasive    Paget's disease (of the nipple) Other    Ductal carcinoma in situ intermediate nuclear grade Non-Invasive    Axillary chanel with metastatic carcinoma Other              Additional Information              Concordance: Not Entered Primary Tumor: T3      Regional Lymph Nodes: N1    Stage: 3A      Histology Grade: G2 Margin Status: Uninvolved      Closest Margin: Anterior    Nodes Biopsied: Ramsay, Axillary Closest Margin Distance: .5 mm    Removed: 20 Nipple Involved: Yes    Positive: 2     Extracapsular Extension: Absent           Invasive: 56 mm           Had Neoadjuvant Chemotherapy: Yes     Completely Submitted for Microscopic Analysis: Yes     External: No                    Malignant - Ultrasound Guided Biopsy, Right - 1/30/2023        Pathology Code Malignancy Type    Axillary chanel with metastatic carcinoma Other              Additional Information              Concordance: Concordant Estrogen: Positive      Progesterone: Positive    HER2/janine IHC: 2+     HER2/janine FISH: Negative           External: No                    Malignant - Ultrasound Guided Biopsy, Right - 1/30/2023        Pathology Code Malignancy Type    Invasive ductal carcinoma Invasive    Ductal carcinoma in situ intermediate nuclear grade Non-Invasive              Additional Information              Concordance: Concordant Estrogen: Positive      Progesterone: Positive    Histology Grade: G2     HER2/janine IHC: 1+     HER2/janine FISH: Negative           External: No                             Objective:          PHYSICAL EXAMINATION:    /73   Pulse 111   Temp 98.7  F (37.1  C)   Resp 16   Wt 88.7 kg (195 lb 8 oz)   LMP 03/20/2023 (Approximate)   SpO2 97%   BMI 31.55 kg/m    Physical Exam     Head: Normocephalic, without  obvious abnormality, atraumatic  Neck: no adenopathy and supple, symmetrical, trachea midline  Lungs: clear to auscultation bilaterally  CW:  expected post operative changes, no masses skin changes suggestive of recurrence or infection. Expanders in place, minimal expansion.   Heart: regular rate and rhythm  Skin: Skin color, texture, turgor normal. No rashes or lesions  Lymph nodes: Cervical, supraclavicular, and axillary nodes normal.  Extremities: No lymphedema,restricted ROM    Neurologic: Grossly normal  Pysch: affect normal, thought content appropriate.     Intent of Therapy: Curative  Patient on concurrent Herceptin No  Adjuvant hormonal therapy: Yes   Start: During radiation start date next week   Chemotherapy: DD AC followed by T  Intended fractionation schedule: standard without boost. Standard fractionation due to reconstruction (NCCN guidelines)     Breast cancer risk factors: mother with breast cancer. Genetic testing negative.     LMP Dates from Last 4 Encounters:   LMP: 2023       We recommend adjuvant radiation as part of her breast conserving therapy to decrease her chance of local recurrence to the single digits. ROM stretches and skin care were discussed with the patient. She understands that these maneuvers need to continue for 6 months following completion of radiation as skin and muscle fibrosis continue to form for weeks to months following completion of therapy.      Side effects that may occur during or within weeks after radiation therapy    Fatigue and general weakness  Darkening, irritation, itchiness, redness, dryness, erythema, peeling, scabbing, ulceration and contraction of the skin of the breast and chest  Swelling of the breast  Loss of armpit hair  Lung irritation  Decrease in appetite    Side effects that may occur months or years after radiation therapy    Development of another tumor or cancer  Thickening, telangiectasias (development of spider like blood vessels in  "the skin) and ulceration of the skin of the breast and chest  Firming, fibrosis (scar tissue), fat necrosis, and distortion of the breast  Poor healing after a trauma or surgery in the irradiated area  Nerve damage resulting in loss of arm strength and sensation  Lung inflammation of fibrosis causing cough, fever and shortness of breath  Fracture of the ribs  Swellingof an arm and hand    The risks, benefits and alternatives to radiation therapy were outlined with the patient. All questions were answered and a consent was signed.     Impression   (C50.811,  Z17.0) Malignant neoplasm of overlapping sites of right breast in female, estrogen receptor positive (H)  (primary encounter diagnosis)       Cancer Staging   Malignant neoplasm of overlapping sites of right breast in female, estrogen receptor positive (H)  Staging form: Breast, AJCC 8th Edition  - Clinical: Stage IIA (cT3, cN2, cM0, G2, ER+, ID+, HER2-) - Signed by Shanelle Weaver APRN CNP on 3/1/2023  - Pathologic stage from 8/21/2023: No Stage Recommended (ypT3, pN1a(sn), cM0, G2, ER+, ID+, HER2-) - Signed by Ruth Orellana MD on 8/21/2023      Assessment & Plan:   Right ER/ID+ HER-2 negative IDC breast cancer s/p neoadjuvant chemotherapy with a 56mm residual with 2/4 SLN and 0/15 ALN on mastectomy. Negative in situ and invasive margins. Dermal invasion, but no skin ulceration so no T4, EIC scattered DCIS.    1) Surgery 8/6/2023 so still struggling with ROM. Has seen PT closer to home ([place]) encouraged her to continue ROM.  She knows can't start XRT until full ROM.  She will ask therapist she is seeing for lymphedema prevention including sleeve. Will add additional orders as indicated.     2) Wants a \"C\" cup after reconstruction complete, so has a way to go before fill of tissue expander is complete. We discussed that will likely have to have contralateral left expander be considerably smaller to get beam under right CW without excessive " dose to left CW.     3) Since will be a while before we start XRT, hormonal therapy to start in next week or two     4) Since getting reconstructed, will proceed with standard fractionation without boost rather than hypofractionation (NCCN guidelines) Insitu and invasive margins negative so no boost. She lives in [place] so discussed hypofractionation, but feel could compromise cosmetic result.     5) Follow up with me after  full expansion of right tissue expander and full ROM. Sooner if questions arise. May need adjustment of left tissue expander     Total time of this visit, including time spent face-to-face with patient 60 minutes , and also in preparing for today's visit for MDM and documentation. Medical decision-making included consideration and possible diagnoses, management options, complex record review, review of diagnostic tests and information, consideration and discussion of significant complications based on comorbidities, discussion with providers involved in the care of the patient.    Sincerely,      Ruth Orellana MD  Department of Radiation Oncology   [facility] Radiation Oncology  Tel: [phone]  Page: [phone]    [facility]  [address] [place] [address]     [place] [id number] [facility] [place] [address]    CC:  Patient Care Team:  Suha Post MD as PCP - General (OB/Gyn)  Maribell Brunner MD as Assigned Surgical Provider  Paola Saleem MD as MD (Medical Oncology)  Gisella Mauricio, RN as Specialty Care Coordinator (Hematology & Oncology)  Paola Saleem MD as Assigned Cancer Care Provider  Ruth Orellana MD as MD (Radiation Oncology)        Oncology Rooming Note    August 22, 2023 9:27 AM   Alicia Iniguez is a 48 year old female who presents for:    Chief Complaint   Patient presents with     Oncology Clinic Visit     Breast Cancer     Rad Onc consult     Initial Vitals: /73   Pulse 111   Temp 98.7  " F (37.1  C)   Resp 16   Wt 88.7 kg (195 lb 8 oz)   LMP 03/20/2023 (Approximate)   SpO2 97%   BMI 31.55 kg/m   Estimated body mass index is 31.55 kg/m  as calculated from the following:    Height as of 7/26/23: 1.676 m (5' 6\").    Weight as of this encounter: 88.7 kg (195 lb 8 oz). Body surface area is 2.03 meters squared.  Mild Pain (3) Comment: Data Unavailable   Patient's last menstrual period was 03/20/2023 (approximate).  Allergies reviewed: Yes  Medications reviewed: Yes    Medications: Medication refills not needed today.  Pharmacy name entered into [employer]: [id number] [address] AT [address]    Clinical concerns: Feeling well post-chemo and post-mastectomies. She does not yet have full ROM but pain is subsiding. She states she will be seeing a lymphedema PT per Surgeon. She has bilateral expanders and is scheduled for weekly fills x4 starting in six days time. She does know that we will have to pause fills at some point for simulation through the end of radiation and contralateral side may need to be deflated. She was getting regular menstrual cycles up until March 2023 and chemotherapy, but none since. Per Dr. Orellana, no UPT needed as pt and spouse are not currently sexually active and  has also had a vasectomy.        Radiation Therapy Patient Education    Person involved with teaching: Patient and     Patient educational needs for self management of treatment-related side effects assessment completed.  [facility] Patient Ed tab contains Patient Learning Assessment    Education Materials Given  Radiation Treatment For Cancer, Radiation Therapy to the Breast Guidelines, Oncology Supportive Care Services , Welcome Letter, Insurance PA Information, and breast-specific RT handout.     Educational Topics Discussed  Side effects expected, Skin care, and Activity    Response To Teaching  Verbalizes understanding    GYN Only  Vaginal " Dilator-given and educated: N/A    Referrals sent: None    Chemotherapy?  Yes: Dr. Saleem    No prior RT  No ICD  No UPT needed per Dr. Orellana, see above note.   Prefers tx at [facility] over [facility]      Divina Pena, RN, MAN, OCN  Radiation Oncology Nurse  [place]                Again, thank you for allowing me to participate in the care of your patient.        Sincerely,        Ruth Orellana MD

## 2023-08-24 ENCOUNTER — APPOINTMENT (OUTPATIENT)
Dept: RADIATION ONCOLOGY | Facility: HOSPITAL | Age: 48
End: 2023-08-24
Attending: INTERNAL MEDICINE
Payer: COMMERCIAL

## 2023-08-24 LAB
PATH REPORT.ADDENDUM SPEC: ABNORMAL
PATH REPORT.ADDENDUM SPEC: ABNORMAL
PATH REPORT.COMMENTS IMP SPEC: ABNORMAL
PATH REPORT.COMMENTS IMP SPEC: YES
PATH REPORT.FINAL DX SPEC: ABNORMAL
PATH REPORT.GROSS SPEC: ABNORMAL
PATH REPORT.INTRAOP OBS SPEC DOC: ABNORMAL
PATH REPORT.MICROSCOPIC SPEC OTHER STN: ABNORMAL
PATH REPORT.RELEVANT HX SPEC: ABNORMAL
PATHOLOGY SYNOPTIC REPORT: ABNORMAL
PHOTO IMAGE: ABNORMAL

## 2023-08-24 PROCEDURE — 77263 THER RADIOLOGY TX PLNG CPLX: CPT | Performed by: RADIOLOGY

## 2023-08-25 ENCOUNTER — TELEPHONE (OUTPATIENT)
Dept: RADIATION ONCOLOGY | Facility: HOSPITAL | Age: 48
End: 2023-08-25
Payer: COMMERCIAL

## 2023-08-25 NOTE — TELEPHONE ENCOUNTER
"Spoke with Alicia about tumor board discussion/recommendations. Let her know that we would be proceeding with standard fractionation and then with skin thinning necessary to achieve negative margins, achieving a \"C' cup may not be feasible due to dehiscence risk. She will discuss with Dr Hernandez at next appointment (Monday)     Also let her know Dr Cady valero will call regarding starting hormonal therapy and ovarian suppression.   "

## 2023-08-28 DIAGNOSIS — Z17.0 MALIGNANT NEOPLASM INVOLVING BOTH NIPPLE AND AREOLA OF RIGHT BREAST IN FEMALE, ESTROGEN RECEPTOR POSITIVE (H): ICD-10-CM

## 2023-08-28 DIAGNOSIS — C50.011 MALIGNANT NEOPLASM INVOLVING BOTH NIPPLE AND AREOLA OF RIGHT BREAST IN FEMALE, ESTROGEN RECEPTOR POSITIVE (H): ICD-10-CM

## 2023-08-28 DIAGNOSIS — C50.811 MALIGNANT NEOPLASM OF OVERLAPPING SITES OF RIGHT BREAST IN FEMALE, ESTROGEN RECEPTOR POSITIVE (H): Primary | ICD-10-CM

## 2023-08-28 DIAGNOSIS — Z17.0 MALIGNANT NEOPLASM OF OVERLAPPING SITES OF RIGHT BREAST IN FEMALE, ESTROGEN RECEPTOR POSITIVE (H): Primary | ICD-10-CM

## 2023-08-28 RX ORDER — PROCHLORPERAZINE MALEATE 10 MG
10 TABLET ORAL EVERY 6 HOURS PRN
Qty: 30 TABLET | Refills: 2 | Status: SHIPPED | OUTPATIENT
Start: 2023-09-03 | End: 2023-11-13

## 2023-08-28 RX ORDER — LOPERAMIDE HCL 2 MG
CAPSULE ORAL
Qty: 30 CAPSULE | Refills: 0 | Status: SHIPPED | OUTPATIENT
Start: 2023-09-03 | End: 2023-11-13

## 2023-08-29 ENCOUNTER — TELEPHONE (OUTPATIENT)
Dept: ONCOLOGY | Facility: CLINIC | Age: 48
End: 2023-08-29
Payer: COMMERCIAL

## 2023-08-29 DIAGNOSIS — Z17.0 MALIGNANT NEOPLASM OF OVERLAPPING SITES OF RIGHT BREAST IN FEMALE, ESTROGEN RECEPTOR POSITIVE (H): Primary | ICD-10-CM

## 2023-08-29 DIAGNOSIS — C50.811 MALIGNANT NEOPLASM OF OVERLAPPING SITES OF RIGHT BREAST IN FEMALE, ESTROGEN RECEPTOR POSITIVE (H): Primary | ICD-10-CM

## 2023-08-29 NOTE — TELEPHONE ENCOUNTER
PA Initiation    Medication: VERZENIO 150 MG PO TABS  Insurance Company: [employer] - Phone [phone] Fax [phone]  Pharmacy Filling the Rx:    Filling Pharmacy Phone:    Filling Pharmacy Fax:    Start Date: 8/29/23

## 2023-08-29 NOTE — TELEPHONE ENCOUNTER
Prior Authorization Approval    Medication: VERZENIO 150 MG PO TABS  Authorization Effective Date: 8/29/2023  Authorization Expiration Date: 8/29/2024  Approved Dose/Quantity: 28/14  Reference #: [id number]   Insurance Company: [employer] - Phone [phone] Fax [phone]  Expected CoPay:  $0     CoPay Card Available: Yes    Financial Assistance Needed: Yes  Which Pharmacy is filling the prescription:  [facility]  Pharmacy Notified: Yes  Patient Notified: Yes

## 2023-08-31 ENCOUNTER — TRANSFERRED RECORDS (OUTPATIENT)
Dept: HEALTH INFORMATION MANAGEMENT | Facility: CLINIC | Age: 48
End: 2023-08-31

## 2023-08-31 ENCOUNTER — TELEPHONE (OUTPATIENT)
Dept: ONCOLOGY | Facility: HOSPITAL | Age: 48
End: 2023-08-31
Payer: COMMERCIAL

## 2023-08-31 NOTE — ORAL ONC MGMT
Oral Chemotherapy Monitoring Program   Left Voicemail    Attempted to contact patient today for follow up regarding oral chemotherapy, abemaciclib, for new teach. No answer. Left voicemail for patient to call us back at [phone] when able. No medication name was left.    Do not expect Alicia to start abemaciclib yet per Dr. Saleem but wanted to provide information since will be approved through [facility] when it is time.     Keyona Whatley, PharmD, BCOP  Oral Chemotherapy Pharmacist  [phone]

## 2023-09-01 NOTE — ORAL ONC MGMT
Oral Chemotherapy Monitoring Program    Lab Monitoring Plan  Per treatment plan  Labs drawn outside of [place]: no  Subjective/Objective:  Alicia Iniguez is a 48 year old female contacted by phone for an initial visit for oral chemotherapy education.  Discussed with Alicia that tentative plan per Dr. Saleem is to start letrozole and goserelin and then add abemaciclib depending on when radiation can be planned (ideally wait until after radiation but uncertainty surrounding that timeline). Let Alicia know that [facility] would be able to fill abemaciclib, though it needs a 14 day supply for so many fills initially per insurance requirements, and would have to use a copay card to bring copay to $0. Will plan to touch base with her more about that once we know about start date.   The letrozole can be filled at her local pharmacy when Dr. Saleem sends that in. She does have loperamide and prochlorperazine at her local pharmacy to  in the future as well. She had great questions surrounding the regimen which I answered to my best ability. Discussed a specific supplement that uses a proprietary blend, and based on ingredients, usually recommend to avoid.        8/31/2023     1:00 PM 9/1/2023    10:00 AM   ORAL CHEMOTHERAPY   Assessment Type Initial Work up;New Teach;Left Voicemail Initial Work up;New Teach   Diagnosis Code Breast Cancer Breast Cancer   Providers Dr. Stiven Saleem   Clinic Name/Location [facility]   Drug Name Verzenio (abemaciclib) Verzenio (abemaciclib)   Dose 150 mg 150 mg   Current Schedule BID BID   Cycle Details Continuous Continuous   Any new drug interactions? No No   Is the dose as ordered appropriate for the patient? Yes Yes       Last PHQ-2 Score on record:       7/26/2023     6:49 AM   PHQ-2 ( 1999 [employer])   Q1: Little interest or pleasure in doing things 0   Q2: Feeling down, depressed or hopeless 0   PHQ-2 Score 0   Q1: Little interest or pleasure in doing things Not  "at all   Q2: Feeling down, depressed or hopeless Not at all   PHQ-2 Score 0       Vitals:  BP:   BP Readings from Last 1 Encounters:   08/22/23 119/73     Wt Readings from Last 1 Encounters:   08/22/23 88.7 kg (195 lb 8 oz)     Estimated body surface area is 2.03 meters squared as calculated from the following:    Height as of 7/26/23: 1.676 m (5' 6\").    Weight as of 8/22/23: 88.7 kg (195 lb 8 oz).    Labs:  No results found for NA within last 30 days.     No results found for K within last 30 days.     No results found for CA within last 30 days.     No results found for Mag within last 30 days.     No results found for Phos within last 30 days.     No results found for ALBUMIN within last 30 days.     No results found for BUN within last 30 days.     No results found for CR within last 30 days.     No results found for AST within last 30 days.     No results found for ALT within last 30 days.     No results found for BILITOTAL within last 30 days.     No results found for WBC within last 30 days.     No results found for HGB within last 30 days.     No results found for PLT within last 30 days.     No results found for ANC within last 30 days.     No results found for ANC within last 30 days.      Assessment:  Patient is appropriate to start therapy with abemaciclib pending start date after discussion with Dr. Saleem 9/7.    Plan:  Basic chemotherapy teaching was reviewed with the patient including indication, start date of therapy, dose, administration, adverse effects, missed doses, food and drug interactions, monitoring, side effect management, office contact information, and safe handling. Written materials were provided and all questions answered.    Follow-Up:  Will review 9/7 appointment note and will plan to review baseline labs 9/8. Would also review DDIs again once we send in actual script since unknown start date.      Keyona Whatley, PharmD, BCOP  Oral Chemotherapy " Pharmacist  [phone]

## 2023-09-07 ENCOUNTER — VIRTUAL VISIT (OUTPATIENT)
Dept: ONCOLOGY | Facility: CLINIC | Age: 48
End: 2023-09-07
Attending: INTERNAL MEDICINE
Payer: COMMERCIAL

## 2023-09-07 VITALS — BODY MASS INDEX: 28.93 KG/M2 | HEIGHT: 66 IN | WEIGHT: 180 LBS

## 2023-09-07 DIAGNOSIS — Z17.0 MALIGNANT NEOPLASM OF OVERLAPPING SITES OF RIGHT BREAST IN FEMALE, ESTROGEN RECEPTOR POSITIVE (H): Primary | ICD-10-CM

## 2023-09-07 DIAGNOSIS — C50.811 MALIGNANT NEOPLASM OF OVERLAPPING SITES OF RIGHT BREAST IN FEMALE, ESTROGEN RECEPTOR POSITIVE (H): Primary | ICD-10-CM

## 2023-09-07 PROCEDURE — 99215 OFFICE O/P EST HI 40 MIN: CPT | Mod: 95 | Performed by: INTERNAL MEDICINE

## 2023-09-07 RX ORDER — LETROZOLE 2.5 MG/1
2.5 TABLET, FILM COATED ORAL DAILY
Qty: 28 TABLET | Refills: 0 | Status: SHIPPED | OUTPATIENT
Start: 2023-09-07 | End: 2023-09-08

## 2023-09-07 ASSESSMENT — PAIN SCALES - GENERAL: PAINLEVEL: NO PAIN (0)

## 2023-09-07 NOTE — LETTER
9/7/2023         RE: Alicia Iniguez  [address] [facility] [address]        Dear Colleague,    Thank you for referring your patient, Alicia Iniguez, to the [facility] CANCER [facility]. Please see a copy of my visit note below.    Video-Visit Details    Type of service:  Video Visit    Video Start Time: 8 AM  Video End Time:  8:22 AM    Originating Location (pt. Location): Home in [place]    Distant Location (provider location):  [place]    Platform used for Video Visit: [facility] Hematology and Oncology Progress Note    Patient: Alicia Iniguez  MRN: [id number]  Date of Service: Sep 7, 2023          Reason for Visit    No chief complaint on file.      Assessment and Plan     Cancer Staging   Malignant neoplasm of overlapping sites of right breast in female, estrogen receptor positive (H)  Staging form: Breast, AJCC 8th Edition  - Clinical: Stage IIA (cT3, cN2, cM0, G2, ER+, SD+, HER2-) - Signed by Shanelle Weaver APRN CNP on 3/1/2023  - Pathologic stage from 8/21/2023: No Stage Recommended (ypT3, pN1a(sn), cM0, G2, ER+, SD+, HER2-) - Signed by Ruth Orellana MD on 8/21/2023    1.  Locally Advanced Breast cancer, right side, stage IIa, T3 N2 M0, grade 2, ER/SD positive, HER2/janine negative: Status post neoadjuvant chemotherapy with dose dense Adriamycin and Cytoxan for 4 cycles, followed by weekly Taxol. Underwent bilateral mastectomy on 8/9/2023.  I reviewed the surgical path report in detail today.  She did have significant residual disease as expected with minimum treatment effect.  Residual disease measured 5.6 x 3.2 x 2.7 cm in greatest dimensions.  Grade 2.  There was evidence of dermal infiltration with focal invasion of epidermis.  There was focal lymphovascular invasion within the dermal connective tissue.  No evidence of DCIS.  Margins were all negative.  Out of 20 lymph nodes examined 2 were positive for macrometastasis (sentinel lymph nodes).   No evidence of extranodal extension.  ypT3, ypN1a.     Adjuvant radiation has been delayed due to plastic surgery related issues.  But it looks like she is meeting with radiation oncology tomorrow and plan is to start in the next couple of weeks.    So in the setting I think we should start with endocrine therapy at least.  She would also qualify for\adjuvant CDK 4/6 cm therapy based on [place] E and Susana trials.  Already sent a prescription for abemaciclib.  This been approved.  But we will hold off on starting this until after she finishes radiation.  For endocrine therapy my plan is to start her on monthly Zoladex and letrozole.  Previously I had discussed about the rationale behind it and also potential side effects and complications.  Prescription for letrozole sent..  She will start from tomorrow after she receives Zoladex injection.  The radiation starts we can hold letrozole and Zoladex for the period of radiation.  And will resume endocrine therapy and start abemaciclib after the radiation.    Abemaciclib will be continued for total of 2 years.  But from the endocrine therapy standpoint she will need at least 5 years but she also qualifies for 10 years of treatment.  It looks like she already has some menopausal symptoms and only has 1 ovary.  I think she has some chemotherapy-induced ovarian suppression.  But is difficult to say for sure if she is in postmenopausal state right now.  Hence the reason to do Zoladex and letrozole.  Probably the next couple of years we may have to stop Zoladex and continue letrozole only.  Other option is to consider removal of the other ovary and continue letrozole only.    Is agreeable to the plan.  I again discussed potential side effects and complications associated with endocrine therapy.  I am seeing her next week.    ECOG Performance    0 - Independent    Distress Screening (within last 30 days)    1. How concerned are you about your ability to eat? : 0  2. How  concerned are you about unintended weight loss or your current weight? : 0  3. How concerned are you about feeling depressed or very sad? : 0  4. How concerned are you about feeling anxious or very scared? : 5  5. Do you struggle with the loss of meaning and tiago in your life? : Not at all  6. How concerned are you about work and home life issues that may be affected by your cancer? : 0  7. How concerned are you about knowing what resources are available to help you? : 0  8. Do you currently have what you would describe as Buddhism or spiritual struggles?            : Not at all       Pain       Problem List    Patient Active Problem List   Diagnosis     Malignant neoplasm of overlapping sites of right breast in female, estrogen receptor positive (H)     Malignant neoplasm involving both nipple and areola of right breast in female, estrogen receptor positive (H)     Adverse effect of antineoplastic and immunosuppressive drugs, sequela     Chest pain, unspecified     Hyperlipidemia        ______________________________________________________________________________    History of Present Illness    Oncology history:  DIAGNOSIS: Breast cancer, right side, patient had a palpable lump with some nipple and skin changes.  She was also found to have lymphadenopathy in the axilla.  Patient had an ultrasound and mammogram that showed a 4.1 cm tumor with axillary lymphadenopathy  Biopsy was done January 31 and it showed invasive ductal carcinoma.  Grade 2.  ER CO positive and HER2/janine negative.  Biopsy was done of the lymph node as well and it was positive.    TREATMENT: Patient has started neoadjuvant chemotherapy with dose dense Adriamycin and Cytoxan for 4 cycles and then 12 weekly Taxol.     INTERIM HISTORY:   48-year-old female with right-sided locally advanced breast cancer status post neoadjuvant chemotherapy with AC followed by T who recently underwent bilateral mastectomy she had significant residual disease.  ypT3,  ypN1a.     She is yet to start radiation therapy.  This visit was to discuss endocrine therapy due to some delay in starting radiation.  Doing well.  No major issues reported today.  Her range of motion in her arms has significantly improved.  She is meeting radiation oncology tomorrow.  She is also scheduled to start Zoladex injections tomorrow.    Review of Systems    Pertinent items are noted in HPI.    Past History    Past Medical History:   Diagnosis Date     Breast cancer (H)     right       PHYSICAL EXAM  LMP 03/20/2023 (Approximate)     GENERAL: no acute distress. Cooperative in conversation.      Lab Results    No results found for this or any previous visit (from the past 168 hour(s)).      Imaging    NM Lymphoscintigraphy Injection only    Result Date: 8/9/2023  INDICATION: Breast Carcinoma. Pre-operative identification of the right sentinel lymph node. PROCEDURE: Informed consent was obtained from the patient. The skin was prepped with ChloraPrep. 510uCi 99mTc Lymphoseek was injected subdermally along the upper outer aspect of the areolar margin. The patient tolerated this well.  The procedure was performed by ALEYDA CARLOS, on 8/9/2023, at 9:44 AM.    IMPRESSION: [place] lymph node injection.      Complex patient.    A total of 40 min were spent today on this visit which included face to face conversation with the patient, EMR review, counseling and co-ordination of care and medical documentation.      Signed by: Paola Saleem MD      Again, thank you for allowing me to participate in the care of your patient.        Sincerely,        Paola Saleem MD

## 2023-09-07 NOTE — LETTER
9/7/2023         RE: Alicia Iniguez  [address] [facility] [address]        Dear Colleague,    Thank you for referring your patient, Alicia Iniguez, to the [facility] CANCER [facility]. Please see a copy of my visit note below.    Video-Visit Details    Type of service:  Video Visit    Video Start Time: 8 AM  Video End Time:  8:22 AM    Originating Location (pt. Location): Home in [place]    Distant Location (provider location):  [place]    Platform used for Video Visit: [facility] Hematology and Oncology Progress Note    Patient: Alicia Iniguez  MRN: [id number]  Date of Service: Sep 7, 2023          Reason for Visit    No chief complaint on file.      Assessment and Plan     Cancer Staging   Malignant neoplasm of overlapping sites of right breast in female, estrogen receptor positive (H)  Staging form: Breast, AJCC 8th Edition  - Clinical: Stage IIA (cT3, cN2, cM0, G2, ER+, SD+, HER2-) - Signed by Shanelle Weaver APRN CNP on 3/1/2023  - Pathologic stage from 8/21/2023: No Stage Recommended (ypT3, pN1a(sn), cM0, G2, ER+, SD+, HER2-) - Signed by Ruth Orellana MD on 8/21/2023    1.  Locally Advanced Breast cancer, right side, stage IIa, T3 N2 M0, grade 2, ER/SD positive, HER2/janine negative: Status post neoadjuvant chemotherapy with dose dense Adriamycin and Cytoxan for 4 cycles, followed by weekly Taxol. Underwent bilateral mastectomy on 8/9/2023.  I reviewed the surgical path report in detail today.  She did have significant residual disease as expected with minimum treatment effect.  Residual disease measured 5.6 x 3.2 x 2.7 cm in greatest dimensions.  Grade 2.  There was evidence of dermal infiltration with focal invasion of epidermis.  There was focal lymphovascular invasion within the dermal connective tissue.  No evidence of DCIS.  Margins were all negative.  Out of 20 lymph nodes examined 2 were positive for macrometastasis (sentinel lymph nodes).   No evidence of extranodal extension.  ypT3, ypN1a.     Adjuvant radiation has been delayed due to plastic surgery related issues.  But it looks like she is meeting with radiation oncology tomorrow and plan is to start in the next couple of weeks.    So in the setting I think we should start with endocrine therapy at least.  She would also qualify for\adjuvant CDK 4/6 cm therapy based on [place] E and Susana trials.  Already sent a prescription for abemaciclib.  This been approved.  But we will hold off on starting this until after she finishes radiation.  For endocrine therapy my plan is to start her on monthly Zoladex and letrozole.  Previously I had discussed about the rationale behind it and also potential side effects and complications.  Prescription for letrozole sent..  She will start from tomorrow after she receives Zoladex injection.  The radiation starts we can hold letrozole and Zoladex for the period of radiation.  And will resume endocrine therapy and start abemaciclib after the radiation.    Abemaciclib will be continued for total of 2 years.  But from the endocrine therapy standpoint she will need at least 5 years but she also qualifies for 10 years of treatment.  It looks like she already has some menopausal symptoms and only has 1 ovary.  I think she has some chemotherapy-induced ovarian suppression.  But is difficult to say for sure if she is in postmenopausal state right now.  Hence the reason to do Zoladex and letrozole.  Probably the next couple of years we may have to stop Zoladex and continue letrozole only.  Other option is to consider removal of the other ovary and continue letrozole only.    Is agreeable to the plan.  I again discussed potential side effects and complications associated with endocrine therapy.  I am seeing her next week.    ECOG Performance    0 - Independent    Distress Screening (within last 30 days)    1. How concerned are you about your ability to eat? : 0  2. How  concerned are you about unintended weight loss or your current weight? : 0  3. How concerned are you about feeling depressed or very sad? : 0  4. How concerned are you about feeling anxious or very scared? : 5  5. Do you struggle with the loss of meaning and tiago in your life? : Not at all  6. How concerned are you about work and home life issues that may be affected by your cancer? : 0  7. How concerned are you about knowing what resources are available to help you? : 0  8. Do you currently have what you would describe as Tenriism or spiritual struggles?            : Not at all       Pain       Problem List    Patient Active Problem List   Diagnosis     Malignant neoplasm of overlapping sites of right breast in female, estrogen receptor positive (H)     Malignant neoplasm involving both nipple and areola of right breast in female, estrogen receptor positive (H)     Adverse effect of antineoplastic and immunosuppressive drugs, sequela     Chest pain, unspecified     Hyperlipidemia        ______________________________________________________________________________    History of Present Illness    Oncology history:  DIAGNOSIS: Breast cancer, right side, patient had a palpable lump with some nipple and skin changes.  She was also found to have lymphadenopathy in the axilla.  Patient had an ultrasound and mammogram that showed a 4.1 cm tumor with axillary lymphadenopathy  Biopsy was done January 31 and it showed invasive ductal carcinoma.  Grade 2.  ER IA positive and HER2/janine negative.  Biopsy was done of the lymph node as well and it was positive.    TREATMENT: Patient has started neoadjuvant chemotherapy with dose dense Adriamycin and Cytoxan for 4 cycles and then 12 weekly Taxol.     INTERIM HISTORY:   48-year-old female with right-sided locally advanced breast cancer status post neoadjuvant chemotherapy with AC followed by T who recently underwent bilateral mastectomy she had significant residual disease.  ypT3,  ypN1a.     She is yet to start radiation therapy.  This visit was to discuss endocrine therapy due to some delay in starting radiation.  Doing well.  No major issues reported today.  Her range of motion in her arms has significantly improved.  She is meeting radiation oncology tomorrow.  She is also scheduled to start Zoladex injections tomorrow.    Review of Systems    Pertinent items are noted in HPI.    Past History    Past Medical History:   Diagnosis Date     Breast cancer (H)     right       PHYSICAL EXAM  LMP 03/20/2023 (Approximate)     GENERAL: no acute distress. Cooperative in conversation.      Lab Results    No results found for this or any previous visit (from the past 168 hour(s)).      Imaging    NM Lymphoscintigraphy Injection only    Result Date: 8/9/2023  INDICATION: Breast Carcinoma. Pre-operative identification of the right sentinel lymph node. PROCEDURE: Informed consent was obtained from the patient. The skin was prepped with ChloraPrep. 510uCi 99mTc Lymphoseek was injected subdermally along the upper outer aspect of the areolar margin. The patient tolerated this well.  The procedure was performed by ALEYDA CARLOS, on 8/9/2023, at 9:44 AM.    IMPRESSION: [place] lymph node injection.      Complex patient.    A total of 40 min were spent today on this visit which included face to face conversation with the patient, EMR review, counseling and co-ordination of care and medical documentation.      Signed by: Paola Saleem MD      Again, thank you for allowing me to participate in the care of your patient.        Sincerely,        Paola Saleem MD

## 2023-09-07 NOTE — NURSING NOTE
Is the patient currently in the state of [place]? YES    Visit mode:VIDEO    If the visit is dropped, the patient can be reconnected by: VIDEO VISIT: Send to e-mail at: [email or web address]    Will anyone else be joining the visit? NO  (If patient encounters technical issues they should call [phone]    How would you like to obtain your AVS? MyChart    Are changes needed to the allergy or medication list? No    Reason for visit: RECHECK    Elodia SETH

## 2023-09-07 NOTE — PROGRESS NOTES
Video-Visit Details    Type of service:  Video Visit    Video Start Time: 8 AM  Video End Time:  8:22 AM    Originating Location (pt. Location): Home in [place]    Distant Location (provider location):  [place]    Platform used for Video Visit: [facility] Hematology and Oncology Progress Note    Patient: Alicia Iniguez  MRN: [id number]  Date of Service: Sep 7, 2023          Reason for Visit    No chief complaint on file.      Assessment and Plan     Cancer Staging   Malignant neoplasm of overlapping sites of right breast in female, estrogen receptor positive (H)  Staging form: Breast, AJCC 8th Edition  - Clinical: Stage IIA (cT3, cN2, cM0, G2, ER+, MA+, HER2-) - Signed by Shanelle Weaver APRN CNP on 3/1/2023  - Pathologic stage from 8/21/2023: No Stage Recommended (ypT3, pN1a(sn), cM0, G2, ER+, MA+, HER2-) - Signed by Ruth Orellana MD on 8/21/2023    1.  Locally Advanced Breast cancer, right side, stage IIa, T3 N2 M0, grade 2, ER/MA positive, HER2/janine negative: Status post neoadjuvant chemotherapy with dose dense Adriamycin and Cytoxan for 4 cycles, followed by weekly Taxol. Underwent bilateral mastectomy on 8/9/2023.  I reviewed the surgical path report in detail today.  She did have significant residual disease as expected with minimum treatment effect.  Residual disease measured 5.6 x 3.2 x 2.7 cm in greatest dimensions.  Grade 2.  There was evidence of dermal infiltration with focal invasion of epidermis.  There was focal lymphovascular invasion within the dermal connective tissue.  No evidence of DCIS.  Margins were all negative.  Out of 20 lymph nodes examined 2 were positive for macrometastasis (sentinel lymph nodes).  No evidence of extranodal extension.  ypT3, ypN1a.     Adjuvant radiation has been delayed due to plastic surgery related issues.  But it looks like she is meeting with radiation oncology tomorrow and plan is to start in the next couple of weeks.    So  in the setting I think we should start with endocrine therapy at least.  She would also qualify for\adjuvant CDK 4/6 cm therapy based on Alpine E and Susana trials.  Already sent a prescription for abemaciclib.  This been approved.  But we will hold off on starting this until after she finishes radiation.  For endocrine therapy my plan is to start her on monthly Zoladex and letrozole.  Previously I had discussed about the rationale behind it and also potential side effects and complications.  Prescription for letrozole sent..  She will start from tomorrow after she receives Zoladex injection.  The radiation starts we can hold letrozole and Zoladex for the period of radiation.  And will resume endocrine therapy and start abemaciclib after the radiation.    Abemaciclib will be continued for total of 2 years.  But from the endocrine therapy standpoint she will need at least 5 years but she also qualifies for 10 years of treatment.  It looks like she already has some menopausal symptoms and only has 1 ovary.  I think she has some chemotherapy-induced ovarian suppression.  But is difficult to say for sure if she is in postmenopausal state right now.  Hence the reason to do Zoladex and letrozole.  Probably the next couple of years we may have to stop Zoladex and continue letrozole only.  Other option is to consider removal of the other ovary and continue letrozole only.    Is agreeable to the plan.  I again discussed potential side effects and complications associated with endocrine therapy.  I am seeing her next week.    ECOG Performance    0 - Independent    Distress Screening (within last 30 days)    1. How concerned are you about your ability to eat? : 0  2. How concerned are you about unintended weight loss or your current weight? : 0  3. How concerned are you about feeling depressed or very sad? : 0  4. How concerned are you about feeling anxious or very scared? : 5  5. Do you struggle with the loss of meaning and  tiago in your life? : Not at all  6. How concerned are you about work and home life issues that may be affected by your cancer? : 0  7. How concerned are you about knowing what resources are available to help you? : 0  8. Do you currently have what you would describe as Samaritan or spiritual struggles?            : Not at all       Pain       Problem List    Patient Active Problem List   Diagnosis    Malignant neoplasm of overlapping sites of right breast in female, estrogen receptor positive (H)    Malignant neoplasm involving both nipple and areola of right breast in female, estrogen receptor positive (H)    Adverse effect of antineoplastic and immunosuppressive drugs, sequela    Chest pain, unspecified    Hyperlipidemia        ______________________________________________________________________________    History of Present Illness    Oncology history:  DIAGNOSIS: Breast cancer, right side, patient had a palpable lump with some nipple and skin changes.  She was also found to have lymphadenopathy in the axilla.  Patient had an ultrasound and mammogram that showed a 4.1 cm tumor with axillary lymphadenopathy  Biopsy was done January 31 and it showed invasive ductal carcinoma.  Grade 2.  ER LA positive and HER2/janine negative.  Biopsy was done of the lymph node as well and it was positive.    TREATMENT: Patient has started neoadjuvant chemotherapy with dose dense Adriamycin and Cytoxan for 4 cycles and then 12 weekly Taxol.     INTERIM HISTORY:   48-year-old female with right-sided locally advanced breast cancer status post neoadjuvant chemotherapy with AC followed by T who recently underwent bilateral mastectomy she had significant residual disease.  ypT3, ypN1a.     She is yet to start radiation therapy.  This visit was to discuss endocrine therapy due to some delay in starting radiation.  Doing well.  No major issues reported today.  Her range of motion in her arms has significantly improved.  She is meeting  radiation oncology tomorrow.  She is also scheduled to start Zoladex injections tomorrow.    Review of Systems    Pertinent items are noted in HPI.    Past History    Past Medical History:   Diagnosis Date    Breast cancer (H)     right       PHYSICAL EXAM  LMP 03/20/2023 (Approximate)     GENERAL: no acute distress. Cooperative in conversation.      Lab Results    No results found for this or any previous visit (from the past 168 hour(s)).      Imaging    NM Lymphoscintigraphy Injection only    Result Date: 8/9/2023  INDICATION: Breast Carcinoma. Pre-operative identification of the right sentinel lymph node. PROCEDURE: Informed consent was obtained from the patient. The skin was prepped with ChloraPrep. 510uCi 99mTc Lymphoseek was injected subdermally along the upper outer aspect of the areolar margin. The patient tolerated this well.  The procedure was performed by ALEYDA CARLOS, on 8/9/2023, at 9:44 AM.    IMPRESSION: Herminie lymph node injection.      Complex patient.    A total of 40 min were spent today on this visit which included face to face conversation with the patient, EMR review, counseling and co-ordination of care and medical documentation.      Signed by: Paola Saleem MD

## 2023-09-08 ENCOUNTER — LAB (OUTPATIENT)
Dept: INFUSION THERAPY | Facility: HOSPITAL | Age: 48
End: 2023-09-08
Attending: INTERNAL MEDICINE
Payer: COMMERCIAL

## 2023-09-08 ENCOUNTER — APPOINTMENT (OUTPATIENT)
Dept: RADIATION ONCOLOGY | Facility: HOSPITAL | Age: 48
End: 2023-09-08
Attending: RADIOLOGY
Payer: COMMERCIAL

## 2023-09-08 DIAGNOSIS — Z17.0 MALIGNANT NEOPLASM OF OVERLAPPING SITES OF RIGHT BREAST IN FEMALE, ESTROGEN RECEPTOR POSITIVE (H): ICD-10-CM

## 2023-09-08 DIAGNOSIS — Z17.0 MALIGNANT NEOPLASM INVOLVING BOTH NIPPLE AND AREOLA OF RIGHT BREAST IN FEMALE, ESTROGEN RECEPTOR POSITIVE (H): Primary | ICD-10-CM

## 2023-09-08 DIAGNOSIS — C50.811 MALIGNANT NEOPLASM OF OVERLAPPING SITES OF RIGHT BREAST IN FEMALE, ESTROGEN RECEPTOR POSITIVE (H): ICD-10-CM

## 2023-09-08 DIAGNOSIS — C50.011 MALIGNANT NEOPLASM INVOLVING BOTH NIPPLE AND AREOLA OF RIGHT BREAST IN FEMALE, ESTROGEN RECEPTOR POSITIVE (H): Primary | ICD-10-CM

## 2023-09-08 LAB
ALBUMIN SERPL BCG-MCNC: 4.5 G/DL (ref 3.5–5.2)
ALP SERPL-CCNC: 81 U/L (ref 35–104)
ALT SERPL W P-5'-P-CCNC: 29 U/L (ref 0–50)
ANION GAP SERPL CALCULATED.3IONS-SCNC: 11 MMOL/L (ref 7–15)
AST SERPL W P-5'-P-CCNC: 21 U/L (ref 0–45)
BASOPHILS # BLD AUTO: 0 10E3/UL (ref 0–0.2)
BASOPHILS NFR BLD AUTO: 1 %
BILIRUB SERPL-MCNC: 0.4 MG/DL
BUN SERPL-MCNC: 13.3 MG/DL (ref 6–20)
CALCIUM SERPL-MCNC: 9.7 MG/DL (ref 8.6–10)
CHLORIDE SERPL-SCNC: 102 MMOL/L (ref 98–107)
CREAT SERPL-MCNC: 0.64 MG/DL (ref 0.51–0.95)
DEPRECATED HCO3 PLAS-SCNC: 24 MMOL/L (ref 22–29)
EGFRCR SERPLBLD CKD-EPI 2021: >90 ML/MIN/1.73M2
EOSINOPHIL # BLD AUTO: 0.2 10E3/UL (ref 0–0.7)
EOSINOPHIL NFR BLD AUTO: 5 %
ERYTHROCYTE [DISTWIDTH] IN BLOOD BY AUTOMATED COUNT: 14.1 % (ref 10–15)
GLUCOSE SERPL-MCNC: 104 MG/DL (ref 70–99)
HCT VFR BLD AUTO: 37 % (ref 35–47)
HGB BLD-MCNC: 11.9 G/DL (ref 11.7–15.7)
IMM GRANULOCYTES # BLD: 0 10E3/UL
IMM GRANULOCYTES NFR BLD: 0 %
LYMPHOCYTES # BLD AUTO: 0.9 10E3/UL (ref 0.8–5.3)
LYMPHOCYTES NFR BLD AUTO: 22 %
MCH RBC QN AUTO: 27.7 PG (ref 26.5–33)
MCHC RBC AUTO-ENTMCNC: 32.2 G/DL (ref 31.5–36.5)
MCV RBC AUTO: 86 FL (ref 78–100)
MONOCYTES # BLD AUTO: 0.4 10E3/UL (ref 0–1.3)
MONOCYTES NFR BLD AUTO: 9 %
NEUTROPHILS # BLD AUTO: 2.6 10E3/UL (ref 1.6–8.3)
NEUTROPHILS NFR BLD AUTO: 63 %
NRBC # BLD AUTO: 0 10E3/UL
NRBC BLD AUTO-RTO: 0 /100
PLATELET # BLD AUTO: 375 10E3/UL (ref 150–450)
POTASSIUM SERPL-SCNC: 4.4 MMOL/L (ref 3.4–5.3)
PROT SERPL-MCNC: 7.1 G/DL (ref 6.4–8.3)
RBC # BLD AUTO: 4.3 10E6/UL (ref 3.8–5.2)
SODIUM SERPL-SCNC: 137 MMOL/L (ref 136–145)
WBC # BLD AUTO: 4.2 10E3/UL (ref 4–11)

## 2023-09-08 PROCEDURE — 77290 THER RAD SIMULAJ FIELD CPLX: CPT | Mod: 26 | Performed by: RADIOLOGY

## 2023-09-08 PROCEDURE — 96402 CHEMO HORMON ANTINEOPL SQ/IM: CPT

## 2023-09-08 PROCEDURE — 77290 THER RAD SIMULAJ FIELD CPLX: CPT | Performed by: RADIOLOGY

## 2023-09-08 PROCEDURE — 36591 DRAW BLOOD OFF VENOUS DEVICE: CPT

## 2023-09-08 PROCEDURE — 77334 RADIATION TREATMENT AID(S): CPT | Performed by: RADIOLOGY

## 2023-09-08 PROCEDURE — 85025 COMPLETE CBC W/AUTO DIFF WBC: CPT

## 2023-09-08 PROCEDURE — 77334 RADIATION TREATMENT AID(S): CPT | Mod: 26 | Performed by: RADIOLOGY

## 2023-09-08 PROCEDURE — 250N000011 HC RX IP 250 OP 636: Mod: JZ | Performed by: INTERNAL MEDICINE

## 2023-09-08 PROCEDURE — 80053 COMPREHEN METABOLIC PANEL: CPT

## 2023-09-08 RX ORDER — HEPARIN SODIUM (PORCINE) LOCK FLUSH IV SOLN 100 UNIT/ML 100 UNIT/ML
5 SOLUTION INTRAVENOUS
Status: DISCONTINUED | OUTPATIENT
Start: 2023-09-08 | End: 2023-09-08 | Stop reason: HOSPADM

## 2023-09-08 RX ORDER — HEPARIN SODIUM (PORCINE) LOCK FLUSH IV SOLN 100 UNIT/ML 100 UNIT/ML
5 SOLUTION INTRAVENOUS
Status: CANCELLED | OUTPATIENT
Start: 2023-09-08

## 2023-09-08 RX ORDER — LETROZOLE 2.5 MG/1
2.5 TABLET, FILM COATED ORAL DAILY
Qty: 28 TABLET | Refills: 0 | Status: SHIPPED | OUTPATIENT
Start: 2023-09-08 | End: 2023-11-13

## 2023-09-08 RX ADMIN — GOSERELIN ACETATE 3.6 MG: 3.6 IMPLANT SUBCUTANEOUS at 08:23

## 2023-09-08 RX ADMIN — Medication 5 ML: at 08:23

## 2023-09-08 NOTE — PROGRESS NOTES
Pt here ambulatory for zoladex inj and labwork. Port accessed and labs drawn/port flushed and deaccessed with 2x2 to site. Reviewed zoladex inj and reviewed possible side effects. Ice pack to right lower quadrant abdomen then zoladex inj to site. PT tolerated without any problems. Follow up reviewed and pt dc'd steady gait

## 2023-09-11 ENCOUNTER — DOCUMENTATION ONLY (OUTPATIENT)
Dept: ONCOLOGY | Facility: CLINIC | Age: 48
End: 2023-09-11
Payer: COMMERCIAL

## 2023-09-11 DIAGNOSIS — Z80.3 FAMILY HISTORY OF MALIGNANT NEOPLASM OF BREAST: ICD-10-CM

## 2023-09-11 DIAGNOSIS — C50.811 MALIGNANT NEOPLASM OF OVERLAPPING SITES OF RIGHT BREAST IN FEMALE, ESTROGEN RECEPTOR POSITIVE (H): Primary | ICD-10-CM

## 2023-09-11 DIAGNOSIS — Z17.0 MALIGNANT NEOPLASM OF OVERLAPPING SITES OF RIGHT BREAST IN FEMALE, ESTROGEN RECEPTOR POSITIVE (H): Primary | ICD-10-CM

## 2023-09-11 DIAGNOSIS — Z80.42 FAMILY HISTORY OF MALIGNANT NEOPLASM OF PROSTATE: ICD-10-CM

## 2023-09-11 NOTE — PROGRESS NOTES
"9/11/2023    Referring Provider: Maribell Brunner MD    Presenting Information:  We are sending a copy of this letter, along with her genetic test report, to Alicia as we have made multiple attempts to contact her to discuss her genetic test results without success. The contents of this letter are a summary of Alicia's genetic test results and the implications for her and her family.     Genetic Testing Result: NEGATIVE  Alicia is negative for mutations in the TOÑO, BARD1, BRCA1, BRCA2, BRIP1, CDH1, CHEK2, DICER1, EPCAM, MLH1, MRE11A, MSH2, MSH6, MUTYH, NBN, NF1, PALB2, PMS2, PTEN, RAD50, RAD51C, RAD51D, SMARCA4, STK11, and TP53 genes. No mutations were found in any of the 25 genes analyzed. This test involved sequencing and deletion/duplication analysis of all genes with the exceptions of EPCAM (deletions/duplications only).    A copy of the test report can be found in the Laboratory tab, dated 5/22/23, and named \"Next generation sequencing\".     Interpretation:  There are a couple of different interpretations of this negative test result.    One explanation may be that there is a different gene or combination of genes and environment that are associated with the cancers in this family.  There is also a small possibility that there is a mutation in one of these genes, and the testing laboratory could not find it with their current testing methods.       Screening:  Based on this negative test result, it is important for Alicia and her relatives to refer back to the family history for appropriate cancer screening.    Alicia's close female relatives remain at increased risk for breast cancer given their family history. Breast cancer screening is generally recommended to begin approximately 10 years younger than the earliest age of breast cancer diagnosis in the family, or at age 40, whichever comes first. In this family, screening may begin at age 38. Breast screening options should be discussed with an individual's " primary care provider and a genetic counselor, to determine at what age to begin screening, what screening is appropriate, and if additional screening (such as breast MRI) is necessary based on personal/family history factors.   Other population cancer screening options, such as those recommended by the American Cancer Society and the National Comprehensive Cancer Network (NCCN), are also appropriate for Alicia and her family. These screening recommendations may change if there are changes to Alicia's personal and/or family history of cancer. Final screening recommendations should be made by each individual's primary care provider.      Inheritance:  Alicia did not pass on an identifiable mutation in these genes to her children based on this test result. Mutations in these genes do not skip generations.      Summary:  We do not have an explanation for Alicia's personal or family history of cancer. While no genetic changes were identified, Alicia may still be at risk for certain cancers due to family history, environmental factors, or other genetic causes not identified by this test. Because of that, it is important that she continue with cancer screening based on her personal and family history as discussed above.    Genetic testing is rapidly advancing, and new cancer susceptibility genes will most likely be identified in the future. Therefore, I encouraged Alicia to contact me annually or if there are changes in her personal or family history. This may change how we assess her cancer risk, screening, and the testing we would offer.    Plan:  1. A copy of the test results will be sent to Alicia.  2. She should follow-up with her other providers.  3. She should contact me regularly, or sooner if her family history changes.    If Alicia has any further questions, she is encouraged to contact me via [employer].    Rishabh Jarvis MS, [facility] [place]  Licensed, Certified Genetic Counselor

## 2023-09-11 NOTE — LETTER
"September 11, 2023    Alicia Iniguez  [address] [place] [address]      Dear Alicia,    Here is a copy of the progress note outlining your genetic test results. If you have any additional questions, please feel free to call.    Referring Provider: Maribell Brunner MD     Presenting Information:  We are sending a copy of this letter, along with her genetic test report, to Alicia as we have made multiple attempts to contact her to discuss her genetic test results without success. The contents of this letter are a summary of Alicia's genetic test results and the implications for her and her family.      Genetic Testing Result: NEGATIVE  Alicia is negative for mutations in the TOÑO, BARD1, BRCA1, BRCA2, BRIP1, CDH1, CHEK2, DICER1, EPCAM, MLH1, MRE11A, MSH2, MSH6, MUTYH, NBN, NF1, PALB2, PMS2, PTEN, RAD50, RAD51C, RAD51D, SMARCA4, STK11, and TP53 genes. No mutations were found in any of the 25 genes analyzed. This test involved sequencing and deletion/duplication analysis of all genes with the exceptions of EPCAM (deletions/duplications only).     A copy of the test report can be found in the Laboratory tab, dated 5/22/23, and named \"Next generation sequencing\".      Interpretation:  There are a couple of different interpretations of this negative test result.    One explanation may be that there is a different gene or combination of genes and environment that are associated with the cancers in this family.  There is also a small possibility that there is a mutation in one of these genes, and the testing laboratory could not find it with their current testing methods.        Screening:  Based on this negative test result, it is important for Alicia and her relatives to refer back to the family history for appropriate cancer screening.    Alicia's close female relatives remain at increased risk for breast cancer given their family history. Breast cancer screening is generally recommended to begin approximately " 10 years younger than the earliest age of breast cancer diagnosis in the family, or at age 40, whichever comes first. In this family, screening may begin at age 38. Breast screening options should be discussed with an individual's primary care provider and a genetic counselor, to determine at what age to begin screening, what screening is appropriate, and if additional screening (such as breast MRI) is necessary based on personal/family history factors.   Other population cancer screening options, such as those recommended by the American Cancer Society and the National Comprehensive Cancer Network (NCCN), are also appropriate for Alicia and her family. These screening recommendations may change if there are changes to Alicia's personal and/or family history of cancer. Final screening recommendations should be made by each individual's primary care provider.       Inheritance:  Alicia did not pass on an identifiable mutation in these genes to her children based on this test result. Mutations in these genes do not skip generations.       Summary:  We do not have an explanation for Alicia's personal or family history of cancer. While no genetic changes were identified, Alicia may still be at risk for certain cancers due to family history, environmental factors, or other genetic causes not identified by this test. Because of that, it is important that she continue with cancer screening based on her personal and family history as discussed above.     Genetic testing is rapidly advancing, and new cancer susceptibility genes will most likely be identified in the future. Therefore, I encouraged Alicia to contact me annually or if there are changes in her personal or family history. This may change how we assess her cancer risk, screening, and the testing we would offer.     Plan:  1. A copy of the test results will be sent to Alicia.  2. She should follow-up with her other providers.  3. She should contact me  regularly, or sooner if her family history changes.     If Alicia has any further questions, she is encouraged to contact me via [employer].     Rishabh Jarvis MS, [facility]  Licensed, Certified Genetic Counselor

## 2023-09-13 ENCOUNTER — VIRTUAL VISIT (OUTPATIENT)
Dept: ONCOLOGY | Facility: HOSPITAL | Age: 48
End: 2023-09-13
Attending: INTERNAL MEDICINE
Payer: COMMERCIAL

## 2023-09-13 VITALS — BODY MASS INDEX: 28.12 KG/M2 | HEIGHT: 66 IN | WEIGHT: 175 LBS

## 2023-09-13 DIAGNOSIS — C50.811 MALIGNANT NEOPLASM OF OVERLAPPING SITES OF RIGHT BREAST IN FEMALE, ESTROGEN RECEPTOR POSITIVE (H): Primary | ICD-10-CM

## 2023-09-13 DIAGNOSIS — Z17.0 MALIGNANT NEOPLASM OF OVERLAPPING SITES OF RIGHT BREAST IN FEMALE, ESTROGEN RECEPTOR POSITIVE (H): Primary | ICD-10-CM

## 2023-09-13 PROCEDURE — 99213 OFFICE O/P EST LOW 20 MIN: CPT | Mod: 95 | Performed by: INTERNAL MEDICINE

## 2023-09-13 ASSESSMENT — PAIN SCALES - GENERAL: PAINLEVEL: NO PAIN (0)

## 2023-09-13 NOTE — NURSING NOTE
Is the patient currently in the state of [place]? YES    Visit mode:VIDEO    If the visit is dropped, the patient can be reconnected by: VIDEO VISIT: Text to cell phone:   Telephone Information:   Mobile [phone]       Will anyone else be joining the visit? NO  (If patient encounters technical issues they should call [phone])    How would you like to obtain your AVS? MyChart    Are changes needed to the allergy or medication list? Pt stated no changes to allergies and Pt stated no med changes    Reason for visit: RECHECK    Lis SETH

## 2023-09-13 NOTE — PROGRESS NOTES
Virtual Visit Details    Type of service:  Video Visit   Video start time: 2:41 PM  Video stop time: 2:48 PM  Originating Location (pt. Location): Home    Distant Location (provider location):  On-site  Platform used for Video Visit: [facility] Hematology and Oncology Progress Note    Patient: Alicia Iniguez  MRN: [id number]  Date of Service: Sep 13, 2023          Reason for Visit    Chief Complaint   Patient presents with    RECHECK       Assessment and Plan     Cancer Staging   Malignant neoplasm of overlapping sites of right breast in female, estrogen receptor positive (H)  Staging form: Breast, AJCC 8th Edition  - Clinical: Stage IIA (cT3, cN2, cM0, G2, ER+, OH+, HER2-) - Signed by Shanelle Weaver APRN CNP on 3/1/2023  - Pathologic stage from 8/21/2023: No Stage Recommended (ypT3, pN1a(sn), cM0, G2, ER+, OH+, HER2-) - Signed by Ruth Orellana MD on 8/21/2023    1.  Locally Advanced Breast cancer, right side, stage IIa, T3 N2 M0, grade 2, ER/OH positive, HER2/janine negative: Status post neoadjuvant chemotherapy with dose dense Adriamycin and Cytoxan for 4 cycles, followed by weekly Taxol. Underwent bilateral mastectomy on 8/9/2023.  I reviewed the surgical path report in detail today.  She did have significant residual disease as expected with minimum treatment effect.  Residual disease measured 5.6 x 3.2 x 2.7 cm in greatest dimensions.  Grade 2.  There was evidence of dermal infiltration with focal invasion of epidermis.  There was focal lymphovascular invasion within the dermal connective tissue.  No evidence of DCIS.  Margins were all negative.  Out of 20 lymph nodes examined 2 were positive for macrometastasis (sentinel lymph nodes).  No evidence of extranodal extension.  ypT3, ypN1a.     Adjuvant radiation has been delayed due to plastic surgery related issues.  She is starting radiation pretty soon.    She has started endocrine therapy with Zoladex and letrozole doing  well.  But she is also starting radiation pretty soon so during the time of radiation she would hold letrozole.  Following that we will start abemaciclib.  She is agreeable to the plan.    ECOG Performance    0 - Independent    Distress Screening (within last 30 days)    1. How concerned are you about your ability to eat? : 0  2. How concerned are you about unintended weight loss or your current weight? : 0  3. How concerned are you about feeling depressed or very sad? : 0  4. How concerned are you about feeling anxious or very scared? : 5  5. Do you struggle with the loss of meaning and tiago in your life? : Not at all  6. How concerned are you about work and home life issues that may be affected by your cancer? : 0  7. How concerned are you about knowing what resources are available to help you? : 0  8. Do you currently have what you would describe as Lutheran or spiritual struggles?            : Not at all       Pain  Pain Score: No Pain (0)    Problem List    Patient Active Problem List   Diagnosis    Malignant neoplasm of overlapping sites of right breast in female, estrogen receptor positive (H)    Malignant neoplasm involving both nipple and areola of right breast in female, estrogen receptor positive (H)    Adverse effect of antineoplastic and immunosuppressive drugs, sequela    Chest pain, unspecified    Hyperlipidemia        ______________________________________________________________________________    History of Present Illness    Oncology history:  DIAGNOSIS: Breast cancer, right side, patient had a palpable lump with some nipple and skin changes.  She was also found to have lymphadenopathy in the axilla.  Patient had an ultrasound and mammogram that showed a 4.1 cm tumor with axillary lymphadenopathy  Biopsy was done January 31 and it showed invasive ductal carcinoma.  Grade 2.  ER MD positive and HER2/janine negative.  Biopsy was done of the lymph node as well and it was positive.    TREATMENT: Patient  "has started neoadjuvant chemotherapy with dose dense Adriamycin and Cytoxan for 4 cycles and then 12 weekly Taxol.     INTERIM HISTORY:   48-year-old female with right-sided locally advanced breast cancer status post neoadjuvant chemotherapy with AC followed by T who recently underwent bilateral mastectomy she had significant residual disease.  ypT3, ypN1a.     She is starting radiation therapy pretty soon.  Has been taking letrozole since last week.  Doing well.  No significant side effects.  Received Zoladex last week.     Review of Systems    Pertinent items are noted in HPI.    Past History    Past Medical History:   Diagnosis Date    Breast cancer (H)     right       PHYSICAL EXAM  Ht 1.676 m (5' 6\")   Wt 79.4 kg (175 lb)   LMP 03/20/2023 (Approximate)   BMI 28.25 kg/m      GENERAL: no acute distress. Cooperative in conversation.      Lab Results    Recent Results (from the past 168 hour(s))   Comprehensive metabolic panel   Result Value Ref Range    Sodium 140 135 - 145 mmol/L    Potassium 4.1 3.4 - 5.3 mmol/L    Carbon Dioxide (CO2) 26 22 - 29 mmol/L    Anion Gap 10 7 - 15 mmol/L    Urea Nitrogen 11.0 6.0 - 20.0 mg/dL    Creatinine 0.59 0.51 - 0.95 mg/dL    GFR Estimate >90 >60 mL/min/1.73m2    Calcium 9.4 8.6 - 10.0 mg/dL    Chloride 104 98 - 107 mmol/L    Glucose 105 (H) 70 - 99 mg/dL    Alkaline Phosphatase 83 35 - 104 U/L    AST 22 0 - 45 U/L    ALT 29 0 - 50 U/L    Protein Total 6.9 6.4 - 8.3 g/dL    Albumin 4.3 3.5 - 5.2 g/dL    Bilirubin Total 0.4 <=1.2 mg/dL   CBC with platelets and differential   Result Value Ref Range    WBC Count 4.6 4.0 - 11.0 10e3/uL    RBC Count 4.36 3.80 - 5.20 10e6/uL    Hemoglobin 12.2 11.7 - 15.7 g/dL    Hematocrit 37.4 35.0 - 47.0 %    MCV 86 78 - 100 fL    MCH 28.0 26.5 - 33.0 pg    MCHC 32.6 31.5 - 36.5 g/dL    RDW 13.8 10.0 - 15.0 %    Platelet Count 335 150 - 450 10e3/uL    % Neutrophils 72 %    % Lymphocytes 15 %    % Monocytes 9 %    Mids % (Monos, Eos, Basos)      " % Eosinophils 3 %    % Basophils 1 %    % Immature Granulocytes 0 %    NRBCs per 100 WBC 0 <1 /100    Absolute Neutrophils 3.4 1.6 - 8.3 10e3/uL    Absolute Lymphocytes 0.7 (L) 0.8 - 5.3 10e3/uL    Absolute Monocytes 0.4 0.0 - 1.3 10e3/uL    Mids Abs (Monos, Eos, Basos)      Absolute Eosinophils 0.1 0.0 - 0.7 10e3/uL    Absolute Basophils 0.0 0.0 - 0.2 10e3/uL    Absolute Immature Granulocytes 0.0 <=0.4 10e3/uL    Absolute NRBCs 0.0 10e3/uL         Imaging    No results found.    A total of 25 min were spent today on this visit which included face to face conversation with the patient, EMR review, counseling and co-ordination of care and medical documentation.            Signed by: Paola Saleem MD

## 2023-09-15 ENCOUNTER — APPOINTMENT (OUTPATIENT)
Dept: RADIATION ONCOLOGY | Facility: HOSPITAL | Age: 48
End: 2023-09-15
Attending: RADIOLOGY
Payer: COMMERCIAL

## 2023-09-15 PROCEDURE — 77295 3-D RADIOTHERAPY PLAN: CPT | Performed by: RADIOLOGY

## 2023-09-15 PROCEDURE — 77334 RADIATION TREATMENT AID(S): CPT | Mod: 26 | Performed by: RADIOLOGY

## 2023-09-15 PROCEDURE — 77300 RADIATION THERAPY DOSE PLAN: CPT | Mod: 26 | Performed by: RADIOLOGY

## 2023-09-15 PROCEDURE — 77300 RADIATION THERAPY DOSE PLAN: CPT | Performed by: RADIOLOGY

## 2023-09-15 PROCEDURE — 77334 RADIATION TREATMENT AID(S): CPT | Performed by: RADIOLOGY

## 2023-09-15 PROCEDURE — 77295 3-D RADIOTHERAPY PLAN: CPT | Mod: 26 | Performed by: RADIOLOGY

## 2023-09-19 ENCOUNTER — APPOINTMENT (OUTPATIENT)
Dept: RADIATION ONCOLOGY | Facility: HOSPITAL | Age: 48
End: 2023-09-19
Attending: RADIOLOGY
Payer: COMMERCIAL

## 2023-09-19 PROCEDURE — 77387 GUIDANCE FOR RADJ TX DLVR: CPT | Performed by: RADIOLOGY

## 2023-09-19 PROCEDURE — 77280 THER RAD SIMULAJ FIELD SMPL: CPT | Performed by: RADIOLOGY

## 2023-09-19 PROCEDURE — 77280 THER RAD SIMULAJ FIELD SMPL: CPT | Mod: 26 | Performed by: RADIOLOGY

## 2023-09-19 PROCEDURE — 77412 RADIATION TX DELIVERY LVL 3: CPT | Performed by: RADIOLOGY

## 2023-09-20 ENCOUNTER — APPOINTMENT (OUTPATIENT)
Dept: RADIATION ONCOLOGY | Facility: HOSPITAL | Age: 48
End: 2023-09-20
Attending: RADIOLOGY
Payer: COMMERCIAL

## 2023-09-20 DIAGNOSIS — Z17.0 MALIGNANT NEOPLASM OF OVERLAPPING SITES OF RIGHT BREAST IN FEMALE, ESTROGEN RECEPTOR POSITIVE (H): Primary | ICD-10-CM

## 2023-09-20 DIAGNOSIS — C50.811 MALIGNANT NEOPLASM OF OVERLAPPING SITES OF RIGHT BREAST IN FEMALE, ESTROGEN RECEPTOR POSITIVE (H): Primary | ICD-10-CM

## 2023-09-20 PROCEDURE — 77412 RADIATION TX DELIVERY LVL 3: CPT | Performed by: STUDENT IN AN ORGANIZED HEALTH CARE EDUCATION/TRAINING PROGRAM

## 2023-09-20 PROCEDURE — 77387 GUIDANCE FOR RADJ TX DLVR: CPT | Performed by: STUDENT IN AN ORGANIZED HEALTH CARE EDUCATION/TRAINING PROGRAM

## 2023-09-20 NOTE — PROGRESS NOTES
RADIATION ONCOLOGY WEEKLY TREATMENT VISIT NOTE      Assessment / Impression     Malignant neoplasm of overlapping sites of right breast in female, estrogen receptor positive (H) [C50.811, Z17.0]     Cancer Staging   Malignant neoplasm of overlapping sites of right breast in female, estrogen receptor positive (H)  Staging form: Breast, AJCC 8th Edition  - Clinical: Stage IIA (cT3, cN2, cM0, G2, ER+, RI+, HER2-) - Signed by Shanelle Weaver APRN CNP on 3/1/2023  - Pathologic stage from 8/21/2023: No Stage Recommended (ypT3, pN1a(sn), cM0, G2, ER+, RI+, HER2-) - Signed by Ruth Orellana MD on 8/21/2023       Tolerating radiation therapy well.  All questions and concerns addressed.  No acute side effects  Working with lymphedema therapy    Plan:     Continue radiation treatment as prescribed.  Radiation:   Site: R CW/ low axilla  Stereotactic Radiosurgery: No  Today's Dose: 360  Total Dose for Breast: 6040  Today's Fraction/Total Fraction Breast: 2/33    Discussed skin care  We will continue lymphedema therapy    Subjective:      HPI: Alicia Iniguez is a 48 year old female with  Malignant neoplasm of overlapping sites of right breast in female, estrogen receptor positive (H) [C50.811, Z17.0]    She is tolerating radiation therapy well to treatments in.  No skin irritation.  Did feel tired yesterday after initiation of radiation.  She is working with lymphedema therapy and using coconut oil along with gentle massage.  She has some tightness in the right chest wall.    The following portions of the patient's history were reviewed and updated as appropriate: allergies, current medications, past family history, past medical history, past social history, past surgical history and problem list.    Assessment                  Body Site:  Breast                           Site: R CW/ low axilla  Stereotactic Radiosurgery: No  Today's Dose: 360  Total Dose for Breast: 6040  Today's Fraction/Total Fraction Breast:    Drainage: 0: Absent                                   Sexuality Alteration                    Emotional Alteration    Copin: Effective  Comfort Alteration   KPS: 90% Can perform normal activity, minor signs of disease  Fatigue (ONS scale): 2: Mild Fatigue  Pain Location: denies   Nutrition Alteration   Anorexia: 0: None  Nausea: 0: None  Vomitin: None  Skin Alteration   Skin Sensation: 0: No problem  Skin Reaction: 0: None (Radioplex given with verbal/written instructions)  AUA Assessment                                           Accompanied by       Objective:     Exam:     There were no vitals filed for this visit.    Wt Readings from Last 8 Encounters:   23 79.4 kg (175 lb)   23 81.6 kg (180 lb)   23 88.7 kg (195 lb 8 oz)   23 89.2 kg (196 lb 11.2 oz)   23 90.4 kg (199 lb 4.8 oz)   23 92.1 kg (203 lb)   23 92.8 kg (204 lb 8 oz)   23 90.8 kg (200 lb 1.6 oz)       General: Alert and oriented, in no acute distress  Alicia has no Erythema.  Some tightness of the right lateral chest wall with tissue expanders in place.    Treatment Summary to Date    Aria chart and setup information reviewed    Ruth Germain MD

## 2023-09-20 NOTE — LETTER
9/20/2023         RE: Alicia Iniguez  [address] [facility] [address]        Dear Colleague,    Thank you for referring your patient, Alicia Iniguez, to the [facility] RADIATION ONCOLOGY [place]. Please see a copy of my visit note below.    RADIATION ONCOLOGY WEEKLY TREATMENT VISIT NOTE      Assessment / Impression     Malignant neoplasm of overlapping sites of right breast in female, estrogen receptor positive (H) [C50.811, Z17.0]     Cancer Staging   Malignant neoplasm of overlapping sites of right breast in female, estrogen receptor positive (H)  Staging form: Breast, AJCC 8th Edition  - Clinical: Stage IIA (cT3, cN2, cM0, G2, ER+, [place]+, HER2-) - Signed by Shanelle Weaver APRN CNP on 3/1/2023  - Pathologic stage from 8/21/2023: No Stage Recommended (ypT3, pN1a(sn), cM0, G2, ER+, [place]+, HER2-) - Signed by Ruth Orellana MD on 8/21/2023       Tolerating radiation therapy well.  All questions and concerns addressed.  No acute side effects  Working with lymphedema therapy    Plan:     Continue radiation treatment as prescribed.  Radiation:   Site: R CW/ low axilla  Stereotactic Radiosurgery: No  Today's Dose: 360  Total Dose for Breast: 6040  Today's Fraction/Total Fraction Breast: 2/33    Discussed skin care  We will continue lymphedema therapy    Subjective:      HPI: Alicia Iniguez is a 48 year old female with  Malignant neoplasm of overlapping sites of right breast in female, estrogen receptor positive (H) [C50.811, Z17.0]    She is tolerating radiation therapy well to treatments in.  No skin irritation.  Did feel tired yesterday after initiation of radiation.  She is working with lymphedema therapy and using coconut oil along with gentle massage.  She has some tightness in the right chest wall.    The following portions of the patient's history were reviewed and updated as appropriate: allergies, current medications, past family history, past medical history, past social  history, past surgical history and problem list.    Assessment                  Body Site:  Breast                           Site: R CW/ low axilla  Stereotactic Radiosurgery: No  Today's Dose: 360  Total Dose for Breast: 6040  Today's Fraction/Total Fraction Breast:   Drainage: 0: Absent                                   Sexuality Alteration                    Emotional Alteration    Copin: Effective  Comfort Alteration   KPS: 90% Can perform normal activity, minor signs of disease  Fatigue (ONS scale): 2: Mild Fatigue  Pain Location: denies   Nutrition Alteration   Anorexia: 0: None  Nausea: 0: None  Vomitin: None  Skin Alteration   Skin Sensation: 0: No problem  Skin Reaction: 0: None (Radioplex given with verbal/written instructions)  AUA Assessment                                           Accompanied by       Objective:     Exam:     There were no vitals filed for this visit.    Wt Readings from Last 8 Encounters:   23 79.4 kg (175 lb)   23 81.6 kg (180 lb)   23 88.7 kg (195 lb 8 oz)   23 89.2 kg (196 lb 11.2 oz)   23 90.4 kg (199 lb 4.8 oz)   23 92.1 kg (203 lb)   23 92.8 kg (204 lb 8 oz)   23 90.8 kg (200 lb 1.6 oz)       General: Alert and oriented, in no acute distress  Alicia has no Erythema.  Some tightness of the right lateral chest wall with tissue expanders in place.    Treatment Summary to Date    Aria chart and setup information reviewed    Ruth Germain MD      Again, thank you for allowing me to participate in the care of your patient.        Sincerely,        Ruth Germain MD

## 2023-09-21 ENCOUNTER — APPOINTMENT (OUTPATIENT)
Dept: RADIATION ONCOLOGY | Facility: HOSPITAL | Age: 48
End: 2023-09-21
Attending: RADIOLOGY
Payer: COMMERCIAL

## 2023-09-21 PROCEDURE — 77412 RADIATION TX DELIVERY LVL 3: CPT | Performed by: RADIOLOGY

## 2023-09-21 PROCEDURE — 77387 GUIDANCE FOR RADJ TX DLVR: CPT | Performed by: RADIOLOGY

## 2023-09-22 ENCOUNTER — APPOINTMENT (OUTPATIENT)
Dept: RADIATION ONCOLOGY | Facility: HOSPITAL | Age: 48
End: 2023-09-22
Attending: RADIOLOGY
Payer: COMMERCIAL

## 2023-09-22 PROCEDURE — 77387 GUIDANCE FOR RADJ TX DLVR: CPT | Performed by: RADIOLOGY

## 2023-09-22 PROCEDURE — 77412 RADIATION TX DELIVERY LVL 3: CPT | Performed by: RADIOLOGY

## 2023-09-25 ENCOUNTER — APPOINTMENT (OUTPATIENT)
Dept: RADIATION ONCOLOGY | Facility: HOSPITAL | Age: 48
End: 2023-09-25
Attending: RADIOLOGY
Payer: COMMERCIAL

## 2023-09-25 PROCEDURE — 77387 GUIDANCE FOR RADJ TX DLVR: CPT | Performed by: RADIOLOGY

## 2023-09-25 PROCEDURE — 77336 RADIATION PHYSICS CONSULT: CPT | Performed by: RADIOLOGY

## 2023-09-25 PROCEDURE — 77412 RADIATION TX DELIVERY LVL 3: CPT | Performed by: RADIOLOGY

## 2023-09-25 PROCEDURE — 77427 RADIATION TX MANAGEMENT X5: CPT | Performed by: STUDENT IN AN ORGANIZED HEALTH CARE EDUCATION/TRAINING PROGRAM

## 2023-09-26 ENCOUNTER — OFFICE VISIT (OUTPATIENT)
Dept: RADIATION ONCOLOGY | Facility: HOSPITAL | Age: 48
End: 2023-09-26
Attending: RADIOLOGY
Payer: COMMERCIAL

## 2023-09-26 VITALS
WEIGHT: 192.1 LBS | DIASTOLIC BLOOD PRESSURE: 75 MMHG | TEMPERATURE: 98.3 F | SYSTOLIC BLOOD PRESSURE: 134 MMHG | HEART RATE: 99 BPM | OXYGEN SATURATION: 99 % | RESPIRATION RATE: 16 BRPM | BODY MASS INDEX: 31.01 KG/M2

## 2023-09-26 DIAGNOSIS — C50.811 MALIGNANT NEOPLASM OF OVERLAPPING SITES OF RIGHT BREAST IN FEMALE, ESTROGEN RECEPTOR POSITIVE (H): Primary | ICD-10-CM

## 2023-09-26 DIAGNOSIS — Z17.0 MALIGNANT NEOPLASM OF OVERLAPPING SITES OF RIGHT BREAST IN FEMALE, ESTROGEN RECEPTOR POSITIVE (H): Primary | ICD-10-CM

## 2023-09-26 PROCEDURE — 77387 GUIDANCE FOR RADJ TX DLVR: CPT | Performed by: RADIOLOGY

## 2023-09-26 PROCEDURE — 77412 RADIATION TX DELIVERY LVL 3: CPT | Performed by: RADIOLOGY

## 2023-09-26 ASSESSMENT — PAIN SCALES - GENERAL: PAINLEVEL: NO PAIN (0)

## 2023-09-26 NOTE — PROGRESS NOTES
RADIATION ONCOLOGY WEEKLY TREATMENT VISIT NOTE      Assessment / Impression     Malignant neoplasm of overlapping sites of right breast in female, estrogen receptor positive (H) [C50.811, Z17.0]     Cancer Staging   Malignant neoplasm of overlapping sites of right breast in female, estrogen receptor positive (H)  Staging form: Breast, AJCC 8th Edition  - Clinical: Stage IIA (cT3, cN2, cM0, G2, ER+, [place]+, HER2-) - Signed by Shanelle Weaver APRN CNP on 3/1/2023  - Pathologic stage from 2023: No Stage Recommended (ypT3, pN1a(sn), cM0, G2, ER+, VT+, HER2-) - Signed by Ruth Orellana MD on 2023       Tolerating radiation therapy well.  All questions and concerns addressed.    Achy joints from ovarian suppression.     Plan:     Continue radiation treatment as prescribed.  Radiation:   Site: R CW/low axilla  Stereotactic Radiosurgery: No  Concurrent Therapy: Yes (Femara)  Today's Dose: 1080  Total Dose for Breast: 6040  Today's Fraction/Total Fraction Breast:     Subjective:      HPI: Alicia Iniguez is a 48 year old female with  Malignant neoplasm of overlapping sites of right breast in female, estrogen receptor positive (H) [C50.811, Z17.0]    The following portions of the patient's history were reviewed and updated as appropriate: allergies, current medications, past family history, past medical history, past social history, past surgical history and problem list.    Assessment                  Body Site:  Breast                           Site: R CW/low axilla  Stereotactic Radiosurgery: No  Concurrent Therapy: Yes (Femara)  Today's Dose: 1080  Total Dose for Breast: 6040  Today's Fraction/Total Fraction Breast:   Drainage: 0: Absent                                   Sexuality Alteration                    Emotional Alteration    Copin: Effective  Comfort Alteration   KPS: 90% Can perform normal activity, minor signs of disease  Fatigue (ONS scale): 2: Mild Fatigue    Nutrition Alteration   Anorexia: 0: None  Nausea: 0: None  Vomitin: None  Weight: 87.1 kg (192 lb 1.6 oz)  Skin Alteration   Skin Sensation: 0: No problem  Skin Reaction: 0: None  AUA Assessment                                           Accompanied by       Objective:     Exam:     Vitals:    23 0840   BP: 134/75   Pulse: 99   Resp: 16   Temp: 98.3  F (36.8  C)   SpO2: 99%   Weight: 87.1 kg (192 lb 1.6 oz)   PainSc: No Pain (0)       Wt Readings from Last 8 Encounters:   23 87.1 kg (192 lb 1.6 oz)   23 79.4 kg (175 lb)   23 81.6 kg (180 lb)   23 88.7 kg (195 lb 8 oz)   23 89.2 kg (196 lb 11.2 oz)   23 90.4 kg (199 lb 4.8 oz)   23 92.1 kg (203 lb)   23 92.8 kg (204 lb 8 oz)       General: Alert and oriented, in no acute distress  Alicia has no Erythema.    Treatment Summary to Date    Aria chart and setup information reviewed    Ruth Orellana MD

## 2023-09-26 NOTE — LETTER
9/26/2023         RE: Alicia Iniguez  [address] [facility] [address]        Dear Colleague,    Thank you for referring your patient, Alicia Iniguez, to the [facility] RADIATION ONCOLOGY [place]. Please see a copy of my visit note below.    RADIATION ONCOLOGY WEEKLY TREATMENT VISIT NOTE      Assessment / Impression     Malignant neoplasm of overlapping sites of right breast in female, estrogen receptor positive (H) [C50.811, Z17.0]     Cancer Staging   Malignant neoplasm of overlapping sites of right breast in female, estrogen receptor positive (H)  Staging form: Breast, AJCC 8th Edition  - Clinical: Stage IIA (cT3, cN2, cM0, G2, ER+, AL+, HER2-) - Signed by Shanelle Weaver APRN CNP on 3/1/2023  - Pathologic stage from 8/21/2023: No Stage Recommended (ypT3, pN1a(sn), cM0, G2, ER+, AL+, HER2-) - Signed by Ruth Orellana MD on 8/21/2023       Tolerating radiation therapy well.  All questions and concerns addressed.    Achy joints from ovarian suppression.     Plan:     Continue radiation treatment as prescribed.  Radiation:   Site: R CW/low axilla  Stereotactic Radiosurgery: No  Concurrent Therapy: Yes (Femara)  Today's Dose: 1080  Total Dose for Breast: 6040  Today's Fraction/Total Fraction Breast: 6/33    Subjective:      HPI: Alicia Iniguez is a 48 year old female with  Malignant neoplasm of overlapping sites of right breast in female, estrogen receptor positive (H) [C50.811, Z17.0]    The following portions of the patient's history were reviewed and updated as appropriate: allergies, current medications, past family history, past medical history, past social history, past surgical history and problem list.    Assessment                  Body Site:  Breast                           Site: R CW/low axilla  Stereotactic Radiosurgery: No  Concurrent Therapy: Yes (Femara)  Today's Dose: 1080  Total Dose for Breast: 6040  Today's Fraction/Total Fraction Breast: 6/33  Drainage: 0:  Absent                                   Sexuality Alteration                    Emotional Alteration    Copin: Effective  Comfort Alteration   KPS: 90% Can perform normal activity, minor signs of disease  Fatigue (ONS scale): 2: Mild Fatigue   Nutrition Alteration   Anorexia: 0: None  Nausea: 0: None  Vomitin: None  Weight: 87.1 kg (192 lb 1.6 oz)  Skin Alteration   Skin Sensation: 0: No problem  Skin Reaction: 0: None  AUA Assessment                                           Accompanied by       Objective:     Exam:     Vitals:    23 0840   BP: 134/75   Pulse: 99   Resp: 16   Temp: 98.3  F (36.8  C)   SpO2: 99%   Weight: 87.1 kg (192 lb 1.6 oz)   PainSc: No Pain (0)       Wt Readings from Last 8 Encounters:   23 87.1 kg (192 lb 1.6 oz)   23 79.4 kg (175 lb)   23 81.6 kg (180 lb)   23 88.7 kg (195 lb 8 oz)   23 89.2 kg (196 lb 11.2 oz)   23 90.4 kg (199 lb 4.8 oz)   23 92.1 kg (203 lb)   23 92.8 kg (204 lb 8 oz)       General: Alert and oriented, in no acute distress  Alicia has no Erythema.    Treatment Summary to Date    Aria chart and setup information reviewed    Ruth Orellana MD      Again, thank you for allowing me to participate in the care of your patient.        Sincerely,        Ruth Orellana MD

## 2023-09-27 ENCOUNTER — APPOINTMENT (OUTPATIENT)
Dept: RADIATION ONCOLOGY | Facility: HOSPITAL | Age: 48
End: 2023-09-27
Attending: RADIOLOGY
Payer: COMMERCIAL

## 2023-09-27 PROCEDURE — 77387 GUIDANCE FOR RADJ TX DLVR: CPT | Performed by: RADIOLOGY

## 2023-09-27 PROCEDURE — 77412 RADIATION TX DELIVERY LVL 3: CPT | Performed by: RADIOLOGY

## 2023-09-28 ENCOUNTER — APPOINTMENT (OUTPATIENT)
Dept: RADIATION ONCOLOGY | Facility: HOSPITAL | Age: 48
End: 2023-09-28
Attending: RADIOLOGY
Payer: COMMERCIAL

## 2023-09-28 PROCEDURE — 77412 RADIATION TX DELIVERY LVL 3: CPT | Performed by: RADIOLOGY

## 2023-09-28 PROCEDURE — 77387 GUIDANCE FOR RADJ TX DLVR: CPT | Performed by: RADIOLOGY

## 2023-09-29 ENCOUNTER — APPOINTMENT (OUTPATIENT)
Dept: RADIATION ONCOLOGY | Facility: HOSPITAL | Age: 48
End: 2023-09-29
Attending: RADIOLOGY
Payer: COMMERCIAL

## 2023-09-29 PROCEDURE — 77387 GUIDANCE FOR RADJ TX DLVR: CPT | Performed by: RADIOLOGY

## 2023-09-29 PROCEDURE — 77412 RADIATION TX DELIVERY LVL 3: CPT | Performed by: RADIOLOGY

## 2023-10-02 ENCOUNTER — APPOINTMENT (OUTPATIENT)
Dept: RADIATION ONCOLOGY | Facility: HOSPITAL | Age: 48
End: 2023-10-02
Attending: RADIOLOGY
Payer: COMMERCIAL

## 2023-10-02 PROCEDURE — 77427 RADIATION TX MANAGEMENT X5: CPT | Performed by: RADIOLOGY

## 2023-10-02 PROCEDURE — 77412 RADIATION TX DELIVERY LVL 3: CPT | Performed by: STUDENT IN AN ORGANIZED HEALTH CARE EDUCATION/TRAINING PROGRAM

## 2023-10-02 PROCEDURE — 77387 GUIDANCE FOR RADJ TX DLVR: CPT | Performed by: STUDENT IN AN ORGANIZED HEALTH CARE EDUCATION/TRAINING PROGRAM

## 2023-10-02 PROCEDURE — 77336 RADIATION PHYSICS CONSULT: CPT | Performed by: RADIOLOGY

## 2023-10-03 ENCOUNTER — APPOINTMENT (OUTPATIENT)
Dept: RADIATION ONCOLOGY | Facility: HOSPITAL | Age: 48
End: 2023-10-03
Attending: RADIOLOGY
Payer: COMMERCIAL

## 2023-10-03 PROCEDURE — 77412 RADIATION TX DELIVERY LVL 3: CPT | Performed by: RADIOLOGY

## 2023-10-03 PROCEDURE — 77387 GUIDANCE FOR RADJ TX DLVR: CPT | Performed by: RADIOLOGY

## 2023-10-04 ENCOUNTER — OFFICE VISIT (OUTPATIENT)
Dept: RADIATION ONCOLOGY | Facility: HOSPITAL | Age: 48
End: 2023-10-04
Attending: RADIOLOGY
Payer: COMMERCIAL

## 2023-10-04 VITALS
HEART RATE: 100 BPM | WEIGHT: 190.2 LBS | OXYGEN SATURATION: 98 % | BODY MASS INDEX: 30.7 KG/M2 | SYSTOLIC BLOOD PRESSURE: 113 MMHG | RESPIRATION RATE: 16 BRPM | DIASTOLIC BLOOD PRESSURE: 64 MMHG | TEMPERATURE: 98.1 F

## 2023-10-04 DIAGNOSIS — C50.811 MALIGNANT NEOPLASM OF OVERLAPPING SITES OF RIGHT BREAST IN FEMALE, ESTROGEN RECEPTOR POSITIVE (H): Primary | ICD-10-CM

## 2023-10-04 DIAGNOSIS — Z17.0 MALIGNANT NEOPLASM OF OVERLAPPING SITES OF RIGHT BREAST IN FEMALE, ESTROGEN RECEPTOR POSITIVE (H): Primary | ICD-10-CM

## 2023-10-04 PROCEDURE — 77387 GUIDANCE FOR RADJ TX DLVR: CPT | Performed by: RADIOLOGY

## 2023-10-04 PROCEDURE — 77412 RADIATION TX DELIVERY LVL 3: CPT | Performed by: RADIOLOGY

## 2023-10-04 ASSESSMENT — PAIN SCALES - GENERAL: PAINLEVEL: NO PAIN (0)

## 2023-10-04 NOTE — PROGRESS NOTES
RADIATION ONCOLOGY WEEKLY TREATMENT VISIT NOTE      Assessment / Impression     Malignant neoplasm of overlapping sites of right breast in female, estrogen receptor positive (H) [C50.811, Z17.0]     Cancer Staging   Malignant neoplasm of overlapping sites of right breast in female, estrogen receptor positive (H)  Staging form: Breast, AJCC 8th Edition  - Clinical: Stage IIA (cT3, cN2, cM0, G2, ER+, [place]+, HER2-) - Signed by Shanelle Weaver APRN CNP on 3/1/2023  - Pathologic stage from 2023: No Stage Recommended (ypT3, pN1a(sn), cM0, G2, ER+, [place]+, HER2-) - Signed by Ruth Orellnaa MD on 2023       Tolerating radiation therapy well.  All questions and concerns addressed.      Plan:   PT lymphedema evaluation completed. Next appointment when radiation over.   Aquaphor to area of early folliculitis. Otherwise usual skin care     Continue radiation treatment as prescribed.  Radiation:   Site: R CW/low axilla  Stereotactic Radiosurgery: No  Concurrent Therapy: Yes (Femara)  Today's Dose: 2160  Total Dose for Breast: 6040  Today's Fraction/Total Fraction Breast:       Subjective:      HPI: Alicia Iniguez is a 48 year old female with  Malignant neoplasm of overlapping sites of right breast in female, estrogen receptor positive (H) [C50.811, Z17.0]    The following portions of the patient's history were reviewed and updated as appropriate: allergies, current medications, past family history, past medical history, past social history, past surgical history and problem list.    Assessment                  Body Site:  Breast                           Site: R CW/low axilla  Stereotactic Radiosurgery: No  Concurrent Therapy: Yes (Femara)  Today's Dose: 2160  Total Dose for Breast: 6040  Today's Fraction/Total Fraction Breast:   Drainage: 0: Absent                                   Sexuality Alteration                    Emotional Alteration    Copin: Effective  Comfort Alteration    KPS: 90% Can perform normal activity, minor signs of disease  Fatigue (ONS scale): 2: Mild Fatigue  Pain Location: denies   Nutrition Alteration   Anorexia: 0: None  Nausea: 0: None  Vomitin: None  Weight: 86.3 kg (190 lb 3.2 oz)  Skin Alteration   Skin Sensation: 0: No problem  Skin Reaction: 1: Faint erythema or dry desquamation  AUA Assessment                                           Accompanied by       Objective:     Exam:     Vitals:    10/04/23 0837   BP: 113/64   Pulse: 100   Resp: 16   Temp: 98.1  F (36.7  C)   SpO2: 98%   Weight: 86.3 kg (190 lb 3.2 oz)   PainSc: No Pain (0)       Wt Readings from Last 8 Encounters:   10/04/23 86.3 kg (190 lb 3.2 oz)   23 87.1 kg (192 lb 1.6 oz)   23 79.4 kg (175 lb)   23 81.6 kg (180 lb)   23 88.7 kg (195 lb 8 oz)   23 89.2 kg (196 lb 11.2 oz)   23 90.4 kg (199 lb 4.8 oz)   23 92.1 kg (203 lb)       General: Alert and oriented, in no acute distress  Alicia has early folliculitis.      Treatment Summary to Date    Aria chart and setup information reviewed    Ruth Orellana MD

## 2023-10-04 NOTE — LETTER
10/4/2023         RE: Alicia Iniguez  [address] [facility] [address]        Dear Colleague,    Thank you for referring your patient, Alicia Iniguez, to the [facility] RADIATION ONCOLOGY [place]. Please see a copy of my visit note below.    RADIATION ONCOLOGY WEEKLY TREATMENT VISIT NOTE      Assessment / Impression     Malignant neoplasm of overlapping sites of right breast in female, estrogen receptor positive (H) [C50.811, Z17.0]     Cancer Staging   Malignant neoplasm of overlapping sites of right breast in female, estrogen receptor positive (H)  Staging form: Breast, AJCC 8th Edition  - Clinical: Stage IIA (cT3, cN2, cM0, G2, ER+, [place]+, HER2-) - Signed by Shanelle Weaver APRN CNP on 3/1/2023  - Pathologic stage from 8/21/2023: No Stage Recommended (ypT3, pN1a(sn), cM0, G2, ER+, [place]+, HER2-) - Signed by Ruth Orellana MD on 8/21/2023       Tolerating radiation therapy well.  All questions and concerns addressed.      Plan:   PT lymphedema evaluation completed. Next appointment when radiation over.   Aquaphor to area of early folliculitis. Otherwise usual skin care     Continue radiation treatment as prescribed.  Radiation:   Site: R CW/low axilla  Stereotactic Radiosurgery: No  Concurrent Therapy: Yes (Femara)  Today's Dose: 2160  Total Dose for Breast: 6040  Today's Fraction/Total Fraction Breast: 12/33      Subjective:      HPI: Alicia Iniguez is a 48 year old female with  Malignant neoplasm of overlapping sites of right breast in female, estrogen receptor positive (H) [C50.811, Z17.0]    The following portions of the patient's history were reviewed and updated as appropriate: allergies, current medications, past family history, past medical history, past social history, past surgical history and problem list.    Assessment                  Body Site:  Breast                           Site: R CW/low axilla  Stereotactic Radiosurgery: No  Concurrent Therapy: Yes  (Femara)  Today's Dose: 2160  Total Dose for Breast: 6040  Today's Fraction/Total Fraction Breast:   Drainage: 0: Absent                                   Sexuality Alteration                    Emotional Alteration    Copin: Effective  Comfort Alteration   KPS: 90% Can perform normal activity, minor signs of disease  Fatigue (ONS scale): 2: Mild Fatigue  Pain Location: denies   Nutrition Alteration   Anorexia: 0: None  Nausea: 0: None  Vomitin: None  Weight: 86.3 kg (190 lb 3.2 oz)  Skin Alteration   Skin Sensation: 0: No problem  Skin Reaction: 1: Faint erythema or dry desquamation  AUA Assessment                                           Accompanied by       Objective:     Exam:     Vitals:    10/04/23 0837   BP: 113/64   Pulse: 100   Resp: 16   Temp: 98.1  F (36.7  C)   SpO2: 98%   Weight: 86.3 kg (190 lb 3.2 oz)   PainSc: No Pain (0)       Wt Readings from Last 8 Encounters:   10/04/23 86.3 kg (190 lb 3.2 oz)   23 87.1 kg (192 lb 1.6 oz)   23 79.4 kg (175 lb)   23 81.6 kg (180 lb)   23 88.7 kg (195 lb 8 oz)   23 89.2 kg (196 lb 11.2 oz)   23 90.4 kg (199 lb 4.8 oz)   23 92.1 kg (203 lb)       General: Alert and oriented, in no acute distress  Alicia has early folliculitis.      Treatment Summary to Date    Aria chart and setup information reviewed    Ruth Orellana MD      Again, thank you for allowing me to participate in the care of your patient.        Sincerely,        Ruth Orellana MD

## 2023-10-05 ENCOUNTER — APPOINTMENT (OUTPATIENT)
Dept: RADIATION ONCOLOGY | Facility: HOSPITAL | Age: 48
End: 2023-10-05
Attending: RADIOLOGY
Payer: COMMERCIAL

## 2023-10-05 PROCEDURE — 77387 GUIDANCE FOR RADJ TX DLVR: CPT | Performed by: RADIOLOGY

## 2023-10-05 PROCEDURE — 77412 RADIATION TX DELIVERY LVL 3: CPT | Performed by: RADIOLOGY

## 2023-10-06 ENCOUNTER — LAB (OUTPATIENT)
Dept: INFUSION THERAPY | Facility: HOSPITAL | Age: 48
End: 2023-10-06
Attending: INTERNAL MEDICINE
Payer: COMMERCIAL

## 2023-10-06 ENCOUNTER — APPOINTMENT (OUTPATIENT)
Dept: RADIATION ONCOLOGY | Facility: HOSPITAL | Age: 48
End: 2023-10-06
Attending: RADIOLOGY
Payer: COMMERCIAL

## 2023-10-06 VITALS
HEART RATE: 92 BPM | TEMPERATURE: 98.5 F | RESPIRATION RATE: 16 BRPM | OXYGEN SATURATION: 93 % | SYSTOLIC BLOOD PRESSURE: 138 MMHG | DIASTOLIC BLOOD PRESSURE: 70 MMHG

## 2023-10-06 DIAGNOSIS — C50.811 MALIGNANT NEOPLASM OF OVERLAPPING SITES OF RIGHT BREAST IN FEMALE, ESTROGEN RECEPTOR POSITIVE (H): ICD-10-CM

## 2023-10-06 DIAGNOSIS — Z17.0 MALIGNANT NEOPLASM INVOLVING BOTH NIPPLE AND AREOLA OF RIGHT BREAST IN FEMALE, ESTROGEN RECEPTOR POSITIVE (H): Primary | ICD-10-CM

## 2023-10-06 DIAGNOSIS — Z17.0 MALIGNANT NEOPLASM OF OVERLAPPING SITES OF RIGHT BREAST IN FEMALE, ESTROGEN RECEPTOR POSITIVE (H): ICD-10-CM

## 2023-10-06 DIAGNOSIS — C50.011 MALIGNANT NEOPLASM INVOLVING BOTH NIPPLE AND AREOLA OF RIGHT BREAST IN FEMALE, ESTROGEN RECEPTOR POSITIVE (H): Primary | ICD-10-CM

## 2023-10-06 LAB
ALBUMIN SERPL BCG-MCNC: 4.3 G/DL (ref 3.5–5.2)
ALP SERPL-CCNC: 83 U/L (ref 35–104)
ALT SERPL W P-5'-P-CCNC: 29 U/L (ref 0–50)
ANION GAP SERPL CALCULATED.3IONS-SCNC: 10 MMOL/L (ref 7–15)
AST SERPL W P-5'-P-CCNC: 22 U/L (ref 0–45)
BASO+EOS+MONOS # BLD AUTO: ABNORMAL 10*3/UL
BASO+EOS+MONOS NFR BLD AUTO: ABNORMAL %
BASOPHILS # BLD AUTO: 0 10E3/UL (ref 0–0.2)
BASOPHILS NFR BLD AUTO: 1 %
BILIRUB SERPL-MCNC: 0.4 MG/DL
BUN SERPL-MCNC: 11 MG/DL (ref 6–20)
CALCIUM SERPL-MCNC: 9.4 MG/DL (ref 8.6–10)
CHLORIDE SERPL-SCNC: 104 MMOL/L (ref 98–107)
CREAT SERPL-MCNC: 0.59 MG/DL (ref 0.51–0.95)
DEPRECATED HCO3 PLAS-SCNC: 26 MMOL/L (ref 22–29)
EGFRCR SERPLBLD CKD-EPI 2021: >90 ML/MIN/1.73M2
EOSINOPHIL # BLD AUTO: 0.1 10E3/UL (ref 0–0.7)
EOSINOPHIL NFR BLD AUTO: 3 %
ERYTHROCYTE [DISTWIDTH] IN BLOOD BY AUTOMATED COUNT: 13.8 % (ref 10–15)
GLUCOSE SERPL-MCNC: 105 MG/DL (ref 70–99)
HCT VFR BLD AUTO: 37.4 % (ref 35–47)
HGB BLD-MCNC: 12.2 G/DL (ref 11.7–15.7)
IMM GRANULOCYTES # BLD: 0 10E3/UL
IMM GRANULOCYTES NFR BLD: 0 %
LYMPHOCYTES # BLD AUTO: 0.7 10E3/UL (ref 0.8–5.3)
LYMPHOCYTES NFR BLD AUTO: 15 %
MCH RBC QN AUTO: 28 PG (ref 26.5–33)
MCHC RBC AUTO-ENTMCNC: 32.6 G/DL (ref 31.5–36.5)
MCV RBC AUTO: 86 FL (ref 78–100)
MONOCYTES # BLD AUTO: 0.4 10E3/UL (ref 0–1.3)
MONOCYTES NFR BLD AUTO: 9 %
NEUTROPHILS # BLD AUTO: 3.4 10E3/UL (ref 1.6–8.3)
NEUTROPHILS NFR BLD AUTO: 72 %
NRBC # BLD AUTO: 0 10E3/UL
NRBC BLD AUTO-RTO: 0 /100
PLATELET # BLD AUTO: 335 10E3/UL (ref 150–450)
POTASSIUM SERPL-SCNC: 4.1 MMOL/L (ref 3.4–5.3)
PROT SERPL-MCNC: 6.9 G/DL (ref 6.4–8.3)
RBC # BLD AUTO: 4.36 10E6/UL (ref 3.8–5.2)
SODIUM SERPL-SCNC: 140 MMOL/L (ref 135–145)
WBC # BLD AUTO: 4.6 10E3/UL (ref 4–11)

## 2023-10-06 PROCEDURE — 77387 GUIDANCE FOR RADJ TX DLVR: CPT | Performed by: RADIOLOGY

## 2023-10-06 PROCEDURE — 82374 ASSAY BLOOD CARBON DIOXIDE: CPT

## 2023-10-06 PROCEDURE — 36591 DRAW BLOOD OFF VENOUS DEVICE: CPT

## 2023-10-06 PROCEDURE — 96402 CHEMO HORMON ANTINEOPL SQ/IM: CPT

## 2023-10-06 PROCEDURE — 85025 COMPLETE CBC W/AUTO DIFF WBC: CPT

## 2023-10-06 PROCEDURE — 77412 RADIATION TX DELIVERY LVL 3: CPT | Performed by: RADIOLOGY

## 2023-10-06 PROCEDURE — 250N000011 HC RX IP 250 OP 636: Mod: JZ | Performed by: INTERNAL MEDICINE

## 2023-10-06 PROCEDURE — 82310 ASSAY OF CALCIUM: CPT

## 2023-10-06 RX ORDER — HEPARIN SODIUM (PORCINE) LOCK FLUSH IV SOLN 100 UNIT/ML 100 UNIT/ML
5 SOLUTION INTRAVENOUS
Status: CANCELLED | OUTPATIENT
Start: 2023-10-06

## 2023-10-06 RX ORDER — HEPARIN SODIUM (PORCINE) LOCK FLUSH IV SOLN 100 UNIT/ML 100 UNIT/ML
5 SOLUTION INTRAVENOUS
Status: DISCONTINUED | OUTPATIENT
Start: 2023-10-06 | End: 2023-10-06 | Stop reason: HOSPADM

## 2023-10-06 RX ADMIN — GOSERELIN ACETATE 3.6 MG: 3.6 IMPLANT SUBCUTANEOUS at 09:00

## 2023-10-06 RX ADMIN — Medication 5 ML: at 08:53

## 2023-10-06 NOTE — PROGRESS NOTES
Infusion Nursing Note:  Alicia Iniguez presents today for cycle 2 day 1 zoladex.    Patient seen by provider today: No   present during visit today: Not Applicable.    Note: Alicia arrived ambulatory and in stable condition. Reports some hot flashes and muscle aches following first injection. Port accessed and labs collected. Ice applied to abdomen prior to injection. Zoladex administered into the RUQ and site covered with gauze and tegaderm. Will return in one month for next injection, patient will call to schedule.      Intravenous Access:  Labs drawn without difficulty.  Implanted Port.    Treatment Conditions:  Lab Results   Component Value Date    HGB 12.2 10/06/2023    WBC 4.6 10/06/2023    ANEU 5.6 04/28/2023    ANEUTAUTO 3.4 10/06/2023     10/06/2023        Lab Results   Component Value Date     10/06/2023    POTASSIUM 4.1 10/06/2023    CR 0.59 10/06/2023    KURT 9.4 10/06/2023    BILITOTAL 0.4 10/06/2023    ALBUMIN 4.3 10/06/2023    ALT 29 10/06/2023    AST 22 10/06/2023         Post Infusion Assessment:  Patient tolerated injection without incident.  Site patent and intact, free from redness, edema or discomfort.  No evidence of extravasations.  Access discontinued per protocol.       Discharge Plan:   Patient and/or family verbalized understanding of discharge instructions and all questions answered.  AVS to patient via [facility].  Patient will return in one month for next appointment.   Patient discharged in stable condition accompanied by: self.  Departure Mode: Ambulatory.      Rita Edwards RN

## 2023-10-09 ENCOUNTER — APPOINTMENT (OUTPATIENT)
Dept: RADIATION ONCOLOGY | Facility: HOSPITAL | Age: 48
End: 2023-10-09
Attending: RADIOLOGY
Payer: COMMERCIAL

## 2023-10-09 PROCEDURE — 77387 GUIDANCE FOR RADJ TX DLVR: CPT | Performed by: RADIOLOGY

## 2023-10-09 PROCEDURE — 77336 RADIATION PHYSICS CONSULT: CPT | Performed by: RADIOLOGY

## 2023-10-09 PROCEDURE — 77412 RADIATION TX DELIVERY LVL 3: CPT | Performed by: RADIOLOGY

## 2023-10-09 PROCEDURE — 77427 RADIATION TX MANAGEMENT X5: CPT | Performed by: RADIOLOGY

## 2023-10-10 ENCOUNTER — APPOINTMENT (OUTPATIENT)
Dept: RADIATION ONCOLOGY | Facility: HOSPITAL | Age: 48
End: 2023-10-10
Attending: RADIOLOGY
Payer: COMMERCIAL

## 2023-10-10 PROCEDURE — 77387 GUIDANCE FOR RADJ TX DLVR: CPT | Performed by: RADIOLOGY

## 2023-10-10 PROCEDURE — 77412 RADIATION TX DELIVERY LVL 3: CPT | Performed by: RADIOLOGY

## 2023-10-11 ENCOUNTER — OFFICE VISIT (OUTPATIENT)
Dept: RADIATION ONCOLOGY | Facility: HOSPITAL | Age: 48
End: 2023-10-11
Attending: RADIOLOGY
Payer: COMMERCIAL

## 2023-10-11 VITALS
BODY MASS INDEX: 30.88 KG/M2 | HEART RATE: 96 BPM | RESPIRATION RATE: 16 BRPM | OXYGEN SATURATION: 97 % | TEMPERATURE: 97.9 F | WEIGHT: 191.3 LBS | SYSTOLIC BLOOD PRESSURE: 120 MMHG | DIASTOLIC BLOOD PRESSURE: 63 MMHG

## 2023-10-11 DIAGNOSIS — C50.811 MALIGNANT NEOPLASM OF OVERLAPPING SITES OF RIGHT BREAST IN FEMALE, ESTROGEN RECEPTOR POSITIVE (H): Primary | ICD-10-CM

## 2023-10-11 DIAGNOSIS — Z17.0 MALIGNANT NEOPLASM OF OVERLAPPING SITES OF RIGHT BREAST IN FEMALE, ESTROGEN RECEPTOR POSITIVE (H): Primary | ICD-10-CM

## 2023-10-11 PROCEDURE — 77387 GUIDANCE FOR RADJ TX DLVR: CPT | Performed by: STUDENT IN AN ORGANIZED HEALTH CARE EDUCATION/TRAINING PROGRAM

## 2023-10-11 PROCEDURE — 77412 RADIATION TX DELIVERY LVL 3: CPT | Performed by: STUDENT IN AN ORGANIZED HEALTH CARE EDUCATION/TRAINING PROGRAM

## 2023-10-11 ASSESSMENT — PAIN SCALES - GENERAL: PAINLEVEL: NO PAIN (0)

## 2023-10-11 NOTE — PROGRESS NOTES
RADIATION ONCOLOGY WEEKLY TREATMENT VISIT NOTE      Assessment / Impression     Malignant neoplasm of overlapping sites of right breast in female, estrogen receptor positive (H) [C50.811, Z17.0]     Cancer Staging   Malignant neoplasm of overlapping sites of right breast in female, estrogen receptor positive (H)  Staging form: Breast, AJCC 8th Edition  - Clinical: Stage IIA (cT3, cN2, cM0, G2, ER+, [place]+, HER2-) - Signed by Shanelle Weaver APRN CNP on 3/1/2023  - Pathologic stage from 8/21/2023: No Stage Recommended (ypT3, pN1a(sn), cM0, G2, ER+, SC+, HER2-) - Signed by Ruth Orellana MD on 8/21/2023       Tolerating radiation therapy well.  All questions and concerns addressed.  Grade 2 radiation dermatitis    Plan:     Continue radiation treatment as prescribed.  Radiation:   Site: R CW/low axilla  Stereotactic Radiosurgery: No  Concurrent Therapy: Yes (Femara)  Today's Dose: 3060  Total Dose for Breast: 6040  Today's Fraction/Total Fraction Breast: 17 33    Continue current skin care, may use hydrocortisone, OTC allergy medicine like Allegra and/or dandruff shampoo for follicular irritation of the chest.  Discussed mometasone as well if needed.    Subjective:      HPI: Alicia Iniguez is a 48 year old female with  Malignant neoplasm of overlapping sites of right breast in female, estrogen receptor positive (H) [C50.811, Z17.0]    She is tolerating radiation therapy well with increasing skin irritation.  Using radio Plex and Aquaphor for skin care.  Does have some pruritus of the medial superior chest wall.  No increased swelling of the chest or upper extremity.  Is working with lymphedema.  The following portions of the patient's history were reviewed and updated as appropriate: allergies, current medications, past family history, past medical history, past social history, past surgical history and problem list.    Assessment                  Body Site:  Breast                           Site: R  CW/low axilla  Stereotactic Radiosurgery: No  Today's Dose: 3060  Total Dose for Breast: 6040  Today's Fraction/Total Fraction Breast: 17 33  Drainage: 0: Absent                                   Sexuality Alteration                    Emotional Alteration    Copin: Effective  Comfort Alteration   KPS: 90% Can perform normal activity, minor signs of disease  Fatigue (ONS scale): 2: Mild Fatigue  Pain Location: denies   Nutrition Alteration   Anorexia: 0: None  Nausea: 0: None  Vomitin: None  Weight: 86.8 kg (191 lb 4.8 oz)  Skin Alteration   Skin Sensation: 1:Pruitis  Skin Reaction: 1: Faint erythema or dry desquamation  AUA Assessment                                           Accompanied by       Objective:     Exam:     Vitals:    10/11/23 0832   BP: 120/63   Pulse: 96   Resp: 16   Temp: 97.9  F (36.6  C)   SpO2: 97%   Weight: 86.8 kg (191 lb 4.8 oz)   PainSc: No Pain (0)       Wt Readings from Last 8 Encounters:   10/11/23 86.8 kg (191 lb 4.8 oz)   10/04/23 86.3 kg (190 lb 3.2 oz)   23 87.1 kg (192 lb 1.6 oz)   23 79.4 kg (175 lb)   23 81.6 kg (180 lb)   23 88.7 kg (195 lb 8 oz)   23 89.2 kg (196 lb 11.2 oz)   23 90.4 kg (199 lb 4.8 oz)       General: Alert and oriented, in no acute distress  Alicia has moderate Erythema.  No desquamation.  No upper extremity lymphedema.    Treatment Summary to Date    Aria chart and setup information reviewed    Ruth Germain MD

## 2023-10-11 NOTE — LETTER
10/11/2023         RE: Alicia Iniguez  [address] [facility] [address]        Dear Colleague,    Thank you for referring your patient, Alicia Iniguez, to the [facility] RADIATION ONCOLOGY [place]. Please see a copy of my visit note below.    RADIATION ONCOLOGY WEEKLY TREATMENT VISIT NOTE      Assessment / Impression     Malignant neoplasm of overlapping sites of right breast in female, estrogen receptor positive (H) [C50.811, Z17.0]     Cancer Staging   Malignant neoplasm of overlapping sites of right breast in female, estrogen receptor positive (H)  Staging form: Breast, AJCC 8th Edition  - Clinical: Stage IIA (cT3, cN2, cM0, G2, ER+, [place]+, HER2-) - Signed by Shanelle Weaver APRN CNP on 3/1/2023  - Pathologic stage from 8/21/2023: No Stage Recommended (ypT3, pN1a(sn), cM0, G2, ER+, [place]+, HER2-) - Signed by Ruth Orellana MD on 8/21/2023       Tolerating radiation therapy well.  All questions and concerns addressed.  Grade 2 radiation dermatitis    Plan:     Continue radiation treatment as prescribed.  Radiation:   Site: R CW/low axilla  Stereotactic Radiosurgery: No  Concurrent Therapy: Yes (Femara)  Today's Dose: 3060  Total Dose for Breast: 6040  Today's Fraction/Total Fraction Breast: 17 33    Continue current skin care, may use hydrocortisone, OTC allergy medicine like Allegra and/or dandruff shampoo for follicular irritation of the chest.  Discussed mometasone as well if needed.    Subjective:      HPI: Alicia Iniguez is a 48 year old female with  Malignant neoplasm of overlapping sites of right breast in female, estrogen receptor positive (H) [C50.811, Z17.0]    She is tolerating radiation therapy well with increasing skin irritation.  Using radio Plex and Aquaphor for skin care.  Does have some pruritus of the medial superior chest wall.  No increased swelling of the chest or upper extremity.  Is working with lymphedema.  The following portions of the patient's history  were reviewed and updated as appropriate: allergies, current medications, past family history, past medical history, past social history, past surgical history and problem list.    Assessment                  Body Site:  Breast                           Site: R CW/low axilla  Stereotactic Radiosurgery: No  Today's Dose: 3060  Total Dose for Breast: 6040  Today's Fraction/Total Fraction Breast: 17 33  Drainage: 0: Absent                                   Sexuality Alteration                    Emotional Alteration    Copin: Effective  Comfort Alteration   KPS: 90% Can perform normal activity, minor signs of disease  Fatigue (ONS scale): 2: Mild Fatigue  Pain Location: denies   Nutrition Alteration   Anorexia: 0: None  Nausea: 0: None  Vomitin: None  Weight: 86.8 kg (191 lb 4.8 oz)  Skin Alteration   Skin Sensation: 1:Pruitis  Skin Reaction: 1: Faint erythema or dry desquamation  AUA Assessment                                           Accompanied by       Objective:     Exam:     Vitals:    10/11/23 0832   BP: 120/63   Pulse: 96   Resp: 16   Temp: 97.9  F (36.6  C)   SpO2: 97%   Weight: 86.8 kg (191 lb 4.8 oz)   PainSc: No Pain (0)       Wt Readings from Last 8 Encounters:   10/11/23 86.8 kg (191 lb 4.8 oz)   10/04/23 86.3 kg (190 lb 3.2 oz)   23 87.1 kg (192 lb 1.6 oz)   23 79.4 kg (175 lb)   23 81.6 kg (180 lb)   23 88.7 kg (195 lb 8 oz)   23 89.2 kg (196 lb 11.2 oz)   23 90.4 kg (199 lb 4.8 oz)       General: Alert and oriented, in no acute distress  Alicia has moderate Erythema.  No desquamation.  No upper extremity lymphedema.    Treatment Summary to Date    Aria chart and setup information reviewed    Ruth Germain MD      Again, thank you for allowing me to participate in the care of your patient.        Sincerely,        Ruth Germain MD

## 2023-10-12 ENCOUNTER — APPOINTMENT (OUTPATIENT)
Dept: RADIATION ONCOLOGY | Facility: HOSPITAL | Age: 48
End: 2023-10-12
Attending: RADIOLOGY
Payer: COMMERCIAL

## 2023-10-12 PROCEDURE — 77387 GUIDANCE FOR RADJ TX DLVR: CPT | Performed by: RADIOLOGY

## 2023-10-12 PROCEDURE — 77412 RADIATION TX DELIVERY LVL 3: CPT | Performed by: RADIOLOGY

## 2023-10-13 ENCOUNTER — APPOINTMENT (OUTPATIENT)
Dept: RADIATION ONCOLOGY | Facility: HOSPITAL | Age: 48
End: 2023-10-13
Attending: RADIOLOGY
Payer: COMMERCIAL

## 2023-10-13 PROCEDURE — 77387 GUIDANCE FOR RADJ TX DLVR: CPT | Performed by: RADIOLOGY

## 2023-10-13 PROCEDURE — 77412 RADIATION TX DELIVERY LVL 3: CPT | Performed by: RADIOLOGY

## 2023-10-16 ENCOUNTER — APPOINTMENT (OUTPATIENT)
Dept: RADIATION ONCOLOGY | Facility: HOSPITAL | Age: 48
End: 2023-10-16
Attending: RADIOLOGY
Payer: COMMERCIAL

## 2023-10-16 PROCEDURE — 77427 RADIATION TX MANAGEMENT X5: CPT | Performed by: STUDENT IN AN ORGANIZED HEALTH CARE EDUCATION/TRAINING PROGRAM

## 2023-10-16 PROCEDURE — 77336 RADIATION PHYSICS CONSULT: CPT | Performed by: RADIOLOGY

## 2023-10-16 PROCEDURE — 77412 RADIATION TX DELIVERY LVL 3: CPT | Performed by: RADIOLOGY

## 2023-10-16 PROCEDURE — 77387 GUIDANCE FOR RADJ TX DLVR: CPT | Performed by: RADIOLOGY

## 2023-10-17 ENCOUNTER — APPOINTMENT (OUTPATIENT)
Dept: RADIATION ONCOLOGY | Facility: HOSPITAL | Age: 48
End: 2023-10-17
Attending: RADIOLOGY
Payer: COMMERCIAL

## 2023-10-17 PROCEDURE — 77387 GUIDANCE FOR RADJ TX DLVR: CPT | Performed by: RADIOLOGY

## 2023-10-17 PROCEDURE — 77412 RADIATION TX DELIVERY LVL 3: CPT | Performed by: RADIOLOGY

## 2023-10-18 ENCOUNTER — APPOINTMENT (OUTPATIENT)
Dept: RADIATION ONCOLOGY | Facility: HOSPITAL | Age: 48
End: 2023-10-18
Attending: RADIOLOGY
Payer: COMMERCIAL

## 2023-10-18 VITALS
WEIGHT: 191.3 LBS | BODY MASS INDEX: 30.88 KG/M2 | DIASTOLIC BLOOD PRESSURE: 75 MMHG | SYSTOLIC BLOOD PRESSURE: 107 MMHG | TEMPERATURE: 98.7 F | HEART RATE: 87 BPM | OXYGEN SATURATION: 100 % | RESPIRATION RATE: 16 BRPM

## 2023-10-18 DIAGNOSIS — C50.811 MALIGNANT NEOPLASM OF OVERLAPPING SITES OF RIGHT BREAST IN FEMALE, ESTROGEN RECEPTOR POSITIVE (H): Primary | ICD-10-CM

## 2023-10-18 DIAGNOSIS — L73.9 FOLLICULITIS: ICD-10-CM

## 2023-10-18 DIAGNOSIS — Z17.0 MALIGNANT NEOPLASM OF OVERLAPPING SITES OF RIGHT BREAST IN FEMALE, ESTROGEN RECEPTOR POSITIVE (H): Primary | ICD-10-CM

## 2023-10-18 PROCEDURE — 77412 RADIATION TX DELIVERY LVL 3: CPT | Performed by: RADIOLOGY

## 2023-10-18 PROCEDURE — 77387 GUIDANCE FOR RADJ TX DLVR: CPT | Performed by: RADIOLOGY

## 2023-10-18 RX ORDER — MOMETASONE FUROATE 1 MG/ML
SOLUTION TOPICAL 2 TIMES DAILY PRN
Qty: 60 ML | Refills: 1 | Status: SHIPPED | OUTPATIENT
Start: 2023-10-18 | End: 2023-11-13

## 2023-10-18 ASSESSMENT — PAIN SCALES - GENERAL: PAINLEVEL: NO PAIN (0)

## 2023-10-18 NOTE — PROGRESS NOTES
"Oncology Rooming Note    October 18, 2023 8:37 AM   Alicia Iniguez is a 48 year old female who presents for:    Chief Complaint   Patient presents with    Radiation Therapy     Initial Vitals: /75   Pulse 87   Temp 98.7  F (37.1  C) (Oral)   Resp 16   Wt 86.8 kg (191 lb 4.8 oz)   SpO2 100%   BMI 30.88 kg/m   Estimated body mass index is 30.88 kg/m  as calculated from the following:    Height as of 9/13/23: 1.676 m (5' 6\").    Weight as of this encounter: 86.8 kg (191 lb 4.8 oz). Body surface area is 2.01 meters squared.  No Pain (0) Comment: Data Unavailable   No LMP recorded.  Allergies reviewed: Yes  Medications reviewed: Yes    Medications: Medication refills not needed today.  Pharmacy name entered into [facility]:    [id number] [place], [place] - [address] AT [facility] - [place], [place] - [address]    Clinical concerns: No concerns      Renée Ortiz RN              "

## 2023-10-18 NOTE — LETTER
"    10/18/2023         RE: Alicia Iniguez  [address] [facility] [address]        Dear Colleague,    Thank you for referring your patient, Alicia Iniguez, to the [facility] RADIATION ONCOLOGY [place]. Please see a copy of my visit note below.    Oncology Rooming Note    October 18, 2023 8:37 AM   Alicia Iniguez is a 48 year old female who presents for:    Chief Complaint   Patient presents with     Radiation Therapy     Initial Vitals: /75   Pulse 87   Temp 98.7  F (37.1  C) (Oral)   Resp 16   Wt 86.8 kg (191 lb 4.8 oz)   SpO2 100%   BMI 30.88 kg/m   Estimated body mass index is 30.88 kg/m  as calculated from the following:    Height as of 9/13/23: 1.676 m (5' 6\").    Weight as of this encounter: 86.8 kg (191 lb 4.8 oz). Body surface area is 2.01 meters squared.  No Pain (0) Comment: Data Unavailable   No LMP recorded.  Allergies reviewed: Yes  Medications reviewed: Yes    Medications: Medication refills not needed today.  Pharmacy name entered into [place]:    [id number] - [place], [place] - [address] AT [address] [facility] - [place], [place] - [address]     Clinical concerns: No concerns      Renée Ortiz RN                RADIATION ONCOLOGY WEEKLY TREATMENT VISIT NOTE      Assessment / Impression     Malignant neoplasm of overlapping sites of right breast in female, estrogen receptor positive (H) [C50.811, Z17.0]     Cancer Staging   Malignant neoplasm of overlapping sites of right breast in female, estrogen receptor positive (H)  Staging form: Breast, AJCC 8th Edition  - Clinical: Stage IIA (cT3, cN2, cM0, G2, ER+, [place]+, HER2-) - Signed by Shanelle Weaver APRN CNP on 3/1/2023  - Pathologic stage from 8/21/2023: No Stage Recommended (ypT3, pN1a(sn), cM0, G2, ER+, [place]+, HER2-) - Signed by Ruth Orellana MD on 8/21/2023       Tolerating radiation therapy well.  All questions and concerns " addressed.  Grade II folliculitis - has not picked up Allegra or hydrocortisone     Plan:   Allegra or generic equivalent  Mometasone.   Cool compresses.     Continue radiation treatment as prescribed.  Radiation:   Site: R CW lowaxilla  Stereotactic Radiosurgery: No  Concurrent Therapy: Yes  Today's Dose: 3472  Total Dose for Breast: 6040  Today's Fraction/Total Fraction Breast:       Subjective:      HPI: Alicia Iniguez is a 48 year old female with  Malignant neoplasm of overlapping sites of right breast in female, estrogen receptor positive (H) [C50.811, Z17.0]    The following portions of the patient's history were reviewed and updated as appropriate: allergies, current medications, past family history, past medical history, past social history, past surgical history and problem list.    Assessment                  Body Site:  Breast                           Site: R CW lowaxilla  Stereotactic Radiosurgery: No  Concurrent Therapy: Yes  Today's Dose: 3472  Total Dose for Breast: 6040  Today's Fraction/Total Fraction Breast:   Drainage: 0: Absent                                   Sexuality Alteration                    Emotional Alteration    Copin: Effective  Comfort Alteration   KPS: 100 % Normal, no complaints  Fatigue (ONS scale): 0: No Fatigue  Pain Location: denies   Nutrition Alteration   Anorexia: 0: None  Nausea: 0: None  Vomitin: None  Weight: 86.8 kg (191 lb 4.8 oz)  Skin Alteration   Skin Sensation: 1:Pruitis  Skin Reaction: 2: Bright erythema  AUA Assessment                                           Accompanied by       Objective:     Exam:     Vitals:    10/18/23 0830   BP: 107/75   Pulse: 87   Resp: 16   Temp: 98.7  F (37.1  C)   TempSrc: Oral   SpO2: 100%   Weight: 86.8 kg (191 lb 4.8 oz)   PainSc: No Pain (0)       Wt Readings from Last 8 Encounters:   10/18/23 86.8 kg (191 lb 4.8 oz)   10/11/23 86.8 kg (191 lb 4.8 oz)   10/04/23 86.3 kg (190 lb 3.2 oz)   23  87.1 kg (192 lb 1.6 oz)   09/13/23 79.4 kg (175 lb)   09/07/23 81.6 kg (180 lb)   08/22/23 88.7 kg (195 lb 8 oz)   08/16/23 89.2 kg (196 lb 11.2 oz)       General: Alert and oriented, in no acute distress  Alicia has moderate Erythema. Grade II folliculits no desquamation     Treatment Summary to Date    Aria chart and setup information reviewed    Ruth Orellana MD      Again, thank you for allowing me to participate in the care of your patient.        Sincerely,        Ruth Orellana MD

## 2023-10-18 NOTE — PROGRESS NOTES
RADIATION ONCOLOGY WEEKLY TREATMENT VISIT NOTE      Assessment / Impression     Malignant neoplasm of overlapping sites of right breast in female, estrogen receptor positive (H) [C50.811, Z17.0]     Cancer Staging   Malignant neoplasm of overlapping sites of right breast in female, estrogen receptor positive (H)  Staging form: Breast, AJCC 8th Edition  - Clinical: Stage IIA (cT3, cN2, cM0, G2, ER+, [place]+, HER2-) - Signed by Shanelle Weaver APRN CNP on 3/1/2023  - Pathologic stage from 2023: No Stage Recommended (ypT3, pN1a(sn), cM0, G2, ER+, [place]+, HER2-) - Signed by Ruth Orellana MD on 2023       Tolerating radiation therapy well.  All questions and concerns addressed.  Grade II folliculitis - has not picked up Allegra or hydrocortisone     Plan:   Allegra or generic equivalent  Mometasone.   Cool compresses.     Continue radiation treatment as prescribed.  Radiation:   Site: R CW lowaxilla  Stereotactic Radiosurgery: No  Concurrent Therapy: Yes  Today's Dose: 3472  Total Dose for Breast: 6040  Today's Fraction/Total Fraction Breast:       Subjective:      HPI: Alicia Iniguez is a 48 year old female with  Malignant neoplasm of overlapping sites of right breast in female, estrogen receptor positive (H) [C50.811, Z17.0]    The following portions of the patient's history were reviewed and updated as appropriate: allergies, current medications, past family history, past medical history, past social history, past surgical history and problem list.    Assessment                  Body Site:  Breast                           Site: R CW lowaxilla  Stereotactic Radiosurgery: No  Concurrent Therapy: Yes  Today's Dose: 3472  Total Dose for Breast: 6040  Today's Fraction/Total Fraction Breast:   Drainage: 0: Absent                                   Sexuality Alteration                    Emotional Alteration    Copin: Effective  Comfort Alteration   KPS: 100 % Normal, no  complaints  Fatigue (ONS scale): 0: No Fatigue  Pain Location: denies   Nutrition Alteration   Anorexia: 0: None  Nausea: 0: None  Vomitin: None  Weight: 86.8 kg (191 lb 4.8 oz)  Skin Alteration   Skin Sensation: 1:Pruitis  Skin Reaction: 2: Bright erythema  AUA Assessment                                           Accompanied by       Objective:     Exam:     Vitals:    10/18/23 0830   BP: 107/75   Pulse: 87   Resp: 16   Temp: 98.7  F (37.1  C)   TempSrc: Oral   SpO2: 100%   Weight: 86.8 kg (191 lb 4.8 oz)   PainSc: No Pain (0)       Wt Readings from Last 8 Encounters:   10/18/23 86.8 kg (191 lb 4.8 oz)   10/11/23 86.8 kg (191 lb 4.8 oz)   10/04/23 86.3 kg (190 lb 3.2 oz)   23 87.1 kg (192 lb 1.6 oz)   23 79.4 kg (175 lb)   23 81.6 kg (180 lb)   23 88.7 kg (195 lb 8 oz)   23 89.2 kg (196 lb 11.2 oz)       General: Alert and oriented, in no acute distress  Alicia has moderate Erythema. Grade II folliculits no desquamation     Treatment Summary to Date    Aria chart and setup information reviewed    Ruth Orlelana MD

## 2023-10-19 ENCOUNTER — APPOINTMENT (OUTPATIENT)
Dept: RADIATION ONCOLOGY | Facility: HOSPITAL | Age: 48
End: 2023-10-19
Attending: RADIOLOGY
Payer: COMMERCIAL

## 2023-10-19 PROCEDURE — 77387 GUIDANCE FOR RADJ TX DLVR: CPT | Performed by: RADIOLOGY

## 2023-10-19 PROCEDURE — 77412 RADIATION TX DELIVERY LVL 3: CPT | Performed by: RADIOLOGY

## 2023-10-20 ENCOUNTER — APPOINTMENT (OUTPATIENT)
Dept: RADIATION ONCOLOGY | Facility: HOSPITAL | Age: 48
End: 2023-10-20
Attending: RADIOLOGY
Payer: COMMERCIAL

## 2023-10-20 PROCEDURE — 77321 SPECIAL TELETX PORT PLAN: CPT | Performed by: RADIOLOGY

## 2023-10-20 PROCEDURE — 77280 THER RAD SIMULAJ FIELD SMPL: CPT | Performed by: RADIOLOGY

## 2023-10-20 PROCEDURE — 77321 SPECIAL TELETX PORT PLAN: CPT | Mod: 26 | Performed by: RADIOLOGY

## 2023-10-20 PROCEDURE — 77334 RADIATION TREATMENT AID(S): CPT | Mod: 26 | Performed by: RADIOLOGY

## 2023-10-20 PROCEDURE — 77280 THER RAD SIMULAJ FIELD SMPL: CPT | Mod: 26 | Performed by: RADIOLOGY

## 2023-10-20 PROCEDURE — 77387 GUIDANCE FOR RADJ TX DLVR: CPT | Performed by: RADIOLOGY

## 2023-10-20 PROCEDURE — 77334 RADIATION TREATMENT AID(S): CPT | Performed by: RADIOLOGY

## 2023-10-20 PROCEDURE — 77412 RADIATION TX DELIVERY LVL 3: CPT | Performed by: RADIOLOGY

## 2023-10-23 ENCOUNTER — APPOINTMENT (OUTPATIENT)
Dept: RADIATION ONCOLOGY | Facility: HOSPITAL | Age: 48
End: 2023-10-23
Attending: RADIOLOGY
Payer: COMMERCIAL

## 2023-10-23 PROCEDURE — 77336 RADIATION PHYSICS CONSULT: CPT | Performed by: RADIOLOGY

## 2023-10-23 PROCEDURE — 77427 RADIATION TX MANAGEMENT X5: CPT | Performed by: RADIOLOGY

## 2023-10-23 PROCEDURE — 77412 RADIATION TX DELIVERY LVL 3: CPT | Performed by: RADIOLOGY

## 2023-10-23 PROCEDURE — 77387 GUIDANCE FOR RADJ TX DLVR: CPT | Performed by: RADIOLOGY

## 2023-10-24 ENCOUNTER — APPOINTMENT (OUTPATIENT)
Dept: RADIATION ONCOLOGY | Facility: HOSPITAL | Age: 48
End: 2023-10-24
Attending: RADIOLOGY
Payer: COMMERCIAL

## 2023-10-24 PROCEDURE — 77387 GUIDANCE FOR RADJ TX DLVR: CPT | Performed by: RADIOLOGY

## 2023-10-24 PROCEDURE — 77412 RADIATION TX DELIVERY LVL 3: CPT | Performed by: RADIOLOGY

## 2023-10-25 ENCOUNTER — APPOINTMENT (OUTPATIENT)
Dept: RADIATION ONCOLOGY | Facility: HOSPITAL | Age: 48
End: 2023-10-25
Attending: RADIOLOGY
Payer: COMMERCIAL

## 2023-10-25 VITALS
TEMPERATURE: 98.4 F | DIASTOLIC BLOOD PRESSURE: 75 MMHG | BODY MASS INDEX: 30.88 KG/M2 | SYSTOLIC BLOOD PRESSURE: 118 MMHG | RESPIRATION RATE: 16 BRPM | WEIGHT: 191.3 LBS | OXYGEN SATURATION: 98 % | HEART RATE: 100 BPM

## 2023-10-25 DIAGNOSIS — C50.811 MALIGNANT NEOPLASM OF OVERLAPPING SITES OF RIGHT BREAST IN FEMALE, ESTROGEN RECEPTOR POSITIVE (H): Primary | ICD-10-CM

## 2023-10-25 DIAGNOSIS — Z17.0 MALIGNANT NEOPLASM OF OVERLAPPING SITES OF RIGHT BREAST IN FEMALE, ESTROGEN RECEPTOR POSITIVE (H): Primary | ICD-10-CM

## 2023-10-25 PROCEDURE — 77387 GUIDANCE FOR RADJ TX DLVR: CPT | Performed by: STUDENT IN AN ORGANIZED HEALTH CARE EDUCATION/TRAINING PROGRAM

## 2023-10-25 PROCEDURE — 77412 RADIATION TX DELIVERY LVL 3: CPT | Performed by: STUDENT IN AN ORGANIZED HEALTH CARE EDUCATION/TRAINING PROGRAM

## 2023-10-25 ASSESSMENT — PAIN SCALES - GENERAL: PAINLEVEL: NO PAIN (0)

## 2023-10-25 NOTE — PROGRESS NOTES
RADIATION ONCOLOGY WEEKLY TREATMENT VISIT NOTE      Assessment / Impression     Malignant neoplasm of overlapping sites of right breast in female, estrogen receptor positive (H) [C50.811, Z17.0]     Cancer Staging   Malignant neoplasm of overlapping sites of right breast in female, estrogen receptor positive (H)  Staging form: Breast, AJCC 8th Edition  - Clinical: Stage IIA (cT3, cN2, cM0, G2, ER+, [place]+, HER2-) - Signed by Shanelle Weaver APRN CNP on 3/1/2023  - Pathologic stage from 8/21/2023: No Stage Recommended (ypT3, pN1a(sn), cM0, G2, ER+, [place]+, HER2-) - Signed by Ruth Orellana MD on 8/21/2023       Tolerating radiation therapy well.  All questions and concerns addressed.  Grade 2-3 RT dermatitis    Plan:     Continue radiation treatment as prescribed.  Radiation:   Site: R CW/low axilla  Stereotactic Radiosurgery: No  Concurrent Therapy: Yes  Today's Dose: 4860  Total Dose for Breast: 6040  Today's Fraction/Total Fraction Breast: 27/33    Continue current skin care-Mepilex and gel dressing provided to try    Subjective:      HPI: Alicia Iniguez is a 48 year old female with  Malignant neoplasm of overlapping sites of right breast in female, estrogen receptor positive (H) [C50.811, Z17.0]    Tolerating radiation with increasing skin reaction.  Started using mometasone and continues Aquaphor and radiaplex.  No swelling of chest  or upper extremity.    The following portions of the patient's history were reviewed and updated as appropriate: allergies, current medications, past family history, past medical history, past social history, past surgical history and problem list.    Assessment                  Body Site:  Breast                           Site: R CW/low axilla  Stereotactic Radiosurgery: No  Today's Dose: 4860  Total Dose for Breast: 6040  Today's Fraction/Total Fraction Breast: 27/33  Drainage: 0: Absent                                   Sexuality Alteration                     Emotional Alteration    Copin: Effective  Comfort Alteration   KPS: 100 % Normal, no complaints  Fatigue (ONS scale): 0: No Fatigue  Pain Location: denies   Nutrition Alteration   Anorexia: 0: None  Nausea: 0: None  Vomitin: None  Weight: 86.8 kg (191 lb 4.8 oz)  Skin Alteration   Skin Sensation: 1:Pruitis  Skin Reaction: 3: Dry desquamation with or without erythema  AUA Assessment                                           Accompanied by       Objective:     Exam:     Vitals:    10/25/23 0823   BP: 118/75   Pulse: 100   Resp: 16   Temp: 98.4  F (36.9  C)   SpO2: 98%   Weight: 86.8 kg (191 lb 4.8 oz)   PainSc: No Pain (0)       Wt Readings from Last 8 Encounters:   10/25/23 86.8 kg (191 lb 4.8 oz)   10/18/23 86.8 kg (191 lb 4.8 oz)   10/11/23 86.8 kg (191 lb 4.8 oz)   10/04/23 86.3 kg (190 lb 3.2 oz)   23 87.1 kg (192 lb 1.6 oz)   23 79.4 kg (175 lb)   23 81.6 kg (180 lb)   23 88.7 kg (195 lb 8 oz)       General: Alert and oriented, in no acute distress  Alicia has moderate Erythema brisk with follicular irritation and some desquamation with maximal reaction superior medial.    Treatment Summary to Date    Aria chart and setup information reviewed    Ruth Germain MD

## 2023-10-25 NOTE — LETTER
10/25/2023         RE: Alicia Iniguez  [address] [place] [address]        Dear Colleague,    Thank you for referring your patient, Alicia Iniguez, to the [facility] RADIATION ONCOLOGY [place]. Please see a copy of my visit note below.    RADIATION ONCOLOGY WEEKLY TREATMENT VISIT NOTE      Assessment / Impression     Malignant neoplasm of overlapping sites of right breast in female, estrogen receptor positive (H) [C50.811, Z17.0]     Cancer Staging   Malignant neoplasm of overlapping sites of right breast in female, estrogen receptor positive (H)  Staging form: Breast, AJCC 8th Edition  - Clinical: Stage IIA (cT3, cN2, cM0, G2, ER+, [place]+, HER2-) - Signed by Shanelle Weaver APRN CNP on 3/1/2023  - Pathologic stage from 8/21/2023: No Stage Recommended (ypT3, pN1a(sn), cM0, G2, ER+, [place]+, HER2-) - Signed by Ruth Orellana MD on 8/21/2023       Tolerating radiation therapy well.  All questions and concerns addressed.  Grade 2-3 RT dermatitis    Plan:     Continue radiation treatment as prescribed.  Radiation:   Site: R CW/low axilla  Stereotactic Radiosurgery: No  Concurrent Therapy: Yes  Today's Dose: 4860  Total Dose for Breast: 6040  Today's Fraction/Total Fraction Breast: 27/33    Continue current skin care-Mepilex and gel dressing provided to try    Subjective:      HPI: Alicia Iniguez is a 48 year old female with  Malignant neoplasm of overlapping sites of right breast in female, estrogen receptor positive (H) [C50.811, Z17.0]    Tolerating radiation with increasing skin reaction.  Started using mometasone and continues Aquaphor and radiaplex.  No swelling of chest  or upper extremity.    The following portions of the patient's history were reviewed and updated as appropriate: allergies, current medications, past family history, past medical history, past social history, past surgical history and problem list.    Assessment                  Body Site:  Breast                            Site: R CW/low axilla  Stereotactic Radiosurgery: No  Today's Dose: 4860  Total Dose for Breast: 6040  Today's Fraction/Total Fraction Breast:   Drainage: 0: Absent                                   Sexuality Alteration                    Emotional Alteration    Copin: Effective  Comfort Alteration   KPS: 100 % Normal, no complaints  Fatigue (ONS scale): 0: No Fatigue  Pain Location: denies   Nutrition Alteration   Anorexia: 0: None  Nausea: 0: None  Vomitin: None  Weight: 86.8 kg (191 lb 4.8 oz)  Skin Alteration   Skin Sensation: 1:Pruitis  Skin Reaction: 3: Dry desquamation with or without erythema  AUA Assessment                                           Accompanied by       Objective:     Exam:     Vitals:    10/25/23 0823   BP: 118/75   Pulse: 100   Resp: 16   Temp: 98.4  F (36.9  C)   SpO2: 98%   Weight: 86.8 kg (191 lb 4.8 oz)   PainSc: No Pain (0)       Wt Readings from Last 8 Encounters:   10/25/23 86.8 kg (191 lb 4.8 oz)   10/18/23 86.8 kg (191 lb 4.8 oz)   10/11/23 86.8 kg (191 lb 4.8 oz)   10/04/23 86.3 kg (190 lb 3.2 oz)   23 87.1 kg (192 lb 1.6 oz)   23 79.4 kg (175 lb)   23 81.6 kg (180 lb)   23 88.7 kg (195 lb 8 oz)       General: Alert and oriented, in no acute distress  Alicia has moderate Erythema brisk with follicular irritation and some desquamation with maximal reaction superior medial.    Treatment Summary to Date    Aria chart and setup information reviewed    Ruth Germain MD      Again, thank you for allowing me to participate in the care of your patient.        Sincerely,        Ruth Germain MD

## 2023-10-26 ENCOUNTER — APPOINTMENT (OUTPATIENT)
Dept: RADIATION ONCOLOGY | Facility: HOSPITAL | Age: 48
End: 2023-10-26
Attending: RADIOLOGY
Payer: COMMERCIAL

## 2023-10-26 PROCEDURE — 77387 GUIDANCE FOR RADJ TX DLVR: CPT | Performed by: RADIOLOGY

## 2023-10-26 PROCEDURE — 77412 RADIATION TX DELIVERY LVL 3: CPT | Performed by: RADIOLOGY

## 2023-10-27 ENCOUNTER — APPOINTMENT (OUTPATIENT)
Dept: RADIATION ONCOLOGY | Facility: HOSPITAL | Age: 48
End: 2023-10-27
Attending: RADIOLOGY
Payer: COMMERCIAL

## 2023-10-27 PROCEDURE — 77412 RADIATION TX DELIVERY LVL 3: CPT | Performed by: RADIOLOGY

## 2023-10-27 PROCEDURE — 77387 GUIDANCE FOR RADJ TX DLVR: CPT | Performed by: RADIOLOGY

## 2023-10-27 PROCEDURE — 77387 GUIDANCE FOR RADJ TX DLVR: CPT | Mod: 26 | Performed by: RADIOLOGY

## 2023-10-30 ENCOUNTER — APPOINTMENT (OUTPATIENT)
Dept: RADIATION ONCOLOGY | Facility: HOSPITAL | Age: 48
End: 2023-10-30
Attending: RADIOLOGY
Payer: COMMERCIAL

## 2023-10-30 PROCEDURE — 77387 GUIDANCE FOR RADJ TX DLVR: CPT | Performed by: STUDENT IN AN ORGANIZED HEALTH CARE EDUCATION/TRAINING PROGRAM

## 2023-10-30 PROCEDURE — 77427 RADIATION TX MANAGEMENT X5: CPT | Performed by: STUDENT IN AN ORGANIZED HEALTH CARE EDUCATION/TRAINING PROGRAM

## 2023-10-30 PROCEDURE — 77387 GUIDANCE FOR RADJ TX DLVR: CPT | Mod: 26 | Performed by: STUDENT IN AN ORGANIZED HEALTH CARE EDUCATION/TRAINING PROGRAM

## 2023-10-30 PROCEDURE — 77336 RADIATION PHYSICS CONSULT: CPT | Performed by: RADIOLOGY

## 2023-10-30 PROCEDURE — 77412 RADIATION TX DELIVERY LVL 3: CPT | Performed by: STUDENT IN AN ORGANIZED HEALTH CARE EDUCATION/TRAINING PROGRAM

## 2023-10-31 ENCOUNTER — APPOINTMENT (OUTPATIENT)
Dept: RADIATION ONCOLOGY | Facility: HOSPITAL | Age: 48
End: 2023-10-31
Attending: RADIOLOGY
Payer: COMMERCIAL

## 2023-10-31 PROCEDURE — 77412 RADIATION TX DELIVERY LVL 3: CPT | Performed by: RADIOLOGY

## 2023-10-31 PROCEDURE — 77387 GUIDANCE FOR RADJ TX DLVR: CPT | Mod: 26 | Performed by: RADIOLOGY

## 2023-10-31 PROCEDURE — 77387 GUIDANCE FOR RADJ TX DLVR: CPT | Performed by: RADIOLOGY

## 2023-11-01 ENCOUNTER — DOCUMENTATION ONLY (OUTPATIENT)
Dept: RADIATION ONCOLOGY | Facility: HOSPITAL | Age: 48
End: 2023-11-01
Payer: COMMERCIAL

## 2023-11-01 ENCOUNTER — OFFICE VISIT (OUTPATIENT)
Dept: RADIATION ONCOLOGY | Facility: HOSPITAL | Age: 48
End: 2023-11-01
Attending: RADIOLOGY
Payer: COMMERCIAL

## 2023-11-01 VITALS
BODY MASS INDEX: 30.86 KG/M2 | RESPIRATION RATE: 16 BRPM | HEART RATE: 98 BPM | TEMPERATURE: 98.1 F | WEIGHT: 191.2 LBS | DIASTOLIC BLOOD PRESSURE: 78 MMHG | SYSTOLIC BLOOD PRESSURE: 116 MMHG | OXYGEN SATURATION: 100 %

## 2023-11-01 DIAGNOSIS — C50.811 MALIGNANT NEOPLASM OF OVERLAPPING SITES OF RIGHT BREAST IN FEMALE, ESTROGEN RECEPTOR POSITIVE (H): Primary | ICD-10-CM

## 2023-11-01 DIAGNOSIS — Z17.0 MALIGNANT NEOPLASM OF OVERLAPPING SITES OF RIGHT BREAST IN FEMALE, ESTROGEN RECEPTOR POSITIVE (H): Primary | ICD-10-CM

## 2023-11-01 PROCEDURE — 77387 GUIDANCE FOR RADJ TX DLVR: CPT | Mod: 26 | Performed by: RADIOLOGY

## 2023-11-01 PROCEDURE — 77412 RADIATION TX DELIVERY LVL 3: CPT | Performed by: RADIOLOGY

## 2023-11-01 PROCEDURE — 77387 GUIDANCE FOR RADJ TX DLVR: CPT | Performed by: RADIOLOGY

## 2023-11-01 ASSESSMENT — PAIN SCALES - GENERAL: PAINLEVEL: NO PAIN (0)

## 2023-11-01 NOTE — ORAL ONC MGMT
Oral Chemotherapy Monitoring Program   Left Voicemail    Attempted to contact patient today for follow up regarding oral chemotherapy, abemaciclib. No answer. Left voicemail to let patient know I submitted the rx to [facility] and she should expect a call from them to set up delivery and reminded her not to start until sees the provider on 11/8. Told patient she can call us back at [phone] if needed. No medication name was left.    Darleen Tijerina, PharmD  Oral Chemotherapy Pharmacist  [phone]

## 2023-11-01 NOTE — PROGRESS NOTES
RADIATION ONCOLOGY WEEKLY TREATMENT VISIT NOTE      Assessment / Impression     Malignant neoplasm of overlapping sites of right breast in female, estrogen receptor positive (H) [C50.811, Z17.0]     Cancer Staging   Malignant neoplasm of overlapping sites of right breast in female, estrogen receptor positive (H)  Staging form: Breast, AJCC 8th Edition  - Clinical: Stage IIA (cT3, cN2, cM0, G2, ER+, [place]+, HER2-) - Signed by Shanelle Weaver APRN CNP on 3/1/2023  - Pathologic stage from 8/21/2023: No Stage Recommended (ypT3, pN1a(sn), cM0, G2, ER+, [place]+, HER2-) - Signed by Ruth Orellana MD on 8/21/2023       Tolerating radiation therapy well.  All questions and concerns addressed.      Plan:   Continue ROM for total of 6 months.  Skin care coconut oil, Allegra, Radiaplex, did not use gel sheets    Followed by lymphedema at [facility] orthopedics   RTC 4-6 weeks     To call Dr Hernandez office to begin contralateral fill.     Continue radiation treatment as prescribed.  Radiation:   Site: R CW/low axilla  Stereotactic Radiosurgery: No  Concurrent Therapy: Yes  Today's Dose: 5840  Total Dose for Breast: 6040  Today's Fraction/Total Fraction Breast: 32/33      Subjective:      HPI: Alicia Iniguez is a 48 year old female with  Malignant neoplasm of overlapping sites of right breast in female, estrogen receptor positive (H) [C50.811, Z17.0]    The following portions of the patient's history were reviewed and updated as appropriate: allergies, current medications, past family history, past medical history, past social history, past surgical history and problem list.    Assessment                  Body Site:  Breast                           Site: R CW/low axilla  Stereotactic Radiosurgery: No  Today's Dose: 5840  Total Dose for Breast: 6040  Today's Fraction/Total Fraction Breast: 32/33  Drainage: 0: Absent                                   Sexuality Alteration                    Emotional  Alteration    Copin: Effective  Comfort Alteration   KPS: 100 % Normal, no complaints  Fatigue (ONS scale): 0: No Fatigue  Pain Location: denies   Nutrition Alteration   Anorexia: 0: None  Nausea: 0: None  Vomitin: None  Weight: 86.7 kg (191 lb 3.2 oz)  Skin Alteration   Skin Sensation: 0: No problem  Skin Reaction: 3: Dry desquamation with or without erythema  AUA Assessment                                           Accompanied by       Objective:     Exam:     Vitals:    23 0832   BP: 116/78   Pulse: 98   Resp: 16   Temp: 98.1  F (36.7  C)   SpO2: 100%   Weight: 86.7 kg (191 lb 3.2 oz)   PainSc: No Pain (0)       Wt Readings from Last 8 Encounters:   23 86.7 kg (191 lb 3.2 oz)   10/25/23 86.8 kg (191 lb 4.8 oz)   10/18/23 86.8 kg (191 lb 4.8 oz)   10/11/23 86.8 kg (191 lb 4.8 oz)   10/04/23 86.3 kg (190 lb 3.2 oz)   23 87.1 kg (192 lb 1.6 oz)   23 79.4 kg (175 lb)   23 81.6 kg (180 lb)       General: Alert and oriented, in no acute distress  Alicai has Grade II dermatitis .    Treatment Summary to Date    Aria chart and setup information reviewed    Ruth Orellana MD

## 2023-11-01 NOTE — LETTER
11/1/2023         RE: Alicia Iniguez  [address] [facility] [address]        Dear Colleague,    Thank you for referring your patient, Alicia Iniguez, to the [facility] RADIATION ONCOLOGY [place]. Please see a copy of my visit note below.    RADIATION ONCOLOGY WEEKLY TREATMENT VISIT NOTE      Assessment / Impression     Malignant neoplasm of overlapping sites of right breast in female, estrogen receptor positive (H) [C50.811, Z17.0]     Cancer Staging   Malignant neoplasm of overlapping sites of right breast in female, estrogen receptor positive (H)  Staging form: Breast, AJCC 8th Edition  - Clinical: Stage IIA (cT3, cN2, cM0, G2, ER+, MS+, HER2-) - Signed by Shanelle Weaver APRN CNP on 3/1/2023  - Pathologic stage from 8/21/2023: No Stage Recommended (ypT3, pN1a(sn), cM0, G2, ER+, MS+, HER2-) - Signed by Ruth Orellana MD on 8/21/2023       Tolerating radiation therapy well.  All questions and concerns addressed.      Plan:   Continue ROM for total of 6 months.  Skin care coconut oil, Allegra, Radiaplex, did not use gel sheets    Followed by lymphedema at [facility] orthopedics   RTC 4-6 weeks     To call Dr Hernandez office to begin contralateral fill.     Continue radiation treatment as prescribed.  Radiation:   Site: R CW/low axilla  Stereotactic Radiosurgery: No  Concurrent Therapy: Yes  Today's Dose: 5840  Total Dose for Breast: 6040  Today's Fraction/Total Fraction Breast: 32/33      Subjective:      HPI: Alicia Iniguez is a 48 year old female with  Malignant neoplasm of overlapping sites of right breast in female, estrogen receptor positive (H) [C50.811, Z17.0]    The following portions of the patient's history were reviewed and updated as appropriate: allergies, current medications, past family history, past medical history, past social history, past surgical history and problem list.    Assessment                  Body Site:  Breast                           Site: R  CW/low axilla  Stereotactic Radiosurgery: No  Today's Dose: 5840  Total Dose for Breast: 6040  Today's Fraction/Total Fraction Breast: 32/33  Drainage: 0: Absent                                   Sexuality Alteration                    Emotional Alteration    Copin: Effective  Comfort Alteration   KPS: 100 % Normal, no complaints  Fatigue (ONS scale): 0: No Fatigue  Pain Location: denies   Nutrition Alteration   Anorexia: 0: None  Nausea: 0: None  Vomitin: None  Weight: 86.7 kg (191 lb 3.2 oz)  Skin Alteration   Skin Sensation: 0: No problem  Skin Reaction: 3: Dry desquamation with or without erythema  AUA Assessment                                           Accompanied by       Objective:     Exam:     Vitals:    23 0832   BP: 116/78   Pulse: 98   Resp: 16   Temp: 98.1  F (36.7  C)   SpO2: 100%   Weight: 86.7 kg (191 lb 3.2 oz)   PainSc: No Pain (0)       Wt Readings from Last 8 Encounters:   23 86.7 kg (191 lb 3.2 oz)   10/25/23 86.8 kg (191 lb 4.8 oz)   10/18/23 86.8 kg (191 lb 4.8 oz)   10/11/23 86.8 kg (191 lb 4.8 oz)   10/04/23 86.3 kg (190 lb 3.2 oz)   23 87.1 kg (192 lb 1.6 oz)   23 79.4 kg (175 lb)   23 81.6 kg (180 lb)       General: Alert and oriented, in no acute distress  Alicia has Grade II dermatitis .    Treatment Summary to Date    Aria chart and setup information reviewed    Ruth Orellana MD      Again, thank you for allowing me to participate in the care of your patient.        Sincerely,        Ruth Orellana MD

## 2023-11-01 NOTE — PROGRESS NOTES
Radiation Treatment Summary          Patient: Alicia Iniguez            MRN: [id number]           : 1975        Care Provider: Ruth Orellana MD         Date of Service: 2023      HISTORY: Alicia Iniguez was treated with 3D conformal radiation therapy for Stage IIA right breast cancer s/p neoadjuvant chemotherapy with residual disease. S/p reconstruction with expander.      SITE TREATED: right CW/RNI  TOTAL DOSE: 6040cGy  NUMBER OF FRACTIONS: 33  DATES COMPLETED: 2023  CONCURRENT CHEMOTHERAPY: No  ADJUVANT THERAPY:Yes    Alicia tolerated the treatment without unexpected side effects.   Continue ROM for total of 6 months.  Skin care coconut oil, Allegra, Radiaplex, did not use gel sheets    [facility]     PLAN: RTC 4-6 weeks.     Signed by: Ruth Orellana MD

## 2023-11-02 ENCOUNTER — LAB (OUTPATIENT)
Dept: INFUSION THERAPY | Facility: HOSPITAL | Age: 48
End: 2023-11-02
Attending: INTERNAL MEDICINE
Payer: COMMERCIAL

## 2023-11-02 ENCOUNTER — APPOINTMENT (OUTPATIENT)
Dept: RADIATION ONCOLOGY | Facility: HOSPITAL | Age: 48
End: 2023-11-02
Attending: RADIOLOGY
Payer: COMMERCIAL

## 2023-11-02 ENCOUNTER — INFUSION THERAPY VISIT (OUTPATIENT)
Dept: INFUSION THERAPY | Facility: HOSPITAL | Age: 48
End: 2023-11-02
Attending: RADIOLOGY
Payer: COMMERCIAL

## 2023-11-02 DIAGNOSIS — Z17.0 MALIGNANT NEOPLASM INVOLVING BOTH NIPPLE AND AREOLA OF RIGHT BREAST IN FEMALE, ESTROGEN RECEPTOR POSITIVE (H): Primary | ICD-10-CM

## 2023-11-02 DIAGNOSIS — Z17.0 MALIGNANT NEOPLASM OF OVERLAPPING SITES OF RIGHT BREAST IN FEMALE, ESTROGEN RECEPTOR POSITIVE (H): ICD-10-CM

## 2023-11-02 DIAGNOSIS — C50.011 MALIGNANT NEOPLASM INVOLVING BOTH NIPPLE AND AREOLA OF RIGHT BREAST IN FEMALE, ESTROGEN RECEPTOR POSITIVE (H): Primary | ICD-10-CM

## 2023-11-02 DIAGNOSIS — C50.811 MALIGNANT NEOPLASM OF OVERLAPPING SITES OF RIGHT BREAST IN FEMALE, ESTROGEN RECEPTOR POSITIVE (H): ICD-10-CM

## 2023-11-02 LAB
ALBUMIN SERPL BCG-MCNC: 4.7 G/DL (ref 3.5–5.2)
ALP SERPL-CCNC: 92 U/L (ref 35–104)
ALT SERPL W P-5'-P-CCNC: 29 U/L (ref 0–50)
ANION GAP SERPL CALCULATED.3IONS-SCNC: 8 MMOL/L (ref 7–15)
AST SERPL W P-5'-P-CCNC: 22 U/L (ref 0–45)
BASOPHILS # BLD AUTO: 0 10E3/UL (ref 0–0.2)
BASOPHILS NFR BLD AUTO: 0 %
BILIRUB SERPL-MCNC: 0.3 MG/DL
BUN SERPL-MCNC: 13 MG/DL (ref 6–20)
CALCIUM SERPL-MCNC: 9.5 MG/DL (ref 8.6–10)
CHLORIDE SERPL-SCNC: 105 MMOL/L (ref 98–107)
CREAT SERPL-MCNC: 0.58 MG/DL (ref 0.51–0.95)
DEPRECATED HCO3 PLAS-SCNC: 28 MMOL/L (ref 22–29)
EGFRCR SERPLBLD CKD-EPI 2021: >90 ML/MIN/1.73M2
EOSINOPHIL # BLD AUTO: 0.2 10E3/UL (ref 0–0.7)
EOSINOPHIL NFR BLD AUTO: 3 %
ERYTHROCYTE [DISTWIDTH] IN BLOOD BY AUTOMATED COUNT: 14.1 % (ref 10–15)
GLUCOSE SERPL-MCNC: 95 MG/DL (ref 70–99)
HCT VFR BLD AUTO: 38.3 % (ref 35–47)
HGB BLD-MCNC: 12.5 G/DL (ref 11.7–15.7)
IMM GRANULOCYTES # BLD: 0 10E3/UL
IMM GRANULOCYTES NFR BLD: 0 %
LYMPHOCYTES # BLD AUTO: 0.6 10E3/UL (ref 0.8–5.3)
LYMPHOCYTES NFR BLD AUTO: 12 %
MCH RBC QN AUTO: 27.8 PG (ref 26.5–33)
MCHC RBC AUTO-ENTMCNC: 32.6 G/DL (ref 31.5–36.5)
MCV RBC AUTO: 85 FL (ref 78–100)
MONOCYTES # BLD AUTO: 0.4 10E3/UL (ref 0–1.3)
MONOCYTES NFR BLD AUTO: 8 %
NEUTROPHILS # BLD AUTO: 4 10E3/UL (ref 1.6–8.3)
NEUTROPHILS NFR BLD AUTO: 77 %
NRBC # BLD AUTO: 0 10E3/UL
NRBC BLD AUTO-RTO: 0 /100
PLATELET # BLD AUTO: 343 10E3/UL (ref 150–450)
POTASSIUM SERPL-SCNC: 4.2 MMOL/L (ref 3.4–5.3)
PROT SERPL-MCNC: 7.1 G/DL (ref 6.4–8.3)
RBC # BLD AUTO: 4.49 10E6/UL (ref 3.8–5.2)
SODIUM SERPL-SCNC: 141 MMOL/L (ref 135–145)
WBC # BLD AUTO: 5.3 10E3/UL (ref 4–11)

## 2023-11-02 PROCEDURE — 250N000011 HC RX IP 250 OP 636: Mod: JZ | Performed by: INTERNAL MEDICINE

## 2023-11-02 PROCEDURE — 77412 RADIATION TX DELIVERY LVL 3: CPT | Performed by: RADIOLOGY

## 2023-11-02 PROCEDURE — 96402 CHEMO HORMON ANTINEOPL SQ/IM: CPT

## 2023-11-02 PROCEDURE — 36591 DRAW BLOOD OFF VENOUS DEVICE: CPT

## 2023-11-02 PROCEDURE — 77336 RADIATION PHYSICS CONSULT: CPT | Performed by: RADIOLOGY

## 2023-11-02 PROCEDURE — 250N000011 HC RX IP 250 OP 636: Mod: JZ | Performed by: NURSE PRACTITIONER

## 2023-11-02 PROCEDURE — 77427 RADIATION TX MANAGEMENT X5: CPT | Performed by: RADIOLOGY

## 2023-11-02 PROCEDURE — 77387 GUIDANCE FOR RADJ TX DLVR: CPT | Performed by: RADIOLOGY

## 2023-11-02 PROCEDURE — 80053 COMPREHEN METABOLIC PANEL: CPT

## 2023-11-02 PROCEDURE — 77387 GUIDANCE FOR RADJ TX DLVR: CPT | Mod: 26 | Performed by: RADIOLOGY

## 2023-11-02 PROCEDURE — 85025 COMPLETE CBC W/AUTO DIFF WBC: CPT

## 2023-11-02 RX ORDER — HEPARIN SODIUM (PORCINE) LOCK FLUSH IV SOLN 100 UNIT/ML 100 UNIT/ML
5 SOLUTION INTRAVENOUS
Status: DISCONTINUED | OUTPATIENT
Start: 2023-11-02 | End: 2023-11-02 | Stop reason: HOSPADM

## 2023-11-02 RX ORDER — HEPARIN SODIUM (PORCINE) LOCK FLUSH IV SOLN 100 UNIT/ML 100 UNIT/ML
5 SOLUTION INTRAVENOUS
Status: CANCELLED | OUTPATIENT
Start: 2023-11-02

## 2023-11-02 RX ADMIN — HEPARIN 5 ML: 100 SYRINGE at 08:58

## 2023-11-02 RX ADMIN — GOSERELIN ACETATE 3.6 MG: 3.6 IMPLANT SUBCUTANEOUS at 09:27

## 2023-11-02 NOTE — PROGRESS NOTES
Infusion Nursing Note:  Alicia PECK Geetha presents today for labs and Zoladex injection.    Patient seen by provider today: No   present during visit today: Not Applicable.    Note: Administered Zoladex injection deep subcutaneous (left lower abdomen) as ordered per provider.      Intravenous Access:  Labs drawn without difficulty.  Implanted Port.    Treatment Conditions:  Lab Results   Component Value Date    HGB 12.5 11/02/2023    WBC 5.3 11/02/2023    ANEU 5.6 04/28/2023    ANEUTAUTO 4.0 11/02/2023     11/02/2023        Lab Results   Component Value Date     11/02/2023    POTASSIUM 4.2 11/02/2023    CR 0.58 11/02/2023    KURT 9.5 11/02/2023    BILITOTAL 0.3 11/02/2023    ALBUMIN 4.7 11/02/2023    ALT 29 11/02/2023    AST 22 11/02/2023         Post Infusion Assessment:  Patient tolerated injection without incident.  Site patent and intact, free from redness, edema or discomfort.  Access discontinued per protocol.       Discharge Plan:   Patient discharged in stable condition accompanied by: daughter.  Departure Mode: Ambulatory.      VAUGHN RIVERA RN

## 2023-11-02 NOTE — PROGRESS NOTES
Pt ambulatory completed Radiation therapy accompanied by her daughter. Discharge instruction reviewed with pt.

## 2023-11-08 ENCOUNTER — ONCOLOGY VISIT (OUTPATIENT)
Dept: ONCOLOGY | Facility: HOSPITAL | Age: 48
End: 2023-11-08
Payer: COMMERCIAL

## 2023-11-08 VITALS
HEART RATE: 92 BPM | DIASTOLIC BLOOD PRESSURE: 74 MMHG | WEIGHT: 192.7 LBS | BODY MASS INDEX: 30.97 KG/M2 | TEMPERATURE: 97.5 F | RESPIRATION RATE: 20 BRPM | HEIGHT: 66 IN | SYSTOLIC BLOOD PRESSURE: 128 MMHG | OXYGEN SATURATION: 99 %

## 2023-11-08 DIAGNOSIS — C50.811 MALIGNANT NEOPLASM OF OVERLAPPING SITES OF RIGHT BREAST IN FEMALE, ESTROGEN RECEPTOR POSITIVE (H): ICD-10-CM

## 2023-11-08 DIAGNOSIS — Z17.0 MALIGNANT NEOPLASM OF OVERLAPPING SITES OF RIGHT BREAST IN FEMALE, ESTROGEN RECEPTOR POSITIVE (H): ICD-10-CM

## 2023-11-08 DIAGNOSIS — C50.011 MALIGNANT NEOPLASM INVOLVING BOTH NIPPLE AND AREOLA OF RIGHT BREAST IN FEMALE, ESTROGEN RECEPTOR POSITIVE (H): Primary | ICD-10-CM

## 2023-11-08 DIAGNOSIS — Z17.0 MALIGNANT NEOPLASM INVOLVING BOTH NIPPLE AND AREOLA OF RIGHT BREAST IN FEMALE, ESTROGEN RECEPTOR POSITIVE (H): Primary | ICD-10-CM

## 2023-11-08 PROCEDURE — 99213 OFFICE O/P EST LOW 20 MIN: CPT | Performed by: INTERNAL MEDICINE

## 2023-11-08 PROCEDURE — 99214 OFFICE O/P EST MOD 30 MIN: CPT | Performed by: INTERNAL MEDICINE

## 2023-11-08 ASSESSMENT — PAIN SCALES - GENERAL: PAINLEVEL: NO PAIN (0)

## 2023-11-08 NOTE — LETTER
11/8/2023         RE: Alicia Iniguez  [address] [facility] [address]        Dear Colleague,    Thank you for referring your patient, Alicia Iniguez, to the [facility] [place]. Please see a copy of my visit note below.        [facility] Hematology and Oncology Progress Note    Patient: Alicia Iniguez  MRN: [id number]  Date of Service: Nov 8, 2023          Reason for Visit    Chief Complaint   Patient presents with     Oncology Clinic Visit     Return visit after radiation completion related to Malignant neoplasm of overlapping sites of right breast in female, estrogen receptor positive       Assessment and Plan     Cancer Staging   Malignant neoplasm of overlapping sites of right breast in female, estrogen receptor positive (H)  Staging form: Breast, AJCC 8th Edition  - Clinical: Stage IIA (cT3, cN2, cM0, G2, ER+, GA+, HER2-) - Signed by Shanelle Weaver APRN CNP on 3/1/2023  - Pathologic stage from 8/21/2023: No Stage Recommended (ypT3, pN1a(sn), cM0, G2, ER+, GA+, HER2-) - Signed by Ruth Orellana MD on 8/21/2023    1.  Locally Advanced Breast cancer, right side, stage IIa, T3 N2 M0, grade 2, ER/GA positive, HER2/janine negative: Status post neoadjuvant chemotherapy with dose dense Adriamycin and Cytoxan for 4 cycles, followed by weekly Taxol. Underwent bilateral mastectomy on 8/9/2023.  I reviewed the surgical path report in detail today.  She did have significant residual disease as expected with minimum treatment effect.  Residual disease measured 5.6 x 3.2 x 2.7 cm in greatest dimensions.  Grade 2.  There was evidence of dermal infiltration with focal invasion of epidermis.  There was focal lymphovascular invasion within the dermal connective tissue.  No evidence of DCIS.  Margins were all negative.  Out of 20 lymph nodes examined 2 were positive for macrometastasis (sentinel lymph nodes).  No evidence of extranodal extension.  ypT3, ypN1a.     Recently  finished adjuvant radiation.    She has started endocrine therapy with Zoladex and letrozole.  He has gotten 3 doses so far.  She has not had any menstrual cycles after starting chemotherapy.  Has hot flashes.  Prior to that she was having pretty regular cycles.  But she also had some hot flashes.  She was probably perimenopausal prior to starting chemo and probably has chemotherapy-induced ovarian suppression or failure.  We discussed treatment going forward.  For patients age above 45 there is a higher chance of chemotherapy inducing permanent ovarian failure.  Due to the fact that she has not had any menstrual cycles since chemo, her age above 45, I think she probably is in premature ovarian failure/menopause.  So I will continue letrozole only for now.  We will hold off on Zoladex.  We will check hormone levels in 2 weeks.  I expect these to be in postmenopausal range.  If she resumes menstrual cycles or if the hormone levels are in the perimenopausal range then we can restart Zoladex.  She will also start Verzenio from tomorrow.  Labs in 2 weeks.      ECOG Performance    0 - Independent    Distress Screening (within last 30 days)    1. How concerned are you about your ability to eat? : 0  2. How concerned are you about unintended weight loss or your current weight? : 0  3. How concerned are you about feeling depressed or very sad? : 0  4. How concerned are you about feeling anxious or very scared? : 5  5. Do you struggle with the loss of meaning and tiago in your life? : Not at all  6. How concerned are you about work and home life issues that may be affected by your cancer? : 0  7. How concerned are you about knowing what resources are available to help you? : 0  8. Do you currently have what you would describe as Congregation or spiritual struggles?            : Not at all       Pain       Problem List    Patient Active Problem List   Diagnosis     Malignant neoplasm of overlapping sites of right breast in female,  "estrogen receptor positive (H)     Malignant neoplasm involving both nipple and areola of right breast in female, estrogen receptor positive (H)     Adverse effect of antineoplastic and immunosuppressive drugs, sequela     Chest pain, unspecified     Hyperlipidemia        ______________________________________________________________________________    History of Present Illness    Oncology history:  DIAGNOSIS: Breast cancer, right side, patient had a palpable lump with some nipple and skin changes.  She was also found to have lymphadenopathy in the axilla.  Patient had an ultrasound and mammogram that showed a 4.1 cm tumor with axillary lymphadenopathy  Biopsy was done January 31 and it showed invasive ductal carcinoma.  Grade 2.  ER LA positive and HER2/janine negative.  Biopsy was done of the lymph node as well and it was positive.    TREATMENT: Patient has started neoadjuvant chemotherapy with dose dense Adriamycin and Cytoxan for 4 cycles and then 12 weekly Taxol.     INTERIM HISTORY:   48-year-old female with right-sided locally advanced breast cancer status post neoadjuvant chemotherapy with AC followed by T who recently underwent bilateral mastectomy she had significant residual disease.  ypT3, ypN1a.     Currently on Zoladex and letrozole for adjuvant therapy and has also received for Verzenio.  Recently finished radiation.  Doing well.    Review of Systems    Pertinent items are noted in HPI.    Past History    Past Medical History:   Diagnosis Date     Breast cancer (H)     right       PHYSICAL EXAM  Ht 1.676 m (5' 6\")   BMI 30.86 kg/m      GENERAL: no acute distress. Cooperative in conversation.      Lab Results    Recent Results (from the past 168 hour(s))   Comprehensive metabolic panel   Result Value Ref Range    Sodium 141 135 - 145 mmol/L    Potassium 4.2 3.4 - 5.3 mmol/L    Carbon Dioxide (CO2) 28 22 - 29 mmol/L    Anion Gap 8 7 - 15 mmol/L    Urea Nitrogen 13.0 6.0 - 20.0 mg/dL    Creatinine 0.58 " "0.51 - 0.95 mg/dL    GFR Estimate >90 >60 mL/min/1.73m2    Calcium 9.5 8.6 - 10.0 mg/dL    Chloride 105 98 - 107 mmol/L    Glucose 95 70 - 99 mg/dL    Alkaline Phosphatase 92 35 - 104 U/L    AST 22 0 - 45 U/L    ALT 29 0 - 50 U/L    Protein Total 7.1 6.4 - 8.3 g/dL    Albumin 4.7 3.5 - 5.2 g/dL    Bilirubin Total 0.3 <=1.2 mg/dL   CBC with platelets and differential   Result Value Ref Range    WBC Count 5.3 4.0 - 11.0 10e3/uL    RBC Count 4.49 3.80 - 5.20 10e6/uL    Hemoglobin 12.5 11.7 - 15.7 g/dL    Hematocrit 38.3 35.0 - 47.0 %    MCV 85 78 - 100 fL    MCH 27.8 26.5 - 33.0 pg    MCHC 32.6 31.5 - 36.5 g/dL    RDW 14.1 10.0 - 15.0 %    Platelet Count 343 150 - 450 10e3/uL    % Neutrophils 77 %    % Lymphocytes 12 %    % Monocytes 8 %    % Eosinophils 3 %    % Basophils 0 %    % Immature Granulocytes 0 %    NRBCs per 100 WBC 0 <1 /100    Absolute Neutrophils 4.0 1.6 - 8.3 10e3/uL    Absolute Lymphocytes 0.6 (L) 0.8 - 5.3 10e3/uL    Absolute Monocytes 0.4 0.0 - 1.3 10e3/uL    Absolute Eosinophils 0.2 0.0 - 0.7 10e3/uL    Absolute Basophils 0.0 0.0 - 0.2 10e3/uL    Absolute Immature Granulocytes 0.0 <=0.4 10e3/uL    Absolute NRBCs 0.0 10e3/uL         Imaging    No results found.    A total of 30 min were spent today on this visit which included face to face conversation with the patient, EMR review, counseling and co-ordination of care and medical documentation.        Signed by: Paola Saleem MD    Oncology Rooming Note    November 8, 2023 3:28 PM   Alicia Iniguez is a 48 year old female who presents for:    Chief Complaint   Patient presents with     Oncology Clinic Visit     Return visit after radiation completion related to Malignant neoplasm of overlapping sites of right breast in female, estrogen receptor positive     Initial Vitals: /74 (BP Location: Left arm, Patient Position: Sitting, Cuff Size: Adult Large)   Pulse 92   Temp 97.5  F (36.4  C) (Tympanic)   Resp 20   Ht 1.676 m (5' 6\")   Wt " "87.4 kg (192 lb 11.2 oz)   LMP 03/01/2023 (Approximate)   SpO2 99%   BMI 31.10 kg/m   Estimated body mass index is 31.1 kg/m  as calculated from the following:    Height as of this encounter: 1.676 m (5' 6\").    Weight as of this encounter: 87.4 kg (192 lb 11.2 oz). Body surface area is 2.02 meters squared.  No Pain (0) Comment: Data Unavailable   Patient's last menstrual period was 03/01/2023 (approximate).  Allergies reviewed: Yes  Medications reviewed: Yes    Medications: Medication refills not needed today.  Pharmacy name entered into [employer]:    [id number] - [place], [place] - [address] [facility] - [place], [place] - [address]    Clinical concerns: Return visit after radiation completion related to Malignant neoplasm of overlapping sites of right breast in female, estrogen receptor positive      DIANA QUINN CMA                Again, thank you for allowing me to participate in the care of your patient.        Sincerely,        Paola Saleem MD  "

## 2023-11-08 NOTE — PROGRESS NOTES
"Oncology Rooming Note    November 8, 2023 3:28 PM   Alicia Iniguez is a 48 year old female who presents for:    Chief Complaint   Patient presents with    Oncology Clinic Visit     Return visit after radiation completion related to Malignant neoplasm of overlapping sites of right breast in female, estrogen receptor positive     Initial Vitals: /74 (BP Location: Left arm, Patient Position: Sitting, Cuff Size: Adult Large)   Pulse 92   Temp 97.5  F (36.4  C) (Tympanic)   Resp 20   Ht 1.676 m (5' 6\")   Wt 87.4 kg (192 lb 11.2 oz)   LMP 03/01/2023 (Approximate)   SpO2 99%   BMI 31.10 kg/m   Estimated body mass index is 31.1 kg/m  as calculated from the following:    Height as of this encounter: 1.676 m (5' 6\").    Weight as of this encounter: 87.4 kg (192 lb 11.2 oz). Body surface area is 2.02 meters squared.  No Pain (0) Comment: Data Unavailable   Patient's last menstrual period was 03/01/2023 (approximate).  Allergies reviewed: Yes  Medications reviewed: Yes    Medications: Medication refills not needed today.  Pharmacy name entered into [employer]:    [id number] - [place], [place] - [address] AT [address] [facility] [address] [facility] - [place], [place] - [address]    Clinical concerns: Return visit after radiation completion related to Malignant neoplasm of overlapping sites of right breast in female, estrogen receptor positive      DIANA QUINN CMA              "

## 2023-11-08 NOTE — PROGRESS NOTES
[facility] Hematology and Oncology Progress Note    Patient: Alicia Iniguez  MRN: [id number]  Date of Service: Nov 8, 2023          Reason for Visit    Chief Complaint   Patient presents with    Oncology Clinic Visit     Return visit after radiation completion related to Malignant neoplasm of overlapping sites of right breast in female, estrogen receptor positive       Assessment and Plan     Cancer Staging   Malignant neoplasm of overlapping sites of right breast in female, estrogen receptor positive (H)  Staging form: Breast, AJCC 8th Edition  - Clinical: Stage IIA (cT3, cN2, cM0, G2, ER+, WY+, HER2-) - Signed by Shanelle Weaver APRN CNP on 3/1/2023  - Pathologic stage from 8/21/2023: No Stage Recommended (ypT3, pN1a(sn), cM0, G2, ER+, WY+, HER2-) - Signed by Ruth Orellana MD on 8/21/2023    1.  Locally Advanced Breast cancer, right side, stage IIa, T3 N2 M0, grade 2, ER/WY positive, HER2/janine negative: Status post neoadjuvant chemotherapy with dose dense Adriamycin and Cytoxan for 4 cycles, followed by weekly Taxol. Underwent bilateral mastectomy on 8/9/2023.  I reviewed the surgical path report in detail today.  She did have significant residual disease as expected with minimum treatment effect.  Residual disease measured 5.6 x 3.2 x 2.7 cm in greatest dimensions.  Grade 2.  There was evidence of dermal infiltration with focal invasion of epidermis.  There was focal lymphovascular invasion within the dermal connective tissue.  No evidence of DCIS.  Margins were all negative.  Out of 20 lymph nodes examined 2 were positive for macrometastasis (sentinel lymph nodes).  No evidence of extranodal extension.  ypT3, ypN1a.     Recently finished adjuvant radiation.    She has started endocrine therapy with Zoladex and letrozole.  He has gotten 3 doses so far.  She has not had any menstrual cycles after starting chemotherapy.  Has hot flashes.  Prior to that she was having pretty regular  cycles.  But she also had some hot flashes.  She was probably perimenopausal prior to starting chemo and probably has chemotherapy-induced ovarian suppression or failure.  We discussed treatment going forward.  For patients age above 45 there is a higher chance of chemotherapy inducing permanent ovarian failure.  Due to the fact that she has not had any menstrual cycles since chemo, her age above 45, I think she probably is in premature ovarian failure/menopause.  So I will continue letrozole only for now.  We will hold off on Zoladex.  We will check hormone levels in 2 weeks.  I expect these to be in postmenopausal range.  If she resumes menstrual cycles or if the hormone levels are in the perimenopausal range then we can restart Zoladex.  She will also start Verzenio from tomorrow.  Labs in 2 weeks.      ECOG Performance    0 - Independent    Distress Screening (within last 30 days)    1. How concerned are you about your ability to eat? : 0  2. How concerned are you about unintended weight loss or your current weight? : 0  3. How concerned are you about feeling depressed or very sad? : 0  4. How concerned are you about feeling anxious or very scared? : 5  5. Do you struggle with the loss of meaning and tiago in your life? : Not at all  6. How concerned are you about work and home life issues that may be affected by your cancer? : 0  7. How concerned are you about knowing what resources are available to help you? : 0  8. Do you currently have what you would describe as Mandaeism or spiritual struggles?            : Not at all       Pain       Problem List    Patient Active Problem List   Diagnosis    Malignant neoplasm of overlapping sites of right breast in female, estrogen receptor positive (H)    Malignant neoplasm involving both nipple and areola of right breast in female, estrogen receptor positive (H)    Adverse effect of antineoplastic and immunosuppressive drugs, sequela    Chest pain, unspecified     "Hyperlipidemia        ______________________________________________________________________________    History of Present Illness    Oncology history:  DIAGNOSIS: Breast cancer, right side, patient had a palpable lump with some nipple and skin changes.  She was also found to have lymphadenopathy in the axilla.  Patient had an ultrasound and mammogram that showed a 4.1 cm tumor with axillary lymphadenopathy  Biopsy was done January 31 and it showed invasive ductal carcinoma.  Grade 2.  ER NH positive and HER2/janine negative.  Biopsy was done of the lymph node as well and it was positive.    TREATMENT: Patient has started neoadjuvant chemotherapy with dose dense Adriamycin and Cytoxan for 4 cycles and then 12 weekly Taxol.     INTERIM HISTORY:   48-year-old female with right-sided locally advanced breast cancer status post neoadjuvant chemotherapy with AC followed by T who recently underwent bilateral mastectomy she had significant residual disease.  ypT3, ypN1a.     Currently on Zoladex and letrozole for adjuvant therapy and has also received for Verzenio.  Recently finished radiation.  Doing well.    Review of Systems    Pertinent items are noted in HPI.    Past History    Past Medical History:   Diagnosis Date    Breast cancer (H)     right       PHYSICAL EXAM  Ht 1.676 m (5' 6\")   BMI 30.86 kg/m      GENERAL: no acute distress. Cooperative in conversation.      Lab Results    Recent Results (from the past 168 hour(s))   Comprehensive metabolic panel   Result Value Ref Range    Sodium 141 135 - 145 mmol/L    Potassium 4.2 3.4 - 5.3 mmol/L    Carbon Dioxide (CO2) 28 22 - 29 mmol/L    Anion Gap 8 7 - 15 mmol/L    Urea Nitrogen 13.0 6.0 - 20.0 mg/dL    Creatinine 0.58 0.51 - 0.95 mg/dL    GFR Estimate >90 >60 mL/min/1.73m2    Calcium 9.5 8.6 - 10.0 mg/dL    Chloride 105 98 - 107 mmol/L    Glucose 95 70 - 99 mg/dL    Alkaline Phosphatase 92 35 - 104 U/L    AST 22 0 - 45 U/L    ALT 29 0 - 50 U/L    Protein Total 7.1 6.4 " - 8.3 g/dL    Albumin 4.7 3.5 - 5.2 g/dL    Bilirubin Total 0.3 <=1.2 mg/dL   CBC with platelets and differential   Result Value Ref Range    WBC Count 5.3 4.0 - 11.0 10e3/uL    RBC Count 4.49 3.80 - 5.20 10e6/uL    Hemoglobin 12.5 11.7 - 15.7 g/dL    Hematocrit 38.3 35.0 - 47.0 %    MCV 85 78 - 100 fL    MCH 27.8 26.5 - 33.0 pg    MCHC 32.6 31.5 - 36.5 g/dL    RDW 14.1 10.0 - 15.0 %    Platelet Count 343 150 - 450 10e3/uL    % Neutrophils 77 %    % Lymphocytes 12 %    % Monocytes 8 %    % Eosinophils 3 %    % Basophils 0 %    % Immature Granulocytes 0 %    NRBCs per 100 WBC 0 <1 /100    Absolute Neutrophils 4.0 1.6 - 8.3 10e3/uL    Absolute Lymphocytes 0.6 (L) 0.8 - 5.3 10e3/uL    Absolute Monocytes 0.4 0.0 - 1.3 10e3/uL    Absolute Eosinophils 0.2 0.0 - 0.7 10e3/uL    Absolute Basophils 0.0 0.0 - 0.2 10e3/uL    Absolute Immature Granulocytes 0.0 <=0.4 10e3/uL    Absolute NRBCs 0.0 10e3/uL         Imaging    No results found.    A total of 30 min were spent today on this visit which included face to face conversation with the patient, EMR review, counseling and co-ordination of care and medical documentation.        Signed by: Paola Saleem MD

## 2023-11-09 DIAGNOSIS — Z85.3 PERSONAL HISTORY OF BREAST CANCER: Primary | ICD-10-CM

## 2023-11-10 ENCOUNTER — TELEPHONE (OUTPATIENT)
Dept: SURGERY | Facility: CLINIC | Age: 48
End: 2023-11-10
Payer: COMMERCIAL

## 2023-11-10 ENCOUNTER — HOSPITAL ENCOUNTER (OUTPATIENT)
Facility: AMBULATORY SURGERY CENTER | Age: 48
End: 2023-11-10
Attending: SPECIALIST
Payer: COMMERCIAL

## 2023-11-10 PROBLEM — Z85.3 PERSONAL HISTORY OF BREAST CANCER: Status: ACTIVE | Noted: 2023-11-09

## 2023-11-10 NOTE — LETTER
Pre-op Physical: To be done by Dr Brnuner day of procedure.    Surgery Date: 12/1/2023     Location: [place] [address]    Approximate Arrival Time: 12:45  (Unless instructed differently by the pre-op call nurse)     Pre-Surgical Tasks:     Review all medications with your primary care or prescribing physician; they will advise you which meds to stop and when, and when you can resume taking.  Certain medications like blood thinners and weight loss medications need to be stopped in advance of surgery to proceed safely.      Blood thinners including but not exclusive to drugs like Xarelto, Eliquis, Warfarin and Aspirin, should be stopped five days before surgery, if your prescribing provider agrees. Follow your provider's advice on stopping blood thinners because they know you best.  If you are unsure if your medication is a blood thinner, ask your prescribing provider.    Weight loss medications: There are multiple medications being used for weight management and diabetes today, and the list is growing.  Phentermine, Ozempic, Wegovy, Trulicity, and other similar medications need to be stopped one week before surgery to avoid being cancelled.  Victoza and Saxenda can be continued longer but must be stopped one full day before surgery.  Please ask your prescribing provider for advice.    Diabetic medications: in addition to the medications talked about above that are used for either weight loss or diabetes, some people are on insulin that may require adjustment.  Please discuss managing diabetic medications with your prescribing doctor as these medications may require modification prior to surgery.     Please shower the evening before and morning of surgery with Hibiclens soap.  This can be found at your local pharmacy.     Fasting instructions will be provided by the pre-op nurse who will call you 1-3 days before surgery.  Typically, we advise normal food up  to 8 hours before you arrive for surgery. Clear liquids only from then until 2 hours before you arrive surgery, then nothing at all by mouth.  The nurse will review your specific instructions with you at the call.      Smoking impacts your body's ability to heal properly so we advise patients to quit if possible before surgery.  Plastic Surgery patients are required to be nicotine free for at least 8 weeks before surgery.      You will need an adult to drive you home and stay with you 24 hours after surgery. Public transportation or Medical Van Services are not permitted.    Visitor restrictions are subject to change, please verify with the pre-op nurse when they call how many people are permitted to accompany you.    We always encourage you to notify your insurance any time you have medical tests or procedures scheduled including surgery. The number is usually right on the back of your insurance card. To obtain pricing for surgery, please call [facility] Cost of Care at [phone] or email [email or web address].        Call our office if you have any questions! Thank you!

## 2023-11-10 NOTE — TELEPHONE ENCOUNTER
Spoke with patient today regarding surgery scheduling     Went over details/instructions.    Surgery Letter sent via [employer]  (Please see LETTERS TAB in chart to retrieve a copy of this letter)

## 2023-11-10 NOTE — LETTER
Pre-op Physical: To be done by Dr Brunner day of procedure.    Surgery Date: 11/17/2023     Location: [place] [address]    Approximate Arrival Time: 10:00am  (Unless instructed differently by the pre-op call nurse)     Pre-Surgical Tasks:     Review all medications with your primary care or prescribing physician; they will advise you which meds to stop and when, and when you can resume taking.  Certain medications like blood thinners and weight loss medications need to be stopped in advance of surgery to proceed safely.      Blood thinners including but not exclusive to drugs like Xarelto, Eliquis, Warfarin and Aspirin, should be stopped five days before surgery, if your prescribing provider agrees. Follow your provider's advice on stopping blood thinners because they know you best.  If you are unsure if your medication is a blood thinner, ask your prescribing provider.    Weight loss medications: There are multiple medications being used for weight management and diabetes today, and the list is growing.  Phentermine, Ozempic, Wegovy, Trulicity, and other similar medications need to be stopped one week before surgery to avoid being cancelled.  Victoza and Saxenda can be continued longer but must be stopped one full day before surgery.  Please ask your prescribing provider for advice.    Diabetic medications: in addition to the medications talked about above that are used for either weight loss or diabetes, some people are on insulin that may require adjustment.  Please discuss managing diabetic medications with your prescribing doctor as these medications may require modification prior to surgery.     Please shower the evening before and morning of surgery with Hibiclens soap.  This can be found at your local pharmacy.     Fasting instructions will be provided by the pre-op nurse who will call you 1-3 days before surgery.  Typically, we advise normal food  up to 8 hours before you arrive for surgery. Clear liquids only from then until 2 hours before you arrive surgery, then nothing at all by mouth.  The nurse will review your specific instructions with you at the call.      Smoking impacts your body's ability to heal properly so we advise patients to quit if possible before surgery.  Plastic Surgery patients are required to be nicotine free for at least 8 weeks before surgery.      You will need an adult to drive you home and stay with you 24 hours after surgery. Public transportation or Medical Van Services are not permitted.    Visitor restrictions are subject to change, please verify with the pre-op nurse when they call how many people are permitted to accompany you.    We always encourage you to notify your insurance any time you have medical tests or procedures scheduled including surgery. The number is usually right on the back of your insurance card. To obtain pricing for surgery, please call  [employer] [facility] Cost of Care at [phone] or email [email or web address].        Call our office if you have any questions! Thank you!

## 2023-11-15 DIAGNOSIS — C50.811 MALIGNANT NEOPLASM OF OVERLAPPING SITES OF RIGHT BREAST IN FEMALE, ESTROGEN RECEPTOR POSITIVE (H): Primary | ICD-10-CM

## 2023-11-15 DIAGNOSIS — Z17.0 MALIGNANT NEOPLASM OF OVERLAPPING SITES OF RIGHT BREAST IN FEMALE, ESTROGEN RECEPTOR POSITIVE (H): Primary | ICD-10-CM

## 2023-11-15 RX ORDER — LETROZOLE 2.5 MG/1
2.5 TABLET, FILM COATED ORAL DAILY
Qty: 30 TABLET | Refills: 0 | Status: SHIPPED | OUTPATIENT
Start: 2023-11-15 | End: 2023-12-28

## 2023-11-16 ENCOUNTER — TELEPHONE (OUTPATIENT)
Dept: ONCOLOGY | Facility: HOSPITAL | Age: 48
End: 2023-11-16
Payer: COMMERCIAL

## 2023-11-16 NOTE — ORAL ONC MGMT
Oral Chemotherapy Monitoring Program   Left Voicemail    Attempted to contact patient today for follow up regarding oral chemotherapy, abemaciclib, for routine assessment. No answer. Left voicemail for patient to call us back at [phone] when able. No medication name was left.    Ghulam Sorto, PharmD, [facility]  Clinical Oncology Pharmacist  November 16, 2023
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
## 2023-11-22 ENCOUNTER — LAB (OUTPATIENT)
Dept: INFUSION THERAPY | Facility: HOSPITAL | Age: 48
End: 2023-11-22
Attending: INTERNAL MEDICINE
Payer: COMMERCIAL

## 2023-11-22 DIAGNOSIS — C50.011 MALIGNANT NEOPLASM INVOLVING BOTH NIPPLE AND AREOLA OF RIGHT BREAST IN FEMALE, ESTROGEN RECEPTOR POSITIVE (H): Primary | ICD-10-CM

## 2023-11-22 DIAGNOSIS — C50.811 MALIGNANT NEOPLASM OF OVERLAPPING SITES OF RIGHT BREAST IN FEMALE, ESTROGEN RECEPTOR POSITIVE (H): ICD-10-CM

## 2023-11-22 DIAGNOSIS — Z17.0 MALIGNANT NEOPLASM OF OVERLAPPING SITES OF RIGHT BREAST IN FEMALE, ESTROGEN RECEPTOR POSITIVE (H): ICD-10-CM

## 2023-11-22 DIAGNOSIS — Z17.0 MALIGNANT NEOPLASM INVOLVING BOTH NIPPLE AND AREOLA OF RIGHT BREAST IN FEMALE, ESTROGEN RECEPTOR POSITIVE (H): Primary | ICD-10-CM

## 2023-11-22 LAB
ALBUMIN SERPL BCG-MCNC: 4.4 G/DL (ref 3.5–5.2)
ALP SERPL-CCNC: 92 U/L (ref 40–150)
ALT SERPL W P-5'-P-CCNC: 31 U/L (ref 0–50)
ANION GAP SERPL CALCULATED.3IONS-SCNC: 10 MMOL/L (ref 7–15)
AST SERPL W P-5'-P-CCNC: 22 U/L (ref 0–45)
BASOPHILS # BLD AUTO: 0 10E3/UL (ref 0–0.2)
BASOPHILS NFR BLD AUTO: 1 %
BILIRUB SERPL-MCNC: 0.4 MG/DL
BUN SERPL-MCNC: 12.7 MG/DL (ref 6–20)
CALCIUM SERPL-MCNC: 9.5 MG/DL (ref 8.6–10)
CHLORIDE SERPL-SCNC: 102 MMOL/L (ref 98–107)
CREAT SERPL-MCNC: 0.82 MG/DL (ref 0.51–0.95)
DEPRECATED HCO3 PLAS-SCNC: 26 MMOL/L (ref 22–29)
EGFRCR SERPLBLD CKD-EPI 2021: 87 ML/MIN/1.73M2
EOSINOPHIL # BLD AUTO: 0.2 10E3/UL (ref 0–0.7)
EOSINOPHIL NFR BLD AUTO: 4 %
ERYTHROCYTE [DISTWIDTH] IN BLOOD BY AUTOMATED COUNT: 13.9 % (ref 10–15)
ESTRADIOL SERPL-MCNC: <5 PG/ML
FSH SERPL IRP2-ACNC: 11.5 MIU/ML
GLUCOSE SERPL-MCNC: 99 MG/DL (ref 70–99)
HCT VFR BLD AUTO: 37.6 % (ref 35–47)
HGB BLD-MCNC: 12.5 G/DL (ref 11.7–15.7)
IMM GRANULOCYTES # BLD: 0 10E3/UL
IMM GRANULOCYTES NFR BLD: 0 %
LH SERPL-ACNC: <0.3 MIU/ML
LYMPHOCYTES # BLD AUTO: 0.7 10E3/UL (ref 0.8–5.3)
LYMPHOCYTES NFR BLD AUTO: 18 %
MCH RBC QN AUTO: 28.1 PG (ref 26.5–33)
MCHC RBC AUTO-ENTMCNC: 33.2 G/DL (ref 31.5–36.5)
MCV RBC AUTO: 85 FL (ref 78–100)
MONOCYTES # BLD AUTO: 0.2 10E3/UL (ref 0–1.3)
MONOCYTES NFR BLD AUTO: 5 %
NEUTROPHILS # BLD AUTO: 2.5 10E3/UL (ref 1.6–8.3)
NEUTROPHILS NFR BLD AUTO: 72 %
NRBC # BLD AUTO: 0 10E3/UL
NRBC BLD AUTO-RTO: 0 /100
PLATELET # BLD AUTO: 275 10E3/UL (ref 150–450)
POTASSIUM SERPL-SCNC: 4.3 MMOL/L (ref 3.4–5.3)
PROT SERPL-MCNC: 6.8 G/DL (ref 6.4–8.3)
RBC # BLD AUTO: 4.45 10E6/UL (ref 3.8–5.2)
SODIUM SERPL-SCNC: 138 MMOL/L (ref 135–145)
WBC # BLD AUTO: 3.6 10E3/UL (ref 4–11)

## 2023-11-22 PROCEDURE — 80053 COMPREHEN METABOLIC PANEL: CPT

## 2023-11-22 PROCEDURE — 36591 DRAW BLOOD OFF VENOUS DEVICE: CPT

## 2023-11-22 PROCEDURE — 82670 ASSAY OF TOTAL ESTRADIOL: CPT

## 2023-11-22 PROCEDURE — 83002 ASSAY OF GONADOTROPIN (LH): CPT

## 2023-11-22 PROCEDURE — 85025 COMPLETE CBC W/AUTO DIFF WBC: CPT

## 2023-11-22 PROCEDURE — 250N000011 HC RX IP 250 OP 636: Mod: JZ | Performed by: INTERNAL MEDICINE

## 2023-11-22 PROCEDURE — 83001 ASSAY OF GONADOTROPIN (FSH): CPT

## 2023-11-22 RX ORDER — HEPARIN SODIUM (PORCINE) LOCK FLUSH IV SOLN 100 UNIT/ML 100 UNIT/ML
5 SOLUTION INTRAVENOUS
Status: DISCONTINUED | OUTPATIENT
Start: 2023-11-22 | End: 2023-11-22 | Stop reason: HOSPADM

## 2023-11-22 RX ORDER — HEPARIN SODIUM (PORCINE) LOCK FLUSH IV SOLN 100 UNIT/ML 100 UNIT/ML
5 SOLUTION INTRAVENOUS
Status: CANCELLED | OUTPATIENT
Start: 2023-11-22

## 2023-11-22 RX ADMIN — Medication 5 ML: at 08:13

## 2023-12-01 ENCOUNTER — LAB (OUTPATIENT)
Dept: INFUSION THERAPY | Facility: HOSPITAL | Age: 48
End: 2023-12-01
Attending: INTERNAL MEDICINE
Payer: COMMERCIAL

## 2023-12-01 ENCOUNTER — ONCOLOGY VISIT (OUTPATIENT)
Dept: ONCOLOGY | Facility: HOSPITAL | Age: 48
End: 2023-12-01
Attending: INTERNAL MEDICINE
Payer: COMMERCIAL

## 2023-12-01 ENCOUNTER — HOSPITAL ENCOUNTER (OUTPATIENT)
Facility: AMBULATORY SURGERY CENTER | Age: 48
Discharge: HOME | End: 2023-12-01
Attending: SPECIALIST
Payer: COMMERCIAL

## 2023-12-01 VITALS
DIASTOLIC BLOOD PRESSURE: 69 MMHG | WEIGHT: 191.3 LBS | OXYGEN SATURATION: 98 % | TEMPERATURE: 98 F | HEIGHT: 66 IN | SYSTOLIC BLOOD PRESSURE: 128 MMHG | BODY MASS INDEX: 30.74 KG/M2 | HEART RATE: 107 BPM | RESPIRATION RATE: 18 BRPM

## 2023-12-01 VITALS
SYSTOLIC BLOOD PRESSURE: 120 MMHG | TEMPERATURE: 97.8 F | HEART RATE: 98 BPM | RESPIRATION RATE: 16 BRPM | DIASTOLIC BLOOD PRESSURE: 61 MMHG | BODY MASS INDEX: 30.7 KG/M2 | WEIGHT: 191 LBS | HEIGHT: 66 IN | OXYGEN SATURATION: 100 %

## 2023-12-01 DIAGNOSIS — Z17.0 MALIGNANT NEOPLASM OF OVERLAPPING SITES OF RIGHT BREAST IN FEMALE, ESTROGEN RECEPTOR POSITIVE (H): Primary | ICD-10-CM

## 2023-12-01 DIAGNOSIS — T45.1X5S ADVERSE EFFECT OF ANTINEOPLASTIC AND IMMUNOSUPPRESSIVE DRUGS, SEQUELA: ICD-10-CM

## 2023-12-01 DIAGNOSIS — C50.811 MALIGNANT NEOPLASM OF OVERLAPPING SITES OF RIGHT BREAST IN FEMALE, ESTROGEN RECEPTOR POSITIVE (H): Primary | ICD-10-CM

## 2023-12-01 DIAGNOSIS — Z17.0 MALIGNANT NEOPLASM INVOLVING BOTH NIPPLE AND AREOLA OF RIGHT BREAST IN FEMALE, ESTROGEN RECEPTOR POSITIVE (H): Primary | ICD-10-CM

## 2023-12-01 DIAGNOSIS — C50.011 MALIGNANT NEOPLASM INVOLVING BOTH NIPPLE AND AREOLA OF RIGHT BREAST IN FEMALE, ESTROGEN RECEPTOR POSITIVE (H): Primary | ICD-10-CM

## 2023-12-01 LAB
ALBUMIN SERPL BCG-MCNC: 4.3 G/DL (ref 3.5–5.2)
ALP SERPL-CCNC: 93 U/L (ref 40–150)
ALT SERPL W P-5'-P-CCNC: 23 U/L (ref 0–50)
ANION GAP SERPL CALCULATED.3IONS-SCNC: 8 MMOL/L (ref 7–15)
AST SERPL W P-5'-P-CCNC: 17 U/L (ref 0–45)
BASOPHILS # BLD AUTO: 0 10E3/UL (ref 0–0.2)
BASOPHILS NFR BLD AUTO: 1 %
BILIRUB SERPL-MCNC: 0.4 MG/DL
BUN SERPL-MCNC: 11.1 MG/DL (ref 6–20)
CALCIUM SERPL-MCNC: 10 MG/DL (ref 8.6–10)
CHLORIDE SERPL-SCNC: 104 MMOL/L (ref 98–107)
CREAT SERPL-MCNC: 0.82 MG/DL (ref 0.51–0.95)
DEPRECATED HCO3 PLAS-SCNC: 28 MMOL/L (ref 22–29)
EGFRCR SERPLBLD CKD-EPI 2021: 87 ML/MIN/1.73M2
EOSINOPHIL # BLD AUTO: 0.1 10E3/UL (ref 0–0.7)
EOSINOPHIL NFR BLD AUTO: 2 %
ERYTHROCYTE [DISTWIDTH] IN BLOOD BY AUTOMATED COUNT: 14 % (ref 10–15)
GLUCOSE SERPL-MCNC: 96 MG/DL (ref 70–99)
HCT VFR BLD AUTO: 35.2 % (ref 35–47)
HGB BLD-MCNC: 11.8 G/DL (ref 11.7–15.7)
IMM GRANULOCYTES # BLD: 0 10E3/UL
IMM GRANULOCYTES NFR BLD: 1 %
LYMPHOCYTES # BLD AUTO: 0.7 10E3/UL (ref 0.8–5.3)
LYMPHOCYTES NFR BLD AUTO: 22 %
MCH RBC QN AUTO: 28.7 PG (ref 26.5–33)
MCHC RBC AUTO-ENTMCNC: 33.5 G/DL (ref 31.5–36.5)
MCV RBC AUTO: 86 FL (ref 78–100)
MONOCYTES # BLD AUTO: 0.2 10E3/UL (ref 0–1.3)
MONOCYTES NFR BLD AUTO: 5 %
NEUTROPHILS # BLD AUTO: 2.2 10E3/UL (ref 1.6–8.3)
NEUTROPHILS NFR BLD AUTO: 69 %
NRBC # BLD AUTO: 0 10E3/UL
NRBC BLD AUTO-RTO: 0 /100
PLATELET # BLD AUTO: 223 10E3/UL (ref 150–450)
POTASSIUM SERPL-SCNC: 4.2 MMOL/L (ref 3.4–5.3)
PROT SERPL-MCNC: 6.9 G/DL (ref 6.4–8.3)
RBC # BLD AUTO: 4.11 10E6/UL (ref 3.8–5.2)
SODIUM SERPL-SCNC: 140 MMOL/L (ref 135–145)
WBC # BLD AUTO: 3.1 10E3/UL (ref 4–11)

## 2023-12-01 PROCEDURE — 99211 OFF/OP EST MAY X REQ PHY/QHP: CPT | Performed by: INTERNAL MEDICINE

## 2023-12-01 PROCEDURE — 36591 DRAW BLOOD OFF VENOUS DEVICE: CPT | Performed by: INTERNAL MEDICINE

## 2023-12-01 PROCEDURE — 85025 COMPLETE CBC W/AUTO DIFF WBC: CPT | Performed by: INTERNAL MEDICINE

## 2023-12-01 PROCEDURE — 80053 COMPREHEN METABOLIC PANEL: CPT | Performed by: INTERNAL MEDICINE

## 2023-12-01 PROCEDURE — 36590 REMOVAL TUNNELED CV CATH: CPT | Performed by: SPECIALIST

## 2023-12-01 PROCEDURE — 99214 OFFICE O/P EST MOD 30 MIN: CPT | Performed by: INTERNAL MEDICINE

## 2023-12-01 RX ORDER — HEPARIN SODIUM (PORCINE) LOCK FLUSH IV SOLN 100 UNIT/ML 100 UNIT/ML
5 SOLUTION INTRAVENOUS
Status: DISCONTINUED | OUTPATIENT
Start: 2023-12-01 | End: 2023-12-01 | Stop reason: HOSPADM

## 2023-12-01 RX ORDER — DIAZEPAM 5 MG
5 TABLET ORAL EVERY 6 HOURS PRN
Status: DISCONTINUED | OUTPATIENT
Start: 2023-12-01 | End: 2023-12-02 | Stop reason: HOSPADM

## 2023-12-01 RX ORDER — LIDOCAINE HYDROCHLORIDE 10 MG/ML
INJECTION, SOLUTION EPIDURAL; INFILTRATION; INTRACAUDAL; PERINEURAL PRN
Status: DISCONTINUED | OUTPATIENT
Start: 2023-12-01 | End: 2023-12-01 | Stop reason: HOSPADM

## 2023-12-01 RX ORDER — IBUPROFEN 600 MG/1
600 TABLET, FILM COATED ORAL
Status: DISCONTINUED | OUTPATIENT
Start: 2023-12-01 | End: 2023-12-02 | Stop reason: HOSPADM

## 2023-12-01 RX ORDER — HEPARIN SODIUM (PORCINE) LOCK FLUSH IV SOLN 100 UNIT/ML 100 UNIT/ML
5 SOLUTION INTRAVENOUS
OUTPATIENT
Start: 2023-12-01

## 2023-12-01 RX ADMIN — DIAZEPAM 5 MG: 5 TABLET ORAL at 13:45

## 2023-12-01 ASSESSMENT — PAIN SCALES - GENERAL: PAINLEVEL: NO PAIN (0)

## 2023-12-01 NOTE — DISCHARGE INSTRUCTIONS
If you have any questions or concerns regarding your procedure, please contact Dr. Brunner, her office number is [phone].

## 2023-12-01 NOTE — PROGRESS NOTES
"Oncology Rooming Note    December 1, 2023 8:52 AM   Alicia Iniguez is a 48 year old female who presents for:    Chief Complaint   Patient presents with    Oncology Clinic Visit     Return visit with labs related to Malignant neoplasm involving both nipple and areola of right breast in female, estrogen receptor positive     Initial Vitals: /69   Pulse 107   Temp 98  F (36.7  C)   Resp 18   Ht 1.676 m (5' 6\")   Wt 86.8 kg (191 lb 4.8 oz)   LMP 03/01/2023 (Approximate)   SpO2 98%   BMI 30.88 kg/m   Estimated body mass index is 30.88 kg/m  as calculated from the following:    Height as of this encounter: 1.676 m (5' 6\").    Weight as of this encounter: 86.8 kg (191 lb 4.8 oz). Body surface area is 2.01 meters squared.  No Pain (0) Comment: Data Unavailable   Patient's last menstrual period was 03/01/2023 (approximate).  Allergies reviewed: No  Medications reviewed: No    Medications: Medication refills not needed today.  Pharmacy name entered into [employer]:    [id number] - [place], [place] - [address] AT SEC OF [facility] - [place], [place] - [address]    Clinical concerns: Medications and allergies not reviewed due to pt being late for apt and provider needing to go right in with patient    Return visit with labs related to Malignant neoplasm involving both nipple and areola of right breast in female, estrogen receptor positive      DIANA QUINN CMA              "

## 2023-12-01 NOTE — PROGRESS NOTES
[facility] Hematology and Oncology Progress Note    Patient: Alicia Iniguez  MRN: [id number]  Date of Service: Dec 1, 2023          Reason for Visit    Chief Complaint   Patient presents with    Oncology Clinic Visit     Return visit with labs related to Malignant neoplasm involving both nipple and areola of right breast in female, estrogen receptor positive       Assessment and Plan     Cancer Staging   Malignant neoplasm of overlapping sites of right breast in female, estrogen receptor positive (H)  Staging form: Breast, AJCC 8th Edition  - Clinical: Stage IIA (cT3, cN2, cM0, G2, ER+, MO+, HER2-) - Signed by Shanelle Weaver APRN CNP on 3/1/2023  - Pathologic stage from 8/21/2023: No Stage Recommended (ypT3, pN1a(sn), cM0, G2, ER+, MO+, HER2-) - Signed by Ruth Orellana MD on 8/21/2023    1.  Locally Advanced Breast cancer, right side, stage IIa, T3 N2 M0, grade 2, ER/MO positive, HER2/janine negative: Status post neoadjuvant chemotherapy with dose dense Adriamycin and Cytoxan for 4 cycles, followed by weekly Taxol. Underwent bilateral mastectomy on 8/9/2023.  I reviewed the surgical path report in detail today.  She did have significant residual disease as expected with minimum treatment effect.  Residual disease measured 5.6 x 3.2 x 2.7 cm in greatest dimensions.  Grade 2.  There was evidence of dermal infiltration with focal invasion of epidermis.  There was focal lymphovascular invasion within the dermal connective tissue.  No evidence of DCIS.  Margins were all negative.  Out of 20 lymph nodes examined 2 were positive for macrometastasis (sentinel lymph nodes).  No evidence of extranodal extension.  ypT3, ypN1a.     Has finished adjuvant radiation.  Currently on letrozole and Verzenio.    I initially started with Zoladex and letrozole.  She has not had any menstrual cycle since chemotherapy.  I checked her hormone levels last week.  Her LH was less than 0.3, FSH was 11.5 and estradiol  was less than 5.  Although her LH and FSH are not in the postmenopausal range, her estradiol is pretty low and is in the postmenopausal range.  I think her LH and FSH are on the lower side due to recent Zoladex injections.  Will plan on rechecking this in the future.  Currently she is not having any menstrual cycles.  Does have hot flashes.  So for now she will continue with letrozole only.  She started Verzenio on 11/9/2023.  Has done reasonably well on it.  No significant side effects except for some diarrhea.  This has resolved.    I will see her back in 4 weeks. Continue verzenio and letrozole.    ECOG Performance    0 - Independent    Distress Screening (within last 30 days)    1. How concerned are you about your ability to eat? : 0  2. How concerned are you about unintended weight loss or your current weight? : 0  3. How concerned are you about feeling depressed or very sad? : 0  4. How concerned are you about feeling anxious or very scared? : 5  5. Do you struggle with the loss of meaning and tiago in your life? : Not at all  6. How concerned are you about work and home life issues that may be affected by your cancer? : 0  7. How concerned are you about knowing what resources are available to help you? : 0  8. Do you currently have what you would describe as Voodoo or spiritual struggles?            : Not at all       Pain  Pain Score: No Pain (0)    Problem List    Patient Active Problem List   Diagnosis    Malignant neoplasm of overlapping sites of right breast in female, estrogen receptor positive (H)    Malignant neoplasm involving both nipple and areola of right breast in female, estrogen receptor positive (H)    Adverse effect of antineoplastic and immunosuppressive drugs, sequela    Chest pain, unspecified    Hyperlipidemia    Personal history of breast cancer        ______________________________________________________________________________    History of Present Illness    Oncology  history:  DIAGNOSIS: Breast cancer, right side, patient had a palpable lump with some nipple and skin changes.  She was also found to have lymphadenopathy in the axilla.  Patient had an ultrasound and mammogram that showed a 4.1 cm tumor with axillary lymphadenopathy  Biopsy was done January 31 and it showed invasive ductal carcinoma.  Grade 2.  ER [place] positive and HER2/janine negative.  Biopsy was done of the lymph node as well and it was positive.    TREATMENT: Patient has started neoadjuvant chemotherapy with dose dense Adriamycin and Cytoxan for 4 cycles and then 12 weekly Taxol.     Bilateral mastectomy on 8/9/2023    A) RIGHT BREAST, MASTECTOMY:  -INVASIVE DUCTAL CARCINOMA (5.6 x 3.2 x 2.7 IN GREATEST DIMENSIONS)     -BREAST CANCER PROGNOSTIC MARKER ANALYSIS     ESTROGEN RECEPTOR: POSITIVE (95% OF CELLS, STRONG STAINING     PROGESTERONE RECEPTOR: POSITIVE (95% OF CELLS, STRONG STAINING     HER2: NEGATIVE (1+ OUT OF 3 MEMBRANE STAINING)     Dr. Paty Berry and Dr. Richard Townsend have also reviewed this case and concur with the breast cancer prognostic marker analysis.     The following data are used for Residual Cancer [place] calculation:      Primary tumor bed area: 56 mm x 32 mm.  Overall cancer cellularity (as percentage of area): 95%.  Percentage of cancer that is in situ disease: 2%.  Number of positive lymph nodes: 2.  Diameter of largest metastasis: 3 mm.    Residual cancer burden score of 3    Adjuvant endocrine therapy with Zoladex and letrozole initially.    Zoladex stopped in November and continued on letrozole only.  Verzenio started on 11/9/2023.    INTERIM HISTORY:   48-year-old female with right-sided locally advanced breast cancer status post neoadjuvant chemotherapy with AC followed by T who recently underwent bilateral mastectomy she had significant residual disease.  ypT3, ypN1a.     Status post adjuvant radiation.  We also started her on Zoladex and letrozole.  But she has not had any menstrual  "cycle since finishing chemotherapy so decided to stop Zoladex and continue to watch her off of it.  She had hormonal studies done a couple of weeks ago.  She has been taking Verzenio for the last month.  Has done reasonably well.  No significant side effects.  Here with repeat labs.    Review of Systems    Pertinent items are noted in HPI.    Past History    Past Medical History:   Diagnosis Date    Breast cancer (H)     right       PHYSICAL EXAM  /69   Pulse 107   Temp 98  F (36.7  C)   Resp 18   Ht 1.676 m (5' 6\")   Wt 86.8 kg (191 lb 4.8 oz)   LMP 03/01/2023 (Approximate)   SpO2 98%   BMI 30.88 kg/m      GENERAL: no acute distress. Cooperative in conversation.  Lungs: Clear to auscultation bilaterally  CNS: Nonfocal  CVS: RRR    Lab Results    No results found for this or any previous visit (from the past 168 hour(s)).        Imaging    No results found.    A total of 30 min were spent today on this visit which included face to face conversation with the patient, EMR review, counseling and co-ordination of care and medical documentation.        Signed by: Paola Saleem MD  "

## 2023-12-01 NOTE — LETTER
12/1/2023         RE: Alicia Iniguez  [address] [facility] [address]        Dear Colleague,    Thank you for referring your patient, Alicia Iniguez, to the [facility] [place]. Please see a copy of my visit note below.        [facility] Hematology and Oncology Progress Note    Patient: Alicia Iniguez  MRN: [id number]  Date of Service: Dec 1, 2023          Reason for Visit    Chief Complaint   Patient presents with     Oncology Clinic Visit     Return visit with labs related to Malignant neoplasm involving both nipple and areola of right breast in female, estrogen receptor positive       Assessment and Plan     Cancer Staging   Malignant neoplasm of overlapping sites of right breast in female, estrogen receptor positive (H)  Staging form: Breast, AJCC 8th Edition  - Clinical: Stage IIA (cT3, cN2, cM0, G2, ER+, AK+, HER2-) - Signed by Shanelle Weaver APRN CNP on 3/1/2023  - Pathologic stage from 8/21/2023: No Stage Recommended (ypT3, pN1a(sn), cM0, G2, ER+, AK+, HER2-) - Signed by Ruht Orellana MD on 8/21/2023    1.  Locally Advanced Breast cancer, right side, stage IIa, T3 N2 M0, grade 2, ER/AK positive, HER2/janine negative: Status post neoadjuvant chemotherapy with dose dense Adriamycin and Cytoxan for 4 cycles, followed by weekly Taxol. Underwent bilateral mastectomy on 8/9/2023.  I reviewed the surgical path report in detail today.  She did have significant residual disease as expected with minimum treatment effect.  Residual disease measured 5.6 x 3.2 x 2.7 cm in greatest dimensions.  Grade 2.  There was evidence of dermal infiltration with focal invasion of epidermis.  There was focal lymphovascular invasion within the dermal connective tissue.  No evidence of DCIS.  Margins were all negative.  Out of 20 lymph nodes examined 2 were positive for macrometastasis (sentinel lymph nodes).  No evidence of extranodal extension.  ypT3, ypN1a.     Has finished  adjuvant radiation.  Currently on letrozole and Verzenio.    I initially started with Zoladex and letrozole.  She has not had any menstrual cycle since chemotherapy.  I checked her hormone levels last week.  Her LH was less than 0.3, FSH was 11.5 and estradiol was less than 5.  Although her LH and FSH are not in the postmenopausal range, her estradiol is pretty low and is in the postmenopausal range.  I think her LH and FSH are on the lower side due to recent Zoladex injections.  Will plan on rechecking this in the future.  Currently she is not having any menstrual cycles.  Does have hot flashes.  So for now she will continue with letrozole only.  She started Verzenio on 11/9/2023.  Has done reasonably well on it.  No significant side effects except for some diarrhea.  This has resolved.    I will see her back in 4 weeks. Continue verzenio and letrozole.    ECOG Performance    0 - Independent    Distress Screening (within last 30 days)    1. How concerned are you about your ability to eat? : 0  2. How concerned are you about unintended weight loss or your current weight? : 0  3. How concerned are you about feeling depressed or very sad? : 0  4. How concerned are you about feeling anxious or very scared? : 5  5. Do you struggle with the loss of meaning and tiago in your life? : Not at all  6. How concerned are you about work and home life issues that may be affected by your cancer? : 0  7. How concerned are you about knowing what resources are available to help you? : 0  8. Do you currently have what you would describe as Uatsdin or spiritual struggles?            : Not at all       Pain  Pain Score: No Pain (0)    Problem List    Patient Active Problem List   Diagnosis     Malignant neoplasm of overlapping sites of right breast in female, estrogen receptor positive (H)     Malignant neoplasm involving both nipple and areola of right breast in female, estrogen receptor positive (H)     Adverse effect of  antineoplastic and immunosuppressive drugs, sequela     Chest pain, unspecified     Hyperlipidemia     Personal history of breast cancer        ______________________________________________________________________________    History of Present Illness    Oncology history:  DIAGNOSIS: Breast cancer, right side, patient had a palpable lump with some nipple and skin changes.  She was also found to have lymphadenopathy in the axilla.  Patient had an ultrasound and mammogram that showed a 4.1 cm tumor with axillary lymphadenopathy  Biopsy was done January 31 and it showed invasive ductal carcinoma.  Grade 2.  ER NC positive and HER2/janine negative.  Biopsy was done of the lymph node as well and it was positive.    TREATMENT: Patient has started neoadjuvant chemotherapy with dose dense Adriamycin and Cytoxan for 4 cycles and then 12 weekly Taxol.     Bilateral mastectomy on 8/9/2023    A) RIGHT BREAST, MASTECTOMY:  -INVASIVE DUCTAL CARCINOMA (5.6 x 3.2 x 2.7 IN GREATEST DIMENSIONS)     -BREAST CANCER PROGNOSTIC MARKER ANALYSIS     ESTROGEN RECEPTOR: POSITIVE (95% OF CELLS, STRONG STAINING     PROGESTERONE RECEPTOR: POSITIVE (95% OF CELLS, STRONG STAINING     HER2: NEGATIVE (1+ OUT OF 3 MEMBRANE STAINING)     Dr. Paty Berry and Dr. Richard Townsend have also reviewed this case and concur with the breast cancer prognostic marker analysis.     The following data are used for Residual Cancer [place] calculation:      Primary tumor bed area: 56 mm x 32 mm.  Overall cancer cellularity (as percentage of area): 95%.  Percentage of cancer that is in situ disease: 2%.  Number of positive lymph nodes: 2.  Diameter of largest metastasis: 3 mm.    Residual cancer burden score of 3    Adjuvant endocrine therapy with Zoladex and letrozole initially.    Zoladex stopped in November and continued on letrozole only.  Verzenio started on 11/9/2023.    INTERIM HISTORY:   48-year-old female with right-sided locally advanced breast cancer status  "post neoadjuvant chemotherapy with AC followed by T who recently underwent bilateral mastectomy she had significant residual disease.  ypT3, ypN1a.     Status post adjuvant radiation.  We also started her on Zoladex and letrozole.  But she has not had any menstrual cycle since finishing chemotherapy so decided to stop Zoladex and continue to watch her off of it.  She had hormonal studies done a couple of weeks ago.  She has been taking Verzenio for the last month.  Has done reasonably well.  No significant side effects.  Here with repeat labs.    Review of Systems    Pertinent items are noted in HPI.    Past History    Past Medical History:   Diagnosis Date     Breast cancer (H)     right       PHYSICAL EXAM  /69   Pulse 107   Temp 98  F (36.7  C)   Resp 18   Ht 1.676 m (5' 6\")   Wt 86.8 kg (191 lb 4.8 oz)   LMP 03/01/2023 (Approximate)   SpO2 98%   BMI 30.88 kg/m      GENERAL: no acute distress. Cooperative in conversation.  Lungs: Clear to auscultation bilaterally  CNS: Nonfocal  CVS: RRR    Lab Results    No results found for this or any previous visit (from the past 168 hour(s)).        Imaging    No results found.    A total of 30 min were spent today on this visit which included face to face conversation with the patient, EMR review, counseling and co-ordination of care and medical documentation.        Signed by: Paola Saleem MD    Oncology Rooming Note    December 1, 2023 8:52 AM   Alicia Iniguez is a 48 year old female who presents for:    Chief Complaint   Patient presents with     Oncology Clinic Visit     Return visit with labs related to Malignant neoplasm involving both nipple and areola of right breast in female, estrogen receptor positive     Initial Vitals: /69   Pulse 107   Temp 98  F (36.7  C)   Resp 18   Ht 1.676 m (5' 6\")   Wt 86.8 kg (191 lb 4.8 oz)   LMP 03/01/2023 (Approximate)   SpO2 98%   BMI 30.88 kg/m   Estimated body mass index is 30.88 kg/m  as " "calculated from the following:    Height as of this encounter: 1.676 m (5' 6\").    Weight as of this encounter: 86.8 kg (191 lb 4.8 oz). Body surface area is 2.01 meters squared.  No Pain (0) Comment: Data Unavailable   Patient's last menstrual period was 03/01/2023 (approximate).  Allergies reviewed: No  Medications reviewed: No    Medications: Medication refills not needed today.  Pharmacy name entered into [facility]:    [id number] - [facility] [address] AT [facility] - [place], [place] - [address]    Clinical concerns: Medications and allergies not reviewed due to pt being late for apt and provider needing to go right in with patient    Return visit with labs related to Malignant neoplasm involving both nipple and areola of right breast in female, estrogen receptor positive      DIANA QUINN CMA                Again, thank you for allowing me to participate in the care of your patient.        Sincerely,        Paola Saleem MD  "

## 2023-12-01 NOTE — OP NOTE
Operative Note    Name:  Alicia Hilller  PCP:  Suha Post  Procedure Date:  12/1/2023       Procedure:  Procedure(s):  Port Removal     Pre-Procedure Diagnosis:  Personal history of breast cancer [Z85.3]     Post-Procedure Diagnosis:    Same     Surgeon(s):  Maribell Brunner MD             Anesthesia Type:  Local      Findings:      Operative Report:    The patient was brought to operating suite where she was placed in supine position.  She is prepped draped in a sterile fashion.  After infiltration with 1% lidocaine I re-incised the incision site where the port was placed.  This is carried down to the port with electrocautery and the port as well as the capsule that had formed around the port was removed easily and completely with electrocautery.  The wound was inspected for hemostasis.  The wound was closed with 3-0 Vicryl to the subcutaneous tissues and subcuticular stitch of 4 Monocryl to the skin.  Sterile dressings are placed.  The patient tolerated the procedure well.    Estimated Blood Loss:   None        Specimens:    None       Drains:   None    Complications:    None    Maribell Brunner MD     Date: 12/1/2023  Time: 2:16 PM

## 2023-12-04 ENCOUNTER — TELEPHONE (OUTPATIENT)
Dept: ONCOLOGY | Facility: HOSPITAL | Age: 48
End: 2023-12-04
Payer: COMMERCIAL

## 2023-12-04 DIAGNOSIS — C50.811 MALIGNANT NEOPLASM OF OVERLAPPING SITES OF RIGHT BREAST IN FEMALE, ESTROGEN RECEPTOR POSITIVE (H): Primary | ICD-10-CM

## 2023-12-04 DIAGNOSIS — Z17.0 MALIGNANT NEOPLASM OF OVERLAPPING SITES OF RIGHT BREAST IN FEMALE, ESTROGEN RECEPTOR POSITIVE (H): Primary | ICD-10-CM

## 2023-12-04 RX ORDER — LETROZOLE 2.5 MG/1
2.5 TABLET, FILM COATED ORAL DAILY
Qty: 28 TABLET | Refills: 0 | Status: SHIPPED | OUTPATIENT
Start: 2023-12-04 | End: 2023-12-28

## 2023-12-04 NOTE — ORAL ONC MGMT
Oral Chemotherapy Monitoring Program    Subjective/Objective:  Alicia Iniguez is a 48 year old female contacted by phone for a follow-up visit for oral chemotherapy.  Alicia is tolerating the abemaciclib well, she takes 1 tablet twice daily with a banana in the AM and after dinner. She reports when she first started she had a few episodes of diarrhea and took a couple doses of loperamide and it resolved. She has not had any more episodes of diarrhea. She reports no other side effects. She needs a refill.         8/31/2023     1:00 PM 9/1/2023    10:00 AM 11/1/2023     9:00 AM 11/16/2023     1:00 PM 12/4/2023     3:00 PM   ORAL CHEMOTHERAPY   Assessment Type Initial Work up;New Teach;Left Voicemail Initial Work up;New Teach Refill Left Voicemail Refill;Initial Follow up   Diagnosis Code Breast Cancer Breast Cancer Breast Cancer Breast Cancer Breast Cancer   Providers Dr. Stiven Saleem   Clinic Name/Location [facility]   Drug Name Verzenio (abemaciclib) Verzenio (abemaciclib) Verzenio (abemaciclib) Verzenio (abemaciclib) Verzenio (abemaciclib)   Dose 150 mg 150 mg 150 mg 150 mg 150 mg   Current Schedule BID BID BID BID BID   Cycle Details Continuous Continuous Continuous Continuous Continuous   Start Date of Last Cycle     11/9/2023   Doses missed in last 2 weeks     0   Adherence Assessment     Adherent   Adverse Effects     Diarrhea   Diarrhea     Grade 1   Pharmacist Intervention(diarrhea)     No   Any new drug interactions? No No   No   Is the dose as ordered appropriate for the patient? Yes Yes   Yes       Last PHQ-2 Score on record:       7/26/2023     6:49 AM   PHQ-2 ( 1999 [employer])   Q1: Little interest or pleasure in doing things 0   Q2: Feeling down, depressed or hopeless 0   PHQ-2 Score 0   Q1: Little interest or pleasure in doing things Not at all   Q2: Feeling down, depressed or hopeless Not at all   PHQ-2 Score  "0       Vitals:  BP:   BP Readings from Last 1 Encounters:   12/01/23 120/61     Wt Readings from Last 1 Encounters:   12/01/23 86.6 kg (191 lb)     Estimated body surface area is 2.01 meters squared as calculated from the following:    Height as of 12/1/23: 1.676 m (5' 6\").    Weight as of 12/1/23: 86.6 kg (191 lb).    Labs:  _  Result Component Current Result Ref Range   Sodium 140 (12/1/2023) 135 - 145 mmol/L     _  Result Component Current Result Ref Range   Potassium 4.2 (12/1/2023) 3.4 - 5.3 mmol/L     _  Result Component Current Result Ref Range   Calcium 10.0 (12/1/2023) 8.6 - 10.0 mg/dL     No results found for Mag within last 30 days.     No results found for Phos within last 30 days.     _  Result Component Current Result Ref Range   Albumin 4.3 (12/1/2023) 3.5 - 5.2 g/dL     _  Result Component Current Result Ref Range   Urea Nitrogen 11.1 (12/1/2023) 6.0 - 20.0 mg/dL     _  Result Component Current Result Ref Range   Creatinine 0.82 (12/1/2023) 0.51 - 0.95 mg/dL     _  Result Component Current Result Ref Range   AST 17 (12/1/2023) 0 - 45 U/L     _  Result Component Current Result Ref Range   ALT 23 (12/1/2023) 0 - 50 U/L     _  Result Component Current Result Ref Range   Bilirubin Total 0.4 (12/1/2023) <=1.2 mg/dL     _  Result Component Current Result Ref Range   WBC Count 3.1 (L) (12/1/2023) 4.0 - 11.0 10e3/uL     _  Result Component Current Result Ref Range   Hemoglobin 11.8 (12/1/2023) 11.7 - 15.7 g/dL     _  Result Component Current Result Ref Range   Platelet Count 223 (12/1/2023) 150 - 450 10e3/uL     No results found for ANC within last 30 days.     _  Result Component Current Result Ref Range   Absolute Neutrophils 2.2 (12/1/2023) 1.6 - 8.3 10e3/uL          Assessment/Plan:  Alicia is tolerating the abemaciclib, had a few episodes of diarrhea early on but it has resolved. She reports no other side effects. She does state she only has two days of medication left, I let her know I would send out " a refill to the pharmacy today and tell them to rush it. She has no other questions or concerns.     Follow-Up:  We will call Alicia in about 1 month.    Refill Due:  Today 12/4    Darleen Tijerina, PharmD  Oral Chemotherapy Pharmacist  [phone]

## 2023-12-28 DIAGNOSIS — C50.811 MALIGNANT NEOPLASM OF OVERLAPPING SITES OF RIGHT BREAST IN FEMALE, ESTROGEN RECEPTOR POSITIVE (H): Primary | ICD-10-CM

## 2023-12-28 DIAGNOSIS — Z17.0 MALIGNANT NEOPLASM OF OVERLAPPING SITES OF RIGHT BREAST IN FEMALE, ESTROGEN RECEPTOR POSITIVE (H): Primary | ICD-10-CM

## 2023-12-28 RX ORDER — LETROZOLE 2.5 MG/1
2.5 TABLET, FILM COATED ORAL DAILY
Qty: 28 TABLET | Refills: 0 | Status: SHIPPED | OUTPATIENT
Start: 2023-12-28 | End: 2024-01-22

## 2024-01-02 ENCOUNTER — TELEPHONE (OUTPATIENT)
Dept: ONCOLOGY | Facility: HOSPITAL | Age: 49
End: 2024-01-02
Payer: COMMERCIAL

## 2024-01-02 NOTE — ORAL ONC MGMT
Oral Chemotherapy Monitoring Program   Left Voicemail    Attempted to contact patient today for follow up regarding oral chemotherapy, abemaciclib, for routine assessment. No answer. Left voicemail for patient to call us back at [phone] when able. No medication name was left.    Ghulam Sorto, PharmD, [facility]  Clinical Oncology Pharmacist  January 2, 2024

## 2024-01-02 NOTE — ORAL ONC MGMT
Oral Chemotherapy Monitoring Program    Subjective/Objective:  Alicia Iniguez is a 48 year old female contacted by phone for a follow-up visit for oral chemotherapy.  Alicia says she is doing well and denies any side effects. Notes some infrequent loose stools, but manageable and much better than the first couple weeks on abemaciclib.         9/1/2023    10:00 AM 11/1/2023     9:00 AM 11/16/2023     1:00 PM 12/4/2023     3:00 PM 12/28/2023     1:00 PM 1/2/2024    12:00 PM 1/2/2024     2:00 PM   ORAL CHEMOTHERAPY   Assessment Type Initial Work up;New Teach Refill Left Voicemail Refill;Initial Follow up Refill Left Voicemail Monthly Follow up   Diagnosis Code Breast Cancer Breast Cancer Breast Cancer Breast Cancer Breast Cancer Breast Cancer Breast Cancer   Providers Dr. Stiven Saleem   Clinic Name/Location [facility]   Drug Name Verzenio (abemaciclib) Verzenio (abemaciclib) Verzenio (abemaciclib) Verzenio (abemaciclib) Verzenio (abemaciclib) Verzenio (abemaciclib) Verzenio (abemaciclib)   Dose 150 mg 150 mg 150 mg 150 mg 150 mg 150 mg 150 mg   Current Schedule BID BID BID BID BID BID BID   Cycle Details Continuous Continuous Continuous Continuous Continuous Continuous Continuous   Start Date of Last Cycle    11/9/2023 11/9/2023   Doses missed in last 2 weeks    0   0   Adherence Assessment    Adherent   Adherent   Adverse Effects    Diarrhea   Diarrhea   Diarrhea    Grade 1   Grade 1   Pharmacist Intervention(diarrhea)    No   No   Any new drug interactions? No   No   No   Is the dose as ordered appropriate for the patient? Yes   Yes   Yes       Last PHQ-2 Score on record:       7/26/2023     6:49 AM   PHQ-2 ( 1999 [employer])   Q1: Little interest or pleasure in doing things 0   Q2: Feeling down, depressed or hopeless 0   PHQ-2 Score 0   Q1: Little interest or pleasure in doing  "things Not at all   Q2: Feeling down, depressed or hopeless Not at all   PHQ-2 Score 0       Vitals:  BP:   BP Readings from Last 1 Encounters:   12/01/23 120/61     Wt Readings from Last 1 Encounters:   12/01/23 86.6 kg (191 lb)     Estimated body surface area is 2.01 meters squared as calculated from the following:    Height as of 12/1/23: 1.676 m (5' 6\").    Weight as of 12/1/23: 86.6 kg (191 lb).    Labs:  No results found for NA within last 30 days.     No results found for K within last 30 days.     No results found for CA within last 30 days.     No results found for Mag within last 30 days.     No results found for Phos within last 30 days.     No results found for ALBUMIN within last 30 days.     No results found for BUN within last 30 days.     No results found for CR within last 30 days.     No results found for AST within last 30 days.     No results found for ALT within last 30 days.     No results found for BILITOTAL within last 30 days.     No results found for WBC within last 30 days.     No results found for HGB within last 30 days.     No results found for PLT within last 30 days.     No results found for ANC within last 30 days.     No results found for ANC within last 30 days.          Assessment/Plan:  Alicia is tolerating the abemaciclib therapy well.  Continue as prescribed.    Follow-Up:  Will plan to review appt with Dr. Saelem on 1/10 and then next assessment call in about a month. Alicia agrees to the plan and knows she can call us in the meantime if she has any questions or concerns.    Refill Due:  ~1/24    Ghulam Sorto, PharmD, BCOP  Clinical Oncology Pharmacist  January 2, 2024      "

## 2024-01-08 DIAGNOSIS — M54.42 ACUTE LEFT-SIDED LOW BACK PAIN WITH LEFT-SIDED SCIATICA: ICD-10-CM

## 2024-01-08 NOTE — TELEPHONE ENCOUNTER
Cyclobenzaprine 5 mg tablet   Directions: Take 1-2 tablets (5-10 mg) by mouth 3 times daily as needed for muscle spasms or other (back pain)  sent from [facility] via fax    Last Written Prescription Date:  4/14/23, filled by Ani Salmeron  Last Fill Quantity: 20,  # refills: 0   Last office visit provider:  12/1/23 with Dr. Saleem  Next visit scheduled on 1/10/24 with Dr. Stiven Manrique RN 01/08/24 1:43 PM

## 2024-01-10 ENCOUNTER — OFFICE VISIT (OUTPATIENT)
Dept: RADIATION ONCOLOGY | Facility: HOSPITAL | Age: 49
End: 2024-01-10
Attending: RADIOLOGY
Payer: COMMERCIAL

## 2024-01-10 ENCOUNTER — ONCOLOGY VISIT (OUTPATIENT)
Dept: ONCOLOGY | Facility: HOSPITAL | Age: 49
End: 2024-01-10
Attending: INTERNAL MEDICINE
Payer: COMMERCIAL

## 2024-01-10 ENCOUNTER — LAB (OUTPATIENT)
Dept: INFUSION THERAPY | Facility: HOSPITAL | Age: 49
End: 2024-01-10
Attending: INTERNAL MEDICINE
Payer: COMMERCIAL

## 2024-01-10 VITALS
TEMPERATURE: 98.5 F | HEART RATE: 107 BPM | RESPIRATION RATE: 16 BRPM | SYSTOLIC BLOOD PRESSURE: 117 MMHG | WEIGHT: 192 LBS | OXYGEN SATURATION: 100 % | DIASTOLIC BLOOD PRESSURE: 77 MMHG | BODY MASS INDEX: 30.99 KG/M2

## 2024-01-10 VITALS
HEART RATE: 110 BPM | SYSTOLIC BLOOD PRESSURE: 119 MMHG | OXYGEN SATURATION: 98 % | BODY MASS INDEX: 31.02 KG/M2 | RESPIRATION RATE: 20 BRPM | WEIGHT: 192.2 LBS | TEMPERATURE: 98.1 F | DIASTOLIC BLOOD PRESSURE: 75 MMHG

## 2024-01-10 DIAGNOSIS — M62.830 BACK MUSCLE SPASM: Primary | ICD-10-CM

## 2024-01-10 DIAGNOSIS — Z17.0 MALIGNANT NEOPLASM OF OVERLAPPING SITES OF RIGHT BREAST IN FEMALE, ESTROGEN RECEPTOR POSITIVE (H): ICD-10-CM

## 2024-01-10 DIAGNOSIS — C50.811 MALIGNANT NEOPLASM OF OVERLAPPING SITES OF RIGHT BREAST IN FEMALE, ESTROGEN RECEPTOR POSITIVE (H): ICD-10-CM

## 2024-01-10 DIAGNOSIS — Z78.0 POSTMENOPAUSAL: ICD-10-CM

## 2024-01-10 DIAGNOSIS — T45.1X5S ADVERSE EFFECT OF ANTINEOPLASTIC AND IMMUNOSUPPRESSIVE DRUGS, SEQUELA: ICD-10-CM

## 2024-01-10 LAB
ALBUMIN SERPL BCG-MCNC: 4.5 G/DL (ref 3.5–5.2)
ALP SERPL-CCNC: 96 U/L (ref 40–150)
ALT SERPL W P-5'-P-CCNC: 21 U/L (ref 0–50)
ANION GAP SERPL CALCULATED.3IONS-SCNC: 8 MMOL/L (ref 7–15)
AST SERPL W P-5'-P-CCNC: 17 U/L (ref 0–45)
BASOPHILS # BLD AUTO: 0.1 10E3/UL (ref 0–0.2)
BASOPHILS NFR BLD AUTO: 2 %
BILIRUB SERPL-MCNC: 0.3 MG/DL
BUN SERPL-MCNC: 15.5 MG/DL (ref 6–20)
CALCIUM SERPL-MCNC: 10.1 MG/DL (ref 8.6–10)
CHLORIDE SERPL-SCNC: 106 MMOL/L (ref 98–107)
CREAT SERPL-MCNC: 0.91 MG/DL (ref 0.51–0.95)
DEPRECATED HCO3 PLAS-SCNC: 27 MMOL/L (ref 22–29)
EGFRCR SERPLBLD CKD-EPI 2021: 77 ML/MIN/1.73M2
EOSINOPHIL # BLD AUTO: 0.1 10E3/UL (ref 0–0.7)
EOSINOPHIL NFR BLD AUTO: 2 %
ERYTHROCYTE [DISTWIDTH] IN BLOOD BY AUTOMATED COUNT: 17.1 % (ref 10–15)
ESTRADIOL SERPL-MCNC: <5 PG/ML
FSH SERPL IRP2-ACNC: 62.6 MIU/ML
GLUCOSE SERPL-MCNC: 83 MG/DL (ref 70–99)
HCT VFR BLD AUTO: 34.7 % (ref 35–47)
HGB BLD-MCNC: 11.6 G/DL (ref 11.7–15.7)
HOLD SPECIMEN: NORMAL
IMM GRANULOCYTES # BLD: 0 10E3/UL
IMM GRANULOCYTES NFR BLD: 0 %
LH SERPL-ACNC: 27.8 MIU/ML
LYMPHOCYTES # BLD AUTO: 0.7 10E3/UL (ref 0.8–5.3)
LYMPHOCYTES NFR BLD AUTO: 21 %
MCH RBC QN AUTO: 30.7 PG (ref 26.5–33)
MCHC RBC AUTO-ENTMCNC: 33.4 G/DL (ref 31.5–36.5)
MCV RBC AUTO: 92 FL (ref 78–100)
MONOCYTES # BLD AUTO: 0.2 10E3/UL (ref 0–1.3)
MONOCYTES NFR BLD AUTO: 7 %
NEUTROPHILS # BLD AUTO: 2.2 10E3/UL (ref 1.6–8.3)
NEUTROPHILS NFR BLD AUTO: 68 %
NRBC # BLD AUTO: 0 10E3/UL
NRBC BLD AUTO-RTO: 0 /100
PLATELET # BLD AUTO: 312 10E3/UL (ref 150–450)
POTASSIUM SERPL-SCNC: 4.6 MMOL/L (ref 3.4–5.3)
PROT SERPL-MCNC: 7.2 G/DL (ref 6.4–8.3)
RBC # BLD AUTO: 3.78 10E6/UL (ref 3.8–5.2)
SODIUM SERPL-SCNC: 141 MMOL/L (ref 135–145)
WBC # BLD AUTO: 3.3 10E3/UL (ref 4–11)

## 2024-01-10 PROCEDURE — 99214 OFFICE O/P EST MOD 30 MIN: CPT | Mod: 27 | Performed by: STUDENT IN AN ORGANIZED HEALTH CARE EDUCATION/TRAINING PROGRAM

## 2024-01-10 PROCEDURE — 82670 ASSAY OF TOTAL ESTRADIOL: CPT

## 2024-01-10 PROCEDURE — 36591 DRAW BLOOD OFF VENOUS DEVICE: CPT

## 2024-01-10 PROCEDURE — 83001 ASSAY OF GONADOTROPIN (FSH): CPT

## 2024-01-10 PROCEDURE — 99214 OFFICE O/P EST MOD 30 MIN: CPT | Performed by: INTERNAL MEDICINE

## 2024-01-10 PROCEDURE — 85004 AUTOMATED DIFF WBC COUNT: CPT

## 2024-01-10 PROCEDURE — 83002 ASSAY OF GONADOTROPIN (LH): CPT

## 2024-01-10 PROCEDURE — 99213 OFFICE O/P EST LOW 20 MIN: CPT | Performed by: INTERNAL MEDICINE

## 2024-01-10 PROCEDURE — 99024 POSTOP FOLLOW-UP VISIT: CPT | Performed by: STUDENT IN AN ORGANIZED HEALTH CARE EDUCATION/TRAINING PROGRAM

## 2024-01-10 PROCEDURE — 80053 COMPREHEN METABOLIC PANEL: CPT

## 2024-01-10 RX ORDER — CYCLOBENZAPRINE HCL 5 MG
5 TABLET ORAL 3 TIMES DAILY PRN
Qty: 30 TABLET | Refills: 0 | Status: SHIPPED | OUTPATIENT
Start: 2024-01-10

## 2024-01-10 RX ORDER — CALCIUM CARBONATE 750 MG/1
750 TABLET, CHEWABLE ORAL DAILY
COMMUNITY

## 2024-01-10 ASSESSMENT — PAIN SCALES - GENERAL
PAINLEVEL: NO PAIN (0)
PAINLEVEL: NO PAIN (0)

## 2024-01-10 NOTE — PROGRESS NOTES
[facility] Radiation Oncology Follow Up     Patient: Alicia Iniguez  MRN: [id number]  Date of Service: 01/10/2024       DISEASE TREATED:  Stage IIA right breast cancer s/p neoadjuvant chemotherapy and bilateral mastectomy with residual disease, reconstruction with expander.         TYPE OF RADIATION THERAPY ADMINISTERED:    SITE TREATED: right CW/RNI  TOTAL DOSE: 6040cGy  NUMBER OF FRACTIONS: 33  DATES COMPLETED: 11/2/2023  CONCURRENT CHEMOTHERAPY: No  ADJUVANT THERAPY:Yes        INTERVAL SINCE COMPLETION OF RADIATION THERAPY: 2 months      SUBJECTIVE:  Ms. Iniguez is a 48 year old female who self palpated a mass in the right breast. Imaging showed a right breast and axilla mass. Biopsy showed a ER/TN+ HER-2- invasive ductal carcinoma. Staging CT showed the known mass with subcentimeter LN.       She went on to receive neoadjuvant chemotherapy consisting of dose dense AC following by 12 weeks of Taxol, the last of which was 7/14/2023.      She went on to have a mastectomy SLN,ALN and expander placement with Dr Brunner and Dr Snell 8/9/2023. Pathology showed residual 56mm of grade II invasive ductal carcinoma in right breast and 2/4 macrometastases in SLN and 0/15 ALN. There was invasion of the overlying epidermis, focal LVI in dermal connective tissue, margins negative.     11/2/2023 compleated adjuvant radiation therapy right CW/RNI  TOTAL DOSE: 6040cGy  NUMBER OF FRACTIONS: 33    Began adjuvant letrozole and Verzenio.     She tolerated radiation therapy well and returns to clinic today for routine follow-up visit.  She reports her skin is healed and is almost returned to normal.  She continues to use coconut oil for moisturizer.  She sees lymphedema therapy regularly and is noted to have some cording she continues to feel some chest tightness which she is working on.  She has follow-up with plastics 2/5/2023.    Medications were reviewed and are up to date on EPIC.    The following portions of the  patient's history were reviewed and updated as appropriate: allergies, current medications, past family history, past medical history, past social history, past surgical history and problem list.    Review of Systems:      General  Constitutional  Constitutional (WDL): Exceptions to WDL  Fatigue: Fatigue relieved by rest  Hot Flashes: Mild symptoms OR intervention not indicated  EENT  Eye Disorders  Eye Disorder (WDL): Assessment not pertinent to visit  Ear Disorders  Ear Disorder (WDL): Assessment not pertinent to visit  Respiratory  Respiratory  Respiratory (WDL): All respiratory elements are within defined limits  Cardiovascular  Cardiovascular  Cardiovascular (WDL): All cardiovascular elements are within defined limits  Gastrointestinal  Gastrointestinal  Gastrointestinal (WDL): All gastrointestinal elements are within defined limits  Musculoskeletal  Musculoskeletal and Connective Tissue Disorders  Musculoskeletal & Connective (WDL): All musculoskeletal & connective elements are within defined limits  Integumentary  Integumentary  Integumentary (WDL): All integumentary elements are within defined limits  Neurological  Neurosensory  Neurosensory (WDL): All neurosensory elements are within defined limits  Genitourinary/Reproductive  Genitourinary  Genitourinary (WDL): All genitourinary elements are within defined limits  Lymphatic  Lymph System Disorders  Lymph (WDL): All lymph elements are within defined limits  Pain  Pain Score: No Pain (0)  AUA Assessment                                                              Accompanied by  Accompanied By: self only    Objective:          PHYSICAL EXAMINATION:    /75 (BP Location: Left arm, Patient Position: Sitting)   Pulse 110   Temp 98.1  F (36.7  C)   Resp 20   Wt 87.2 kg (192 lb 3.2 oz)   SpO2 98%   BMI 31.02 kg/m      Gen: Alert, in NAD pleasant interactive, well nourished  Eyes: EOMI, sclera anicteric  HENT     Head: NC/AT   Pulm:  No wheezing,  stridor or respiratory distress  Chest: Chest wall with bilateral tissue expanders symmetry is stable post radiation.  She has faint hyperpigmentation with resolving radiation dermatitis.  Good ROM, no lymphedema or other acute or subacute sequelae of her radiation.   Musculoskeletal: Normal muscle bulk and tone  Skin: Normal tone and turgor, warm, dry, intact  Neurologic: A/Ox3, CN II-XII intact, face symmetric, speech fluent, no focal motor deficits, gait normal and unaided  Psychiatric: Appropriate mood and affect          Imaging: Imaging results 6 weeks:No results found.     Impression   48-year-old woman with stage ypT3 pN1a M0 ER/CA positive, HER2 negative right breast invasive ductal carcinoma status post neoadjuvant chemo (AC + T) followed by bilateral mastectomy with axillary lymph node dissection and immediate reconstruction with tissue expanders.  She completed adjuvant radiation therapy and is recovering from acute side effects of radiation.    Visit Dx:    (C50.811,  Z17.0) Malignant neoplasm of overlapping sites of right breast in female, estrogen receptor positive (H)       Cancer Staging   Malignant neoplasm of overlapping sites of right breast in female, estrogen receptor positive (H)  Staging form: Breast, AJCC 8th Edition  - Clinical: Stage IIA (cT3, cN2, cM0, G2, ER+, CA+, HER2-) - Signed by Shanelle Weaver APRN CNP on 3/1/2023  - Pathologic stage from 8/21/2023: No Stage Recommended (ypT3, pN1a(sn), cM0, G2, ER+, CA+, HER2-) - Signed by Ruth Orellana MD on 8/21/2023      Assessment & Plan:   1. Continue follow-up with medical oncology while on letrozole and Verzenio  2.  Follow-up with plastics as scheduled    3.  Reviewed potential long-term side effects to watch out for including good skin sun protection of the chest where radiated.      4.  Discussed scheduled follow-up with us or follow-up with return to clinic as needed patient prefers to contact us should any questions or  concerns develop in the future to schedule an appointment with MD Orellana.    Pain Management Plan: N/A  Total time of this visit, including time spent face-to-face with patient and or via video/audio, and also in preparing for today's visit for MDM and documentation. Medical decision-making included consideration and possible diagnoses, management options, complex record review, review of diagnostic tests and information, consideration and discussion of significant complications based on comorbidities, discussion with providers involved in the care of the patient.     30 Minutes spent.      Ruth Germain MD  Department of Radiation Oncology   [facility]  Tel: [phone]  Page: [phone]    [facility]  [address] [place] [address]     [address] [place] [address]    CC:  Patient Care Team:  Suha Post MD as PCP - General (OB/Gyn)  Maribell Brunner MD as Assigned Surgical Provider  Paola Saleem MD as MD (Medical Oncology)  Gisella Mauricio RN as Specialty Care Coordinator (Hematology & Oncology)  Paola Saleem MD as Assigned Cancer Care Provider  Mayco Browne PA-C as Assigned PCP  Ruth Orellana MD as MD (Radiation Oncology)

## 2024-01-10 NOTE — PROGRESS NOTES
"Oncology Rooming Note    January 10, 2024 8:50 AM   Alicia Iniguez is a 48 year old female who presents for:    Chief Complaint   Patient presents with    Oncology Clinic Visit     Return visit 5 weeks with lab. Malignant neoplasm of overlapping sites of right breast in female, estrogen receptor positive.     Initial Vitals: /77 (BP Location: Left arm, Patient Position: Sitting, Cuff Size: Adult Regular)   Pulse 107   Temp 98.5  F (36.9  C) (Oral)   Resp 16   Wt 87.1 kg (192 lb)   SpO2 100%   BMI 30.99 kg/m   Estimated body mass index is 30.99 kg/m  as calculated from the following:    Height as of 12/1/23: 1.676 m (5' 6\").    Weight as of this encounter: 87.1 kg (192 lb). Body surface area is 2.01 meters squared.  No Pain (0) Comment: Data Unavailable   No LMP recorded. (Menstrual status: Chemotherapy).  Allergies reviewed: Yes  Medications reviewed: Yes    Medications: Medication refills not needed today.  Pharmacy name entered into [employer]:    [facility] - [place], [place] - [address]  [id number] - [facility] [address] [facility] AT [facility] & [facility]    Frailty Screening:   Is the patient here for a new oncology consult visit in cancer care? 2. No      Clinical concerns: Return visit 5 weeks with lab. Malignant neoplasm of overlapping sites of right breast in female, estrogen receptor positive.      Mireille Gutierrez CMA ([facility])                "

## 2024-01-10 NOTE — LETTER
1/10/2024         RE: Alicia Iniguez  [address] [facility] [address]        Dear Colleague,    Thank you for referring your patient, Alicia Iniguez, to the [facility] [place]. Please see a copy of my visit note below.        [facility] Hematology and Oncology Progress Note    Patient: Alicia Iniguez  MRN: [id number]  Date of Service: Jeevan 10, 2024          Reason for Visit    Chief Complaint   Patient presents with     Oncology Clinic Visit     Return visit 5 weeks with lab. Malignant neoplasm of overlapping sites of right breast in female, estrogen receptor positive.       Assessment and Plan     Cancer Staging   Malignant neoplasm of overlapping sites of right breast in female, estrogen receptor positive (H)  Staging form: Breast, AJCC 8th Edition  - Clinical: Stage IIA (cT3, cN2, cM0, G2, ER+, GA+, HER2-) - Signed by Shanelle Weaver APRN CNP on 3/1/2023  - Pathologic stage from 8/21/2023: No Stage Recommended (ypT3, pN1a(sn), cM0, G2, ER+, GA+, HER2-) - Signed by Ruth Orellana MD on 8/21/2023    1.  Locally Advanced Breast cancer, right side, stage IIa, T3 N2 M0, grade 2, ER/GA positive, HER2/janine negative: Status post neoadjuvant chemotherapy with dose dense Adriamycin and Cytoxan for 4 cycles, followed by weekly Taxol. Underwent bilateral mastectomy on 8/9/2023.  Surgical path showed significant residual disease as expected with minimum treatment effect.  Residual disease measured 5.6 x 3.2 x 2.7 cm in greatest dimensions.  Grade 2.  There was evidence of dermal infiltration with focal invasion of epidermis.  There was focal lymphovascular invasion within the dermal connective tissue.  No evidence of DCIS.  Margins were all negative.  Out of 20 lymph nodes examined 2 were positive for macrometastasis (sentinel lymph nodes).  No evidence of extranodal extension.  ypT3, ypN1a.     Has finished adjuvant radiation.  Currently on letrozole and  Verzenio.    She is doing well on Verzenio and letrozole so far.  No major side effects.  Has not had any menstrual cycle since her chemotherapy.  No significant diarrhea.  Labs reviewed today.  CBC shows mild anemia with hemoglobin of 11.6.  Normal ANC.  Normal platelet count.  Chemistry shows mild hyperglycemia with a calcium of 10.1.  Normal LFTs.  Normal electrolytes.  Continue Verzenio at the current dose.  Continue letrozole.  I am waiting for the hormone labs today.  If she continues to be in the postmenopausal range then there is no need to add Zoladex.    Plan is to continue letrozole for total of 10 years and Verzenio for a total of 2 years.  Will plan to follow her every 3 to 4 months for the first year physical exam.  No need for imaging unless clinically indicated.  She is status post bilateral mastectomies with no need for mammograms.  Will need monthly labs for Verzenio.  Will schedule this in [facility].    She complains of back muscle spasm.  Will send a prescription for cyclobenzaprine.  5 mg.  3 times daily as needed.  She has had this before for similar issues.      ECOG Performance    0 - Independent    Distress Screening (within last 30 days)    1. How concerned are you about your ability to eat? : 0  2. How concerned are you about unintended weight loss or your current weight? : 0  3. How concerned are you about feeling depressed or very sad? : 0  4. How concerned are you about feeling anxious or very scared? : 5  5. Do you struggle with the loss of meaning and tiago in your life? : Not at all  6. How concerned are you about work and home life issues that may be affected by your cancer? : 0  7. How concerned are you about knowing what resources are available to help you? : 0  8. Do you currently have what you would describe as Mandaen or spiritual struggles?            : Not at all       Pain  Pain Score: No Pain (0)    Problem List    Patient Active Problem List   Diagnosis     Malignant  neoplasm of overlapping sites of right breast in female, estrogen receptor positive (H)     Malignant neoplasm involving both nipple and areola of right breast in female, estrogen receptor positive (H)     Adverse effect of antineoplastic and immunosuppressive drugs, sequela     Chest pain, unspecified     Hyperlipidemia     Personal history of breast cancer        ______________________________________________________________________________    History of Present Illness    Oncology history:  DIAGNOSIS: Breast cancer, right side, patient had a palpable lump with some nipple and skin changes.  She was also found to have lymphadenopathy in the axilla.  Patient had an ultrasound and mammogram that showed a 4.1 cm tumor with axillary lymphadenopathy  Biopsy was done January 31 and it showed invasive ductal carcinoma.  Grade 2.  ER MI positive and HER2/janine negative.  Biopsy was done of the lymph node as well and it was positive.    TREATMENT: Patient has started neoadjuvant chemotherapy with dose dense Adriamycin and Cytoxan for 4 cycles and then 12 weekly Taxol.     Bilateral mastectomy on 8/9/2023    A) RIGHT BREAST, MASTECTOMY:  -INVASIVE DUCTAL CARCINOMA (5.6 x 3.2 x 2.7 IN GREATEST DIMENSIONS)     -BREAST CANCER PROGNOSTIC MARKER ANALYSIS     ESTROGEN RECEPTOR: POSITIVE (95% OF CELLS, STRONG STAINING     PROGESTERONE RECEPTOR: POSITIVE (95% OF CELLS, STRONG STAINING     HER2: NEGATIVE (1+ OUT OF 3 MEMBRANE STAINING)     Dr. Paty Berry and Dr. Richard Townsend have also reviewed this case and concur with the breast cancer prognostic marker analysis.     The following data are used for Residual Cancer Farnam calculation:      Primary tumor bed area: 56 mm x 32 mm.  Overall cancer cellularity (as percentage of area): 95%.  Percentage of cancer that is in situ disease: 2%.  Number of positive lymph nodes: 2.  Diameter of largest metastasis: 3 mm.    Residual cancer burden score of 3    Adjuvant endocrine therapy with  Zoladex and letrozole initially.    Zoladex stopped in November and continued on letrozole only.  Verzenio started on 11/9/2023.    INTERIM HISTORY:   48-year-old female with right-sided locally advanced breast cancer status post neoadjuvant chemotherapy with AC followed by T who recently underwent bilateral mastectomy she had significant residual disease.  ypT3, ypN1a.     Status post adjuvant radiation.  We also started her on Zoladex and letrozole.  But she has not had any menstrual cycle since finishing chemotherapy so decided to stop Zoladex and continue to watch her off of it.  Hormone studies showed mixed results.  LH and FSH were not in the postmenopausal range but estradiol was less than 5.  I thought that the normal LH and FSH were due to recent Zoladex injections.  She also started Verzenio on 11/9/2023.    She has been fairly well on letrozole and Verzenio so far.  No significant side effects.  No fevers or chills.  No significant diarrhea.  She may have some loose stools occasionally but nothing major.    Review of Systems    Pertinent items are noted in HPI.    Past History    Past Medical History:   Diagnosis Date     Breast cancer (H)     right       PHYSICAL EXAM  /77 (BP Location: Left arm, Patient Position: Sitting, Cuff Size: Adult Regular)   Pulse 107   Temp 98.5  F (36.9  C) (Oral)   Resp 16   Wt 87.1 kg (192 lb)   SpO2 100%   BMI 30.99 kg/m      GENERAL: no acute distress. Cooperative in conversation.  Lungs: Clear to auscultation bilaterally  CNS: Nonfocal  CVS: RRR  LYMPH nodes: No clinically palpable lymph nodes peripherally    Lab Results    Recent Results (from the past 168 hour(s))   Comprehensive metabolic panel   Result Value Ref Range    Sodium 141 135 - 145 mmol/L    Potassium 4.6 3.4 - 5.3 mmol/L    Carbon Dioxide (CO2) 27 22 - 29 mmol/L    Anion Gap 8 7 - 15 mmol/L    Urea Nitrogen 15.5 6.0 - 20.0 mg/dL    Creatinine 0.91 0.51 - 0.95 mg/dL    GFR Estimate 77 >60  "mL/min/1.73m2    Calcium 10.1 (H) 8.6 - 10.0 mg/dL    Chloride 106 98 - 107 mmol/L    Glucose 83 70 - 99 mg/dL    Alkaline Phosphatase 96 40 - 150 U/L    AST 17 0 - 45 U/L    ALT 21 0 - 50 U/L    Protein Total 7.2 6.4 - 8.3 g/dL    Albumin 4.5 3.5 - 5.2 g/dL    Bilirubin Total 0.3 <=1.2 mg/dL   CBC with platelets and differential   Result Value Ref Range    WBC Count 3.3 (L) 4.0 - 11.0 10e3/uL    RBC Count 3.78 (L) 3.80 - 5.20 10e6/uL    Hemoglobin 11.6 (L) 11.7 - 15.7 g/dL    Hematocrit 34.7 (L) 35.0 - 47.0 %    MCV 92 78 - 100 fL    MCH 30.7 26.5 - 33.0 pg    MCHC 33.4 31.5 - 36.5 g/dL    RDW 17.1 (H) 10.0 - 15.0 %    Platelet Count 312 150 - 450 10e3/uL    % Neutrophils 68 %    % Lymphocytes 21 %    % Monocytes 7 %    % Eosinophils 2 %    % Basophils 2 %    % Immature Granulocytes 0 %    NRBCs per 100 WBC 0 <1 /100    Absolute Neutrophils 2.2 1.6 - 8.3 10e3/uL    Absolute Lymphocytes 0.7 (L) 0.8 - 5.3 10e3/uL    Absolute Monocytes 0.2 0.0 - 1.3 10e3/uL    Absolute Eosinophils 0.1 0.0 - 0.7 10e3/uL    Absolute Basophils 0.1 0.0 - 0.2 10e3/uL    Absolute Immature Granulocytes 0.0 <=0.4 10e3/uL    Absolute NRBCs 0.0 10e3/uL           Imaging    No results found.    Signed by: Paola Saleem MD    Oncology Rooming Note    January 10, 2024 8:50 AM   Alicia Iniguez is a 48 year old female who presents for:    Chief Complaint   Patient presents with     Oncology Clinic Visit     Return visit 5 weeks with lab. Malignant neoplasm of overlapping sites of right breast in female, estrogen receptor positive.     Initial Vitals: /77 (BP Location: Left arm, Patient Position: Sitting, Cuff Size: Adult Regular)   Pulse 107   Temp 98.5  F (36.9  C) (Oral)   Resp 16   Wt 87.1 kg (192 lb)   SpO2 100%   BMI 30.99 kg/m   Estimated body mass index is 30.99 kg/m  as calculated from the following:    Height as of 12/1/23: 1.676 m (5' 6\").    Weight as of this encounter: 87.1 kg (192 lb). Body surface area is 2.01 " meters squared.  No Pain (0) Comment: Data Unavailable   No LMP recorded. (Menstrual status: Chemotherapy).  Allergies reviewed: Yes  Medications reviewed: Yes    Medications: Medication refills not needed today.  Pharmacy name entered into [employer]:    [facility] - [place], [place] - [address]  [id number] - [place], [place] - [address] [facility]    Frailty Screening:   Is the patient here for a new oncology consult visit in cancer care? 2. No      Clinical concerns: Return visit 5 weeks with lab. Malignant neoplasm of overlapping sites of right breast in female, estrogen receptor positive.      Mireille Gutierrez CMA ([facility])                  Again, thank you for allowing me to participate in the care of your patient.        Sincerely,        Paola Saleem MD

## 2024-01-10 NOTE — PROGRESS NOTES
"Oncology Rooming Note    January 10, 2024 8:22 AM   Alicia Iniguez is a 48 year old female who presents for:    Chief Complaint   Patient presents with    Oncology Clinic Visit    Breast Cancer     Follow up     Initial Vitals: /75 (BP Location: Left arm, Patient Position: Sitting)   Pulse 110   Temp 98.1  F (36.7  C)   Resp 20   Wt 87.2 kg (192 lb 3.2 oz)   SpO2 98%   BMI 31.02 kg/m   Estimated body mass index is 31.02 kg/m  as calculated from the following:    Height as of 12/1/23: 1.676 m (5' 6\").    Weight as of this encounter: 87.2 kg (192 lb 3.2 oz). Body surface area is 2.02 meters squared.  No Pain (0) Comment: Data Unavailable   No LMP recorded. (Menstrual status: Chemotherapy).  Allergies reviewed: Yes  Medications reviewed: Yes    Medications: Medication refills not needed today.  Pharmacy name entered into [facility]:    [id number] [address] [facility] AT [facility] - [place], [place] - [address]    Frailty Screening:   Is the patient here for a new oncology consult visit in cancer care? 2. No      Clinical concerns: Follow up after RT. Her dad is at [facility] and in process of dying, he has dementia, tired from traveling and emotions  Dr. Germain was notified.      Ayah Eaton RN              "

## 2024-01-10 NOTE — LETTER
1/10/2024         RE: Alicia Iniguez  [address] [facility] [address]        Dear Colleague,    Thank you for referring your patient, Alicia Iniguez, to the [facility] RADIATION ONCOLOGY [place]. Please see a copy of my visit note below.    [facility] Radiation Oncology Follow Up     Patient: Alicia Iniguez  MRN: [id number]  Date of Service: 01/10/2024       DISEASE TREATED:  Stage IIA right breast cancer s/p neoadjuvant chemotherapy and bilateral mastectomy with residual disease, reconstruction with expander.         TYPE OF RADIATION THERAPY ADMINISTERED:    SITE TREATED: right CW/RNI  TOTAL DOSE: 6040cGy  NUMBER OF FRACTIONS: 33  DATES COMPLETED: 11/2/2023  CONCURRENT CHEMOTHERAPY: No  ADJUVANT THERAPY:Yes        INTERVAL SINCE COMPLETION OF RADIATION THERAPY: 2 months      SUBJECTIVE:  Ms. Iniguez is a 48 year old female who self palpated a mass in the right breast. Imaging showed a right breast and axilla mass. Biopsy showed a ER/VT+ HER-2- invasive ductal carcinoma. Staging CT showed the known mass with subcentimeter LN.       She went on to receive neoadjuvant chemotherapy consisting of dose dense AC following by 12 weeks of Taxol, the last of which was 7/14/2023.      She went on to have a mastectomy SLN,ALN and expander placement with Dr Brunner and Dr Snell 8/9/2023. Pathology showed residual 56mm of grade II invasive ductal carcinoma in right breast and 2/4 macrometastases in SLN and 0/15 ALN. There was invasion of the overlying epidermis, focal LVI in dermal connective tissue, margins negative.     11/2/2023 compleated adjuvant radiation therapy right CW/RNI  TOTAL DOSE: 6040cGy  NUMBER OF FRACTIONS: 33    Began adjuvant letrozole and Verzenio.     She tolerated radiation therapy well and returns to clinic today for routine follow-up visit.  She reports her skin is healed and is almost returned to normal.  She continues to use coconut oil for moisturizer.  She sees  lymphedema therapy regularly and is noted to have some cording she continues to feel some chest tightness which she is working on.  She has follow-up with plastics 2/5/2023.    Medications were reviewed and are up to date on EPIC.    The following portions of the patient's history were reviewed and updated as appropriate: allergies, current medications, past family history, past medical history, past social history, past surgical history and problem list.    Review of Systems:      General  Constitutional  Constitutional (WDL): Exceptions to WDL  Fatigue: Fatigue relieved by rest  Hot Flashes: Mild symptoms OR intervention not indicated  EENT  Eye Disorders  Eye Disorder (WDL): Assessment not pertinent to visit  Ear Disorders  Ear Disorder (WDL): Assessment not pertinent to visit  Respiratory  Respiratory  Respiratory (WDL): All respiratory elements are within defined limits  Cardiovascular  Cardiovascular  Cardiovascular (WDL): All cardiovascular elements are within defined limits  Gastrointestinal  Gastrointestinal  Gastrointestinal (WDL): All gastrointestinal elements are within defined limits  Musculoskeletal  Musculoskeletal and Connective Tissue Disorders  Musculoskeletal & Connective (WDL): All musculoskeletal & connective elements are within defined limits  Integumentary  Integumentary  Integumentary (WDL): All integumentary elements are within defined limits  Neurological  Neurosensory  Neurosensory (WDL): All neurosensory elements are within defined limits  Genitourinary/Reproductive  Genitourinary  Genitourinary (WDL): All genitourinary elements are within defined limits  Lymphatic  Lymph System Disorders  Lymph (WDL): All lymph elements are within defined limits  Pain  Pain Score: No Pain (0)  AUA Assessment                                                              Accompanied by  Accompanied By: self only    Objective:          PHYSICAL EXAMINATION:    /75 (BP Location: Left arm, Patient  Position: Sitting)   Pulse 110   Temp 98.1  F (36.7  C)   Resp 20   Wt 87.2 kg (192 lb 3.2 oz)   SpO2 98%   BMI 31.02 kg/m      Gen: Alert, in NAD pleasant interactive, well nourished  Eyes: EOMI, sclera anicteric  HENT     Head: NC/AT   Pulm:  No wheezing, stridor or respiratory distress  Chest: Chest wall with bilateral tissue expanders symmetry is stable post radiation.  She has faint hyperpigmentation with resolving radiation dermatitis.  Good ROM, no lymphedema or other acute or subacute sequelae of her radiation.   Musculoskeletal: Normal muscle bulk and tone  Skin: Normal tone and turgor, warm, dry, intact  Neurologic: A/Ox3, CN II-XII intact, face symmetric, speech fluent, no focal motor deficits, gait normal and unaided  Psychiatric: Appropriate mood and affect          Imaging: Imaging results 6 weeks:No results found.     Impression   48-year-old woman with stage ypT3 pN1a M0 ER/AZ positive, HER2 negative right breast invasive ductal carcinoma status post neoadjuvant chemo (AC + T) followed by bilateral mastectomy with axillary lymph node dissection and immediate reconstruction with tissue expanders.  She completed adjuvant radiation therapy and is recovering from acute side effects of radiation.    Visit Dx:    (C50.811,  Z17.0) Malignant neoplasm of overlapping sites of right breast in female, estrogen receptor positive (H)       Cancer Staging   Malignant neoplasm of overlapping sites of right breast in female, estrogen receptor positive (H)  Staging form: Breast, AJCC 8th Edition  - Clinical: Stage IIA (cT3, cN2, cM0, G2, ER+, AZ+, HER2-) - Signed by Shanelle Weaver APRN CNP on 3/1/2023  - Pathologic stage from 8/21/2023: No Stage Recommended (ypT3, pN1a(sn), cM0, G2, ER+, AZ+, HER2-) - Signed by Ruth Orellana MD on 8/21/2023      Assessment & Plan:   1. Continue follow-up with medical oncology while on letrozole and Verzenio  2.  Follow-up with plastics as scheduled    3.   Reviewed potential long-term side effects to watch out for including good skin sun protection of the chest where radiated.      4.  Discussed scheduled follow-up with us or follow-up with return to clinic as needed patient prefers to contact us should any questions or concerns develop in the future to schedule an appointment with MD Orellana.    Pain Management Plan: N/A  Total time of this visit, including time spent face-to-face with patient and or via video/audio, and also in preparing for today's visit for MDM and documentation. Medical decision-making included consideration and possible diagnoses, management options, complex record review, review of diagnostic tests and information, consideration and discussion of significant complications based on comorbidities, discussion with providers involved in the care of the patient.     30 Minutes spent.      Ruth Germain MD  Department of Radiation Oncology   [facility]  Tel: [phone]  Page: [phone]    [facility]  [address] [place] [address]     [place] [id number] [facility]   [place] [address]    CC:  Patient Care Team:  Suha Psot MD as PCP - General (OB/Gyn)  Maribell Brunner MD as Assigned Surgical Provider  Paola Saleem MD as MD (Medical Oncology)  Gisella Mauricio RN as Specialty Care Coordinator (Hematology & Oncology)  Paola Saleem MD as Assigned Cancer Care Provider  Mayco Browne PA-C as Assigned PCP  Ruth Orellana MD as MD (Radiation Oncology)      Oncology Rooming Note    January 10, 2024 8:22 AM   Alicia Iniguez is a 48 year old female who presents for:    Chief Complaint   Patient presents with     Oncology Clinic Visit     Breast Cancer     Follow up     Initial Vitals: /75 (BP Location: Left arm, Patient Position: Sitting)   Pulse 110   Temp 98.1  F (36.7  C)   Resp 20   Wt 87.2 kg (192 lb 3.2 oz)   SpO2 98%   BMI 31.02 kg/m   Estimated body  "mass index is 31.02 kg/m  as calculated from the following:    Height as of 12/1/23: 1.676 m (5' 6\").    Weight as of this encounter: 87.2 kg (192 lb 3.2 oz). Body surface area is 2.02 meters squared.  No Pain (0) Comment: Data Unavailable   No LMP recorded. (Menstrual status: Chemotherapy).  Allergies reviewed: Yes  Medications reviewed: Yes    Medications: Medication refills not needed today.  Pharmacy name entered into [facility]:    [id number] - [facility] [address] [facility] - [place], [place] - [address]    Frailty Screening:   Is the patient here for a new oncology consult visit in cancer care? 2. No      Clinical concerns: Follow up after RT. Her dad is at  and in process of dying, he has dementia, tired from traveling and emotions  Dr. Germain was notified.      Ayah Eaton, RN                Again, thank you for allowing me to participate in the care of your patient.        Sincerely,        Ruth Germain MD  "

## 2024-01-10 NOTE — PROGRESS NOTES
[facility] Hematology and Oncology Progress Note    Patient: Alicia Iniguez  MRN: [id number]  Date of Service: Jeevan 10, 2024          Reason for Visit    Chief Complaint   Patient presents with    Oncology Clinic Visit     Return visit 5 weeks with lab. Malignant neoplasm of overlapping sites of right breast in female, estrogen receptor positive.       Assessment and Plan     Cancer Staging   Malignant neoplasm of overlapping sites of right breast in female, estrogen receptor positive (H)  Staging form: Breast, AJCC 8th Edition  - Clinical: Stage IIA (cT3, cN2, cM0, G2, ER+, NV+, HER2-) - Signed by Shanelle Weaver APRN CNP on 3/1/2023  - Pathologic stage from 8/21/2023: No Stage Recommended (ypT3, pN1a(sn), cM0, G2, ER+, NV+, HER2-) - Signed by Ruth Orellana MD on 8/21/2023    1.  Locally Advanced Breast cancer, right side, stage IIa, T3 N2 M0, grade 2, ER/NV positive, HER2/janine negative: Status post neoadjuvant chemotherapy with dose dense Adriamycin and Cytoxan for 4 cycles, followed by weekly Taxol. Underwent bilateral mastectomy on 8/9/2023.  Surgical path showed significant residual disease as expected with minimum treatment effect.  Residual disease measured 5.6 x 3.2 x 2.7 cm in greatest dimensions.  Grade 2.  There was evidence of dermal infiltration with focal invasion of epidermis.  There was focal lymphovascular invasion within the dermal connective tissue.  No evidence of DCIS.  Margins were all negative.  Out of 20 lymph nodes examined 2 were positive for macrometastasis (sentinel lymph nodes).  No evidence of extranodal extension.  ypT3, ypN1a.     Has finished adjuvant radiation.  Currently on letrozole and Verzenio.    She is doing well on Verzenio and letrozole so far.  No major side effects.  Has not had any menstrual cycle since her chemotherapy.  No significant diarrhea.  Labs reviewed today.  CBC shows mild anemia with hemoglobin of 11.6.  Normal ANC.  Normal platelet  count.  Chemistry shows mild hyperglycemia with a calcium of 10.1.  Normal LFTs.  Normal electrolytes.  Continue Verzenio at the current dose.  Continue letrozole.  I am waiting for the hormone labs today.  If she continues to be in the postmenopausal range then there is no need to add Zoladex.    Plan is to continue letrozole for total of 10 years and Verzenio for a total of 2 years.  Will plan to follow her every 3 to 4 months for the first year physical exam.  No need for imaging unless clinically indicated.  She is status post bilateral mastectomies with no need for mammograms.  Will need monthly labs for Verzenio.  Will schedule this in [facility].    She complains of back muscle spasm.  Will send a prescription for cyclobenzaprine.  5 mg.  3 times daily as needed.  She has had this before for similar issues.      ECOG Performance    0 - Independent    Distress Screening (within last 30 days)    1. How concerned are you about your ability to eat? : 0  2. How concerned are you about unintended weight loss or your current weight? : 0  3. How concerned are you about feeling depressed or very sad? : 0  4. How concerned are you about feeling anxious or very scared? : 5  5. Do you struggle with the loss of meaning and tiago in your life? : Not at all  6. How concerned are you about work and home life issues that may be affected by your cancer? : 0  7. How concerned are you about knowing what resources are available to help you? : 0  8. Do you currently have what you would describe as Jew or spiritual struggles?            : Not at all       Pain  Pain Score: No Pain (0)    Problem List    Patient Active Problem List   Diagnosis    Malignant neoplasm of overlapping sites of right breast in female, estrogen receptor positive (H)    Malignant neoplasm involving both nipple and areola of right breast in female, estrogen receptor positive (H)    Adverse effect of antineoplastic and immunosuppressive drugs, sequela     Chest pain, unspecified    Hyperlipidemia    Personal history of breast cancer        ______________________________________________________________________________    History of Present Illness    Oncology history:  DIAGNOSIS: Breast cancer, right side, patient had a palpable lump with some nipple and skin changes.  She was also found to have lymphadenopathy in the axilla.  Patient had an ultrasound and mammogram that showed a 4.1 cm tumor with axillary lymphadenopathy  Biopsy was done January 31 and it showed invasive ductal carcinoma.  Grade 2.  ER RI positive and HER2/janine negative.  Biopsy was done of the lymph node as well and it was positive.    TREATMENT: Patient has started neoadjuvant chemotherapy with dose dense Adriamycin and Cytoxan for 4 cycles and then 12 weekly Taxol.     Bilateral mastectomy on 8/9/2023    A) RIGHT BREAST, MASTECTOMY:  -INVASIVE DUCTAL CARCINOMA (5.6 x 3.2 x 2.7 IN GREATEST DIMENSIONS)     -BREAST CANCER PROGNOSTIC MARKER ANALYSIS     ESTROGEN RECEPTOR: POSITIVE (95% OF CELLS, STRONG STAINING     PROGESTERONE RECEPTOR: POSITIVE (95% OF CELLS, STRONG STAINING     HER2: NEGATIVE (1+ OUT OF 3 MEMBRANE STAINING)     Dr. Paty Berry and Dr. Richard Townsend have also reviewed this case and concur with the breast cancer prognostic marker analysis.     The following data are used for Residual Cancer [place] calculation:      Primary tumor bed area: 56 mm x 32 mm.  Overall cancer cellularity (as percentage of area): 95%.  Percentage of cancer that is in situ disease: 2%.  Number of positive lymph nodes: 2.  Diameter of largest metastasis: 3 mm.    Residual cancer burden score of 3    Adjuvant endocrine therapy with Zoladex and letrozole initially.    Zoladex stopped in November and continued on letrozole only.  Verzenio started on 11/9/2023.    INTERIM HISTORY:   48-year-old female with right-sided locally advanced breast cancer status post neoadjuvant chemotherapy with AC followed by T who  recently underwent bilateral mastectomy she had significant residual disease.  ypT3, ypN1a.     Status post adjuvant radiation.  We also started her on Zoladex and letrozole.  But she has not had any menstrual cycle since finishing chemotherapy so decided to stop Zoladex and continue to watch her off of it.  Hormone studies showed mixed results.  LH and FSH were not in the postmenopausal range but estradiol was less than 5.  I thought that the normal LH and FSH were due to recent Zoladex injections.  She also started Verzenio on 11/9/2023.    She has been fairly well on letrozole and Verzenio so far.  No significant side effects.  No fevers or chills.  No significant diarrhea.  She may have some loose stools occasionally but nothing major.    Review of Systems    Pertinent items are noted in HPI.    Past History    Past Medical History:   Diagnosis Date    Breast cancer (H)     right       PHYSICAL EXAM  /77 (BP Location: Left arm, Patient Position: Sitting, Cuff Size: Adult Regular)   Pulse 107   Temp 98.5  F (36.9  C) (Oral)   Resp 16   Wt 87.1 kg (192 lb)   SpO2 100%   BMI 30.99 kg/m      GENERAL: no acute distress. Cooperative in conversation.  Lungs: Clear to auscultation bilaterally  CNS: Nonfocal  CVS: RRR  LYMPH nodes: No clinically palpable lymph nodes peripherally    Lab Results    Recent Results (from the past 168 hour(s))   Comprehensive metabolic panel   Result Value Ref Range    Sodium 141 135 - 145 mmol/L    Potassium 4.6 3.4 - 5.3 mmol/L    Carbon Dioxide (CO2) 27 22 - 29 mmol/L    Anion Gap 8 7 - 15 mmol/L    Urea Nitrogen 15.5 6.0 - 20.0 mg/dL    Creatinine 0.91 0.51 - 0.95 mg/dL    GFR Estimate 77 >60 mL/min/1.73m2    Calcium 10.1 (H) 8.6 - 10.0 mg/dL    Chloride 106 98 - 107 mmol/L    Glucose 83 70 - 99 mg/dL    Alkaline Phosphatase 96 40 - 150 U/L    AST 17 0 - 45 U/L    ALT 21 0 - 50 U/L    Protein Total 7.2 6.4 - 8.3 g/dL    Albumin 4.5 3.5 - 5.2 g/dL    Bilirubin Total 0.3 <=1.2  mg/dL   CBC with platelets and differential   Result Value Ref Range    WBC Count 3.3 (L) 4.0 - 11.0 10e3/uL    RBC Count 3.78 (L) 3.80 - 5.20 10e6/uL    Hemoglobin 11.6 (L) 11.7 - 15.7 g/dL    Hematocrit 34.7 (L) 35.0 - 47.0 %    MCV 92 78 - 100 fL    MCH 30.7 26.5 - 33.0 pg    MCHC 33.4 31.5 - 36.5 g/dL    RDW 17.1 (H) 10.0 - 15.0 %    Platelet Count 312 150 - 450 10e3/uL    % Neutrophils 68 %    % Lymphocytes 21 %    % Monocytes 7 %    % Eosinophils 2 %    % Basophils 2 %    % Immature Granulocytes 0 %    NRBCs per 100 WBC 0 <1 /100    Absolute Neutrophils 2.2 1.6 - 8.3 10e3/uL    Absolute Lymphocytes 0.7 (L) 0.8 - 5.3 10e3/uL    Absolute Monocytes 0.2 0.0 - 1.3 10e3/uL    Absolute Eosinophils 0.1 0.0 - 0.7 10e3/uL    Absolute Basophils 0.1 0.0 - 0.2 10e3/uL    Absolute Immature Granulocytes 0.0 <=0.4 10e3/uL    Absolute NRBCs 0.0 10e3/uL           Imaging    No results found.    Signed by: Paola Saleem MD

## 2024-01-12 RX ORDER — CYCLOBENZAPRINE HCL 5 MG
5-10 TABLET ORAL 3 TIMES DAILY PRN
Qty: 20 TABLET | Refills: 0 | OUTPATIENT
Start: 2024-01-12

## 2024-01-22 DIAGNOSIS — C50.811 MALIGNANT NEOPLASM OF OVERLAPPING SITES OF RIGHT BREAST IN FEMALE, ESTROGEN RECEPTOR POSITIVE (H): ICD-10-CM

## 2024-01-22 DIAGNOSIS — Z17.0 MALIGNANT NEOPLASM OF OVERLAPPING SITES OF RIGHT BREAST IN FEMALE, ESTROGEN RECEPTOR POSITIVE (H): ICD-10-CM

## 2024-01-22 RX ORDER — LETROZOLE 2.5 MG/1
2.5 TABLET, FILM COATED ORAL DAILY
Qty: 90 TABLET | Refills: 0 | Status: SHIPPED | OUTPATIENT
Start: 2024-01-22 | End: 2024-01-31

## 2024-01-24 DIAGNOSIS — C50.811 MALIGNANT NEOPLASM OF OVERLAPPING SITES OF RIGHT BREAST IN FEMALE, ESTROGEN RECEPTOR POSITIVE (H): Primary | ICD-10-CM

## 2024-01-24 DIAGNOSIS — Z17.0 MALIGNANT NEOPLASM OF OVERLAPPING SITES OF RIGHT BREAST IN FEMALE, ESTROGEN RECEPTOR POSITIVE (H): Primary | ICD-10-CM

## 2024-01-24 RX ORDER — LETROZOLE 2.5 MG/1
2.5 TABLET, FILM COATED ORAL DAILY
Qty: 28 TABLET | Refills: 0 | Status: SHIPPED | OUTPATIENT
Start: 2024-01-24 | End: 2024-01-24

## 2024-01-31 DIAGNOSIS — C50.811 MALIGNANT NEOPLASM OF OVERLAPPING SITES OF RIGHT BREAST IN FEMALE, ESTROGEN RECEPTOR POSITIVE (H): ICD-10-CM

## 2024-01-31 DIAGNOSIS — Z17.0 MALIGNANT NEOPLASM OF OVERLAPPING SITES OF RIGHT BREAST IN FEMALE, ESTROGEN RECEPTOR POSITIVE (H): ICD-10-CM

## 2024-01-31 RX ORDER — LETROZOLE 2.5 MG/1
2.5 TABLET, FILM COATED ORAL DAILY
Qty: 90 TABLET | Refills: 0 | Status: SHIPPED | OUTPATIENT
Start: 2024-01-31 | End: 2024-02-22

## 2024-01-31 NOTE — TELEPHONE ENCOUNTER
Received a phone call today from Alicia.  She is calling to request a refill of her letrozole be sent to a different pharmacy.  This medication was sent and she was notified.    Zohra Espinal RN on 1/31/2024 at 9:52 AM

## 2024-02-06 ENCOUNTER — TELEPHONE (OUTPATIENT)
Dept: ONCOLOGY | Facility: HOSPITAL | Age: 49
End: 2024-02-06
Payer: COMMERCIAL

## 2024-02-06 NOTE — ORAL ONC MGMT
Oral Chemotherapy Monitoring Program    Subjective/Objective:  Alicia Iniguez is a 48 year old female contacted by phone for a follow-up visit for oral chemotherapy.  Alicia is doing well on the abemaciclib, she confirms taking 1 tablet twice daily. She reports very seldom having a loose stool but is manageable. She denies any n/v, abdominal pain, fatigue or other side effects.         11/16/2023     1:00 PM 12/4/2023     3:00 PM 12/28/2023     1:00 PM 1/2/2024    12:00 PM 1/2/2024     2:00 PM 1/24/2024    12:00 PM 2/6/2024    10:00 AM   ORAL CHEMOTHERAPY   Assessment Type Left Voicemail Refill;Initial Follow up Refill Left Voicemail Monthly Follow up Refill Monthly Follow up   Diagnosis Code Breast Cancer Breast Cancer Breast Cancer Breast Cancer Breast Cancer Breast Cancer Breast Cancer   Providers Dr. Stiven Saleem   Clinic Name/Location [facility]   Drug Name Verzenio (abemaciclib) Verzenio (abemaciclib) Verzenio (abemaciclib) Verzenio (abemaciclib) Verzenio (abemaciclib) Verzenio (abemaciclib) Verzenio (abemaciclib)   Dose 150 mg 150 mg 150 mg 150 mg 150 mg 150 mg 150 mg   Current Schedule BID BID BID BID BID BID BID   Cycle Details Continuous Continuous Continuous Continuous Continuous Continuous Continuous   Start Date of Last Cycle  11/9/2023 11/9/2023 11/9/2023    Doses missed in last 2 weeks  0   0  0   Adherence Assessment  Adherent   Adherent  Adherent   Adverse Effects  Diarrhea   Diarrhea  No AE identified during assessment   Diarrhea  Grade 1   Grade 1     Pharmacist Intervention(diarrhea)  No   No     Any new drug interactions?  No   No  No   Is the dose as ordered appropriate for the patient?  Yes   Yes  Yes       Last PHQ-2 Score on record:       7/26/2023     6:49 AM   PHQ-2 ( 1999 [employer])   Q1: Little interest or pleasure in doing things 0   Q2: Feeling  "down, depressed or hopeless 0   PHQ-2 Score 0   Q1: Little interest or pleasure in doing things Not at all   Q2: Feeling down, depressed or hopeless Not at all   PHQ-2 Score 0       Vitals:  BP:   BP Readings from Last 1 Encounters:   01/10/24 117/77     Wt Readings from Last 1 Encounters:   01/10/24 87.1 kg (192 lb)     Estimated body surface area is 2.01 meters squared as calculated from the following:    Height as of 12/1/23: 1.676 m (5' 6\").    Weight as of 1/10/24: 87.1 kg (192 lb).    Labs:  _  Result Component Current Result Ref Range   Sodium 141 (1/10/2024) 135 - 145 mmol/L     _  Result Component Current Result Ref Range   Potassium 4.6 (1/10/2024) 3.4 - 5.3 mmol/L     _  Result Component Current Result Ref Range   Calcium 10.1 (H) (1/10/2024) 8.6 - 10.0 mg/dL     No results found for Mag within last 30 days.     No results found for Phos within last 30 days.     _  Result Component Current Result Ref Range   Albumin 4.5 (1/10/2024) 3.5 - 5.2 g/dL     _  Result Component Current Result Ref Range   Urea Nitrogen 15.5 (1/10/2024) 6.0 - 20.0 mg/dL     _  Result Component Current Result Ref Range   Creatinine 0.91 (1/10/2024) 0.51 - 0.95 mg/dL     _  Result Component Current Result Ref Range   AST 17 (1/10/2024) 0 - 45 U/L     _  Result Component Current Result Ref Range   ALT 21 (1/10/2024) 0 - 50 U/L     _  Result Component Current Result Ref Range   Bilirubin Total 0.3 (1/10/2024) <=1.2 mg/dL     _  Result Component Current Result Ref Range   WBC Count 3.3 (L) (1/10/2024) 4.0 - 11.0 10e3/uL     _  Result Component Current Result Ref Range   Hemoglobin 11.6 (L) (1/10/2024) 11.7 - 15.7 g/dL     _  Result Component Current Result Ref Range   Platelet Count 312 (1/10/2024) 150 - 450 10e3/uL     No results found for ANC within last 30 days.     _  Result Component Current Result Ref Range   Absolute Neutrophils 2.2 (1/10/2024) 1.6 - 8.3 10e3/uL          Assessment/Plan:  Alicia is tolerating the abemaciclib " nicely. She reports no side effects other than a loose stool every now and now and has no questions or concerns.     Follow-Up:  We will review labs on 2/9 and then call Alicia back in a few weeks.    Refill Due:  2/21/24    Darleen Tijerina, PharmD  Oral Chemotherapy Pharmacist  [phone]

## 2024-02-09 ENCOUNTER — LAB (OUTPATIENT)
Dept: LAB | Facility: CLINIC | Age: 49
End: 2024-02-09
Payer: COMMERCIAL

## 2024-02-09 DIAGNOSIS — Z17.0 MALIGNANT NEOPLASM OF OVERLAPPING SITES OF RIGHT BREAST IN FEMALE, ESTROGEN RECEPTOR POSITIVE (H): ICD-10-CM

## 2024-02-09 DIAGNOSIS — C50.811 MALIGNANT NEOPLASM OF OVERLAPPING SITES OF RIGHT BREAST IN FEMALE, ESTROGEN RECEPTOR POSITIVE (H): ICD-10-CM

## 2024-02-09 LAB
BASOPHILS # BLD AUTO: 0.1 10E3/UL (ref 0–0.2)
BASOPHILS NFR BLD AUTO: 1 %
EOSINOPHIL # BLD AUTO: 0.1 10E3/UL (ref 0–0.7)
EOSINOPHIL NFR BLD AUTO: 1 %
ERYTHROCYTE [DISTWIDTH] IN BLOOD BY AUTOMATED COUNT: 15.2 % (ref 10–15)
HCT VFR BLD AUTO: 37.4 % (ref 35–47)
HGB BLD-MCNC: 12.7 G/DL (ref 11.7–15.7)
IMM GRANULOCYTES # BLD: 0 10E3/UL
IMM GRANULOCYTES NFR BLD: 0 %
LYMPHOCYTES # BLD AUTO: 1.1 10E3/UL (ref 0.8–5.3)
LYMPHOCYTES NFR BLD AUTO: 23 %
MCH RBC QN AUTO: 32.6 PG (ref 26.5–33)
MCHC RBC AUTO-ENTMCNC: 34 G/DL (ref 31.5–36.5)
MCV RBC AUTO: 96 FL (ref 78–100)
MONOCYTES # BLD AUTO: 0.3 10E3/UL (ref 0–1.3)
MONOCYTES NFR BLD AUTO: 6 %
NEUTROPHILS # BLD AUTO: 3.2 10E3/UL (ref 1.6–8.3)
NEUTROPHILS NFR BLD AUTO: 69 %
PLATELET # BLD AUTO: 311 10E3/UL (ref 150–450)
RBC # BLD AUTO: 3.9 10E6/UL (ref 3.8–5.2)
WBC # BLD AUTO: 4.7 10E3/UL (ref 4–11)

## 2024-02-09 PROCEDURE — 36415 COLL VENOUS BLD VENIPUNCTURE: CPT

## 2024-02-09 PROCEDURE — 80053 COMPREHEN METABOLIC PANEL: CPT

## 2024-02-09 PROCEDURE — 85025 COMPLETE CBC W/AUTO DIFF WBC: CPT

## 2024-02-10 LAB
ALBUMIN SERPL BCG-MCNC: 4.7 G/DL (ref 3.5–5.2)
ALP SERPL-CCNC: 93 U/L (ref 40–150)
ALT SERPL W P-5'-P-CCNC: 25 U/L (ref 0–50)
ANION GAP SERPL CALCULATED.3IONS-SCNC: 10 MMOL/L (ref 7–15)
AST SERPL W P-5'-P-CCNC: 23 U/L (ref 0–45)
BILIRUB SERPL-MCNC: 0.4 MG/DL
BUN SERPL-MCNC: 14.8 MG/DL (ref 6–20)
CALCIUM SERPL-MCNC: 9.9 MG/DL (ref 8.6–10)
CHLORIDE SERPL-SCNC: 103 MMOL/L (ref 98–107)
CREAT SERPL-MCNC: 0.94 MG/DL (ref 0.51–0.95)
DEPRECATED HCO3 PLAS-SCNC: 27 MMOL/L (ref 22–29)
EGFRCR SERPLBLD CKD-EPI 2021: 74 ML/MIN/1.73M2
GLUCOSE SERPL-MCNC: 91 MG/DL (ref 70–99)
POTASSIUM SERPL-SCNC: 4.1 MMOL/L (ref 3.4–5.3)
PROT SERPL-MCNC: 7.7 G/DL (ref 6.4–8.3)
SODIUM SERPL-SCNC: 140 MMOL/L (ref 135–145)

## 2024-02-12 ENCOUNTER — DOCUMENTATION ONLY (OUTPATIENT)
Dept: ONCOLOGY | Facility: HOSPITAL | Age: 49
End: 2024-02-12
Payer: COMMERCIAL

## 2024-02-22 DIAGNOSIS — Z17.0 MALIGNANT NEOPLASM OF OVERLAPPING SITES OF RIGHT BREAST IN FEMALE, ESTROGEN RECEPTOR POSITIVE (H): Primary | ICD-10-CM

## 2024-02-22 DIAGNOSIS — C50.811 MALIGNANT NEOPLASM OF OVERLAPPING SITES OF RIGHT BREAST IN FEMALE, ESTROGEN RECEPTOR POSITIVE (H): Primary | ICD-10-CM

## 2024-02-22 RX ORDER — LETROZOLE 2.5 MG/1
2.5 TABLET, FILM COATED ORAL DAILY
Qty: 28 TABLET | Refills: 0 | Status: SHIPPED | OUTPATIENT
Start: 2024-02-22 | End: 2024-03-19

## 2024-03-02 ENCOUNTER — HEALTH MAINTENANCE LETTER (OUTPATIENT)
Age: 49
End: 2024-03-02

## 2024-03-02 ENCOUNTER — LAB (OUTPATIENT)
Dept: LAB | Facility: CLINIC | Age: 49
End: 2024-03-02
Payer: COMMERCIAL

## 2024-03-02 DIAGNOSIS — Z17.0 MALIGNANT NEOPLASM OF OVERLAPPING SITES OF RIGHT BREAST IN FEMALE, ESTROGEN RECEPTOR POSITIVE (H): ICD-10-CM

## 2024-03-02 DIAGNOSIS — C50.811 MALIGNANT NEOPLASM OF OVERLAPPING SITES OF RIGHT BREAST IN FEMALE, ESTROGEN RECEPTOR POSITIVE (H): ICD-10-CM

## 2024-03-02 LAB
BASOPHILS # BLD AUTO: 0.1 10E3/UL (ref 0–0.2)
BASOPHILS NFR BLD AUTO: 1 %
EOSINOPHIL # BLD AUTO: 0.1 10E3/UL (ref 0–0.7)
EOSINOPHIL NFR BLD AUTO: 2 %
ERYTHROCYTE [DISTWIDTH] IN BLOOD BY AUTOMATED COUNT: 13.3 % (ref 10–15)
HCT VFR BLD AUTO: 34.9 % (ref 35–47)
HGB BLD-MCNC: 11.9 G/DL (ref 11.7–15.7)
IMM GRANULOCYTES # BLD: 0 10E3/UL
IMM GRANULOCYTES NFR BLD: 0 %
LYMPHOCYTES # BLD AUTO: 1.2 10E3/UL (ref 0.8–5.3)
LYMPHOCYTES NFR BLD AUTO: 23 %
MCH RBC QN AUTO: 33.4 PG (ref 26.5–33)
MCHC RBC AUTO-ENTMCNC: 34.1 G/DL (ref 31.5–36.5)
MCV RBC AUTO: 98 FL (ref 78–100)
MONOCYTES # BLD AUTO: 0.2 10E3/UL (ref 0–1.3)
MONOCYTES NFR BLD AUTO: 5 %
NEUTROPHILS # BLD AUTO: 3.4 10E3/UL (ref 1.6–8.3)
NEUTROPHILS NFR BLD AUTO: 69 %
PLATELET # BLD AUTO: 315 10E3/UL (ref 150–450)
RBC # BLD AUTO: 3.56 10E6/UL (ref 3.8–5.2)
WBC # BLD AUTO: 5 10E3/UL (ref 4–11)

## 2024-03-02 PROCEDURE — 80053 COMPREHEN METABOLIC PANEL: CPT

## 2024-03-02 PROCEDURE — 85025 COMPLETE CBC W/AUTO DIFF WBC: CPT

## 2024-03-02 PROCEDURE — 36415 COLL VENOUS BLD VENIPUNCTURE: CPT

## 2024-03-03 LAB
ALBUMIN SERPL BCG-MCNC: 4.4 G/DL (ref 3.5–5.2)
ALP SERPL-CCNC: 92 U/L (ref 40–150)
ALT SERPL W P-5'-P-CCNC: 27 U/L (ref 0–50)
ANION GAP SERPL CALCULATED.3IONS-SCNC: 12 MMOL/L (ref 7–15)
AST SERPL W P-5'-P-CCNC: 25 U/L (ref 0–45)
BILIRUB SERPL-MCNC: 0.2 MG/DL
BUN SERPL-MCNC: 10.1 MG/DL (ref 6–20)
CALCIUM SERPL-MCNC: 9.7 MG/DL (ref 8.6–10)
CHLORIDE SERPL-SCNC: 103 MMOL/L (ref 98–107)
CREAT SERPL-MCNC: 0.77 MG/DL (ref 0.51–0.95)
DEPRECATED HCO3 PLAS-SCNC: 23 MMOL/L (ref 22–29)
EGFRCR SERPLBLD CKD-EPI 2021: >90 ML/MIN/1.73M2
GLUCOSE SERPL-MCNC: 129 MG/DL (ref 70–99)
POTASSIUM SERPL-SCNC: 4.6 MMOL/L (ref 3.4–5.3)
PROT SERPL-MCNC: 7.1 G/DL (ref 6.4–8.3)
SODIUM SERPL-SCNC: 138 MMOL/L (ref 135–145)

## 2024-03-06 ENCOUNTER — PATIENT OUTREACH (OUTPATIENT)
Dept: ONCOLOGY | Facility: HOSPITAL | Age: 49
End: 2024-03-06
Payer: COMMERCIAL

## 2024-03-06 NOTE — PROGRESS NOTES
[facility]                                                                                      See [facility] message.      Signature:  Gisella Mauricio RN

## 2024-03-11 ENCOUNTER — TELEPHONE (OUTPATIENT)
Dept: ONCOLOGY | Facility: HOSPITAL | Age: 49
End: 2024-03-11
Payer: COMMERCIAL

## 2024-03-11 NOTE — ORAL ONC MGMT
Oral Chemotherapy Monitoring Program    Subjective/Objective:  Alicia Iniguez is a 48 year old female contacted by phone for a follow-up visit for oral chemotherapy.  Alicia says she is doing well and denies any issues with diarrhea, nausea, rash. Is having surgery on 4/12/24 and planning to hold her abemaciclib about 2 weeks prior. Will stay off of the abemaciclib until she sees Dr. Saleem a few weeks later (~5/3- appt still to be scheduled.         1/2/2024    12:00 PM 1/2/2024     2:00 PM 1/24/2024    12:00 PM 2/6/2024    10:00 AM 2/12/2024    11:00 AM 2/22/2024     8:00 AM 3/11/2024    11:00 AM   ORAL CHEMOTHERAPY   Assessment Type Left Voicemail Monthly Follow up Refill Monthly Follow up Lab Monitoring Refill Monthly Follow up   Diagnosis Code Breast Cancer Breast Cancer Breast Cancer Breast Cancer Breast Cancer Breast Cancer Breast Cancer   Providers Dr. Stiven Saleem   Clinic Name/Location [facility]   Drug Name Verzenio (abemaciclib) Verzenio (abemaciclib) Verzenio (abemaciclib) Verzenio (abemaciclib) Verzenio (abemaciclib) Verzenio (abemaciclib) Verzenio (abemaciclib)   Dose 150 mg 150 mg 150 mg 150 mg 150 mg 150 mg 150 mg   Current Schedule BID BID BID BID BID BID BID   Cycle Details Continuous Continuous Continuous Continuous Continuous Continuous Continuous   Start Date of Last Cycle  11/9/2023 11/9/2023 11/9/2023   Doses missed in last 2 weeks  0  0   0   Adherence Assessment  Adherent  Adherent   Adherent   Adverse Effects  Diarrhea  No AE identified during assessment   No AE identified during assessment   Diarrhea  Grade 1        Pharmacist Intervention(diarrhea)  No        Any new drug interactions?  No  No   No   Is the dose as ordered appropriate for the patient?  Yes  Yes   Yes       Last PHQ-2 Score on record:       7/26/2023     6:49 AM   PHQ-2 ( 1999  "[employer])   Q1: Little interest or pleasure in doing things 0   Q2: Feeling down, depressed or hopeless 0   PHQ-2 Score 0   Q1: Little interest or pleasure in doing things Not at all   Q2: Feeling down, depressed or hopeless Not at all   PHQ-2 Score 0       Vitals:  BP:   BP Readings from Last 1 Encounters:   01/10/24 117/77     Wt Readings from Last 1 Encounters:   01/10/24 87.1 kg (192 lb)     Estimated body surface area is 2.01 meters squared as calculated from the following:    Height as of 12/1/23: 1.676 m (5' 6\").    Weight as of 1/10/24: 87.1 kg (192 lb).    Labs:  _  Result Component Current Result Ref Range   Sodium 138 (3/2/2024) 135 - 145 mmol/L     _  Result Component Current Result Ref Range   Potassium 4.6 (3/2/2024) 3.4 - 5.3 mmol/L     _  Result Component Current Result Ref Range   Calcium 9.7 (3/2/2024) 8.6 - 10.0 mg/dL     No results found for Mag within last 30 days.     No results found for Phos within last 30 days.     _  Result Component Current Result Ref Range   Albumin 4.4 (3/2/2024) 3.5 - 5.2 g/dL     _  Result Component Current Result Ref Range   Urea Nitrogen 10.1 (3/2/2024) 6.0 - 20.0 mg/dL     _  Result Component Current Result Ref Range   Creatinine 0.77 (3/2/2024) 0.51 - 0.95 mg/dL     _  Result Component Current Result Ref Range   AST 25 (3/2/2024) 0 - 45 U/L     _  Result Component Current Result Ref Range   ALT 27 (3/2/2024) 0 - 50 U/L     _  Result Component Current Result Ref Range   Bilirubin Total 0.2 (3/2/2024) <=1.2 mg/dL     _  Result Component Current Result Ref Range   WBC Count 5.0 (3/2/2024) 4.0 - 11.0 10e3/uL     _  Result Component Current Result Ref Range   Hemoglobin 11.9 (3/2/2024) 11.7 - 15.7 g/dL     _  Result Component Current Result Ref Range   Platelet Count 315 (3/2/2024) 150 - 450 10e3/uL     No results found for ANC within last 30 days.     _  Result Component Current Result Ref Range   Absolute Neutrophils 3.4 (3/2/2024) 1.6 - 8.3 10e3/uL    "       Assessment/Plan:  Alicia is tolerating the abemaciclib therapy well. Continue as prescribed.    Follow-Up:  Will plan to keep an eye out for her appt in May when she'll be restarted (as anticipated) and she knows she can reach out to us in the meantime if she has any questions or concerns.    Refill Due:  ~3/20 (does need a refill to get enough prior to holding for her surgery)    Ghulam Sorto, PharmD, [facility]  Clinical Oncology Pharmacist  March 11, 2024

## 2024-03-19 DIAGNOSIS — C50.811 MALIGNANT NEOPLASM OF OVERLAPPING SITES OF RIGHT BREAST IN FEMALE, ESTROGEN RECEPTOR POSITIVE (H): Primary | ICD-10-CM

## 2024-03-19 DIAGNOSIS — Z17.0 MALIGNANT NEOPLASM OF OVERLAPPING SITES OF RIGHT BREAST IN FEMALE, ESTROGEN RECEPTOR POSITIVE (H): Primary | ICD-10-CM

## 2024-03-19 RX ORDER — LETROZOLE 2.5 MG/1
2.5 TABLET, FILM COATED ORAL DAILY
Qty: 28 TABLET | Refills: 0 | Status: SHIPPED | OUTPATIENT
Start: 2024-03-19 | End: 2024-05-14

## 2024-03-26 ENCOUNTER — OFFICE VISIT (OUTPATIENT)
Dept: FAMILY MEDICINE | Facility: CLINIC | Age: 49
End: 2024-03-26
Payer: COMMERCIAL

## 2024-03-26 VITALS
TEMPERATURE: 97.3 F | HEIGHT: 66 IN | OXYGEN SATURATION: 99 % | DIASTOLIC BLOOD PRESSURE: 83 MMHG | HEART RATE: 107 BPM | RESPIRATION RATE: 18 BRPM | WEIGHT: 194 LBS | SYSTOLIC BLOOD PRESSURE: 132 MMHG | BODY MASS INDEX: 31.18 KG/M2

## 2024-03-26 DIAGNOSIS — Z01.818 PREOP GENERAL PHYSICAL EXAM: Primary | ICD-10-CM

## 2024-03-26 DIAGNOSIS — Z17.0 MALIGNANT NEOPLASM OF OVERLAPPING SITES OF RIGHT BREAST IN FEMALE, ESTROGEN RECEPTOR POSITIVE (H): ICD-10-CM

## 2024-03-26 DIAGNOSIS — Z85.3 PERSONAL HISTORY OF BREAST CANCER: ICD-10-CM

## 2024-03-26 DIAGNOSIS — C50.811 MALIGNANT NEOPLASM OF OVERLAPPING SITES OF RIGHT BREAST IN FEMALE, ESTROGEN RECEPTOR POSITIVE (H): ICD-10-CM

## 2024-03-26 LAB
BASOPHILS # BLD AUTO: 0 10E3/UL (ref 0–0.2)
BASOPHILS NFR BLD AUTO: 1 %
EOSINOPHIL # BLD AUTO: 0.1 10E3/UL (ref 0–0.7)
EOSINOPHIL NFR BLD AUTO: 3 %
ERYTHROCYTE [DISTWIDTH] IN BLOOD BY AUTOMATED COUNT: 13 % (ref 10–15)
HCT VFR BLD AUTO: 37.9 % (ref 35–47)
HGB BLD-MCNC: 13 G/DL (ref 11.7–15.7)
IMM GRANULOCYTES # BLD: 0 10E3/UL
IMM GRANULOCYTES NFR BLD: 0 %
LYMPHOCYTES # BLD AUTO: 1 10E3/UL (ref 0.8–5.3)
LYMPHOCYTES NFR BLD AUTO: 27 %
MCH RBC QN AUTO: 33.5 PG (ref 26.5–33)
MCHC RBC AUTO-ENTMCNC: 34.3 G/DL (ref 31.5–36.5)
MCV RBC AUTO: 98 FL (ref 78–100)
MONOCYTES # BLD AUTO: 0.3 10E3/UL (ref 0–1.3)
MONOCYTES NFR BLD AUTO: 8 %
NEUTROPHILS # BLD AUTO: 2.3 10E3/UL (ref 1.6–8.3)
NEUTROPHILS NFR BLD AUTO: 61 %
PLATELET # BLD AUTO: 293 10E3/UL (ref 150–450)
RBC # BLD AUTO: 3.88 10E6/UL (ref 3.8–5.2)
WBC # BLD AUTO: 3.8 10E3/UL (ref 4–11)

## 2024-03-26 PROCEDURE — 99214 OFFICE O/P EST MOD 30 MIN: CPT | Performed by: PHYSICIAN ASSISTANT

## 2024-03-26 PROCEDURE — 85025 COMPLETE CBC W/AUTO DIFF WBC: CPT | Performed by: PHYSICIAN ASSISTANT

## 2024-03-26 PROCEDURE — 36415 COLL VENOUS BLD VENIPUNCTURE: CPT | Performed by: PHYSICIAN ASSISTANT

## 2024-03-26 ASSESSMENT — PAIN SCALES - GENERAL: PAINLEVEL: NO PAIN (0)

## 2024-03-26 NOTE — PROGRESS NOTES
Preoperative Evaluation  [facility]  [address] [place] [address]  Phone: [phone]  Primary Provider: Suha Post  Pre-op Performing Provider: CLAIRE MOODY 26, 2024       Alicia is a 48 year old, presenting for the following:  Pre-Op Exam        3/26/2024     9:44 AM   Additional Questions   Roomed by erick   Accompanied by self         3/26/2024     9:44 AM   Patient Reported Additional Medications   Patient reports taking the following new medications no     Surgical Information  Surgery/Procedure: fat grafting to bilateral breast from abdomen,   Surgery Location: [facility]  Surgeon:   Surgery Date: 04/12/2024  Time of Surgery: na  Where patient plans to recover: At home with family  Fax number for surgical facility: Note does not need to be faxed, will be available electronically in Epic.    Assessment & Plan     The proposed surgical procedure is considered LOW risk.      ICD-10-CM    1. Preop general physical exam  Z01.818       2. Personal history of breast cancer  Z85.3            - No identified additional risk factors other than previously addressed    Antiplatelet or Anticoagulation Medication Instructions   - Patient is on no antiplatelet or anticoagulation medications.    Additional Medication Instructions  Hold all medications for 2 wks.    Recommendation  APPROVAL GIVEN to proceed with proposed procedure, without further diagnostic evaluation.      Subjective       Via the Health Maintenance questionnaire, the patient has reported the following services have been completed -Cervical Cancer Screening, this information has been sent to the abstraction team.  HPI related to upcoming procedure: history of breast cancer and bilateral masectomy.         3/26/2024     9:42 AM   Preop Questions   1. Have you ever had a heart attack or stroke? No   2. Have you ever had surgery on your heart or blood vessels, such as a stent  placement, a coronary artery bypass, or surgery on an artery in your head, neck, heart, or legs? No   3. Do you have chest pain with activity? No   4. Do you have a history of  heart failure? No   5. Do you currently have a cold, bronchitis or symptoms of other infection? No   6. Do you have a cough, shortness of breath, or wheezing? No   7. Do you or anyone in your family have previous history of blood clots? No   8. Do you or does anyone in your family have a serious bleeding problem such as prolonged bleeding following surgeries or cuts? No   9. Have you ever had problems with anemia or been told to take iron pills? No   10. Have you had any abnormal blood loss such as black, tarry or bloody stools, or abnormal vaginal bleeding? No   11. Have you ever had a blood transfusion? No   12. Are you willing to have a blood transfusion if it is medically needed before, during, or after your surgery? Yes   13. Have you or any of your relatives ever had problems with anesthesia? No   14. Do you have sleep apnea, excessive snoring or daytime drowsiness? No   15. Do you have any artifical heart valves or other implanted medical devices like a pacemaker, defibrillator, or continuous glucose monitor? No   16. Do you have artificial joints? No   17. Are you allergic to latex? No   18. Is there any chance that you may be pregnant? No       Health Care Directive  Patient does not have a Health Care Directive or Living Will: Discussed advance care planning with patient; information given to patient to review.    Preoperative Review of    reviewed - no record of controlled substances prescribed.      Status of Chronic Conditions:  See problem list for active medical problems.  Problems all longstanding and stable, except as noted/documented.  See ROS for pertinent symptoms related to these conditions.    Patient Active Problem List    Diagnosis Date Noted    Personal history of breast cancer 11/09/2023     Priority: Medium     Chest pain, unspecified 08/09/2023     Priority: Medium    Hyperlipidemia 08/09/2023     Priority: Medium    Adverse effect of antineoplastic and immunosuppressive drugs, sequela 02/21/2023     Priority: Medium    Malignant neoplasm of overlapping sites of right breast in female, estrogen receptor positive (H) 02/17/2023     Priority: Medium    Malignant neoplasm involving both nipple and areola of right breast in female, estrogen receptor positive (H) 02/17/2023     Priority: Medium     Added automatically from request for surgery [id number]        Past Medical History:   Diagnosis Date    Breast cancer (H)     right     Past Surgical History:   Procedure Laterality Date    AS REMOVAL OF OVARY(S) Left     BIOPSY NODE SENTINEL Right 8/9/2023    Procedure: Right sentinel lymph node biopsy with localizer and gamma probe;  Surgeon: Maribell Brunner MD;  Location: [facility] OR    BREAST CYST ASPIRATION      INSERT PORT VASCULAR ACCESS Left 02/22/2023    Procedure: Port Placement;  Surgeon: Maribell Brunner MD;  Location: [facility] OR    MASTECTOMY SIMPLE Bilateral 8/9/2023    Procedure: Bilateral mastectomies;;  Surgeon: Maribell Brunner MD;  Location: [facility] OR    RECONSTRUCT BREAST, INSERT TISSUE EXPANDER, COMBINED Bilateral 8/9/2023    Procedure: BILATERAL BREAST RECONSTRUCTION WITH TISSUE EXPANDERS;  Surgeon: Becky Hernandez MD;  Location: [facility] OR    TUNNELED VENOUS CATHETER PLACEMENT N/A 12/1/2023    Procedure: Port Removal;  Surgeon: Maribell Brunner MD;  Location: [facility] OR     Current Outpatient Medications   Medication Sig Dispense Refill    abemaciclib (VERZENIO) 150 MG tablet Take 1 tablet (150 mg) by mouth 2 times daily for 28 days 56 tablet 0    calcium carbonate 750 MG CHEW Take 750 mg by mouth daily      cholecalciferol (VITAMIN D3) 25 mcg (1000 units) capsule Take 1 capsule by mouth daily      cyclobenzaprine (FLEXERIL) 5 MG tablet Take 1 tablet (5 mg) by mouth 3 times  "daily as needed for muscle spasms 30 tablet 0    letrozole (FEMARA) 2.5 MG tablet Take 1 tablet (2.5 mg) by mouth daily for 28 days 28 tablet 0    Probiotic Product (PROBIOTIC PO) Take 1 chew tab by mouth daily         Allergies   Allergen Reactions    Seasonal Allergies      Nasal congestion   Watery eyes        Social History     Tobacco Use    Smoking status: Former     Types: Cigarettes     Quit date:      Years since quittin.2    Smokeless tobacco: Never   Substance Use Topics    Alcohol use: Not Currently     Family History   Problem Relation Age of Onset    Breast Cancer Mother      History   Drug Use Unknown     Comment: Gummies THC 5mg at bedtime as needed.         Review of Systems    Review of Systems  Constitutional, HEENT, cardiovascular, pulmonary, gi and gu systems are negative, except as otherwise noted.    Objective    /83   Pulse 107   Temp 97.3  F (36.3  C) (Tympanic)   Resp 18   Ht 1.676 m (5' 6\")   Wt 88 kg (194 lb)   SpO2 99%   BMI 31.31 kg/m     Estimated body mass index is 31.31 kg/m  as calculated from the following:    Height as of this encounter: 1.676 m (5' 6\").    Weight as of this encounter: 88 kg (194 lb).  Physical Exam  GENERAL: alert and no distress  EYES: Eyes grossly normal to inspection, PERRL and conjunctivae and sclerae normal  HENT: ear canals and TM's normal, nose and mouth without ulcers or lesions  NECK: no adenopathy, no asymmetry, masses, or scars  RESP: lungs clear to auscultation - no rales, rhonchi or wheezes  CV: regular rate and rhythm, normal S1 S2, no S3 or S4, no murmur, click or rub, no peripheral edema  ABDOMEN: soft, nontender, no hepatosplenomegaly, no masses and bowel sounds normal  MS: no gross musculoskeletal defects noted, no edema  SKIN: no suspicious lesions or rashes  NEURO: Normal strength and tone, mentation intact and speech normal  PSYCH: mentation appears normal, affect normal/bright    Recent Labs   Lab Test 24  1539 " 02/09/24  1502   HGB 11.9 12.7    311    140   POTASSIUM 4.6 4.1   CR 0.77 0.94        Diagnostics  Recent Results (from the past 720 hour(s))   Comprehensive metabolic panel    Collection Time: 03/02/24  3:39 PM   Result Value Ref Range    Sodium 138 135 - 145 mmol/L    Potassium 4.6 3.4 - 5.3 mmol/L    Carbon Dioxide (CO2) 23 22 - 29 mmol/L    Anion Gap 12 7 - 15 mmol/L    Urea Nitrogen 10.1 6.0 - 20.0 mg/dL    Creatinine 0.77 0.51 - 0.95 mg/dL    GFR Estimate >90 >60 mL/min/1.73m2    Calcium 9.7 8.6 - 10.0 mg/dL    Chloride 103 98 - 107 mmol/L    Glucose 129 (H) 70 - 99 mg/dL    Alkaline Phosphatase 92 40 - 150 U/L    AST 25 0 - 45 U/L    ALT 27 0 - 50 U/L    Protein Total 7.1 6.4 - 8.3 g/dL    Albumin 4.4 3.5 - 5.2 g/dL    Bilirubin Total 0.2 <=1.2 mg/dL   CBC with platelets and differential    Collection Time: 03/02/24  3:39 PM   Result Value Ref Range    WBC Count 5.0 4.0 - 11.0 10e3/uL    RBC Count 3.56 (L) 3.80 - 5.20 10e6/uL    Hemoglobin 11.9 11.7 - 15.7 g/dL    Hematocrit 34.9 (L) 35.0 - 47.0 %    MCV 98 78 - 100 fL    MCH 33.4 (H) 26.5 - 33.0 pg    MCHC 34.1 31.5 - 36.5 g/dL    RDW 13.3 10.0 - 15.0 %    Platelet Count 315 150 - 450 10e3/uL    % Neutrophils 69 %    % Lymphocytes 23 %    % Monocytes 5 %    % Eosinophils 2 %    % Basophils 1 %    % Immature Granulocytes 0 %    Absolute Neutrophils 3.4 1.6 - 8.3 10e3/uL    Absolute Lymphocytes 1.2 0.8 - 5.3 10e3/uL    Absolute Monocytes 0.2 0.0 - 1.3 10e3/uL    Absolute Eosinophils 0.1 0.0 - 0.7 10e3/uL    Absolute Basophils 0.1 0.0 - 0.2 10e3/uL    Absolute Immature Granulocytes 0.0 <=0.4 10e3/uL      Echo 2/24/23:   Interpretation Summary     Normal chamber sizes  There is borderline concentric left ventricular hypertrophy.  The visual ejection fraction is 60-65%.  No regional wall motion abnormalities noted.  The right ventricle is normal in size and function.  The interatrial septum bows toward the right atrium, consistent with  increased  left atrial pressure.  Possible patent foramen ovale.  There is trace to mild mitral regurgitation.  There is trace tricuspid regurgitation.      Revised Cardiac Risk Index (RCRI)  The patient has the following serious cardiovascular risks for perioperative complications:   - No serious cardiac risks = 0 points     RCRI Interpretation: 0 points: Class I (very low risk - 0.4% complication rate)         Signed Electronically by: Mayco Browne PA-C  Copy of this evaluation report is provided to requesting physician.

## 2024-03-26 NOTE — PATIENT INSTRUCTIONS
Preparing for Your Surgery  Getting started  A nurse will call you to review your health history and instructions. They will give you an arrival time based on your scheduled surgery time. Please be ready to share:  Your doctor's clinic name and phone number  Your medical, surgical, and anesthesia history  A list of allergies and sensitivities  A list of medicines, including herbal treatments and over-the-counter drugs  Whether the patient has a legal guardian (ask how to send us the papers in advance)  Please tell us if you're pregnant--or if there's any chance you might be pregnant. Some surgeries may injure a fetus (unborn baby), so they require a pregnancy test. Surgeries that are safe for a fetus don't always need a test, and you can choose whether to have one.   If you have a child who's having surgery, please ask for a copy of Preparing for Your Child's Surgery.    Preparing for surgery  Within 10 to 30 days of surgery: Have a pre-op exam (sometimes called an H&P, or History and Physical). This can be done at a clinic or pre-operative center.  If you're having a , you may not need this exam. Talk to your care team.  At your pre-op exam, talk to your care team about all medicines you take. If you need to stop any medicines before surgery, ask when to start taking them again.  We do this for your safety. Many medicines can make you bleed too much during surgery. Some change how well surgery (anesthesia) drugs work.  Call your insurance company to let them know you're having surgery. (If you don't have insurance, call [phone].)  Call your clinic if there's any change in your health. This includes signs of a cold or flu (sore throat, runny nose, cough, rash, fever). It also includes a scrape or scratch near the surgery site.  If you have questions on the day of surgery, call your hospital or surgery center.  Eating and drinking guidelines  For your safety: Unless your surgeon tells you otherwise,  follow the guidelines below.  Eat and drink as usual until 8 hours before you arrive for surgery. After that, no food or milk.  Drink clear liquids until 2 hours before you arrive. These are liquids you can see through, like water, Gatorade, and Propel Water. They also include plain black coffee and tea (no cream or milk), candy, and breath mints. You can spit out gum when you arrive.  If you drink alcohol: Stop drinking it the night before surgery.  If your care team tells you to take medicine on the morning of surgery, it's okay to take it with a sip of water.  Preventing infection  Shower or bathe the night before and morning of your surgery. Follow the instructions your clinic gave you. (If no instructions, use regular soap.)  Don't shave or clip hair near your surgery site. We'll remove the hair if needed.  Don't smoke or vape the morning of surgery. You may chew nicotine gum up to 2 hours before surgery. A nicotine patch is okay.  Note: Some surgeries require you to completely quit smoking and nicotine. Check with your surgeon.  Your care team will make every effort to keep you safe from infection. We will:  Clean our hands often with soap and water (or an alcohol-based hand rub).  Clean the skin at your surgery site with a special soap that kills germs.  Give you a special gown to keep you warm. (Cold raises the risk of infection.)  Wear special hair covers, masks, gowns and gloves during surgery.  Give antibiotic medicine, if prescribed. Not all surgeries need antibiotics.  What to bring on the day of surgery  Photo ID and insurance card  Copy of your health care directive, if you have one  Glasses and hearing aids (bring cases)  You can't wear contacts during surgery  Inhaler and eye drops, if you use them (tell us about these when you arrive)  CPAP machine or breathing device, if you use them  A few personal items, if spending the night  If you have . . .  A pacemaker, ICD (cardiac defibrillator) or other  implant: Bring the ID card.  An implanted stimulator: Bring the remote control.  A legal guardian: Bring a copy of the certified (court-stamped) guardianship papers.  Please remove any jewelry, including body piercings. Leave jewelry and other valuables at home.  If you're going home the day of surgery  You must have a responsible adult drive you home. They should stay with you overnight as well.  If you don't have someone to stay with you, and you aren't safe to go home alone, we may keep you overnight. Insurance often won't pay for this.  After surgery  If it's hard to control your pain or you need more pain medicine, please call your surgeon's office.  Questions?   If you have any questions for your care team, list them here: _________________________________________________________________________________________________________________________________________________________________________ ____________________________________ ____________________________________ ____________________________________  For informational purposes only. Not to replace the advice of your health care provider. Copyright   2003, 2019 [employer]. All rights reserved. Clinically reviewed by Lissa Zhong MD. [employer] 294445 - REV 12/22.    How to Take Your Medication Before Surgery  Hold all medications for 2 wks.

## 2024-05-02 DIAGNOSIS — R74.01 TRANSAMINITIS: ICD-10-CM

## 2024-05-02 DIAGNOSIS — C50.811 MALIGNANT NEOPLASM OF OVERLAPPING SITES OF RIGHT BREAST IN FEMALE, ESTROGEN RECEPTOR POSITIVE (H): Primary | ICD-10-CM

## 2024-05-02 DIAGNOSIS — Z17.0 MALIGNANT NEOPLASM OF OVERLAPPING SITES OF RIGHT BREAST IN FEMALE, ESTROGEN RECEPTOR POSITIVE (H): Primary | ICD-10-CM

## 2024-05-02 DIAGNOSIS — T45.1X5S ADVERSE EFFECT OF ANTINEOPLASTIC AND IMMUNOSUPPRESSIVE DRUGS, SEQUELA: ICD-10-CM

## 2024-05-03 ENCOUNTER — LAB (OUTPATIENT)
Dept: INFUSION THERAPY | Facility: HOSPITAL | Age: 49
End: 2024-05-03
Attending: INTERNAL MEDICINE
Payer: COMMERCIAL

## 2024-05-03 ENCOUNTER — ONCOLOGY VISIT (OUTPATIENT)
Dept: ONCOLOGY | Facility: HOSPITAL | Age: 49
End: 2024-05-03
Attending: INTERNAL MEDICINE
Payer: COMMERCIAL

## 2024-05-03 VITALS
OXYGEN SATURATION: 99 % | TEMPERATURE: 98 F | HEIGHT: 66 IN | DIASTOLIC BLOOD PRESSURE: 70 MMHG | HEART RATE: 83 BPM | RESPIRATION RATE: 18 BRPM | SYSTOLIC BLOOD PRESSURE: 128 MMHG | WEIGHT: 193 LBS | BODY MASS INDEX: 31.02 KG/M2

## 2024-05-03 DIAGNOSIS — C50.811 MALIGNANT NEOPLASM OF OVERLAPPING SITES OF RIGHT BREAST IN FEMALE, ESTROGEN RECEPTOR POSITIVE (H): ICD-10-CM

## 2024-05-03 DIAGNOSIS — C50.811 MALIGNANT NEOPLASM OF OVERLAPPING SITES OF RIGHT BREAST IN FEMALE, ESTROGEN RECEPTOR POSITIVE (H): Primary | ICD-10-CM

## 2024-05-03 DIAGNOSIS — T45.1X5S ADVERSE EFFECT OF ANTINEOPLASTIC AND IMMUNOSUPPRESSIVE DRUGS, SEQUELA: ICD-10-CM

## 2024-05-03 DIAGNOSIS — Z17.0 MALIGNANT NEOPLASM OF OVERLAPPING SITES OF RIGHT BREAST IN FEMALE, ESTROGEN RECEPTOR POSITIVE (H): Primary | ICD-10-CM

## 2024-05-03 DIAGNOSIS — Z17.0 MALIGNANT NEOPLASM OF OVERLAPPING SITES OF RIGHT BREAST IN FEMALE, ESTROGEN RECEPTOR POSITIVE (H): ICD-10-CM

## 2024-05-03 DIAGNOSIS — R74.01 TRANSAMINITIS: ICD-10-CM

## 2024-05-03 LAB
ALBUMIN SERPL BCG-MCNC: 4.6 G/DL (ref 3.5–5.2)
ALP SERPL-CCNC: 107 U/L (ref 40–150)
ALT SERPL W P-5'-P-CCNC: 32 U/L (ref 0–50)
ANION GAP SERPL CALCULATED.3IONS-SCNC: 10 MMOL/L (ref 7–15)
AST SERPL W P-5'-P-CCNC: 26 U/L (ref 0–45)
BASOPHILS # BLD AUTO: 0.1 10E3/UL (ref 0–0.2)
BASOPHILS NFR BLD AUTO: 1 %
BILIRUB SERPL-MCNC: 0.4 MG/DL
BUN SERPL-MCNC: 12.8 MG/DL (ref 6–20)
CALCIUM SERPL-MCNC: 10.1 MG/DL (ref 8.6–10)
CHLORIDE SERPL-SCNC: 103 MMOL/L (ref 98–107)
CREAT SERPL-MCNC: 0.8 MG/DL (ref 0.51–0.95)
DEPRECATED HCO3 PLAS-SCNC: 27 MMOL/L (ref 22–29)
EGFRCR SERPLBLD CKD-EPI 2021: 90 ML/MIN/1.73M2
EOSINOPHIL # BLD AUTO: 0.3 10E3/UL (ref 0–0.7)
EOSINOPHIL NFR BLD AUTO: 4 %
ERYTHROCYTE [DISTWIDTH] IN BLOOD BY AUTOMATED COUNT: 12.1 % (ref 10–15)
GLUCOSE SERPL-MCNC: 93 MG/DL (ref 70–99)
HCT VFR BLD AUTO: 37.5 % (ref 35–47)
HGB BLD-MCNC: 12.7 G/DL (ref 11.7–15.7)
IMM GRANULOCYTES # BLD: 0 10E3/UL
IMM GRANULOCYTES NFR BLD: 0 %
LYMPHOCYTES # BLD AUTO: 1.1 10E3/UL (ref 0.8–5.3)
LYMPHOCYTES NFR BLD AUTO: 18 %
MCH RBC QN AUTO: 32.1 PG (ref 26.5–33)
MCHC RBC AUTO-ENTMCNC: 33.9 G/DL (ref 31.5–36.5)
MCV RBC AUTO: 95 FL (ref 78–100)
MONOCYTES # BLD AUTO: 0.6 10E3/UL (ref 0–1.3)
MONOCYTES NFR BLD AUTO: 10 %
NEUTROPHILS # BLD AUTO: 4.1 10E3/UL (ref 1.6–8.3)
NEUTROPHILS NFR BLD AUTO: 67 %
NRBC # BLD AUTO: 0 10E3/UL
NRBC BLD AUTO-RTO: 0 /100
PLATELET # BLD AUTO: 370 10E3/UL (ref 150–450)
POTASSIUM SERPL-SCNC: 4.8 MMOL/L (ref 3.4–5.3)
PROT SERPL-MCNC: 7.7 G/DL (ref 6.4–8.3)
RBC # BLD AUTO: 3.96 10E6/UL (ref 3.8–5.2)
SODIUM SERPL-SCNC: 140 MMOL/L (ref 135–145)
WBC # BLD AUTO: 6.1 10E3/UL (ref 4–11)

## 2024-05-03 PROCEDURE — G2211 COMPLEX E/M VISIT ADD ON: HCPCS | Performed by: INTERNAL MEDICINE

## 2024-05-03 PROCEDURE — 99214 OFFICE O/P EST MOD 30 MIN: CPT | Performed by: INTERNAL MEDICINE

## 2024-05-03 PROCEDURE — 85025 COMPLETE CBC W/AUTO DIFF WBC: CPT

## 2024-05-03 PROCEDURE — 99213 OFFICE O/P EST LOW 20 MIN: CPT | Performed by: INTERNAL MEDICINE

## 2024-05-03 PROCEDURE — 36591 DRAW BLOOD OFF VENOUS DEVICE: CPT

## 2024-05-03 PROCEDURE — 80053 COMPREHEN METABOLIC PANEL: CPT

## 2024-05-03 ASSESSMENT — PAIN SCALES - GENERAL: PAINLEVEL: NO PAIN (0)

## 2024-05-03 NOTE — LETTER
5/3/2024         RE: Alicia Iniguez  [address] [facility] [address]        Dear Colleague,    Thank you for referring your patient, Alicia Iniguez, to the [facility] [place]. Please see a copy of my visit note below.        [facility] Hematology and Oncology Progress Note    Patient: Alicia Iniguez  MRN: [id number]  Date of Service: May 3, 2024          Reason for Visit    Chief Complaint   Patient presents with     Oncology Clinic Visit     3 month follow up with labs related to Malignant neoplasm of overlapping sites of right breast in female, estrogen receptor positive        Assessment and Plan     Cancer Staging   Malignant neoplasm of overlapping sites of right breast in female, estrogen receptor positive (H)  Staging form: Breast, AJCC 8th Edition  - Clinical: Stage IIA (cT3, cN2, cM0, G2, ER+, NC+, HER2-) - Signed by Shanelle Weaver APRN CNP on 3/1/2023  - Pathologic stage from 8/21/2023: No Stage Recommended (ypT3, pN1a(sn), cM0, G2, ER+, NC+, HER2-) - Signed by Ruth Orellana MD on 8/21/2023    1.  Locally Advanced Breast cancer, right side, stage IIa, T3 N2 M0, grade 2, ER/NC positive, HER2/janine negative: Status post neoadjuvant chemotherapy with dose dense Adriamycin and Cytoxan for 4 cycles, followed by weekly Taxol. Underwent bilateral mastectomy on 8/9/2023.  Surgical path showed significant residual disease as expected with minimum treatment effect.  Residual disease measured 5.6 x 3.2 x 2.7 cm in greatest dimensions.  Grade 2.  There was evidence of dermal infiltration with focal invasion of epidermis.  There was focal lymphovascular invasion within the dermal connective tissue.  No evidence of DCIS.  Margins were all negative.  Out of 20 lymph nodes examined 2 were positive for macrometastasis (sentinel lymph nodes).  No evidence of extranodal extension.  ypT3, ypN1a.     Has finished adjuvant radiation.  Was started on Verzenio and  letrozole in November 2023.    Noted.  No significant side effects.  Recently underwent stage II reconstruction surgery with silicone implant placement and fat grafting.  Both letrozole and Verzenio were held for a month.     Labs reviewed today.  Serum chemistry shows mildly elevated calcium at 10.1 but otherwise normal.  CBC is normal.  She will resume both letrozole now and will restart Verzenio after meeting with plastic surgery.  Will repeat labs in 2 months and I will see her back in 4 months.  Will hold off on Zoladex injections since she has not had any menstrual cycle for more than a year now.  Continues to have hot flashes.    ECOG Performance    0 - Independent    Distress Screening (within last 30 days)    1. How concerned are you about your ability to eat? : 0  2. How concerned are you about unintended weight loss or your current weight? : 0  3. How concerned are you about feeling depressed or very sad? : 0  4. How concerned are you about feeling anxious or very scared? : 5  5. Do you struggle with the loss of meaning and tiago in your life? : Not at all  6. How concerned are you about work and home life issues that may be affected by your cancer? : 0  7. How concerned are you about knowing what resources are available to help you? : 0  8. Do you currently have what you would describe as Christianity or spiritual struggles?            : Not at all       Pain  Pain Score: No Pain (0)    Problem List    Patient Active Problem List   Diagnosis     Malignant neoplasm of overlapping sites of right breast in female, estrogen receptor positive (H)     Malignant neoplasm involving both nipple and areola of right breast in female, estrogen receptor positive (H)     Adverse effect of antineoplastic and immunosuppressive drugs, sequela     Chest pain, unspecified     Hyperlipidemia     Personal history of breast cancer        ______________________________________________________________________________    History of  Present Illness    Oncology history:  DIAGNOSIS: Breast cancer, right side, patient had a palpable lump with some nipple and skin changes.  She was also found to have lymphadenopathy in the axilla.  Patient had an ultrasound and mammogram that showed a 4.1 cm tumor with axillary lymphadenopathy  Biopsy was done January 31 and it showed invasive ductal carcinoma.  Grade 2.  ER ME positive and HER2/janine negative.  Biopsy was done of the lymph node as well and it was positive.    TREATMENT: Patient has started neoadjuvant chemotherapy with dose dense Adriamycin and Cytoxan for 4 cycles and then 12 weekly Taxol.     Bilateral mastectomy on 8/9/2023    A) RIGHT BREAST, MASTECTOMY:  -INVASIVE DUCTAL CARCINOMA (5.6 x 3.2 x 2.7 IN GREATEST DIMENSIONS)     -BREAST CANCER PROGNOSTIC MARKER ANALYSIS     ESTROGEN RECEPTOR: POSITIVE (95% OF CELLS, STRONG STAINING     PROGESTERONE RECEPTOR: POSITIVE (95% OF CELLS, STRONG STAINING     HER2: NEGATIVE (1+ OUT OF 3 MEMBRANE STAINING)     Dr. Paty Berry and Dr. Richard Townsend have also reviewed this case and concur with the breast cancer prognostic marker analysis.     The following data are used for Residual Cancer [place] calculation:      Primary tumor bed area: 56 mm x 32 mm.  Overall cancer cellularity (as percentage of area): 95%.  Percentage of cancer that is in situ disease: 2%.  Number of positive lymph nodes: 2.  Diameter of largest metastasis: 3 mm.    Residual cancer burden score of 3    Adjuvant endocrine therapy with Zoladex and letrozole initially.    Zoladex stopped in November and continued on letrozole only.  Verzenio started on 11/9/2023.    INTERIM HISTORY:   48-year-old female with right-sided locally advanced breast cancer status post neoadjuvant chemotherapy with AC followed by T who recently underwent bilateral mastectomy she had significant residual disease.  ypT3, ypN1a.     Status post adjuvant radiation.  We also started her on Zoladex and letrozole.  But  "she has not had any menstrual cycle since finishing chemotherapy so decided to stop Zoladex and continue to watch her off of it.  Hormone studies showed mixed results.  LH and FSH were not in the postmenopausal range but estradiol was less than 5.  I thought that the normal LH and FSH were due to recent Zoladex injections.  She also started Verzenio on 11/9/2023.    Well on continue on letrozole through the major side effects.  Recently underwent stage II reconstruction surgery.  Both the drugs were held for over a month.  Now she has fully recovered.  Here to discuss further management.  Denies any new issues today.  Surgical scars have healed well.    Review of Systems    Pertinent items are noted in HPI.    Past History    Past Medical History:   Diagnosis Date     Breast cancer (H)     right       PHYSICAL EXAM  /70 (BP Location: Left arm, Patient Position: Sitting, Cuff Size: Adult Large)   Pulse 83   Temp 98  F (36.7  C) (Tympanic)   Resp 18   Ht 1.676 m (5' 6\")   Wt 87.5 kg (193 lb)   LMP  (Approximate)   SpO2 99%   BMI 31.15 kg/m      GENERAL: no acute distress. Cooperative in conversation.  Lungs: Clear to auscultation bilaterally  CNS: Nonfocal  CVS: RRR  LYMPH nodes: No clinically palpable lymph nodes peripherally    Lab Results    Recent Results (from the past 168 hour(s))   Comprehensive metabolic panel (BMP + Alb, Alk Phos, ALT, AST, Total. Bili, TP)   Result Value Ref Range    Sodium 140 135 - 145 mmol/L    Potassium 4.8 3.4 - 5.3 mmol/L    Carbon Dioxide (CO2) 27 22 - 29 mmol/L    Anion Gap 10 7 - 15 mmol/L    Urea Nitrogen 12.8 6.0 - 20.0 mg/dL    Creatinine 0.80 0.51 - 0.95 mg/dL    GFR Estimate 90 >60 mL/min/1.73m2    Calcium 10.1 (H) 8.6 - 10.0 mg/dL    Chloride 103 98 - 107 mmol/L    Glucose 93 70 - 99 mg/dL    Alkaline Phosphatase 107 40 - 150 U/L    AST 26 0 - 45 U/L    ALT 32 0 - 50 U/L    Protein Total 7.7 6.4 - 8.3 g/dL    Albumin 4.6 3.5 - 5.2 g/dL    Bilirubin Total 0.4 " "<=1.2 mg/dL   CBC with platelets and differential   Result Value Ref Range    WBC Count 6.1 4.0 - 11.0 10e3/uL    RBC Count 3.96 3.80 - 5.20 10e6/uL    Hemoglobin 12.7 11.7 - 15.7 g/dL    Hematocrit 37.5 35.0 - 47.0 %    MCV 95 78 - 100 fL    MCH 32.1 26.5 - 33.0 pg    MCHC 33.9 31.5 - 36.5 g/dL    RDW 12.1 10.0 - 15.0 %    Platelet Count 370 150 - 450 10e3/uL    % Neutrophils 67 %    % Lymphocytes 18 %    % Monocytes 10 %    % Eosinophils 4 %    % Basophils 1 %    % Immature Granulocytes 0 %    NRBCs per 100 WBC 0 <1 /100    Absolute Neutrophils 4.1 1.6 - 8.3 10e3/uL    Absolute Lymphocytes 1.1 0.8 - 5.3 10e3/uL    Absolute Monocytes 0.6 0.0 - 1.3 10e3/uL    Absolute Eosinophils 0.3 0.0 - 0.7 10e3/uL    Absolute Basophils 0.1 0.0 - 0.2 10e3/uL    Absolute Immature Granulocytes 0.0 <=0.4 10e3/uL    Absolute NRBCs 0.0 10e3/uL           Imaging    No results found.    A total of 35 min was spent today on this visit which included face to face conversation with the patient, EMR review, counseling and co-ordination of care and medical documentation.    The longitudinal plan of care for the diagnosis(es)/condition(s) as documented were addressed during this visit. Due to the added complexity in care, I will continue to support Alicia in the subsequent management and with ongoing continuity of care.        Signed by: Paola Saleem MD    Oncology Rooming Note    May 3, 2024 10:08 AM   Alicia Iniguez is a 48 year old female who presents for:    Chief Complaint   Patient presents with     Oncology Clinic Visit     3 month follow up with labs related to Malignant neoplasm of overlapping sites of right breast in female, estrogen receptor positive      Initial Vitals: /70 (BP Location: Left arm, Patient Position: Sitting, Cuff Size: Adult Large)   Pulse 83   Temp 98  F (36.7  C) (Tympanic)   Resp 18   Ht 1.676 m (5' 6\")   Wt 87.5 kg (193 lb)   LMP  (Approximate)   SpO2 99%   BMI 31.15 kg/m   Estimated " "body mass index is 31.15 kg/m  as calculated from the following:    Height as of this encounter: 1.676 m (5' 6\").    Weight as of this encounter: 87.5 kg (193 lb). Body surface area is 2.02 meters squared.  No Pain (0) Comment: Data Unavailable   No LMP recorded (approximate). (Menstrual status: Chemotherapy).  Allergies reviewed: Yes  Medications reviewed: Yes    Medications: Medication refills not needed today.  Pharmacy name entered into [employer]:    [facility] - [place], [place] - [address]  [id number] - [place], [place] - [address] [facility] AT [facility]    Frailty Screening:   Is the patient here for a new oncology consult visit in cancer care? 2. No      Clinical concerns: No      DIANA QUINN CMA                Again, thank you for allowing me to participate in the care of your patient.        Sincerely,        Paola Saleem MD  "

## 2024-05-03 NOTE — PROGRESS NOTES
[facility] Hematology and Oncology Progress Note    Patient: Alicia Iniguez  MRN: [id number]  Date of Service: May 3, 2024          Reason for Visit    Chief Complaint   Patient presents with    Oncology Clinic Visit     3 month follow up with labs related to Malignant neoplasm of overlapping sites of right breast in female, estrogen receptor positive        Assessment and Plan     Cancer Staging   Malignant neoplasm of overlapping sites of right breast in female, estrogen receptor positive (H)  Staging form: Breast, AJCC 8th Edition  - Clinical: Stage IIA (cT3, cN2, cM0, G2, ER+, AL+, HER2-) - Signed by Shanelle Weaver APRN CNP on 3/1/2023  - Pathologic stage from 8/21/2023: No Stage Recommended (ypT3, pN1a(sn), cM0, G2, ER+, AL+, HER2-) - Signed by Ruth Orellana MD on 8/21/2023    1.  Locally Advanced Breast cancer, right side, stage IIa, T3 N2 M0, grade 2, ER/AL positive, HER2/janine negative: Status post neoadjuvant chemotherapy with dose dense Adriamycin and Cytoxan for 4 cycles, followed by weekly Taxol. Underwent bilateral mastectomy on 8/9/2023.  Surgical path showed significant residual disease as expected with minimum treatment effect.  Residual disease measured 5.6 x 3.2 x 2.7 cm in greatest dimensions.  Grade 2.  There was evidence of dermal infiltration with focal invasion of epidermis.  There was focal lymphovascular invasion within the dermal connective tissue.  No evidence of DCIS.  Margins were all negative.  Out of 20 lymph nodes examined 2 were positive for macrometastasis (sentinel lymph nodes).  No evidence of extranodal extension.  ypT3, ypN1a.     Has finished adjuvant radiation.  Was started on Verzenio and letrozole in November 2023.    Noted.  No significant side effects.  Recently underwent stage II reconstruction surgery with silicone implant placement and fat grafting.  Both letrozole and Verzenio were held for a month.     Labs reviewed today.  Serum chemistry  shows mildly elevated calcium at 10.1 but otherwise normal.  CBC is normal.  She will resume both letrozole now and will restart Verzenio after meeting with plastic surgery.  Will repeat labs in 2 months and I will see her back in 4 months.  Will hold off on Zoladex injections since she has not had any menstrual cycle for more than a year now.  Continues to have hot flashes.    ECOG Performance    0 - Independent    Distress Screening (within last 30 days)    1. How concerned are you about your ability to eat? : 0  2. How concerned are you about unintended weight loss or your current weight? : 0  3. How concerned are you about feeling depressed or very sad? : 0  4. How concerned are you about feeling anxious or very scared? : 5  5. Do you struggle with the loss of meaning and tiago in your life? : Not at all  6. How concerned are you about work and home life issues that may be affected by your cancer? : 0  7. How concerned are you about knowing what resources are available to help you? : 0  8. Do you currently have what you would describe as Adventism or spiritual struggles?            : Not at all       Pain  Pain Score: No Pain (0)    Problem List    Patient Active Problem List   Diagnosis    Malignant neoplasm of overlapping sites of right breast in female, estrogen receptor positive (H)    Malignant neoplasm involving both nipple and areola of right breast in female, estrogen receptor positive (H)    Adverse effect of antineoplastic and immunosuppressive drugs, sequela    Chest pain, unspecified    Hyperlipidemia    Personal history of breast cancer        ______________________________________________________________________________    History of Present Illness    Oncology history:  DIAGNOSIS: Breast cancer, right side, patient had a palpable lump with some nipple and skin changes.  She was also found to have lymphadenopathy in the axilla.  Patient had an ultrasound and mammogram that showed a 4.1 cm tumor with  axillary lymphadenopathy  Biopsy was done January 31 and it showed invasive ductal carcinoma.  Grade 2.  ER AL positive and HER2/janine negative.  Biopsy was done of the lymph node as well and it was positive.    TREATMENT: Patient has started neoadjuvant chemotherapy with dose dense Adriamycin and Cytoxan for 4 cycles and then 12 weekly Taxol.     Bilateral mastectomy on 8/9/2023    A) RIGHT BREAST, MASTECTOMY:  -INVASIVE DUCTAL CARCINOMA (5.6 x 3.2 x 2.7 IN GREATEST DIMENSIONS)     -BREAST CANCER PROGNOSTIC MARKER ANALYSIS     ESTROGEN RECEPTOR: POSITIVE (95% OF CELLS, STRONG STAINING     PROGESTERONE RECEPTOR: POSITIVE (95% OF CELLS, STRONG STAINING     HER2: NEGATIVE (1+ OUT OF 3 MEMBRANE STAINING)     Dr. Paty Berry and Dr. Richard Townsend have also reviewed this case and concur with the breast cancer prognostic marker analysis.     The following data are used for Residual Cancer [place] calculation:      Primary tumor bed area: 56 mm x 32 mm.  Overall cancer cellularity (as percentage of area): 95%.  Percentage of cancer that is in situ disease: 2%.  Number of positive lymph nodes: 2.  Diameter of largest metastasis: 3 mm.    Residual cancer burden score of 3    Adjuvant endocrine therapy with Zoladex and letrozole initially.    Zoladex stopped in November and continued on letrozole only.  Verzenio started on 11/9/2023.    INTERIM HISTORY:   48-year-old female with right-sided locally advanced breast cancer status post neoadjuvant chemotherapy with AC followed by T who recently underwent bilateral mastectomy she had significant residual disease.  ypT3, ypN1a.     Status post adjuvant radiation.  We also started her on Zoladex and letrozole.  But she has not had any menstrual cycle since finishing chemotherapy so decided to stop Zoladex and continue to watch her off of it.  Hormone studies showed mixed results.  LH and FSH were not in the postmenopausal range but estradiol was less than 5.  I thought that the  "normal LH and FSH were due to recent Zoladex injections.  She also started Verzenio on 11/9/2023.    Well on continue on letrozole through the major side effects.  Recently underwent stage II reconstruction surgery.  Both the drugs were held for over a month.  Now she has fully recovered.  Here to discuss further management.  Denies any new issues today.  Surgical scars have healed well.    Review of Systems    Pertinent items are noted in HPI.    Past History    Past Medical History:   Diagnosis Date    Breast cancer (H)     right       PHYSICAL EXAM  /70 (BP Location: Left arm, Patient Position: Sitting, Cuff Size: Adult Large)   Pulse 83   Temp 98  F (36.7  C) (Tympanic)   Resp 18   Ht 1.676 m (5' 6\")   Wt 87.5 kg (193 lb)   LMP  (Approximate)   SpO2 99%   BMI 31.15 kg/m      GENERAL: no acute distress. Cooperative in conversation.  Lungs: Clear to auscultation bilaterally  CNS: Nonfocal  CVS: RRR  LYMPH nodes: No clinically palpable lymph nodes peripherally    Lab Results    Recent Results (from the past 168 hour(s))   Comprehensive metabolic panel (BMP + Alb, Alk Phos, ALT, AST, Total. Bili, TP)   Result Value Ref Range    Sodium 140 135 - 145 mmol/L    Potassium 4.8 3.4 - 5.3 mmol/L    Carbon Dioxide (CO2) 27 22 - 29 mmol/L    Anion Gap 10 7 - 15 mmol/L    Urea Nitrogen 12.8 6.0 - 20.0 mg/dL    Creatinine 0.80 0.51 - 0.95 mg/dL    GFR Estimate 90 >60 mL/min/1.73m2    Calcium 10.1 (H) 8.6 - 10.0 mg/dL    Chloride 103 98 - 107 mmol/L    Glucose 93 70 - 99 mg/dL    Alkaline Phosphatase 107 40 - 150 U/L    AST 26 0 - 45 U/L    ALT 32 0 - 50 U/L    Protein Total 7.7 6.4 - 8.3 g/dL    Albumin 4.6 3.5 - 5.2 g/dL    Bilirubin Total 0.4 <=1.2 mg/dL   CBC with platelets and differential   Result Value Ref Range    WBC Count 6.1 4.0 - 11.0 10e3/uL    RBC Count 3.96 3.80 - 5.20 10e6/uL    Hemoglobin 12.7 11.7 - 15.7 g/dL    Hematocrit 37.5 35.0 - 47.0 %    MCV 95 78 - 100 fL    MCH 32.1 26.5 - 33.0 pg    " MCHC 33.9 31.5 - 36.5 g/dL    RDW 12.1 10.0 - 15.0 %    Platelet Count 370 150 - 450 10e3/uL    % Neutrophils 67 %    % Lymphocytes 18 %    % Monocytes 10 %    % Eosinophils 4 %    % Basophils 1 %    % Immature Granulocytes 0 %    NRBCs per 100 WBC 0 <1 /100    Absolute Neutrophils 4.1 1.6 - 8.3 10e3/uL    Absolute Lymphocytes 1.1 0.8 - 5.3 10e3/uL    Absolute Monocytes 0.6 0.0 - 1.3 10e3/uL    Absolute Eosinophils 0.3 0.0 - 0.7 10e3/uL    Absolute Basophils 0.1 0.0 - 0.2 10e3/uL    Absolute Immature Granulocytes 0.0 <=0.4 10e3/uL    Absolute NRBCs 0.0 10e3/uL           Imaging    No results found.    A total of 35 min was spent today on this visit which included face to face conversation with the patient, EMR review, counseling and co-ordination of care and medical documentation.    The longitudinal plan of care for the diagnosis(es)/condition(s) as documented were addressed during this visit. Due to the added complexity in care, I will continue to support Alicia in the subsequent management and with ongoing continuity of care.        Signed by: Paola Saleem MD

## 2024-05-03 NOTE — PROGRESS NOTES
"Oncology Rooming Note    May 3, 2024 10:08 AM   Alicia Iniguez is a 48 year old female who presents for:    Chief Complaint   Patient presents with    Oncology Clinic Visit     3 month follow up with labs related to Malignant neoplasm of overlapping sites of right breast in female, estrogen receptor positive      Initial Vitals: /70 (BP Location: Left arm, Patient Position: Sitting, Cuff Size: Adult Large)   Pulse 83   Temp 98  F (36.7  C) (Tympanic)   Resp 18   Ht 1.676 m (5' 6\")   Wt 87.5 kg (193 lb)   LMP  (Approximate)   SpO2 99%   BMI 31.15 kg/m   Estimated body mass index is 31.15 kg/m  as calculated from the following:    Height as of this encounter: 1.676 m (5' 6\").    Weight as of this encounter: 87.5 kg (193 lb). Body surface area is 2.02 meters squared.  No Pain (0) Comment: Data Unavailable   No LMP recorded (approximate). (Menstrual status: Chemotherapy).  Allergies reviewed: Yes  Medications reviewed: Yes    Medications: Medication refills not needed today.  Pharmacy name entered into [employer]:    [facility] - [place], [place] - [address] [id number] - [place], [place] - [address] [facility] AT [facility]    Frailty Screening:   Is the patient here for a new oncology consult visit in cancer care? 2. No      Clinical concerns: No      DIANA QUINN CMA              "

## 2024-05-07 ENCOUNTER — E-VISIT (OUTPATIENT)
Dept: URGENT CARE | Facility: CLINIC | Age: 49
End: 2024-05-07
Payer: COMMERCIAL

## 2024-05-07 DIAGNOSIS — H10.30 ACUTE BACTERIAL CONJUNCTIVITIS, UNSPECIFIED LATERALITY: Primary | ICD-10-CM

## 2024-05-07 PROCEDURE — 99421 OL DIG E/M SVC 5-10 MIN: CPT | Performed by: PHYSICIAN ASSISTANT

## 2024-05-07 RX ORDER — POLYMYXIN B SULFATE AND TRIMETHOPRIM 1; 10000 MG/ML; [USP'U]/ML
SOLUTION OPHTHALMIC
Qty: 10 ML | Refills: 0 | Status: SHIPPED | OUTPATIENT
Start: 2024-05-07 | End: 2024-05-14

## 2024-05-07 NOTE — PATIENT INSTRUCTIONS
Thank you for choosing us for your care. I have placed an order for a prescription so that you can start treatment. View your full visit summary for details by clicking on the link below. Your pharmacist will able to address any questions you may have about the medication.     If you re not feeling better within 2-3 days, please schedule an appointment.  You can schedule an appointment right here in [facility], or call [phone]  If the visit is for the same symptoms as your eVisit, we ll refund the cost of your eVisit if seen within seven days.    Pinkeye: Care Instructions  Overview     Pinkeye is redness and swelling of the eye surface and the conjunctiva (the lining of the eyelid and the covering of the white part of the eye). Pinkeye is also called conjunctivitis. Pinkeye is often caused by infection with bacteria or a virus. Dry air, allergies, smoke, and chemicals are other common causes.  Pinkeye often gets better on its own in 7 to 10 days. Antibiotics only help if the pinkeye is caused by bacteria. Pinkeye caused by infection spreads easily. If an allergy or chemical is causing pinkeye, it will not go away unless you can avoid whatever is causing it.  Follow-up care is a key part of your treatment and safety. Be sure to make and go to all appointments, and call your doctor if you are having problems. It's also a good idea to know your test results and keep a list of the medicines you take.  How can you care for yourself at home?  Wash your hands often. Always wash them before and after you treat pinkeye or touch your eyes or face.  Use moist cotton or a clean, wet cloth to remove crust. Wipe from the inside corner of the eye to the outside. Use a clean part of the cloth for each wipe.  Put cold or warm wet cloths on your eye a few times a day if the eye hurts.  Do not wear contact lenses or eye makeup until the pinkeye is gone. Throw away any eye makeup you were using when you got pinkeye. Clean your  "contacts and storage case. If you wear disposable contacts, use a new pair when your eye has cleared and it is safe to wear contacts again.  If the doctor gave you antibiotic ointment or eyedrops, use them as directed. Use the medicine for as long as instructed, even if your eye starts looking better soon. Keep the bottle tip clean, and do not let it touch the eye area.  To put in eyedrops or ointment:  Tilt your head back, and pull your lower eyelid down with one finger.  Drop or squirt the medicine inside the lower lid.  Close your eye for 30 to 60 seconds to let the drops or ointment move around.  Do not touch the ointment or dropper tip to your eyelashes or any other surface.  Do not share towels, pillows, or washcloths while you have pinkeye.  When should you call for help?   Call your doctor now or seek immediate medical care if:    You have pain in your eye, not just irritation on the surface.     You have a change in vision or loss of vision.     You have an increase in discharge from the eye.     Your eye has not started to improve or begins to get worse within 48 hours after you start using antibiotics.     Pinkeye lasts longer than 7 days.   Watch closely for changes in your health, and be sure to contact your doctor if you have any problems.  Where can you learn more?  Go to https://www.[employer].net/patiented  Enter Y392 in the search box to learn more about \"Pinkeye: Care Instructions.\"  Current as of: June 5, 2023               Content Version: 14.0    4863-0892 [employer].   Care instructions adapted under license by your healthcare professional. If you have questions about a medical condition or this instruction, always ask your healthcare professional. [employer] disclaims any warranty or liability for your use of this information.       Taking Care of Pinkeye at Home (01:30)  Your health professional recommends that you watch this short online health video.  Learn ways " to ease the discomfort of pinkeye and keep the infection from spreading.  Purpose:  Describes basic home care for pinkeye to ease discomfort and prevent the spread of the infection.  Goal:  User will learn home treatment for pinkeye.     How to watch the video    Scan the QR code   OR Visit the website    https://hwi.[email or web address]   Current as of: June 5, 2023               Content Version: 14.0    6565-2648 [employer].   Care instructions adapted under license by your healthcare professional. If you have questions about a medical condition or this instruction, always ask your healthcare professional. [employer] disclaims any warranty or liability for your use of this information.

## 2024-05-14 ENCOUNTER — TELEPHONE (OUTPATIENT)
Dept: FAMILY MEDICINE | Facility: CLINIC | Age: 49
End: 2024-05-14
Payer: COMMERCIAL

## 2024-05-14 DIAGNOSIS — C50.811 MALIGNANT NEOPLASM OF OVERLAPPING SITES OF RIGHT BREAST IN FEMALE, ESTROGEN RECEPTOR POSITIVE (H): ICD-10-CM

## 2024-05-14 DIAGNOSIS — Z17.0 MALIGNANT NEOPLASM OF OVERLAPPING SITES OF RIGHT BREAST IN FEMALE, ESTROGEN RECEPTOR POSITIVE (H): ICD-10-CM

## 2024-05-14 RX ORDER — LETROZOLE 2.5 MG/1
2.5 TABLET, FILM COATED ORAL DAILY
Qty: 90 TABLET | Refills: 0 | Status: SHIPPED | OUTPATIENT
Start: 2024-05-14 | End: 2024-08-29

## 2024-05-14 NOTE — TELEPHONE ENCOUNTER
Patient Quality Outreach    Patient is due for the following:   Colon Cancer Screening  Physical Preventive Adult Physical    Next Steps:   Schedule a Adult Preventative    Type of outreach:    Sent [employer] message.    Next Steps:  Reach out within 90 days via [employer].    Max number of attempts reached: No. Will try again in 90 days if patient still on fail list.    Questions for provider review:    None           Mago Farrell MA  Chart routed to Care Team.

## 2024-05-29 ENCOUNTER — MYC MEDICAL ADVICE (OUTPATIENT)
Dept: ONCOLOGY | Facility: HOSPITAL | Age: 49
End: 2024-05-29
Payer: COMMERCIAL

## 2024-05-29 ENCOUNTER — TELEPHONE (OUTPATIENT)
Dept: ONCOLOGY | Facility: HOSPITAL | Age: 49
End: 2024-05-29
Payer: COMMERCIAL

## 2024-05-29 NOTE — TELEPHONE ENCOUNTER
Oral Chemotherapy Monitoring Program    Subjective/Objective:  Alicia Iniguez is a 48 year old female contacted by phone for a follow-up visit for oral chemotherapy.  Alicia returned my call to confirm her abemaciclib was resumed 5/13 after her plastic surgery appointment. Things have been going well since resuming and she reports no adverse effects.        2/6/2024    10:00 AM 2/12/2024    11:00 AM 2/22/2024     8:00 AM 3/11/2024    11:00 AM 3/19/2024     9:00 AM 5/29/2024     1:00 PM 5/29/2024     2:00 PM   ORAL CHEMOTHERAPY   Assessment Type Monthly Follow up Lab Monitoring Refill Monthly Follow up Refill Left Voicemail Monthly Follow up   Diagnosis Code Breast Cancer Breast Cancer Breast Cancer Breast Cancer Breast Cancer Breast Cancer Breast Cancer   Providers Dr. Stiven Saleem   Clinic Name/Location [facility]   Drug Name Verzenio (abemaciclib) Verzenio (abemaciclib) Verzenio (abemaciclib) Verzenio (abemaciclib) Verzenio (abemaciclib) Verzenio (abemaciclib) Verzenio (abemaciclib)   Dose 150 mg 150 mg 150 mg 150 mg 150 mg 150 mg 150 mg   Current Schedule BID BID BID BID BID     Cycle Details Continuous Continuous Continuous Continuous Continuous Continuous Continuous   Start Date of Last Cycle    11/9/2023      Doses missed in last 2 weeks 0   0      Adherence Assessment Adherent   Adherent   Adherent   Adverse Effects No AE identified during assessment   No AE identified during assessment   No AE identified during assessment   Any new drug interactions? No   No      Is the dose as ordered appropriate for the patient? Yes   Yes          Last PHQ-2 Score on record:       3/26/2024     9:43 AM 7/26/2023     6:49 AM   PHQ-2 ( 1999 [employer])   Q1: Little interest or pleasure in doing things 0 0   Q2: Feeling down, depressed or hopeless 0 0   PHQ-2 Score 0 0   Q1: Little  "interest or pleasure in doing things Not at all Not at all   Q2: Feeling down, depressed or hopeless Not at all Not at all   PHQ-2 Score 0 0       Vitals:  BP:   BP Readings from Last 1 Encounters:   05/03/24 128/70     Wt Readings from Last 1 Encounters:   05/03/24 87.5 kg (193 lb)     Estimated body surface area is 2.02 meters squared as calculated from the following:    Height as of 5/3/24: 1.676 m (5' 6\").    Weight as of 5/3/24: 87.5 kg (193 lb).    Labs:  _  Result Component Current Result Ref Range   Sodium 140 (5/3/2024) 135 - 145 mmol/L     _  Result Component Current Result Ref Range   Potassium 4.8 (5/3/2024) 3.4 - 5.3 mmol/L     _  Result Component Current Result Ref Range   Calcium 10.1 (H) (5/3/2024) 8.6 - 10.0 mg/dL     _  Result Component Current Result Ref Range   Albumin 4.6 (5/3/2024) 3.5 - 5.2 g/dL     _  Result Component Current Result Ref Range   Urea Nitrogen 12.8 (5/3/2024) 6.0 - 20.0 mg/dL     _  Result Component Current Result Ref Range   Creatinine 0.80 (5/3/2024) 0.51 - 0.95 mg/dL     _  Result Component Current Result Ref Range   AST 26 (5/3/2024) 0 - 45 U/L     _  Result Component Current Result Ref Range   ALT 32 (5/3/2024) 0 - 50 U/L     _  Result Component Current Result Ref Range   Bilirubin Total 0.4 (5/3/2024) <=1.2 mg/dL     _  Result Component Current Result Ref Range   WBC Count 6.1 (5/3/2024) 4.0 - 11.0 10e3/uL     _  Result Component Current Result Ref Range   Hemoglobin 12.7 (5/3/2024) 11.7 - 15.7 g/dL     _  Result Component Current Result Ref Range   Platelet Count 370 (5/3/2024) 150 - 450 10e3/uL     _  Result Component Current Result Ref Range   Absolute Neutrophils 4.1 (5/3/2024) 1.6 - 8.3 10e3/uL        Assessment/Plan:  Alicia has tolerated abemaciclib well since resuming. Continue therapy as planned. I updated her refill date to reflect when she resumed therapy. Will try to get her in for labs about month after she resumed.    Follow-Up:  ~6/10: due for monthly labs " (about one month after resuming)  ~6/26: next monthly assessment    Refill Due:  Needs ~6/10, will release 6/3    Dhruv Tolliver, PharmD  PGY-2 Oncology Pharmacy Resident  Oral Chemotherapy Monitoring Program  [phone]

## 2024-05-29 NOTE — TELEPHONE ENCOUNTER
Oral Chemotherapy Monitoring Program     Placed call to patient in follow up of oral chemotherapy. Hoping to complete monthly assessment and confirm which day Alicia resumed her abemaciclib so we can time next refill appropriately. Left message requesting call back. No drug names were mentioned. Will also send patient a [employer] message to try to confirm date of resumption.    Dhruv Tolliver, PharmD  PGY-2 Oncology Pharmacy Resident  Oral Chemotherapy Monitoring Program  [phone]

## 2024-06-04 DIAGNOSIS — C50.811 MALIGNANT NEOPLASM OF OVERLAPPING SITES OF RIGHT BREAST IN FEMALE, ESTROGEN RECEPTOR POSITIVE (H): Primary | ICD-10-CM

## 2024-06-04 DIAGNOSIS — Z17.0 MALIGNANT NEOPLASM OF OVERLAPPING SITES OF RIGHT BREAST IN FEMALE, ESTROGEN RECEPTOR POSITIVE (H): Primary | ICD-10-CM

## 2024-06-05 ENCOUNTER — DOCUMENTATION ONLY (OUTPATIENT)
Dept: ONCOLOGY | Facility: HOSPITAL | Age: 49
End: 2024-06-05
Payer: COMMERCIAL

## 2024-06-05 DIAGNOSIS — Z17.0 MALIGNANT NEOPLASM OF OVERLAPPING SITES OF RIGHT BREAST IN FEMALE, ESTROGEN RECEPTOR POSITIVE (H): Primary | ICD-10-CM

## 2024-06-05 DIAGNOSIS — C50.811 MALIGNANT NEOPLASM OF OVERLAPPING SITES OF RIGHT BREAST IN FEMALE, ESTROGEN RECEPTOR POSITIVE (H): Primary | ICD-10-CM

## 2024-06-05 RX ORDER — LETROZOLE 2.5 MG/1
2.5 TABLET, FILM COATED ORAL DAILY
Qty: 90 TABLET | Refills: 0 | Status: SHIPPED | OUTPATIENT
Start: 2024-06-10 | End: 2024-08-29

## 2024-06-07 DIAGNOSIS — Z17.0 MALIGNANT NEOPLASM OF OVERLAPPING SITES OF RIGHT BREAST IN FEMALE, ESTROGEN RECEPTOR POSITIVE (H): Primary | ICD-10-CM

## 2024-06-07 DIAGNOSIS — C50.811 MALIGNANT NEOPLASM OF OVERLAPPING SITES OF RIGHT BREAST IN FEMALE, ESTROGEN RECEPTOR POSITIVE (H): Primary | ICD-10-CM

## 2024-07-01 DIAGNOSIS — C50.811 MALIGNANT NEOPLASM OF OVERLAPPING SITES OF RIGHT BREAST IN FEMALE, ESTROGEN RECEPTOR POSITIVE (H): Primary | ICD-10-CM

## 2024-07-01 DIAGNOSIS — Z17.0 MALIGNANT NEOPLASM OF OVERLAPPING SITES OF RIGHT BREAST IN FEMALE, ESTROGEN RECEPTOR POSITIVE (H): Primary | ICD-10-CM

## 2024-07-10 ENCOUNTER — TELEPHONE (OUTPATIENT)
Dept: ONCOLOGY | Facility: HOSPITAL | Age: 49
End: 2024-07-10
Payer: COMMERCIAL

## 2024-07-10 NOTE — ORAL ONC MGMT
Oral Chemotherapy Monitoring Program   Left Voicemail    Attempted to contact patient today for follow up regarding oral chemotherapy, abemaciclib, for followup and discussion about labs. No answer. Left voicemail for patient to call us back at [phone] when able. No medication name was left.    /Keyona Whatley, PharmD, [facility]  Oral Chemotherapy Pharmacist  [phone]

## 2024-07-17 ENCOUNTER — LAB (OUTPATIENT)
Dept: LAB | Facility: CLINIC | Age: 49
End: 2024-07-17
Payer: COMMERCIAL

## 2024-07-17 DIAGNOSIS — C50.811 MALIGNANT NEOPLASM OF OVERLAPPING SITES OF RIGHT BREAST IN FEMALE, ESTROGEN RECEPTOR POSITIVE (H): ICD-10-CM

## 2024-07-17 DIAGNOSIS — Z17.0 MALIGNANT NEOPLASM OF OVERLAPPING SITES OF RIGHT BREAST IN FEMALE, ESTROGEN RECEPTOR POSITIVE (H): ICD-10-CM

## 2024-07-17 LAB
BASOPHILS # BLD AUTO: 0.1 10E3/UL (ref 0–0.2)
BASOPHILS NFR BLD AUTO: 1 %
EOSINOPHIL # BLD AUTO: 0.1 10E3/UL (ref 0–0.7)
EOSINOPHIL NFR BLD AUTO: 2 %
ERYTHROCYTE [DISTWIDTH] IN BLOOD BY AUTOMATED COUNT: 15.7 % (ref 10–15)
HCT VFR BLD AUTO: 36.1 % (ref 35–47)
HGB BLD-MCNC: 12.6 G/DL (ref 11.7–15.7)
IMM GRANULOCYTES # BLD: 0 10E3/UL
IMM GRANULOCYTES NFR BLD: 0 %
LYMPHOCYTES # BLD AUTO: 1.3 10E3/UL (ref 0.8–5.3)
LYMPHOCYTES NFR BLD AUTO: 35 %
MCH RBC QN AUTO: 31.3 PG (ref 26.5–33)
MCHC RBC AUTO-ENTMCNC: 34.9 G/DL (ref 31.5–36.5)
MCV RBC AUTO: 90 FL (ref 78–100)
MONOCYTES # BLD AUTO: 0.3 10E3/UL (ref 0–1.3)
MONOCYTES NFR BLD AUTO: 8 %
NEUTROPHILS # BLD AUTO: 2 10E3/UL (ref 1.6–8.3)
NEUTROPHILS NFR BLD AUTO: 54 %
PLATELET # BLD AUTO: 310 10E3/UL (ref 150–450)
RBC # BLD AUTO: 4.03 10E6/UL (ref 3.8–5.2)
WBC # BLD AUTO: 3.8 10E3/UL (ref 4–11)

## 2024-07-17 PROCEDURE — 36415 COLL VENOUS BLD VENIPUNCTURE: CPT

## 2024-07-17 PROCEDURE — 80053 COMPREHEN METABOLIC PANEL: CPT

## 2024-07-17 PROCEDURE — 85025 COMPLETE CBC W/AUTO DIFF WBC: CPT

## 2024-07-18 ENCOUNTER — TELEPHONE (OUTPATIENT)
Dept: ONCOLOGY | Facility: HOSPITAL | Age: 49
End: 2024-07-18
Payer: COMMERCIAL

## 2024-07-18 LAB
ALBUMIN SERPL BCG-MCNC: 4.5 G/DL (ref 3.5–5.2)
ALP SERPL-CCNC: 94 U/L (ref 40–150)
ALT SERPL W P-5'-P-CCNC: 27 U/L (ref 0–50)
ANION GAP SERPL CALCULATED.3IONS-SCNC: 11 MMOL/L (ref 7–15)
AST SERPL W P-5'-P-CCNC: 24 U/L (ref 0–45)
BILIRUB SERPL-MCNC: 0.4 MG/DL
BUN SERPL-MCNC: 10.4 MG/DL (ref 6–20)
CALCIUM SERPL-MCNC: 10 MG/DL (ref 8.8–10.4)
CHLORIDE SERPL-SCNC: 102 MMOL/L (ref 98–107)
CREAT SERPL-MCNC: 0.87 MG/DL (ref 0.51–0.95)
EGFRCR SERPLBLD CKD-EPI 2021: 81 ML/MIN/1.73M2
GLUCOSE SERPL-MCNC: 99 MG/DL (ref 70–99)
HCO3 SERPL-SCNC: 24 MMOL/L (ref 22–29)
POTASSIUM SERPL-SCNC: 4.5 MMOL/L (ref 3.4–5.3)
PROT SERPL-MCNC: 7.5 G/DL (ref 6.4–8.3)
SODIUM SERPL-SCNC: 137 MMOL/L (ref 135–145)

## 2024-07-18 NOTE — ORAL ONC MGMT
Oral Chemotherapy Monitoring Program   Left Voicemail    Attempted to contact patient today for follow up regarding oral chemotherapy, abemaciclib, for lab assessment and routine call. No answer. Left voicemail for patient to call us back at [phone] when able. No medication name was left.    Oral Chemotherapy Monitoring Program  Lab Follow Up    Patient currently on abemaciclib therapy for breast cancer.    Reviewed lab results from 7/17.    No concerning abnormalities.      Follow-Up:  Will plan to check in on her in 3-4 weeks for next assessment call.     Ghulam Sorto, PharmD, [facility]  Clinical Oncology Pharmacist  July 18, 2024

## 2024-07-29 DIAGNOSIS — C50.811 MALIGNANT NEOPLASM OF OVERLAPPING SITES OF RIGHT BREAST IN FEMALE, ESTROGEN RECEPTOR POSITIVE (H): Primary | ICD-10-CM

## 2024-07-29 DIAGNOSIS — Z17.0 MALIGNANT NEOPLASM OF OVERLAPPING SITES OF RIGHT BREAST IN FEMALE, ESTROGEN RECEPTOR POSITIVE (H): Primary | ICD-10-CM

## 2024-08-08 ENCOUNTER — TELEPHONE (OUTPATIENT)
Dept: ONCOLOGY | Facility: HOSPITAL | Age: 49
End: 2024-08-08
Payer: COMMERCIAL

## 2024-08-08 NOTE — ORAL ONC MGMT
Oral Chemotherapy Monitoring Program    Subjective/Objective:  Alicia Iniguez is a 49 year old female contacted by phone for a follow-up visit for oral chemotherapy.  Alicia says she is still doing well on the abemaciclib and denies any side effects. Did have questions about weight loss medications and where to get them from.        5/29/2024     2:00 PM 6/5/2024     8:00 AM 7/1/2024     9:00 AM 7/10/2024     1:00 PM 7/18/2024     1:00 PM 7/29/2024     9:00 AM 8/8/2024     2:00 PM   ORAL CHEMOTHERAPY   Assessment Type Monthly Follow up Refill Refill Monthly Follow up;Left Voicemail Left Voicemail Refill Quarterly Follow up   Diagnosis Code Breast Cancer Breast Cancer Breast Cancer Breast Cancer Breast Cancer Breast Cancer Breast Cancer   Providers Dr. Stiven Saleem   Clinic Name/Location [facility]   Drug Name Verzenio (abemaciclib) Verzenio (abemaciclib) Verzenio (abemaciclib) Verzenio (abemaciclib) Verzenio (abemaciclib) Verzenio (abemaciclib) Verzenio (abemaciclib)   Dose 150 mg 150 mg 150 mg 150 mg 150 mg 150 mg 150 mg   Current Schedule   BID BID BID BID BID   Cycle Details Continuous Continuous Continuous Continuous Continuous Continuous Continuous   Start Date of Last Cycle       11/9/2023   Doses missed in last 2 weeks       0   Adherence Assessment Adherent      Adherent   Adverse Effects No AE identified during assessment      No AE identified during assessment   Any new drug interactions?       No   Is the dose as ordered appropriate for the patient?       Yes       Last PHQ-2 Score on record:       3/26/2024     9:43 AM 7/26/2023     6:49 AM   PHQ-2 ( 1999 Pfizer)   Q1: Little interest or pleasure in doing things 0 0   Q2: Feeling down, depressed or hopeless 0 0   PHQ-2 Score 0 0   Q1: Little interest or pleasure in doing things Not at all Not at all   Q2: Feeling  "down, depressed or hopeless Not at all Not at all   PHQ-2 Score 0 0       Vitals:  BP:   BP Readings from Last 1 Encounters:   05/03/24 128/70     Wt Readings from Last 1 Encounters:   05/03/24 87.5 kg (193 lb)     Estimated body surface area is 2.02 meters squared as calculated from the following:    Height as of 5/3/24: 1.676 m (5' 6\").    Weight as of 5/3/24: 87.5 kg (193 lb).    Labs:  _  Result Component Current Result Ref Range   Sodium 137 (7/17/2024) 135 - 145 mmol/L     _  Result Component Current Result Ref Range   Potassium 4.5 (7/17/2024) 3.4 - 5.3 mmol/L     _  Result Component Current Result Ref Range   Calcium 10.0 (7/17/2024) 8.8 - 10.4 mg/dL     No results found for Mag within last 30 days.     No results found for Phos within last 30 days.     _  Result Component Current Result Ref Range   Albumin 4.5 (7/17/2024) 3.5 - 5.2 g/dL     _  Result Component Current Result Ref Range   Urea Nitrogen 10.4 (7/17/2024) 6.0 - 20.0 mg/dL     _  Result Component Current Result Ref Range   Creatinine 0.87 (7/17/2024) 0.51 - 0.95 mg/dL     _  Result Component Current Result Ref Range   AST 24 (7/17/2024) 0 - 45 U/L     _  Result Component Current Result Ref Range   ALT 27 (7/17/2024) 0 - 50 U/L     _  Result Component Current Result Ref Range   Bilirubin Total 0.4 (7/17/2024) <=1.2 mg/dL     _  Result Component Current Result Ref Range   WBC Count 3.8 (L) (7/17/2024) 4.0 - 11.0 10e3/uL     _  Result Component Current Result Ref Range   Hemoglobin 12.6 (7/17/2024) 11.7 - 15.7 g/dL     _  Result Component Current Result Ref Range   Platelet Count 310 (7/17/2024) 150 - 450 10e3/uL     No results found for ANC within last 30 days.     _  Result Component Current Result Ref Range   Absolute Neutrophils 2.0 (7/17/2024) 1.6 - 8.3 10e3/uL          Assessment/Plan:  Alicia is tolerating the abemaciclib therapy well. Continue as prescribed.  Did encourage Alicia to follow up with her PCP about the weight loss medications " and let her know some of the ones that Im aware of are phentermine and Ozempic if she wants to look into them beforehand, but would be based on a discussion with her provider about the risks and benefits. She agrees to let us know if she is going to be starting anything new so we can make sure there are no interactions.    Follow-Up:  Will review next appt with Dr. Saleem on 9/6 and then next (and potentially last) assessment call in Nov. Alicia Iniguez agrees to the plan and knows they can call us in the meantime if they have any questions or concerns.    Refill Due:  ~8/26    Ghulam Sorto, PharmD, BCOP  Clinical Oncology Pharmacist  August 8, 2024

## 2024-08-09 ENCOUNTER — TELEPHONE (OUTPATIENT)
Dept: ONCOLOGY | Facility: HOSPITAL | Age: 49
End: 2024-08-09
Payer: COMMERCIAL

## 2024-08-09 NOTE — ORAL ONC MGMT
"Oral Chemotherapy Monitoring Program       Gave Alicia a call back to discuss the weight loss supplements that he had and the ingredients. Let her know that many of the ingredients I could not find any information about, but there were a couple that flagged as expected issues/intereactions including the cinnamon bark extract (CY inhibitor as well as pro-estrogen receptor activity), Soy (pro estrogen receptor activity), and Green Tea leaf extract (CY inhibitor). Explained how we are not able to tell how much of any of these individual components are in the supplement, nor are we able to determine what would be a \"safe amount\" so do not recommend she take this product. Let her know, these products when consume in normal food in moderation are not of concern, but when starting to deal with \"extracts\" the risk does increase.  Alicia thanked me for the the help.    Ghulam Sorto, PharmD, [facility]  Clinical Oncology Pharmacist  2024       "

## 2024-08-16 ENCOUNTER — TELEPHONE (OUTPATIENT)
Dept: ONCOLOGY | Facility: HOSPITAL | Age: 49
End: 2024-08-16
Payer: COMMERCIAL

## 2024-08-16 NOTE — TELEPHONE ENCOUNTER
Prior Authorization Approval    Medication: VERZENIO 150 MG PO TABS  Authorization Effective Date: 8/16/2024  Authorization Expiration Date: 8/15/2025  Approved Dose/Quantity: 56/28  Reference #: [id number]   Insurance Company: [employer] [place] - Phone [phone] Fax [phone]  Which Pharmacy is filling the prescription: [facility] - [place], [place] - [address]

## 2024-08-20 ENCOUNTER — PATIENT OUTREACH (OUTPATIENT)
Dept: ONCOLOGY | Facility: HOSPITAL | Age: 49
End: 2024-08-20
Payer: COMMERCIAL

## 2024-08-20 NOTE — PROGRESS NOTES
Situation: Patient chart reviewed by care coordinator.    Background: Malignant neoplasm of overlapping sites of right breast in female, estrogen receptor positive (H)   Has finished adjuvant radiation.  Was started on Verzenio and letrozole in November 2023.  No significant side effects.  Recently underwent stage II reconstruction surgery with silicone implant placement and fat grafting.  Both letrozole and Verzenio were held for a month.    Assessment: Pt is following closely with oral pharmacy team and doing well.    Plan/Recommendations: Will continue to follow on Maintenance with the oral medications.     Gisella Mauricio RN

## 2024-08-29 DIAGNOSIS — C50.811 MALIGNANT NEOPLASM OF OVERLAPPING SITES OF RIGHT BREAST IN FEMALE, ESTROGEN RECEPTOR POSITIVE (H): Primary | ICD-10-CM

## 2024-08-29 DIAGNOSIS — Z17.0 MALIGNANT NEOPLASM OF OVERLAPPING SITES OF RIGHT BREAST IN FEMALE, ESTROGEN RECEPTOR POSITIVE (H): Primary | ICD-10-CM

## 2024-08-29 RX ORDER — LETROZOLE 2.5 MG/1
2.5 TABLET, FILM COATED ORAL DAILY
Qty: 90 TABLET | Refills: 0 | Status: SHIPPED | OUTPATIENT
Start: 2024-08-29

## 2024-09-06 ENCOUNTER — ONCOLOGY VISIT (OUTPATIENT)
Dept: ONCOLOGY | Facility: HOSPITAL | Age: 49
End: 2024-09-06
Attending: INTERNAL MEDICINE
Payer: COMMERCIAL

## 2024-09-06 ENCOUNTER — LAB (OUTPATIENT)
Dept: INFUSION THERAPY | Facility: HOSPITAL | Age: 49
End: 2024-09-06
Attending: INTERNAL MEDICINE
Payer: COMMERCIAL

## 2024-09-06 VITALS
RESPIRATION RATE: 16 BRPM | HEART RATE: 86 BPM | BODY MASS INDEX: 30.86 KG/M2 | WEIGHT: 192 LBS | OXYGEN SATURATION: 100 % | TEMPERATURE: 97.6 F | HEIGHT: 66 IN | SYSTOLIC BLOOD PRESSURE: 139 MMHG | DIASTOLIC BLOOD PRESSURE: 70 MMHG

## 2024-09-06 DIAGNOSIS — Z17.0 MALIGNANT NEOPLASM OF OVERLAPPING SITES OF RIGHT BREAST IN FEMALE, ESTROGEN RECEPTOR POSITIVE (H): ICD-10-CM

## 2024-09-06 DIAGNOSIS — C50.811 MALIGNANT NEOPLASM OF OVERLAPPING SITES OF RIGHT BREAST IN FEMALE, ESTROGEN RECEPTOR POSITIVE (H): Primary | ICD-10-CM

## 2024-09-06 DIAGNOSIS — Z17.0 MALIGNANT NEOPLASM OF OVERLAPPING SITES OF RIGHT BREAST IN FEMALE, ESTROGEN RECEPTOR POSITIVE (H): Primary | ICD-10-CM

## 2024-09-06 DIAGNOSIS — M81.0 AGE RELATED OSTEOPOROSIS, UNSPECIFIED PATHOLOGICAL FRACTURE PRESENCE: ICD-10-CM

## 2024-09-06 DIAGNOSIS — C50.811 MALIGNANT NEOPLASM OF OVERLAPPING SITES OF RIGHT BREAST IN FEMALE, ESTROGEN RECEPTOR POSITIVE (H): ICD-10-CM

## 2024-09-06 LAB
ALBUMIN SERPL BCG-MCNC: 4.5 G/DL (ref 3.5–5.2)
ALP SERPL-CCNC: 104 U/L (ref 40–150)
ALT SERPL W P-5'-P-CCNC: 26 U/L (ref 0–50)
ANION GAP SERPL CALCULATED.3IONS-SCNC: 10 MMOL/L (ref 7–15)
AST SERPL W P-5'-P-CCNC: 33 U/L (ref 0–45)
BASOPHILS # BLD AUTO: 0.1 10E3/UL (ref 0–0.2)
BASOPHILS NFR BLD AUTO: 1 %
BILIRUB SERPL-MCNC: 0.4 MG/DL
BUN SERPL-MCNC: 14.6 MG/DL (ref 6–20)
CALCIUM SERPL-MCNC: 9.8 MG/DL (ref 8.8–10.4)
CHLORIDE SERPL-SCNC: 102 MMOL/L (ref 98–107)
CREAT SERPL-MCNC: 0.87 MG/DL (ref 0.51–0.95)
EGFRCR SERPLBLD CKD-EPI 2021: 81 ML/MIN/1.73M2
EOSINOPHIL # BLD AUTO: 0.1 10E3/UL (ref 0–0.7)
EOSINOPHIL NFR BLD AUTO: 3 %
ERYTHROCYTE [DISTWIDTH] IN BLOOD BY AUTOMATED COUNT: 13.8 % (ref 10–15)
GLUCOSE SERPL-MCNC: 83 MG/DL (ref 70–99)
HCO3 SERPL-SCNC: 26 MMOL/L (ref 22–29)
HCT VFR BLD AUTO: 36.5 % (ref 35–47)
HGB BLD-MCNC: 12.5 G/DL (ref 11.7–15.7)
IMM GRANULOCYTES # BLD: 0 10E3/UL
IMM GRANULOCYTES NFR BLD: 0 %
LYMPHOCYTES # BLD AUTO: 1 10E3/UL (ref 0.8–5.3)
LYMPHOCYTES NFR BLD AUTO: 28 %
MCH RBC QN AUTO: 33.9 PG (ref 26.5–33)
MCHC RBC AUTO-ENTMCNC: 34.2 G/DL (ref 31.5–36.5)
MCV RBC AUTO: 99 FL (ref 78–100)
MONOCYTES # BLD AUTO: 0.2 10E3/UL (ref 0–1.3)
MONOCYTES NFR BLD AUTO: 6 %
NEUTROPHILS # BLD AUTO: 2.3 10E3/UL (ref 1.6–8.3)
NEUTROPHILS NFR BLD AUTO: 63 %
NRBC # BLD AUTO: 0 10E3/UL
NRBC BLD AUTO-RTO: 1 /100
PLATELET # BLD AUTO: 307 10E3/UL (ref 150–450)
POTASSIUM SERPL-SCNC: 5.3 MMOL/L (ref 3.4–5.3)
PROT SERPL-MCNC: 7.7 G/DL (ref 6.4–8.3)
RBC # BLD AUTO: 3.69 10E6/UL (ref 3.8–5.2)
SODIUM SERPL-SCNC: 138 MMOL/L (ref 135–145)
WBC # BLD AUTO: 3.6 10E3/UL (ref 4–11)

## 2024-09-06 PROCEDURE — 36591 DRAW BLOOD OFF VENOUS DEVICE: CPT

## 2024-09-06 PROCEDURE — 99214 OFFICE O/P EST MOD 30 MIN: CPT | Performed by: INTERNAL MEDICINE

## 2024-09-06 PROCEDURE — 85025 COMPLETE CBC W/AUTO DIFF WBC: CPT

## 2024-09-06 PROCEDURE — 80053 COMPREHEN METABOLIC PANEL: CPT

## 2024-09-06 PROCEDURE — 99213 OFFICE O/P EST LOW 20 MIN: CPT | Performed by: INTERNAL MEDICINE

## 2024-09-06 PROCEDURE — G2211 COMPLEX E/M VISIT ADD ON: HCPCS | Performed by: INTERNAL MEDICINE

## 2024-09-06 RX ORDER — MONTELUKAST SODIUM 10 MG/1
10 TABLET ORAL DAILY
COMMUNITY
Start: 2024-05-09

## 2024-09-06 ASSESSMENT — PAIN SCALES - GENERAL: PAINLEVEL: NO PAIN (0)

## 2024-09-06 NOTE — LETTER
9/6/2024      Alicia Iniguez  [address] [facility] [address]      Dear Colleague,    Thank you for referring your patient, Alicia Iniguez, to the [facility]. Please see a copy of my visit note below.        [facility] Hematology and Oncology Progress Note    Patient: Alicia Iniguez  MRN: [id number]  Date of Service: Sep 6, 2024          Reason for Visit    Chief Complaint   Patient presents with     Oncology Clinic Visit     4 month follow up with labs       Assessment and Plan     Cancer Staging   Malignant neoplasm of overlapping sites of right breast in female, estrogen receptor positive (H)  Staging form: Breast, AJCC 8th Edition  - Clinical: Stage IIA (cT3, cN2, cM0, G2, ER+, IN+, HER2-) - Signed by Shanelle Weaver APRN CNP on 3/1/2023  - Pathologic stage from 8/21/2023: No Stage Recommended (ypT3, pN1a(sn), cM0, G2, ER+, IN+, HER2-) - Signed by Ruth Orellana MD on 8/21/2023    1.  Locally Advanced Breast cancer, right side, stage IIa, T3 N2 M0, grade 2, ER/IN positive, HER2/janine negative: Status post neoadjuvant chemotherapy with dose dense Adriamycin and Cytoxan for 4 cycles, followed by weekly Taxol. Underwent bilateral mastectomy on 8/9/2023.  Surgical path showed significant residual disease as expected with minimum treatment effect.  Residual disease measured 5.6 x 3.2 x 2.7 cm in greatest dimensions.  Grade 2.  There was evidence of dermal infiltration with focal invasion of epidermis.  There was focal lymphovascular invasion within the dermal connective tissue.  No evidence of DCIS.  Margins were all negative.  Out of 20 lymph nodes examined 2 were positive for macrometastasis (sentinel lymph nodes).  No evidence of extranodal extension.  ypT3, ypN1a.     Has finished adjuvant radiation.  Was started on Verzenio and letrozole in November 2023.    Held for a couple of months during her reconstruction surgery.  Restarted in May late May.   Current dose is 150 mg twice daily.  She has done extremely well on this.  No significant side effects.  Does have some musculoskeletal pain and some weight gain issues.  Labs reviewed today.  Normal CBC and serum chemistry.  Plan is to continue to continue at the current dose.  Total intended duration of treatment is 2 years.  She will also continue letrozole daily with an intention to treat for total of 10 years.  I will get a baseline DEXA scan to look for osteoporosis.  Continue vitamin D and calcium supplements.    For her weight gain she was thinking about trying Ozempic or similar drugs.  There is no contraindication with letrozole standpoint but I asked her to try calorie deficit diet and exercise to see if that helps.    I do not think she needs labs on a monthly basis from now on since her counts have been really stable.  Plans to see her back in 3 months with labs prior.    ECOG Performance    0 - Independent    Distress Screening (within last 30 days)    1. How concerned are you about your ability to eat? : 0  2. How concerned are you about unintended weight loss or your current weight? : 0  3. How concerned are you about feeling depressed or very sad? : 0  4. How concerned are you about feeling anxious or very scared? : 5  5. Do you struggle with the loss of meaning and tiago in your life? : Not at all  6. How concerned are you about work and home life issues that may be affected by your cancer? : 0  7. How concerned are you about knowing what resources are available to help you? : 0  8. Do you currently have what you would describe as Shinto or spiritual struggles?            : Not at all       Pain  Pain Score: No Pain (0)    Problem List    Patient Active Problem List   Diagnosis     Malignant neoplasm of overlapping sites of right breast in female, estrogen receptor positive (H)     Malignant neoplasm involving both nipple and areola of right breast in female, estrogen receptor positive (H)      Adverse effect of antineoplastic and immunosuppressive drugs, sequela     Chest pain, unspecified     Hyperlipidemia     Personal history of breast cancer        ______________________________________________________________________________    History of Present Illness    Oncology history:  DIAGNOSIS: Breast cancer, right side, patient had a palpable lump with some nipple and skin changes.  She was also found to have lymphadenopathy in the axilla.  Patient had an ultrasound and mammogram that showed a 4.1 cm tumor with axillary lymphadenopathy  Biopsy was done January 31 and it showed invasive ductal carcinoma.  Grade 2.  ER NM positive and HER2/janine negative.  Biopsy was done of the lymph node as well and it was positive.    TREATMENT: Patient has started neoadjuvant chemotherapy with dose dense Adriamycin and Cytoxan for 4 cycles and then 12 weekly Taxol.     Bilateral mastectomy on 8/9/2023    A) RIGHT BREAST, MASTECTOMY:  -INVASIVE DUCTAL CARCINOMA (5.6 x 3.2 x 2.7 IN GREATEST DIMENSIONS)     -BREAST CANCER PROGNOSTIC MARKER ANALYSIS     ESTROGEN RECEPTOR: POSITIVE (95% OF CELLS, STRONG STAINING     PROGESTERONE RECEPTOR: POSITIVE (95% OF CELLS, STRONG STAINING     HER2: NEGATIVE (1+ OUT OF 3 MEMBRANE STAINING)     Dr. Paty Berry and Dr. Richard Townsedn have also reviewed this case and concur with the breast cancer prognostic marker analysis.     The following data are used for Residual Cancer [place] calculation:      Primary tumor bed area: 56 mm x 32 mm.  Overall cancer cellularity (as percentage of area): 95%.  Percentage of cancer that is in situ disease: 2%.  Number of positive lymph nodes: 2.  Diameter of largest metastasis: 3 mm.    Residual cancer burden score of 3    Adjuvant endocrine therapy with Zoladex and letrozole initially.    Zoladex stopped in November and continued on letrozole only.  Verzenio started on 11/9/2023.    INTERIM HISTORY:   48-year-old female with right-sided locally advanced  "breast cancer status post neoadjuvant chemotherapy with AC followed by T who recently underwent bilateral mastectomy she had significant residual disease.  ypT3, ypN1a.     Currently on Verzenio and letrozole.  She had reconstruction surgery earlier this year.  Had to hold Verzenio for couple of months.  Looks like she restarted at the end of May.  150 mg twice daily.  Has done fairly well.  No significant side effects.  Here with repeat labs.        Review of Systems    Pertinent items are noted in HPI.    Past History    Past Medical History:   Diagnosis Date     Breast cancer (H)     right       PHYSICAL EXAM  /70 (BP Location: Left arm, Patient Position: Sitting, Cuff Size: Adult Regular)   Pulse 86   Temp 97.6  F (36.4  C) (Tympanic)   Resp 16   Ht 1.676 m (5' 6\")   Wt 87.1 kg (192 lb)   LMP  (LMP Unknown)   SpO2 100%   BMI 30.99 kg/m      GENERAL: no acute distress. Cooperative in conversation.  Lungs: Clear to auscultation bilaterally  CNS: Nonfocal  CVS: RRR  LYMPH nodes: No clinically palpable lymph nodes peripherally    Lab Results    Recent Results (from the past 168 hour(s))   Comprehensive metabolic panel   Result Value Ref Range    Sodium 138 135 - 145 mmol/L    Potassium 5.3 3.4 - 5.3 mmol/L    Carbon Dioxide (CO2) 26 22 - 29 mmol/L    Anion Gap 10 7 - 15 mmol/L    Urea Nitrogen 14.6 6.0 - 20.0 mg/dL    Creatinine 0.87 0.51 - 0.95 mg/dL    GFR Estimate 81 >60 mL/min/1.73m2    Calcium 9.8 8.8 - 10.4 mg/dL    Chloride 102 98 - 107 mmol/L    Glucose 83 70 - 99 mg/dL    Alkaline Phosphatase 104 40 - 150 U/L    AST 33 0 - 45 U/L    ALT 26 0 - 50 U/L    Protein Total 7.7 6.4 - 8.3 g/dL    Albumin 4.5 3.5 - 5.2 g/dL    Bilirubin Total 0.4 <=1.2 mg/dL   CBC with platelets and differential   Result Value Ref Range    WBC Count 3.6 (L) 4.0 - 11.0 10e3/uL    RBC Count 3.69 (L) 3.80 - 5.20 10e6/uL    Hemoglobin 12.5 11.7 - 15.7 g/dL    Hematocrit 36.5 35.0 - 47.0 %    MCV 99 78 - 100 fL    MCH 33.9 " "(H) 26.5 - 33.0 pg    MCHC 34.2 31.5 - 36.5 g/dL    RDW 13.8 10.0 - 15.0 %    Platelet Count 307 150 - 450 10e3/uL    % Neutrophils 63 %    % Lymphocytes 28 %    % Monocytes 6 %    % Eosinophils 3 %    % Basophils 1 %    % Immature Granulocytes 0 %    NRBCs per 100 WBC 1 (H) <1 /100    Absolute Neutrophils 2.3 1.6 - 8.3 10e3/uL    Absolute Lymphocytes 1.0 0.8 - 5.3 10e3/uL    Absolute Monocytes 0.2 0.0 - 1.3 10e3/uL    Absolute Eosinophils 0.1 0.0 - 0.7 10e3/uL    Absolute Basophils 0.1 0.0 - 0.2 10e3/uL    Absolute Immature Granulocytes 0.0 <=0.4 10e3/uL    Absolute NRBCs 0.0 10e3/uL             Imaging    No results found.    A total of 30 min was spent today on this visit which included face to face conversation with the patient, EMR review, counseling and co-ordination of care and medical documentation.    The longitudinal plan of care for the diagnosis(es)/condition(s) as documented were addressed during this visit. Due to the added complexity in care, I will continue to support Alicia in the subsequent management and with ongoing continuity of care.        Signed by: Paola Saleem MD    Oncology Rooming Note    September 6, 2024 10:40 AM   Alicia Iniguez is a 49 year old female who presents for:    Chief Complaint   Patient presents with     Oncology Clinic Visit     4 month follow up with labs     Initial Vitals: /70 (BP Location: Left arm, Patient Position: Sitting, Cuff Size: Adult Regular)   Pulse 86   Temp 97.6  F (36.4  C) (Tympanic)   Resp 16   Ht 1.676 m (5' 6\")   Wt 87.1 kg (192 lb)   LMP  (LMP Unknown)   SpO2 100%   BMI 30.99 kg/m   Estimated body mass index is 30.99 kg/m  as calculated from the following:    Height as of this encounter: 1.676 m (5' 6\").    Weight as of this encounter: 87.1 kg (192 lb). Body surface area is 2.01 meters squared.  No Pain (0) Comment: Data Unavailable   No LMP recorded (lmp unknown). (Menstrual status: Chemotherapy).  Allergies reviewed: " Yes  Medications reviewed: Yes    Medications: Medication refills not needed today.  Pharmacy name entered into [facility]:    [facility] - [place], [place] - [address] [id number] - [place], [place] - [address] [facility] AT [facility] & [facility]    Frailty Screening:   Is the patient here for a new oncology consult visit in cancer care? 2. No      Clinical concerns:       DIANA QUINN CMA                Again, thank you for allowing me to participate in the care of your patient.        Sincerely,        Paola Saleem MD

## 2024-09-06 NOTE — PROGRESS NOTES
[facility] Hematology and Oncology Progress Note    Patient: Alicia Iniguez  MRN: [id number]  Date of Service: Sep 6, 2024          Reason for Visit    Chief Complaint   Patient presents with    Oncology Clinic Visit     4 month follow up with labs       Assessment and Plan     Cancer Staging   Malignant neoplasm of overlapping sites of right breast in female, estrogen receptor positive (H)  Staging form: Breast, AJCC 8th Edition  - Clinical: Stage IIA (cT3, cN2, cM0, G2, ER+, ID+, HER2-) - Signed by Shanelle Weaver APRN CNP on 3/1/2023  - Pathologic stage from 8/21/2023: No Stage Recommended (ypT3, pN1a(sn), cM0, G2, ER+, ID+, HER2-) - Signed by Ruth Orellana MD on 8/21/2023    1.  Locally Advanced Breast cancer, right side, stage IIa, T3 N2 M0, grade 2, ER/ID positive, HER2/janine negative: Status post neoadjuvant chemotherapy with dose dense Adriamycin and Cytoxan for 4 cycles, followed by weekly Taxol. Underwent bilateral mastectomy on 8/9/2023.  Surgical path showed significant residual disease as expected with minimum treatment effect.  Residual disease measured 5.6 x 3.2 x 2.7 cm in greatest dimensions.  Grade 2.  There was evidence of dermal infiltration with focal invasion of epidermis.  There was focal lymphovascular invasion within the dermal connective tissue.  No evidence of DCIS.  Margins were all negative.  Out of 20 lymph nodes examined 2 were positive for macrometastasis (sentinel lymph nodes).  No evidence of extranodal extension.  ypT3, ypN1a.     Has finished adjuvant radiation.  Was started on Verzenio and letrozole in November 2023.    Held for a couple of months during her reconstruction surgery.  Restarted in May late May.  Current dose is 150 mg twice daily.  She has done extremely well on this.  No significant side effects.  Does have some musculoskeletal pain and some weight gain issues.  Labs reviewed today.  Normal CBC and serum chemistry.  Plan is to continue  to continue at the current dose.  Total intended duration of treatment is 2 years.  She will also continue letrozole daily with an intention to treat for total of 10 years.  I will get a baseline DEXA scan to look for osteoporosis.  Continue vitamin D and calcium supplements.    For her weight gain she was thinking about trying Ozempic or similar drugs.  There is no contraindication with letrozole standpoint but I asked her to try calorie deficit diet and exercise to see if that helps.    I do not think she needs labs on a monthly basis from now on since her counts have been really stable.  Plans to see her back in 3 months with labs prior.    ECOG Performance    0 - Independent    Distress Screening (within last 30 days)    1. How concerned are you about your ability to eat? : 0  2. How concerned are you about unintended weight loss or your current weight? : 0  3. How concerned are you about feeling depressed or very sad? : 0  4. How concerned are you about feeling anxious or very scared? : 5  5. Do you struggle with the loss of meaning and tiago in your life? : Not at all  6. How concerned are you about work and home life issues that may be affected by your cancer? : 0  7. How concerned are you about knowing what resources are available to help you? : 0  8. Do you currently have what you would describe as Restoration or spiritual struggles?            : Not at all       Pain  Pain Score: No Pain (0)    Problem List    Patient Active Problem List   Diagnosis    Malignant neoplasm of overlapping sites of right breast in female, estrogen receptor positive (H)    Malignant neoplasm involving both nipple and areola of right breast in female, estrogen receptor positive (H)    Adverse effect of antineoplastic and immunosuppressive drugs, sequela    Chest pain, unspecified    Hyperlipidemia    Personal history of breast cancer        ______________________________________________________________________________    History of  Present Illness    Oncology history:  DIAGNOSIS: Breast cancer, right side, patient had a palpable lump with some nipple and skin changes.  She was also found to have lymphadenopathy in the axilla.  Patient had an ultrasound and mammogram that showed a 4.1 cm tumor with axillary lymphadenopathy  Biopsy was done January 31 and it showed invasive ductal carcinoma.  Grade 2.  ER WA positive and HER2/janine negative.  Biopsy was done of the lymph node as well and it was positive.    TREATMENT: Patient has started neoadjuvant chemotherapy with dose dense Adriamycin and Cytoxan for 4 cycles and then 12 weekly Taxol.     Bilateral mastectomy on 8/9/2023    A) RIGHT BREAST, MASTECTOMY:  -INVASIVE DUCTAL CARCINOMA (5.6 x 3.2 x 2.7 IN GREATEST DIMENSIONS)     -BREAST CANCER PROGNOSTIC MARKER ANALYSIS     ESTROGEN RECEPTOR: POSITIVE (95% OF CELLS, STRONG STAINING     PROGESTERONE RECEPTOR: POSITIVE (95% OF CELLS, STRONG STAINING     HER2: NEGATIVE (1+ OUT OF 3 MEMBRANE STAINING)     Dr. Paty Berry and Dr. Richard Townsend have also reviewed this case and concur with the breast cancer prognostic marker analysis.     The following data are used for Residual Cancer [place] calculation:      Primary tumor bed area: 56 mm x 32 mm.  Overall cancer cellularity (as percentage of area): 95%.  Percentage of cancer that is in situ disease: 2%.  Number of positive lymph nodes: 2.  Diameter of largest metastasis: 3 mm.    Residual cancer burden score of 3    Adjuvant endocrine therapy with Zoladex and letrozole initially.    Zoladex stopped in November and continued on letrozole only.  Verzenio started on 11/9/2023.    INTERIM HISTORY:   48-year-old female with right-sided locally advanced breast cancer status post neoadjuvant chemotherapy with AC followed by T who recently underwent bilateral mastectomy she had significant residual disease.  ypT3, ypN1a.     Currently on Verzenio and letrozole.  She had reconstruction surgery earlier this  "year.  Had to hold Verzenio for couple of months.  Looks like she restarted at the end of May.  150 mg twice daily.  Has done fairly well.  No significant side effects.  Here with repeat labs.        Review of Systems    Pertinent items are noted in HPI.    Past History    Past Medical History:   Diagnosis Date    Breast cancer (H)     right       PHYSICAL EXAM  /70 (BP Location: Left arm, Patient Position: Sitting, Cuff Size: Adult Regular)   Pulse 86   Temp 97.6  F (36.4  C) (Tympanic)   Resp 16   Ht 1.676 m (5' 6\")   Wt 87.1 kg (192 lb)   LMP  (LMP Unknown)   SpO2 100%   BMI 30.99 kg/m      GENERAL: no acute distress. Cooperative in conversation.  Lungs: Clear to auscultation bilaterally  CNS: Nonfocal  CVS: RRR  LYMPH nodes: No clinically palpable lymph nodes peripherally    Lab Results    Recent Results (from the past 168 hour(s))   Comprehensive metabolic panel   Result Value Ref Range    Sodium 138 135 - 145 mmol/L    Potassium 5.3 3.4 - 5.3 mmol/L    Carbon Dioxide (CO2) 26 22 - 29 mmol/L    Anion Gap 10 7 - 15 mmol/L    Urea Nitrogen 14.6 6.0 - 20.0 mg/dL    Creatinine 0.87 0.51 - 0.95 mg/dL    GFR Estimate 81 >60 mL/min/1.73m2    Calcium 9.8 8.8 - 10.4 mg/dL    Chloride 102 98 - 107 mmol/L    Glucose 83 70 - 99 mg/dL    Alkaline Phosphatase 104 40 - 150 U/L    AST 33 0 - 45 U/L    ALT 26 0 - 50 U/L    Protein Total 7.7 6.4 - 8.3 g/dL    Albumin 4.5 3.5 - 5.2 g/dL    Bilirubin Total 0.4 <=1.2 mg/dL   CBC with platelets and differential   Result Value Ref Range    WBC Count 3.6 (L) 4.0 - 11.0 10e3/uL    RBC Count 3.69 (L) 3.80 - 5.20 10e6/uL    Hemoglobin 12.5 11.7 - 15.7 g/dL    Hematocrit 36.5 35.0 - 47.0 %    MCV 99 78 - 100 fL    MCH 33.9 (H) 26.5 - 33.0 pg    MCHC 34.2 31.5 - 36.5 g/dL    RDW 13.8 10.0 - 15.0 %    Platelet Count 307 150 - 450 10e3/uL    % Neutrophils 63 %    % Lymphocytes 28 %    % Monocytes 6 %    % Eosinophils 3 %    % Basophils 1 %    % Immature Granulocytes 0 %    " NRBCs per 100 WBC 1 (H) <1 /100    Absolute Neutrophils 2.3 1.6 - 8.3 10e3/uL    Absolute Lymphocytes 1.0 0.8 - 5.3 10e3/uL    Absolute Monocytes 0.2 0.0 - 1.3 10e3/uL    Absolute Eosinophils 0.1 0.0 - 0.7 10e3/uL    Absolute Basophils 0.1 0.0 - 0.2 10e3/uL    Absolute Immature Granulocytes 0.0 <=0.4 10e3/uL    Absolute NRBCs 0.0 10e3/uL             Imaging    No results found.    A total of 30 min was spent today on this visit which included face to face conversation with the patient, EMR review, counseling and co-ordination of care and medical documentation.    The longitudinal plan of care for the diagnosis(es)/condition(s) as documented were addressed during this visit. Due to the added complexity in care, I will continue to support Alicia in the subsequent management and with ongoing continuity of care.        Signed by: Poala Saleem MD

## 2024-09-06 NOTE — PROGRESS NOTES
"Oncology Rooming Note    September 6, 2024 10:40 AM   Alicia Iniguez is a 49 year old female who presents for:    Chief Complaint   Patient presents with    Oncology Clinic Visit     4 month follow up with labs     Initial Vitals: /70 (BP Location: Left arm, Patient Position: Sitting, Cuff Size: Adult Regular)   Pulse 86   Temp 97.6  F (36.4  C) (Tympanic)   Resp 16   Ht 1.676 m (5' 6\")   Wt 87.1 kg (192 lb)   LMP  (LMP Unknown)   SpO2 100%   BMI 30.99 kg/m   Estimated body mass index is 30.99 kg/m  as calculated from the following:    Height as of this encounter: 1.676 m (5' 6\").    Weight as of this encounter: 87.1 kg (192 lb). Body surface area is 2.01 meters squared.  No Pain (0) Comment: Data Unavailable   No LMP recorded (lmp unknown). (Menstrual status: Chemotherapy).  Allergies reviewed: Yes  Medications reviewed: Yes    Medications: Medication refills not needed today.  Pharmacy name entered into [employer]:    [facility] - [place], [place] - [address]  [id number] [place], [place] - [address] [facility] [address] [facility]    Frailty Screening:   Is the patient here for a new oncology consult visit in cancer care? 2. No      Clinical concerns:       DIANA QUINN CMA              "

## 2024-09-11 ENCOUNTER — ANCILLARY PROCEDURE (OUTPATIENT)
Dept: BONE DENSITY | Facility: CLINIC | Age: 49
End: 2024-09-11
Attending: INTERNAL MEDICINE
Payer: COMMERCIAL

## 2024-09-11 DIAGNOSIS — M81.0 AGE RELATED OSTEOPOROSIS, UNSPECIFIED PATHOLOGICAL FRACTURE PRESENCE: ICD-10-CM

## 2024-09-11 PROCEDURE — 77080 DXA BONE DENSITY AXIAL: CPT | Performed by: RADIOLOGY

## 2024-09-23 DIAGNOSIS — Z17.0 MALIGNANT NEOPLASM OF OVERLAPPING SITES OF RIGHT BREAST IN FEMALE, ESTROGEN RECEPTOR POSITIVE (H): Primary | ICD-10-CM

## 2024-09-23 DIAGNOSIS — C50.811 MALIGNANT NEOPLASM OF OVERLAPPING SITES OF RIGHT BREAST IN FEMALE, ESTROGEN RECEPTOR POSITIVE (H): Primary | ICD-10-CM

## 2024-10-21 DIAGNOSIS — Z17.0 MALIGNANT NEOPLASM OF OVERLAPPING SITES OF RIGHT BREAST IN FEMALE, ESTROGEN RECEPTOR POSITIVE (H): Primary | ICD-10-CM

## 2024-10-21 DIAGNOSIS — C50.811 MALIGNANT NEOPLASM OF OVERLAPPING SITES OF RIGHT BREAST IN FEMALE, ESTROGEN RECEPTOR POSITIVE (H): Primary | ICD-10-CM

## 2024-10-22 DIAGNOSIS — Z17.0 MALIGNANT NEOPLASM OF OVERLAPPING SITES OF RIGHT BREAST IN FEMALE, ESTROGEN RECEPTOR POSITIVE (H): Primary | ICD-10-CM

## 2024-10-22 DIAGNOSIS — C50.811 MALIGNANT NEOPLASM OF OVERLAPPING SITES OF RIGHT BREAST IN FEMALE, ESTROGEN RECEPTOR POSITIVE (H): Primary | ICD-10-CM

## 2024-11-04 ENCOUNTER — TELEPHONE (OUTPATIENT)
Dept: ONCOLOGY | Facility: HOSPITAL | Age: 49
End: 2024-11-04
Payer: COMMERCIAL

## 2024-11-04 NOTE — ORAL ONC MGMT
Oral Chemotherapy Monitoring Program   Left Voicemail    Attempted to contact patient today for follow up regarding oral chemotherapy, abemaciclib, for breast cancer. No answer. Left voicemail for patient to call us back at [phone] when able. No medication name was left.    Alicia has been deemed stable on her therapy. The oral chemotherapy team will no longer be making phone call assessments, though we are always available for any medication-related concerns. We will release refills and review oncology provider notes for the duration of therapy.     Darleen Tijerina, PharmD  Oral Chemotherapy Pharmacist  [phone]

## 2024-11-21 DIAGNOSIS — C50.811 MALIGNANT NEOPLASM OF OVERLAPPING SITES OF RIGHT BREAST IN FEMALE, ESTROGEN RECEPTOR POSITIVE (H): Primary | ICD-10-CM

## 2024-11-21 DIAGNOSIS — Z17.0 MALIGNANT NEOPLASM OF OVERLAPPING SITES OF RIGHT BREAST IN FEMALE, ESTROGEN RECEPTOR POSITIVE (H): Primary | ICD-10-CM

## 2024-12-06 ENCOUNTER — ONCOLOGY VISIT (OUTPATIENT)
Dept: ONCOLOGY | Facility: HOSPITAL | Age: 49
End: 2024-12-06
Attending: INTERNAL MEDICINE
Payer: COMMERCIAL

## 2024-12-06 VITALS
HEART RATE: 102 BPM | HEIGHT: 66 IN | OXYGEN SATURATION: 97 % | TEMPERATURE: 99 F | DIASTOLIC BLOOD PRESSURE: 69 MMHG | WEIGHT: 185.6 LBS | BODY MASS INDEX: 29.83 KG/M2 | RESPIRATION RATE: 20 BRPM | SYSTOLIC BLOOD PRESSURE: 123 MMHG

## 2024-12-06 DIAGNOSIS — Z17.0 MALIGNANT NEOPLASM OF OVERLAPPING SITES OF RIGHT BREAST IN FEMALE, ESTROGEN RECEPTOR POSITIVE (H): Primary | ICD-10-CM

## 2024-12-06 DIAGNOSIS — C50.811 MALIGNANT NEOPLASM OF OVERLAPPING SITES OF RIGHT BREAST IN FEMALE, ESTROGEN RECEPTOR POSITIVE (H): Primary | ICD-10-CM

## 2024-12-06 ASSESSMENT — PAIN SCALES - GENERAL: PAINLEVEL_OUTOF10: NO PAIN (0)

## 2024-12-06 NOTE — PROGRESS NOTES
"Oncology Rooming Note    December 6, 2024 12:20 PM   Alicia Iniguez is a 49 year old female who presents for:    Chief Complaint   Patient presents with    Oncology Clinic Visit     Malignant neoplasm of overlapping sites of right breast in female, estrogen receptor positive follow up     Initial Vitals: /69 (BP Location: Left arm, Patient Position: Sitting, Cuff Size: Adult Regular)   Pulse 102   Temp 99  F (37.2  C) (Oral)   Resp 20   Ht 1.676 m (5' 6\")   Wt 84.2 kg (185 lb 9.6 oz)   SpO2 97%   BMI 29.96 kg/m   Estimated body mass index is 29.96 kg/m  as calculated from the following:    Height as of this encounter: 1.676 m (5' 6\").    Weight as of this encounter: 84.2 kg (185 lb 9.6 oz). Body surface area is 1.98 meters squared.  No Pain (0) Comment: Data Unavailable   No LMP recorded. (Menstrual status: Chemotherapy).  Allergies reviewed: Yes  Medications reviewed: Yes    Medications: Medication refills not needed today.  Pharmacy name entered into [employer]:    [facility] - [place], [place] - [address]  [id number] - [place], [place] - [address] [facility] AT [facility]    Frailty Screening:   Is the patient here for a new oncology consult visit in cancer care? 2. No      Clinical concerns: Patient would like to lose some more weight.  Dr Saleem was notified.      Faiza Saucedo MA              "

## 2024-12-06 NOTE — Clinical Note
12/6/2024      Alicia Iniguez  [address] [facility] [address]      Dear Colleague,    Thank you for referring your patient, Alicia Iniguez, to the [facility] [place]. Please see a copy of my visit note below.        [facility] Hematology and Oncology Progress Note    Patient: Alicia Iniguez  MRN: [id number]  Date of Service: Dec 6, 2024          Reason for Visit    No chief complaint on file.      Assessment and Plan     Cancer Staging   Malignant neoplasm of overlapping sites of right breast in female, estrogen receptor positive (H)  Staging form: Breast, AJCC 8th Edition  - Clinical: Stage IIA (cT3, cN2, cM0, G2, ER+, CA+, HER2-) - Signed by Shanelle Weaver APRN CNP on 3/1/2023  - Pathologic stage from 8/21/2023: No Stage Recommended (ypT3, pN1a(sn), cM0, G2, ER+, CA+, HER2-) - Signed by Ruth Orellana MD on 8/21/2023    1.  Locally Advanced Breast cancer, right side, stage IIa, T3 N2 M0, grade 2, ER/CA positive, HER2/janine negative: Status post neoadjuvant chemotherapy with dose dense Adriamycin and Cytoxan for 4 cycles, followed by weekly Taxol. Underwent bilateral mastectomy on 8/9/2023.  Surgical path showed significant residual disease as expected with minimum treatment effect.  Residual disease measured 5.6 x 3.2 x 2.7 cm in greatest dimensions.  Grade 2.  There was evidence of dermal infiltration with focal invasion of epidermis.  There was focal lymphovascular invasion within the dermal connective tissue.  No evidence of DCIS.  Margins were all negative.  Out of 20 lymph nodes examined 2 were positive for macrometastasis (sentinel lymph nodes).  No evidence of extranodal extension.  ypT3, ypN1a.     After she finished adjuvant radiation, she was started on Verzenio and letrozole in November 2023.    Held for a couple of months during her reconstruction surgery.  Restarted in May 2024.  Current dose is 150 mg twice daily.      Done really well.  She has  done about a years worth of Verzenio now.  And has tolerated it very well without any major side effects.    Some weight gain from letrozole.  Otherwise no significant side effects.  Here with repeat labs.    Will continue 3 monthly labs and physical exam.    ECOG Performance    0 - Independent    Distress Screening (within last 30 days)    1. How concerned are you about your ability to eat? : 0  2. How concerned are you about unintended weight loss or your current weight? : 0  3. How concerned are you about feeling depressed or very sad? : 0  4. How concerned are you about feeling anxious or very scared? : 5  5. Do you struggle with the loss of meaning and tiago in your life? : Not at all  6. How concerned are you about work and home life issues that may be affected by your cancer? : 0  7. How concerned are you about knowing what resources are available to help you? : 0  8. Do you currently have what you would describe as [employer] or spiritual struggles?            : Not at all       Pain       Problem List    Patient Active Problem List   Diagnosis    Malignant neoplasm of overlapping sites of right breast in female, estrogen receptor positive (H)    Malignant neoplasm involving both nipple and areola of right breast in female, estrogen receptor positive (H)    Adverse effect of antineoplastic and immunosuppressive drugs, sequela    Chest pain, unspecified    Hyperlipidemia    Personal history of breast cancer        ______________________________________________________________________________    History of Present Illness    Oncology history:  DIAGNOSIS: Breast cancer, right side, patient had a palpable lump with some nipple and skin changes.  She was also found to have lymphadenopathy in the axilla.  Patient had an ultrasound and mammogram that showed a 4.1 cm tumor with axillary lymphadenopathy  Biopsy was done January 31 and it showed invasive ductal carcinoma.  Grade 2.  ER IL positive and HER2/janine negative.   Biopsy was done of the lymph node as well and it was positive.    TREATMENT: Patient has started neoadjuvant chemotherapy with dose dense Adriamycin and Cytoxan for 4 cycles and then 12 weekly Taxol.     Bilateral mastectomy on 8/9/2023    A) RIGHT BREAST, MASTECTOMY:  -INVASIVE DUCTAL CARCINOMA (5.6 x 3.2 x 2.7 IN GREATEST DIMENSIONS)     -BREAST CANCER PROGNOSTIC MARKER ANALYSIS     ESTROGEN RECEPTOR: POSITIVE (95% OF CELLS, STRONG STAINING     PROGESTERONE RECEPTOR: POSITIVE (95% OF CELLS, STRONG STAINING     HER2: NEGATIVE (1+ OUT OF 3 MEMBRANE STAINING)     Dr. Paty eBrry and Dr. Richard Townsend have also reviewed this case and concur with the breast cancer prognostic marker analysis.     The following data are used for Residual Cancer [place] calculation:      Primary tumor bed area: 56 mm x 32 mm.  Overall cancer cellularity (as percentage of area): 95%.  Percentage of cancer that is in situ disease: 2%.  Number of positive lymph nodes: 2.  Diameter of largest metastasis: 3 mm.    Residual cancer burden score of 3    Adjuvant endocrine therapy with Zoladex and letrozole initially.    Zoladex stopped in November and continued on letrozole only.  Verzenio started on 11/9/2023.    INTERIM HISTORY:   48-year-old female with right-sided locally advanced breast cancer status post neoadjuvant chemotherapy with AC followed by T who recently underwent bilateral mastectomy she had significant residual disease.  ypT3, ypN1a.     Currently on Verzenio and letrozole.      Doing well on current therapy.  No significant side effects.  Here with repeat labs.  Denies any new lumps or bumps.  No bone pain.  No weight loss.      Review of Systems    Pertinent items are noted in HPI.    Past History    Past Medical History:   Diagnosis Date    Breast cancer (H)     right       PHYSICAL EXAM  There were no vitals taken for this visit.    GENERAL: no acute distress. Cooperative in conversation.  Lungs: Clear to auscultation  "bilaterally  CNS: Nonfocal  CVS: RRR  LYMPH nodes: No clinically palpable lymph nodes peripherally    Lab Results    No results found for this or any previous visit (from the past week).            Imaging    No results found.    A total of 30 min was spent today on this visit which included face to face conversation with the patient, EMR review, counseling and co-ordination of care and medical documentation.    The longitudinal plan of care for the diagnosis(es)/condition(s) as documented were addressed during this visit. Due to the added complexity in care, I will continue to support Alicia in the subsequent management and with ongoing continuity of care.        Signed by: Paola Saleem MD    Oncology Rooming Note    December 6, 2024 12:20 PM   Alicia Iniguez is a 49 year old female who presents for:    Chief Complaint   Patient presents with    Oncology Clinic Visit     Malignant neoplasm of overlapping sites of right breast in female, estrogen receptor positive follow up     Initial Vitals: /69 (BP Location: Left arm, Patient Position: Sitting, Cuff Size: Adult Regular)   Pulse 102   Temp 99  F (37.2  C) (Oral)   Resp 20   Ht 1.676 m (5' 6\")   Wt 84.2 kg (185 lb 9.6 oz)   SpO2 97%   BMI 29.96 kg/m   Estimated body mass index is 29.96 kg/m  as calculated from the following:    Height as of this encounter: 1.676 m (5' 6\").    Weight as of this encounter: 84.2 kg (185 lb 9.6 oz). Body surface area is 1.98 meters squared.  No Pain (0) Comment: Data Unavailable   No LMP recorded. (Menstrual status: Chemotherapy).  Allergies reviewed: Yes  Medications reviewed: Yes    Medications: Medication refills not needed today.  Pharmacy name entered into [employer]:    [facility] - [place], [place] - [address]  [id number] - [place], [place] - [address] [facility]    Frailty Screening:   Is the patient here for a new oncology consult " visit in cancer care? 2. No      Clinical concerns: Patient would like to lose some more weight.  Dr Saleem was notified.      Faiza Saucedo MA                  Again, thank you for allowing me to participate in the care of your patient.        Sincerely,        Paola Saleem MD

## 2024-12-06 NOTE — PROGRESS NOTES
[facility] Hematology and Oncology Progress Note    Patient: Alicia Iniguez  MRN: [id number]  Date of Service: Dec 6, 2024          Reason for Visit    No chief complaint on file.      Assessment and Plan     Cancer Staging   Malignant neoplasm of overlapping sites of right breast in female, estrogen receptor positive (H)  Staging form: Breast, AJCC 8th Edition  - Clinical: Stage IIA (cT3, cN2, cM0, G2, ER+, HI+, HER2-) - Signed by Shanelle Weaver APRN CNP on 3/1/2023  - Pathologic stage from 8/21/2023: No Stage Recommended (ypT3, pN1a(sn), cM0, G2, ER+, HI+, HER2-) - Signed by Ruth Orellana MD on 8/21/2023    1.  Locally Advanced Breast cancer, right side, stage IIa, T3 N2 M0, grade 2, ER/HI positive, HER2/janine negative: Status post neoadjuvant chemotherapy with dose dense Adriamycin and Cytoxan for 4 cycles, followed by weekly Taxol. Underwent bilateral mastectomy on 8/9/2023.  Surgical path showed significant residual disease as expected with minimum treatment effect.  Residual disease measured 5.6 x 3.2 x 2.7 cm in greatest dimensions.  Grade 2.  There was evidence of dermal infiltration with focal invasion of epidermis.  There was focal lymphovascular invasion within the dermal connective tissue.  No evidence of DCIS.  Margins were all negative.  Out of 20 lymph nodes examined 2 were positive for macrometastasis (sentinel lymph nodes).  No evidence of extranodal extension.  ypT3, ypN1a.     After she finished adjuvant radiation, she was started on Verzenio and letrozole in November 2023.    Held for a couple of months during her reconstruction surgery.  Restarted in May 2024.  Current dose is 150 mg twice daily.      Done really well.  She has done about a years worth of Verzenio now.  And has tolerated it very well without any major side effects.    Some weight gain from letrozole.  Otherwise no significant side effects.  Here with repeat labs.    Will continue 3 monthly labs and  physical exam.    ECOG Performance    0 - Independent    Distress Screening (within last 30 days)    1. How concerned are you about your ability to eat? : 0  2. How concerned are you about unintended weight loss or your current weight? : 0  3. How concerned are you about feeling depressed or very sad? : 0  4. How concerned are you about feeling anxious or very scared? : 5  5. Do you struggle with the loss of meaning and tiago in your life? : Not at all  6. How concerned are you about work and home life issues that may be affected by your cancer? : 0  7. How concerned are you about knowing what resources are available to help you? : 0  8. Do you currently have what you would describe as [employer]oration[employer] or spiritual struggles?            : Not at all       Pain       Problem List    Patient Active Problem List   Diagnosis    Malignant neoplasm of overlapping sites of right breast in female, estrogen receptor positive (H)    Malignant neoplasm involving both nipple and areola of right breast in female, estrogen receptor positive (H)    Adverse effect of antineoplastic and immunosuppressive drugs, sequela    Chest pain, unspecified    Hyperlipidemia    Personal history of breast cancer        ______________________________________________________________________________    History of Present Illness    Oncology history:  DIAGNOSIS: Breast cancer, right side, patient had a palpable lump with some nipple and skin changes.  She was also found to have lymphadenopathy in the axilla.  Patient had an ultrasound and mammogram that showed a 4.1 cm tumor with axillary lymphadenopathy  Biopsy was done January 31 and it showed invasive ductal carcinoma.  Grade 2.  ER TN positive and HER2/janine negative.  Biopsy was done of the lymph node as well and it was positive.    TREATMENT: Patient has started neoadjuvant chemotherapy with dose dense Adriamycin and Cytoxan for 4 cycles and then 12 weekly Taxol.     Bilateral mastectomy on  8/9/2023    A) RIGHT BREAST, MASTECTOMY:  -INVASIVE DUCTAL CARCINOMA (5.6 x 3.2 x 2.7 IN GREATEST DIMENSIONS)     -BREAST CANCER PROGNOSTIC MARKER ANALYSIS     ESTROGEN RECEPTOR: POSITIVE (95% OF CELLS, STRONG STAINING     PROGESTERONE RECEPTOR: POSITIVE (95% OF CELLS, STRONG STAINING     HER2: NEGATIVE (1+ OUT OF 3 MEMBRANE STAINING)     Dr. Paty Berry and Dr. Richard Townsend have also reviewed this case and concur with the breast cancer prognostic marker analysis.     The following data are used for Residual Cancer [place] calculation:      Primary tumor bed area: 56 mm x 32 mm.  Overall cancer cellularity (as percentage of area): 95%.  Percentage of cancer that is in situ disease: 2%.  Number of positive lymph nodes: 2.  Diameter of largest metastasis: 3 mm.    Residual cancer burden score of 3    Adjuvant endocrine therapy with Zoladex and letrozole initially.    Zoladex stopped in November and continued on letrozole only.  Verzenio started on 11/9/2023.    INTERIM HISTORY:   48-year-old female with right-sided locally advanced breast cancer status post neoadjuvant chemotherapy with AC followed by T who recently underwent bilateral mastectomy she had significant residual disease.  ypT3, ypN1a.     Currently on Verzenio and letrozole.      Doing well on current therapy.  No significant side effects.  Here with repeat labs.  Denies any new lumps or bumps.  No bone pain.  No weight loss.      Review of Systems    Pertinent items are noted in HPI.    Past History    Past Medical History:   Diagnosis Date    Breast cancer (H)     right       PHYSICAL EXAM  There were no vitals taken for this visit.    GENERAL: no acute distress. Cooperative in conversation.  Lungs: Clear to auscultation bilaterally  CNS: Nonfocal  CVS: RRR  LYMPH nodes: No clinically palpable lymph nodes peripherally    Lab Results    No results found for this or any previous visit (from the past week).            Imaging    No results found.    A  total of 30 min was spent today on this visit which included face to face conversation with the patient, EMR review, counseling and co-ordination of care and medical documentation.    The longitudinal plan of care for the diagnosis(es)/condition(s) as documented were addressed during this visit. Due to the added complexity in care, I will continue to support Alicia in the subsequent management and with ongoing continuity of care.        Signed by: Paola Saleem MD

## 2024-12-10 ENCOUNTER — PATIENT OUTREACH (OUTPATIENT)
Dept: ONCOLOGY | Facility: HOSPITAL | Age: 49
End: 2024-12-10
Payer: COMMERCIAL

## 2024-12-10 NOTE — PROGRESS NOTES
[facility]: Cancer Care                                                                                          Sent pt MyC message.    Signature:  Gisella Mauricio RN

## 2024-12-30 ENCOUNTER — MYC REFILL (OUTPATIENT)
Dept: ONCOLOGY | Facility: HOSPITAL | Age: 49
End: 2024-12-30
Payer: COMMERCIAL

## 2024-12-30 ENCOUNTER — MYC MEDICAL ADVICE (OUTPATIENT)
Dept: ONCOLOGY | Facility: HOSPITAL | Age: 49
End: 2024-12-30
Payer: COMMERCIAL

## 2024-12-30 DIAGNOSIS — M62.830 BACK MUSCLE SPASM: ICD-10-CM

## 2024-12-30 NOTE — TELEPHONE ENCOUNTER
Last Written Prescription Date:  1/10/24  Last Fill Quantity: 30,  # refills: 0   Last office visit provider:  12/6/24 with Dr. Saleem     Requested Prescriptions   Pending Prescriptions Disp Refills    cyclobenzaprine (FLEXERIL) 5 MG tablet 30 tablet 0     Sig: Take 1 tablet (5 mg) by mouth 3 times daily as needed for muscle spasms.       There is no refill protocol information for this order          Cheryl Stacy RN 12/30/24 2:47 PM

## 2024-12-31 RX ORDER — CYCLOBENZAPRINE HCL 5 MG
5 TABLET ORAL 3 TIMES DAILY PRN
Qty: 30 TABLET | Refills: 0 | Status: SHIPPED | OUTPATIENT
Start: 2024-12-31

## 2025-03-05 ENCOUNTER — ONCOLOGY VISIT (OUTPATIENT)
Dept: ONCOLOGY | Facility: HOSPITAL | Age: 50
End: 2025-03-05
Attending: INTERNAL MEDICINE
Payer: COMMERCIAL

## 2025-03-05 ENCOUNTER — LAB (OUTPATIENT)
Dept: INFUSION THERAPY | Facility: HOSPITAL | Age: 50
End: 2025-03-05
Attending: INTERNAL MEDICINE
Payer: COMMERCIAL

## 2025-03-05 VITALS
HEART RATE: 86 BPM | WEIGHT: 172.7 LBS | DIASTOLIC BLOOD PRESSURE: 65 MMHG | HEIGHT: 66 IN | TEMPERATURE: 98.1 F | BODY MASS INDEX: 27.76 KG/M2 | OXYGEN SATURATION: 100 % | RESPIRATION RATE: 16 BRPM | SYSTOLIC BLOOD PRESSURE: 135 MMHG

## 2025-03-05 DIAGNOSIS — Z17.0 MALIGNANT NEOPLASM OF OVERLAPPING SITES OF RIGHT BREAST IN FEMALE, ESTROGEN RECEPTOR POSITIVE (H): ICD-10-CM

## 2025-03-05 DIAGNOSIS — D70.2 DRUG-INDUCED LEUKOPENIA: ICD-10-CM

## 2025-03-05 DIAGNOSIS — Z17.0 MALIGNANT NEOPLASM OF OVERLAPPING SITES OF RIGHT BREAST IN FEMALE, ESTROGEN RECEPTOR POSITIVE (H): Primary | ICD-10-CM

## 2025-03-05 DIAGNOSIS — C50.811 MALIGNANT NEOPLASM OF OVERLAPPING SITES OF RIGHT BREAST IN FEMALE, ESTROGEN RECEPTOR POSITIVE (H): ICD-10-CM

## 2025-03-05 DIAGNOSIS — C50.811 MALIGNANT NEOPLASM OF OVERLAPPING SITES OF RIGHT BREAST IN FEMALE, ESTROGEN RECEPTOR POSITIVE (H): Primary | ICD-10-CM

## 2025-03-05 LAB
ALBUMIN SERPL BCG-MCNC: 4.4 G/DL (ref 3.5–5.2)
ALP SERPL-CCNC: 82 U/L (ref 40–150)
ALT SERPL W P-5'-P-CCNC: 23 U/L (ref 0–50)
ANION GAP SERPL CALCULATED.3IONS-SCNC: 8 MMOL/L (ref 7–15)
AST SERPL W P-5'-P-CCNC: 20 U/L (ref 0–45)
BASOPHILS # BLD AUTO: 0 10E3/UL (ref 0–0.2)
BASOPHILS NFR BLD AUTO: 1 %
BILIRUB SERPL-MCNC: 0.4 MG/DL
BUN SERPL-MCNC: 10.2 MG/DL (ref 6–20)
CALCIUM SERPL-MCNC: 9.7 MG/DL (ref 8.8–10.4)
CHLORIDE SERPL-SCNC: 105 MMOL/L (ref 98–107)
CREAT SERPL-MCNC: 0.81 MG/DL (ref 0.51–0.95)
EGFRCR SERPLBLD CKD-EPI 2021: 88 ML/MIN/1.73M2
EOSINOPHIL # BLD AUTO: 0 10E3/UL (ref 0–0.7)
EOSINOPHIL NFR BLD AUTO: 1 %
ERYTHROCYTE [DISTWIDTH] IN BLOOD BY AUTOMATED COUNT: 12.6 % (ref 10–15)
GLUCOSE SERPL-MCNC: 98 MG/DL (ref 70–99)
HCO3 SERPL-SCNC: 27 MMOL/L (ref 22–29)
HCT VFR BLD AUTO: 34.1 % (ref 35–47)
HGB BLD-MCNC: 11.5 G/DL (ref 11.7–15.7)
IMM GRANULOCYTES # BLD: 0 10E3/UL
IMM GRANULOCYTES NFR BLD: 0 %
LYMPHOCYTES # BLD AUTO: 0.9 10E3/UL (ref 0.8–5.3)
LYMPHOCYTES NFR BLD AUTO: 34 %
MCH RBC QN AUTO: 32.4 PG (ref 26.5–33)
MCHC RBC AUTO-ENTMCNC: 33.7 G/DL (ref 31.5–36.5)
MCV RBC AUTO: 96 FL (ref 78–100)
MONOCYTES # BLD AUTO: 0.2 10E3/UL (ref 0–1.3)
MONOCYTES NFR BLD AUTO: 6 %
NEUTROPHILS # BLD AUTO: 1.6 10E3/UL (ref 1.6–8.3)
NEUTROPHILS NFR BLD AUTO: 59 %
NRBC # BLD AUTO: 0 10E3/UL
NRBC BLD AUTO-RTO: 0 /100
PLATELET # BLD AUTO: 208 10E3/UL (ref 150–450)
POTASSIUM SERPL-SCNC: 3.7 MMOL/L (ref 3.4–5.3)
PROT SERPL-MCNC: 7.1 G/DL (ref 6.4–8.3)
RBC # BLD AUTO: 3.55 10E6/UL (ref 3.8–5.2)
SODIUM SERPL-SCNC: 140 MMOL/L (ref 135–145)
WBC # BLD AUTO: 2.8 10E3/UL (ref 4–11)

## 2025-03-05 PROCEDURE — 82947 ASSAY GLUCOSE BLOOD QUANT: CPT

## 2025-03-05 PROCEDURE — 82247 BILIRUBIN TOTAL: CPT

## 2025-03-05 PROCEDURE — 85025 COMPLETE CBC W/AUTO DIFF WBC: CPT

## 2025-03-05 PROCEDURE — 36591 DRAW BLOOD OFF VENOUS DEVICE: CPT

## 2025-03-05 PROCEDURE — G2211 COMPLEX E/M VISIT ADD ON: HCPCS | Performed by: INTERNAL MEDICINE

## 2025-03-05 PROCEDURE — 99214 OFFICE O/P EST MOD 30 MIN: CPT | Performed by: INTERNAL MEDICINE

## 2025-03-05 ASSESSMENT — PAIN SCALES - GENERAL: PAINLEVEL_OUTOF10: NO PAIN (0)

## 2025-03-05 NOTE — PROGRESS NOTES
"Oncology Rooming Note    March 5, 2025 2:56 PM   Alicia Iniguez is a 49 year old female who presents for:    Chief Complaint   Patient presents with    Oncology Clinic Visit     Return visit 3 months with lab. Malignant neoplasm of overlapping sites of right breast in female, estrogen receptor positive.     Initial Vitals: /65 (BP Location: Left arm, Patient Position: Sitting, Cuff Size: Adult Regular)   Pulse 86   Temp 98.1  F (36.7  C) (Oral)   Resp 16   Ht 1.67 m (5' 5.75\")   Wt 78.3 kg (172 lb 11.2 oz)   SpO2 100%   BMI 28.09 kg/m   Estimated body mass index is 28.09 kg/m  as calculated from the following:    Height as of this encounter: 1.67 m (5' 5.75\").    Weight as of this encounter: 78.3 kg (172 lb 11.2 oz). Body surface area is 1.91 meters squared.  No Pain (0) Comment: Data Unavailable   No LMP recorded. (Menstrual status: Chemotherapy).  Allergies reviewed: Yes  Medications reviewed: Yes    Medications: Medication refills not needed today.  Pharmacy name entered into [employer]:    [facility] - [place], [place] - [address]  [id number] - [place], [place] - [address] [facility]    Frailty Screening:   Is the patient here for a new oncology consult visit in cancer care? 2. No    PHQ9:  Did this patient require a PHQ9?: No      Clinical concerns: none       Mireille Gutierrez CMA            "

## 2025-03-05 NOTE — LETTER
"3/5/2025      Alicia Iniguez  [address] [facility] [address]      Dear Colleague,    Thank you for referring your patient, Alicia Iniguez, to the [facility]. Please see a copy of my visit note below.    Oncology Rooming Note    March 5, 2025 2:56 PM   Alicia Iniguez is a 49 year old female who presents for:    Chief Complaint   Patient presents with     Oncology Clinic Visit     Return visit 3 months with lab. Malignant neoplasm of overlapping sites of right breast in female, estrogen receptor positive.     Initial Vitals: /65 (BP Location: Left arm, Patient Position: Sitting, Cuff Size: Adult Regular)   Pulse 86   Temp 98.1  F (36.7  C) (Oral)   Resp 16   Ht 1.67 m (5' 5.75\")   Wt 78.3 kg (172 lb 11.2 oz)   SpO2 100%   BMI 28.09 kg/m   Estimated body mass index is 28.09 kg/m  as calculated from the following:    Height as of this encounter: 1.67 m (5' 5.75\").    Weight as of this encounter: 78.3 kg (172 lb 11.2 oz). Body surface area is 1.91 meters squared.  No Pain (0) Comment: Data Unavailable   No LMP recorded. (Menstrual status: Chemotherapy).  Allergies reviewed: Yes  Medications reviewed: Yes    Medications: Medication refills not needed today.  Pharmacy name entered into [facility]:    [facility] - [place], [place] - [address]  [id number] - [facility] [address] [facility] AT [facility] & [facility]    Frailty Screening:   Is the patient here for a new oncology consult visit in cancer care? 2. No    PHQ9:  Did this patient require a PHQ9?: No      Clinical concerns: none       Mireille Gutierrez, [facility] Hematology and Oncology Progress Note    Patient: Alicia Iniguez  MRN: [id number]  Date of Service: Mar 5, 2025          Reason for Visit    Chief Complaint   Patient presents with     Oncology Clinic Visit     Return visit 3 months with lab. Malignant neoplasm of " overlapping sites of right breast in female, estrogen receptor positive.       Assessment and Plan     Cancer Staging   Malignant neoplasm of overlapping sites of right breast in female, estrogen receptor positive (H)  Staging form: Breast, AJCC 8th Edition  - Clinical: Stage IIA (cT3, cN2, cM0, G2, ER+, VT+, HER2-) - Signed by Shanelle Weaver APRN CNP on 3/1/2023  - Pathologic stage from 8/21/2023: No Stage Recommended (ypT3, pN1a(sn), cM0, G2, ER+, VT+, HER2-) - Signed by Ruth Orellana MD on 8/21/2023    1.  Locally Advanced Breast cancer, right side, stage IIa, T3 N2 M0, grade 2, ER/VT positive, HER2/janine negative: Status post neoadjuvant chemotherapy with dose dense Adriamycin and Cytoxan for 4 cycles, followed by weekly Taxol. Underwent bilateral mastectomy on 8/9/2023.  Surgical path showed significant residual disease as expected with minimum treatment effect.  Residual disease measured 5.6 x 3.2 x 2.7 cm in greatest dimensions.  Grade 2.  There was evidence of dermal infiltration with focal invasion of epidermis.  There was focal lymphovascular invasion within the dermal connective tissue.  No evidence of DCIS.  Margins were all negative.  Out of 20 lymph nodes examined 2 were positive for macrometastasis (sentinel lymph nodes).  No evidence of extranodal extension.  ypT3, ypN1a.     After she finished adjuvant radiation, she was started on Verzenio and letrozole in November 2023.    Held for a couple of months during her reconstruction surgery.  Restarted in May 2024.  Current dose is 150 mg twice daily.      Plan:  - Patient is on Verzenio and letrozole as part of ongoing treatment. No new lumps or bumps reported. Slightly decreased white blood cell count and mildly low hemoglobin, likely due to recent infection. No significant changes in liver or kidney function. Bone density scan normal as of September last year. No need for mammograms post-bilateral mastectomy.  - Continue Verzenio until  November 2025. Continue letrozole for 10 years. Maintain three-monthly labs and visits until November 2025, then extend to every six months. Continue calcium and vitamin D supplementation. Monitor for any new nodules or lumps, especially in the armpits or scar area. Echocardiogram to be done at 10 years post-chemotherapy.  - Risks and side effects: Monitor bone health due to potential side effects of letrozole. Be mindful of potential cardiovascular risks associated with past chemotherapy and radiation.         ECOG Performance    0 - Independent    Distress Screening (within last 30 days)    1. How concerned are you about your ability to eat? : 0  2. How concerned are you about unintended weight loss or your current weight? : 0  3. How concerned are you about feeling depressed or very sad? : 0  4. How concerned are you about feeling anxious or very scared? : 5  5. Do you struggle with the loss of meaning and tiago in your life? : Not at all  6. How concerned are you about work and home life issues that may be affected by your cancer? : 0  7. How concerned are you about knowing what resources are available to help you? : 0  8. Do you currently have what you would describe as Jainism or spiritual struggles?            : Not at all       Pain  Pain Score: No Pain (0)    Problem List    Patient Active Problem List   Diagnosis     Malignant neoplasm of overlapping sites of right breast in female, estrogen receptor positive (H)     Malignant neoplasm involving both nipple and areola of right breast in female, estrogen receptor positive (H)     Adverse effect of antineoplastic and immunosuppressive drugs, sequela     Chest pain, unspecified     Hyperlipidemia     Personal history of breast cancer        ______________________________________________________________________________    History of Present Illness    Oncology history:  DIAGNOSIS: Breast cancer, right side, patient had a palpable lump with some nipple and skin  changes.  She was also found to have lymphadenopathy in the axilla.  Patient had an ultrasound and mammogram that showed a 4.1 cm tumor with axillary lymphadenopathy  Biopsy was done January 31 and it showed invasive ductal carcinoma.  Grade 2.  ER [facility] positive and HER2/janine negative.  Biopsy was done of the lymph node as well and it was positive.    TREATMENT: Patient has started neoadjuvant chemotherapy with dose dense Adriamycin and Cytoxan for 4 cycles and then 12 weekly Taxol.     Bilateral mastectomy on 8/9/2023    A) RIGHT BREAST, MASTECTOMY:  -INVASIVE DUCTAL CARCINOMA (5.6 x 3.2 x 2.7 IN GREATEST DIMENSIONS)     -BREAST CANCER PROGNOSTIC MARKER ANALYSIS     ESTROGEN RECEPTOR: POSITIVE (95% OF CELLS, STRONG STAINING     PROGESTERONE RECEPTOR: POSITIVE (95% OF CELLS, STRONG STAINING     HER2: NEGATIVE (1+ OUT OF 3 MEMBRANE STAINING)     Dr. Paty Berry and Dr. Richard Townsend have also reviewed this case and concur with the breast cancer prognostic marker analysis.     The following data are used for Residual Cancer [place] calculation:      Primary tumor bed area: 56 mm x 32 mm.  Overall cancer cellularity (as percentage of area): 95%.  Percentage of cancer that is in situ disease: 2%.  Number of positive lymph nodes: 2.  Diameter of largest metastasis: 3 mm.    Residual cancer burden score of 3    Adjuvant endocrine therapy with Zoladex and letrozole initially.    Zoladex stopped in November and continued on letrozole only.  Verzenio started on 11/9/2023.    INTERIM HISTORY:   48-year-old female with right-sided locally advanced breast cancer status post neoadjuvant chemotherapy with AC followed by T who recently underwent bilateral mastectomy she had significant residual disease.  ypT3, ypN1a.     Currently on Verzenio and letrozole.      She reported experiencing a week of illness last week, which included symptoms such as headache, body aches, and diarrhea. She noted that she felt fine by Thursday and  "returned to work, but experienced a recurrence of symptoms on Friday. She did not have a fever during this period. Her youngest child also contracted the illness. She continued taking her medication, Verzenio, throughout the illness.     Alicia has been on Verzenio since November 9, 2023, and has been taking letrozole, which has caused manageable hot flashes. She has intentionally lost approximately 20 pounds through dietary changes and regular treadmill exercise, although she took a break from exercise while she was unwell.     Alicia underwent surgery in August 2023, which included lymph node removal on the right side, resulting in some post-surgical tightness and swelling that has since softened. She attends physical therapy once a week to address this tightness. Alicia had a bilateral mastectomy and does not require mammograms. She has been taking calcium and vitamin D supplements. She reported no new lumps or bumps and no swelling. Alicia is planning a trip to [place] and has made arrangements for her medication refills.       Review of Systems    Pertinent items are noted in HPI.    Past History    Past Medical History:   Diagnosis Date     Breast cancer (H)     right       PHYSICAL EXAM  /65 (BP Location: Left arm, Patient Position: Sitting, Cuff Size: Adult Regular)   Pulse 86   Temp 98.1  F (36.7  C) (Oral)   Resp 16   Ht 1.67 m (5' 5.75\")   Wt 78.3 kg (172 lb 11.2 oz)   SpO2 100%   BMI 28.09 kg/m      GENERAL: no acute distress. Cooperative in conversation.  - LUNGS: No wheezing, rales, or crackles noted.  - SKIN: Induration noted on the right side, attributed to scarring, no palpable nodules.     Lab Results    Recent Results (from the past week)   Comprehensive metabolic panel   Result Value Ref Range    Sodium 140 135 - 145 mmol/L    Potassium 3.7 3.4 - 5.3 mmol/L    Carbon Dioxide (CO2) 27 22 - 29 mmol/L    Anion Gap 8 7 - 15 mmol/L    Urea Nitrogen 10.2 6.0 - 20.0 mg/dL    Creatinine " 0.81 0.51 - 0.95 mg/dL    GFR Estimate 88 >60 mL/min/1.73m2    Calcium 9.7 8.8 - 10.4 mg/dL    Chloride 105 98 - 107 mmol/L    Glucose 98 70 - 99 mg/dL    Alkaline Phosphatase 82 40 - 150 U/L    AST 20 0 - 45 U/L    ALT 23 0 - 50 U/L    Protein Total 7.1 6.4 - 8.3 g/dL    Albumin 4.4 3.5 - 5.2 g/dL    Bilirubin Total 0.4 <=1.2 mg/dL   CBC with platelets and differential   Result Value Ref Range    WBC Count 2.8 (L) 4.0 - 11.0 10e3/uL    RBC Count 3.55 (L) 3.80 - 5.20 10e6/uL    Hemoglobin 11.5 (L) 11.7 - 15.7 g/dL    Hematocrit 34.1 (L) 35.0 - 47.0 %    MCV 96 78 - 100 fL    MCH 32.4 26.5 - 33.0 pg    MCHC 33.7 31.5 - 36.5 g/dL    RDW 12.6 10.0 - 15.0 %    Platelet Count 208 150 - 450 10e3/uL    % Neutrophils 59 %    % Lymphocytes 34 %    % Monocytes 6 %    % Eosinophils 1 %    % Basophils 1 %    % Immature Granulocytes 0 %    NRBCs per 100 WBC 0 <1 /100    Absolute Neutrophils 1.6 1.6 - 8.3 10e3/uL    Absolute Lymphocytes 0.9 0.8 - 5.3 10e3/uL    Absolute Monocytes 0.2 0.0 - 1.3 10e3/uL    Absolute Eosinophils 0.0 0.0 - 0.7 10e3/uL    Absolute Basophils 0.0 0.0 - 0.2 10e3/uL    Absolute Immature Granulocytes 0.0 <=0.4 10e3/uL    Absolute NRBCs 0.0 10e3/uL               Imaging    No results found.    The longitudinal plan of care for the diagnosis(es)/condition(s) as documented were addressed during this visit. Due to the added complexity in care, I will continue to support Alicia in the subsequent management and with ongoing continuity of care.      High risk breast cancer status post surgery chemotherapy, surgery and radiation.  Currently on adjuvant Verzenio and letrozole requiring intensive monitoring and follow-up.        Signed by: Paola Saleem MD      Again, thank you for allowing me to participate in the care of your patient.        Sincerely,        Paola Saleem MD    Electronically signed

## 2025-03-05 NOTE — PROGRESS NOTES
[facility] Hematology and Oncology Progress Note    Patient: Alicia Iniguez  MRN: [id number]  Date of Service: Mar 5, 2025          Reason for Visit    Chief Complaint   Patient presents with    Oncology Clinic Visit     Return visit 3 months with lab. Malignant neoplasm of overlapping sites of right breast in female, estrogen receptor positive.       Assessment and Plan     Cancer Staging   Malignant neoplasm of overlapping sites of right breast in female, estrogen receptor positive (H)  Staging form: Breast, AJCC 8th Edition  - Clinical: Stage IIA (cT3, cN2, cM0, G2, ER+, IA+, HER2-) - Signed by Shanelle Weaver APRN CNP on 3/1/2023  - Pathologic stage from 8/21/2023: No Stage Recommended (ypT3, pN1a(sn), cM0, G2, ER+, IA+, HER2-) - Signed by Ruth Orellana MD on 8/21/2023    1.  Locally Advanced Breast cancer, right side, stage IIa, T3 N2 M0, grade 2, ER/IA positive, HER2/janine negative: Status post neoadjuvant chemotherapy with dose dense Adriamycin and Cytoxan for 4 cycles, followed by weekly Taxol. Underwent bilateral mastectomy on 8/9/2023.  Surgical path showed significant residual disease as expected with minimum treatment effect.  Residual disease measured 5.6 x 3.2 x 2.7 cm in greatest dimensions.  Grade 2.  There was evidence of dermal infiltration with focal invasion of epidermis.  There was focal lymphovascular invasion within the dermal connective tissue.  No evidence of DCIS.  Margins were all negative.  Out of 20 lymph nodes examined 2 were positive for macrometastasis (sentinel lymph nodes).  No evidence of extranodal extension.  ypT3, ypN1a.     After she finished adjuvant radiation, she was started on Verzenio and letrozole in November 2023.    Held for a couple of months during her reconstruction surgery.  Restarted in May 2024.  Current dose is 150 mg twice daily.      Plan:  - Patient is on Verzenio and letrozole as part of ongoing treatment. No new lumps or bumps  reported. Slightly decreased white blood cell count and mildly low hemoglobin, likely due to recent infection. No significant changes in liver or kidney function. Bone density scan normal as of September last year. No need for mammograms post-bilateral mastectomy.  - Continue Verzenio until November 2025. Continue letrozole for 10 years. Maintain three-monthly labs and visits until November 2025, then extend to every six months. Continue calcium and vitamin D supplementation. Monitor for any new nodules or lumps, especially in the armpits or scar area. Echocardiogram to be done at 10 years post-chemotherapy.  - Risks and side effects: Monitor bone health due to potential side effects of letrozole. Be mindful of potential cardiovascular risks associated with past chemotherapy and radiation.         ECOG Performance    0 - Independent    Distress Screening (within last 30 days)    1. How concerned are you about your ability to eat? : 0  2. How concerned are you about unintended weight loss or your current weight? : 0  3. How concerned are you about feeling depressed or very sad? : 0  4. How concerned are you about feeling anxious or very scared? : 5  5. Do you struggle with the loss of meaning and tiago in your life? : Not at all  6. How concerned are you about work and home life issues that may be affected by your cancer? : 0  7. How concerned are you about knowing what resources are available to help you? : 0  8. Do you currently have what you would describe as Episcopalian or spiritual struggles?            : Not at all       Pain  Pain Score: No Pain (0)    Problem List    Patient Active Problem List   Diagnosis    Malignant neoplasm of overlapping sites of right breast in female, estrogen receptor positive (H)    Malignant neoplasm involving both nipple and areola of right breast in female, estrogen receptor positive (H)    Adverse effect of antineoplastic and immunosuppressive drugs, sequela    Chest pain,  unspecified    Hyperlipidemia    Personal history of breast cancer        ______________________________________________________________________________    History of Present Illness    Oncology history:  DIAGNOSIS: Breast cancer, right side, patient had a palpable lump with some nipple and skin changes.  She was also found to have lymphadenopathy in the axilla.  Patient had an ultrasound and mammogram that showed a 4.1 cm tumor with axillary lymphadenopathy  Biopsy was done January 31 and it showed invasive ductal carcinoma.  Grade 2.  ER NM positive and HER2/janine negative.  Biopsy was done of the lymph node as well and it was positive.    TREATMENT: Patient has started neoadjuvant chemotherapy with dose dense Adriamycin and Cytoxan for 4 cycles and then 12 weekly Taxol.     Bilateral mastectomy on 8/9/2023    A) RIGHT BREAST, MASTECTOMY:  -INVASIVE DUCTAL CARCINOMA (5.6 x 3.2 x 2.7 IN GREATEST DIMENSIONS)     -BREAST CANCER PROGNOSTIC MARKER ANALYSIS     ESTROGEN RECEPTOR: POSITIVE (95% OF CELLS, STRONG STAINING     PROGESTERONE RECEPTOR: POSITIVE (95% OF CELLS, STRONG STAINING     HER2: NEGATIVE (1+ OUT OF 3 MEMBRANE STAINING)     Dr. Paty Berry and Dr. Richard Townsend have also reviewed this case and concur with the breast cancer prognostic marker analysis.     The following data are used for Residual Cancer [place] calculation:      Primary tumor bed area: 56 mm x 32 mm.  Overall cancer cellularity (as percentage of area): 95%.  Percentage of cancer that is in situ disease: 2%.  Number of positive lymph nodes: 2.  Diameter of largest metastasis: 3 mm.    Residual cancer burden score of 3    Adjuvant endocrine therapy with Zoladex and letrozole initially.    Zoladex stopped in November and continued on letrozole only.  Verzenio started on 11/9/2023.    INTERIM HISTORY:   48-year-old female with right-sided locally advanced breast cancer status post neoadjuvant chemotherapy with AC followed by T who recently  "underwent bilateral mastectomy she had significant residual disease.  ypT3, ypN1a.     Currently on Verzenio and letrozole.      She reported experiencing a week of illness last week, which included symptoms such as headache, body aches, and diarrhea. She noted that she felt fine by Thursday and returned to work, but experienced a recurrence of symptoms on Friday. She did not have a fever during this period. Her youngest child also contracted the illness. She continued taking her medication, Verzenio, throughout the illness.     Alicia has been on Verzenio since November 9, 2023, and has been taking letrozole, which has caused manageable hot flashes. She has intentionally lost approximately 20 pounds through dietary changes and regular treadmill exercise, although she took a break from exercise while she was unwell.     Alicia underwent surgery in August 2023, which included lymph node removal on the right side, resulting in some post-surgical tightness and swelling that has since softened. She attends physical therapy once a week to address this tightness. Alicia had a bilateral mastectomy and does not require mammograms. She has been taking calcium and vitamin D supplements. She reported no new lumps or bumps and no swelling. Alicia is planning a trip to [place] and has made arrangements for her medication refills.       Review of Systems    Pertinent items are noted in HPI.    Past History    Past Medical History:   Diagnosis Date    Breast cancer (H)     right       PHYSICAL EXAM  /65 (BP Location: Left arm, Patient Position: Sitting, Cuff Size: Adult Regular)   Pulse 86   Temp 98.1  F (36.7  C) (Oral)   Resp 16   Ht 1.67 m (5' 5.75\")   Wt 78.3 kg (172 lb 11.2 oz)   SpO2 100%   BMI 28.09 kg/m      GENERAL: no acute distress. Cooperative in conversation.  - LUNGS: No wheezing, rales, or crackles noted.  - SKIN: Induration noted on the right side, attributed to scarring, no palpable nodules. "     Lab Results    Recent Results (from the past week)   Comprehensive metabolic panel   Result Value Ref Range    Sodium 140 135 - 145 mmol/L    Potassium 3.7 3.4 - 5.3 mmol/L    Carbon Dioxide (CO2) 27 22 - 29 mmol/L    Anion Gap 8 7 - 15 mmol/L    Urea Nitrogen 10.2 6.0 - 20.0 mg/dL    Creatinine 0.81 0.51 - 0.95 mg/dL    GFR Estimate 88 >60 mL/min/1.73m2    Calcium 9.7 8.8 - 10.4 mg/dL    Chloride 105 98 - 107 mmol/L    Glucose 98 70 - 99 mg/dL    Alkaline Phosphatase 82 40 - 150 U/L    AST 20 0 - 45 U/L    ALT 23 0 - 50 U/L    Protein Total 7.1 6.4 - 8.3 g/dL    Albumin 4.4 3.5 - 5.2 g/dL    Bilirubin Total 0.4 <=1.2 mg/dL   CBC with platelets and differential   Result Value Ref Range    WBC Count 2.8 (L) 4.0 - 11.0 10e3/uL    RBC Count 3.55 (L) 3.80 - 5.20 10e6/uL    Hemoglobin 11.5 (L) 11.7 - 15.7 g/dL    Hematocrit 34.1 (L) 35.0 - 47.0 %    MCV 96 78 - 100 fL    MCH 32.4 26.5 - 33.0 pg    MCHC 33.7 31.5 - 36.5 g/dL    RDW 12.6 10.0 - 15.0 %    Platelet Count 208 150 - 450 10e3/uL    % Neutrophils 59 %    % Lymphocytes 34 %    % Monocytes 6 %    % Eosinophils 1 %    % Basophils 1 %    % Immature Granulocytes 0 %    NRBCs per 100 WBC 0 <1 /100    Absolute Neutrophils 1.6 1.6 - 8.3 10e3/uL    Absolute Lymphocytes 0.9 0.8 - 5.3 10e3/uL    Absolute Monocytes 0.2 0.0 - 1.3 10e3/uL    Absolute Eosinophils 0.0 0.0 - 0.7 10e3/uL    Absolute Basophils 0.0 0.0 - 0.2 10e3/uL    Absolute Immature Granulocytes 0.0 <=0.4 10e3/uL    Absolute NRBCs 0.0 10e3/uL               Imaging    No results found.    The longitudinal plan of care for the diagnosis(es)/condition(s) as documented were addressed during this visit. Due to the added complexity in care, I will continue to support Alicia in the subsequent management and with ongoing continuity of care.      High risk breast cancer status post surgery chemotherapy, surgery and radiation.  Currently on adjuvant Verzenio and letrozole requiring intensive monitoring and  follow-up.        Signed by: Paola Saleem MD

## 2025-03-06 DIAGNOSIS — Z17.0 MALIGNANT NEOPLASM OF OVERLAPPING SITES OF RIGHT BREAST IN FEMALE, ESTROGEN RECEPTOR POSITIVE (H): Primary | ICD-10-CM

## 2025-03-06 DIAGNOSIS — C50.811 MALIGNANT NEOPLASM OF OVERLAPPING SITES OF RIGHT BREAST IN FEMALE, ESTROGEN RECEPTOR POSITIVE (H): Primary | ICD-10-CM

## 2025-03-06 RX ORDER — LETROZOLE 2.5 MG/1
2.5 TABLET, FILM COATED ORAL DAILY
Qty: 90 TABLET | Refills: 0 | Status: SHIPPED | OUTPATIENT
Start: 2025-03-06

## 2025-03-15 ENCOUNTER — HEALTH MAINTENANCE LETTER (OUTPATIENT)
Age: 50
End: 2025-03-15

## 2025-03-20 ENCOUNTER — PATIENT OUTREACH (OUTPATIENT)
Dept: ONCOLOGY | Facility: HOSPITAL | Age: 50
End: 2025-03-20
Payer: COMMERCIAL

## 2025-03-20 NOTE — PROGRESS NOTES
Situation: Patient chart reviewed by care coordinator.    Background: Seen in clinic on 3/5/25 for follow up for breast cancer. Pt continues on letrozole and Verzenio.     Assessment: Labs stable. Breasts stable.    Plan/Recommendations: Continue Verzenio til Nov 2025. Continue letrozole for 10 yrs. Continue 3 monthly labs & visits til Nov 2025, then extend to q 6 mo.     Gisella Mauricio RN

## 2025-06-04 ENCOUNTER — ONCOLOGY VISIT (OUTPATIENT)
Dept: ONCOLOGY | Facility: HOSPITAL | Age: 50
End: 2025-06-04
Attending: INTERNAL MEDICINE
Payer: COMMERCIAL

## 2025-06-04 ENCOUNTER — PATIENT OUTREACH (OUTPATIENT)
Dept: ONCOLOGY | Facility: HOSPITAL | Age: 50
End: 2025-06-04

## 2025-06-04 ENCOUNTER — LAB (OUTPATIENT)
Dept: INFUSION THERAPY | Facility: HOSPITAL | Age: 50
End: 2025-06-04
Attending: INTERNAL MEDICINE
Payer: COMMERCIAL

## 2025-06-04 VITALS
DIASTOLIC BLOOD PRESSURE: 77 MMHG | BODY MASS INDEX: 27.74 KG/M2 | TEMPERATURE: 98.5 F | SYSTOLIC BLOOD PRESSURE: 125 MMHG | HEART RATE: 78 BPM | RESPIRATION RATE: 16 BRPM | WEIGHT: 172.6 LBS | OXYGEN SATURATION: 100 % | HEIGHT: 66 IN

## 2025-06-04 DIAGNOSIS — C50.811 MALIGNANT NEOPLASM OF OVERLAPPING SITES OF RIGHT BREAST IN FEMALE, ESTROGEN RECEPTOR POSITIVE (H): ICD-10-CM

## 2025-06-04 DIAGNOSIS — Z17.0 MALIGNANT NEOPLASM OF OVERLAPPING SITES OF RIGHT BREAST IN FEMALE, ESTROGEN RECEPTOR POSITIVE (H): ICD-10-CM

## 2025-06-04 DIAGNOSIS — C50.811 MALIGNANT NEOPLASM OF OVERLAPPING SITES OF RIGHT BREAST IN FEMALE, ESTROGEN RECEPTOR POSITIVE (H): Primary | ICD-10-CM

## 2025-06-04 DIAGNOSIS — Z17.0 MALIGNANT NEOPLASM OF OVERLAPPING SITES OF RIGHT BREAST IN FEMALE, ESTROGEN RECEPTOR POSITIVE (H): Primary | ICD-10-CM

## 2025-06-04 LAB
ALBUMIN SERPL BCG-MCNC: 4.2 G/DL (ref 3.5–5.2)
ALP SERPL-CCNC: 92 U/L (ref 40–150)
ALT SERPL W P-5'-P-CCNC: 34 U/L (ref 0–50)
ANION GAP SERPL CALCULATED.3IONS-SCNC: 8 MMOL/L (ref 7–15)
AST SERPL W P-5'-P-CCNC: 28 U/L (ref 0–45)
BASOPHILS # BLD AUTO: 0.1 10E3/UL (ref 0–0.2)
BASOPHILS NFR BLD AUTO: 2 %
BILIRUB SERPL-MCNC: 0.3 MG/DL
BUN SERPL-MCNC: 10 MG/DL (ref 6–20)
CALCIUM SERPL-MCNC: 9.3 MG/DL (ref 8.8–10.4)
CHLORIDE SERPL-SCNC: 104 MMOL/L (ref 98–107)
CREAT SERPL-MCNC: 0.87 MG/DL (ref 0.51–0.95)
EGFRCR SERPLBLD CKD-EPI 2021: 80 ML/MIN/1.73M2
EOSINOPHIL # BLD AUTO: 0.1 10E3/UL (ref 0–0.7)
EOSINOPHIL NFR BLD AUTO: 3 %
ERYTHROCYTE [DISTWIDTH] IN BLOOD BY AUTOMATED COUNT: 12.5 % (ref 10–15)
GLUCOSE SERPL-MCNC: 92 MG/DL (ref 70–99)
HCO3 SERPL-SCNC: 27 MMOL/L (ref 22–29)
HCT VFR BLD AUTO: 34.8 % (ref 35–47)
HGB BLD-MCNC: 12 G/DL (ref 11.7–15.7)
IMM GRANULOCYTES # BLD: 0 10E3/UL
IMM GRANULOCYTES NFR BLD: 1 %
LYMPHOCYTES # BLD AUTO: 1.1 10E3/UL (ref 0.8–5.3)
LYMPHOCYTES NFR BLD AUTO: 29 %
MCH RBC QN AUTO: 33.6 PG (ref 26.5–33)
MCHC RBC AUTO-ENTMCNC: 34.5 G/DL (ref 31.5–36.5)
MCV RBC AUTO: 98 FL (ref 78–100)
MONOCYTES # BLD AUTO: 0.2 10E3/UL (ref 0–1.3)
MONOCYTES NFR BLD AUTO: 6 %
NEUTROPHILS # BLD AUTO: 2.2 10E3/UL (ref 1.6–8.3)
NEUTROPHILS NFR BLD AUTO: 60 %
NRBC # BLD AUTO: 0 10E3/UL
NRBC BLD AUTO-RTO: 0 /100
PLATELET # BLD AUTO: 219 10E3/UL (ref 150–450)
POTASSIUM SERPL-SCNC: 4 MMOL/L (ref 3.4–5.3)
PROT SERPL-MCNC: 7 G/DL (ref 6.4–8.3)
RBC # BLD AUTO: 3.57 10E6/UL (ref 3.8–5.2)
SODIUM SERPL-SCNC: 139 MMOL/L (ref 135–145)
WBC # BLD AUTO: 3.7 10E3/UL (ref 4–11)

## 2025-06-04 PROCEDURE — 82374 ASSAY BLOOD CARBON DIOXIDE: CPT

## 2025-06-04 PROCEDURE — 99214 OFFICE O/P EST MOD 30 MIN: CPT | Performed by: INTERNAL MEDICINE

## 2025-06-04 PROCEDURE — 85004 AUTOMATED DIFF WBC COUNT: CPT

## 2025-06-04 PROCEDURE — G2211 COMPLEX E/M VISIT ADD ON: HCPCS | Performed by: INTERNAL MEDICINE

## 2025-06-04 PROCEDURE — 36591 DRAW BLOOD OFF VENOUS DEVICE: CPT

## 2025-06-04 ASSESSMENT — PAIN SCALES - GENERAL: PAINLEVEL_OUTOF10: NO PAIN (0)

## 2025-06-04 NOTE — PROGRESS NOTES
Situation: Patient chart reviewed by care coordinator.    Background: 3 month RTC for locally Advanced Breast cancer, right side, stage Iia.     Assessment: Patient is on Verzenio and letrozole as part of ongoing treatment. No new lumps or bumps reported. Slightly decreased white blood cell count and mildly low hemoglobin, likely due to recent infection.     Plan/Recommendations: Continue Verzenio until November 2025. Continue letrozole for 10 years. Maintain three-monthly labs and visits until November 2025, then extend to every six months. Continue calcium and vitamin D supplementation. Monitor for any new nodules or lumps, especially in the armpits or scar area. Echocardiogram to be done at 10 years post-chemotherapy.    Planning reconstruction surgery - sees surgeon next week. She will let us know when surgery is scheduled - and hold meds for 2 weeks x2 wks prior. Sent pt MyC message.    Gisella Mauricio RN

## 2025-06-04 NOTE — PROGRESS NOTES
[facility] Hematology and Oncology Progress Note    Patient: Alicai Iniguez  MRN: [id number]  Date of Service: Jun 4, 2025          Reason for Visit    Chief Complaint   Patient presents with    Oncology Clinic Visit     Malignant neoplasm of overlapping sites of right breast in female, estrogen receptor positive (H)  Malignant neoplasm involving both nipple and areola of right breast in female, estrogen receptor positive (H)         Assessment and Plan     Cancer Staging   Malignant neoplasm of overlapping sites of right breast in female, estrogen receptor positive (H)  Staging form: Breast, AJCC 8th Edition  - Clinical: Stage IIA (cT3, cN2, cM0, G2, ER+, KS+, HER2-) - Signed by Shanelle Weaver APRN CNP on 3/1/2023  - Pathologic stage from 8/21/2023: No Stage Recommended (ypT3, pN1a(sn), cM0, G2, ER+, KS+, HER2-) - Signed by Ruth Orellana MD on 8/21/2023    1.  Locally Advanced Breast cancer, right side, stage IIa, T3 N2 M0, grade 2, ER/KS positive, HER2/janine negative: Status post neoadjuvant chemotherapy with dose dense Adriamycin and Cytoxan for 4 cycles, followed by weekly Taxol. Underwent bilateral mastectomy on 8/9/2023.  Surgical path showed significant residual disease as expected with minimum treatment effect.  Residual disease measured 5.6 x 3.2 x 2.7 cm in greatest dimensions.  Grade 2.  There was evidence of dermal infiltration with focal invasion of epidermis.  There was focal lymphovascular invasion within the dermal connective tissue.  No evidence of DCIS.  Margins were all negative.  Out of 20 lymph nodes examined 2 were positive for macrometastasis (sentinel lymph nodes).  No evidence of extranodal extension.  ypT3, ypN1a.     After she finished adjuvant radiation, she was started on Verzenio and letrozole in November 2023.    Held for a couple of months during her reconstruction surgery.  Restarted in May 2024.  Current dose is 150 mg twice daily.      Plan:  - Patient  is on Verzenio and letrozole as part of ongoing treatment. No new lumps or bumps reported. Slightly decreased white blood cell count and mildly low hemoglobin, likely due to recent infection. No significant changes in liver or kidney function. Bone density scan normal as of September last year. No need for mammograms post-bilateral mastectomy.  - Continue Verzenio until November 2025. Continue letrozole for 10 years. Maintain three-monthly labs and visits until November 2025, then extend to every six months. Continue calcium and vitamin D supplementation. Monitor for any new nodules or lumps, especially in the armpits or scar area. Echocardiogram to be done at 10 years post-chemotherapy.  - Risks and side effects: Monitor bone health due to potential side effects of letrozole. Be mindful of potential cardiovascular risks associated with past chemotherapy and radiation.         ECOG Performance    0 - Independent    Distress Screening (within last 30 days)    1. How concerned are you about your ability to eat? : 0  2. How concerned are you about unintended weight loss or your current weight? : 0  3. How concerned are you about feeling depressed or very sad? : 0  4. How concerned are you about feeling anxious or very scared? : 5  5. Do you struggle with the loss of meaning and tiago in your life? : Not at all  6. How concerned are you about work and home life issues that may be affected by your cancer? : 0  7. How concerned are you about knowing what resources are available to help you? : 0  8. Do you currently have what you would describe as Scientology or spiritual struggles?            : Not at all       Pain  Pain Score: No Pain (0)    Problem List    Patient Active Problem List   Diagnosis    Malignant neoplasm of overlapping sites of right breast in female, estrogen receptor positive (H)    Malignant neoplasm involving both nipple and areola of right breast in female, estrogen receptor positive (H)    Adverse effect  of antineoplastic and immunosuppressive drugs, sequela    Chest pain, unspecified    Hyperlipidemia    Personal history of breast cancer        ______________________________________________________________________________    History of Present Illness    Oncology history:  DIAGNOSIS: Breast cancer, right side, patient had a palpable lump with some nipple and skin changes.  She was also found to have lymphadenopathy in the axilla.  Patient had an ultrasound and mammogram that showed a 4.1 cm tumor with axillary lymphadenopathy  Biopsy was done January 31 and it showed invasive ductal carcinoma.  Grade 2.  ER [place] positive and HER2/janine negative.  Biopsy was done of the lymph node as well and it was positive.    TREATMENT: Patient has started neoadjuvant chemotherapy with dose dense Adriamycin and Cytoxan for 4 cycles and then 12 weekly Taxol.     Bilateral mastectomy on 8/9/2023    A) RIGHT BREAST, MASTECTOMY:  -INVASIVE DUCTAL CARCINOMA (5.6 x 3.2 x 2.7 IN GREATEST DIMENSIONS)     -BREAST CANCER PROGNOSTIC MARKER ANALYSIS     ESTROGEN RECEPTOR: POSITIVE (95% OF CELLS, STRONG STAINING     PROGESTERONE RECEPTOR: POSITIVE (95% OF CELLS, STRONG STAINING     HER2: NEGATIVE (1+ OUT OF 3 MEMBRANE STAINING)     Dr. Paty Berry and Dr. Richard Townsend have also reviewed this case and concur with the breast cancer prognostic marker analysis.     The following data are used for Residual Cancer [place] calculation:      Primary tumor bed area: 56 mm x 32 mm.  Overall cancer cellularity (as percentage of area): 95%.  Percentage of cancer that is in situ disease: 2%.  Number of positive lymph nodes: 2.  Diameter of largest metastasis: 3 mm.    Residual cancer burden score of 3    Adjuvant endocrine therapy with Zoladex and letrozole initially.    Zoladex stopped in November and continued on letrozole only.  Verzenio started on 11/9/2023.    INTERIM HISTORY:   48-year-old female with right-sided locally advanced breast cancer status  "post neoadjuvant chemotherapy with AC followed by T who recently underwent bilateral mastectomy she had significant residual disease.  ypT3, ypN1a.     Currently on Verzenio and letrozole.      She reported experiencing a week of illness last week, which included symptoms such as headache, body aches, and diarrhea. She noted that she felt fine by Thursday and returned to work, but experienced a recurrence of symptoms on Friday. She did not have a fever during this period. Her youngest child also contracted the illness. She continued taking her medication, Verzenio, throughout the illness.     Alicia has been on Verzenio since November 9, 2023, and has been taking letrozole, which has caused manageable hot flashes. She has intentionally lost approximately 20 pounds through dietary changes and regular treadmill exercise, although she took a break from exercise while she was unwell.     Alicia underwent surgery in August 2023, which included lymph node removal on the right side, resulting in some post-surgical tightness and swelling that has since softened. She attends physical therapy once a week to address this tightness. Alicia had a bilateral mastectomy and does not require mammograms. She has been taking calcium and vitamin D supplements. She reported no new lumps or bumps and no swelling. Alicia is planning a trip to [place] and has made arrangements for her medication refills.       Review of Systems    Pertinent items are noted in HPI.    Past History    Past Medical History:   Diagnosis Date    Breast cancer (H)     right       PHYSICAL EXAM  /77   Pulse 78   Temp 98.5  F (36.9  C)   Resp 16   Ht 1.67 m (5' 5.75\")   Wt 78.3 kg (172 lb 9.6 oz)   SpO2 100%   BMI 28.07 kg/m      GENERAL: no acute distress. Cooperative in conversation.  - LUNGS: No wheezing, rales, or crackles noted.  - SKIN: Induration noted on the right side, attributed to scarring, no palpable nodules.     Lab Results    Recent " Results (from the past week)   CBC with platelets and differential   Result Value Ref Range    WBC Count 3.7 (L) 4.0 - 11.0 10e3/uL    RBC Count 3.57 (L) 3.80 - 5.20 10e6/uL    Hemoglobin 12.0 11.7 - 15.7 g/dL    Hematocrit 34.8 (L) 35.0 - 47.0 %    MCV 98 78 - 100 fL    MCH 33.6 (H) 26.5 - 33.0 pg    MCHC 34.5 31.5 - 36.5 g/dL    RDW 12.5 10.0 - 15.0 %    Platelet Count 219 150 - 450 10e3/uL    % Neutrophils 60 %    % Lymphocytes 29 %    % Monocytes 6 %    % Eosinophils 3 %    % Basophils 2 %    % Immature Granulocytes 1 %    NRBCs per 100 WBC 0 <1 /100    Absolute Neutrophils 2.2 1.6 - 8.3 10e3/uL    Absolute Lymphocytes 1.1 0.8 - 5.3 10e3/uL    Absolute Monocytes 0.2 0.0 - 1.3 10e3/uL    Absolute Eosinophils 0.1 0.0 - 0.7 10e3/uL    Absolute Basophils 0.1 0.0 - 0.2 10e3/uL    Absolute Immature Granulocytes 0.0 <=0.4 10e3/uL    Absolute NRBCs 0.0 10e3/uL               Imaging    No results found.    The longitudinal plan of care for the diagnosis(es)/condition(s) as documented were addressed during this visit. Due to the added complexity in care, I will continue to support Alicia in the subsequent management and with ongoing continuity of care.      High risk breast cancer status post surgery chemotherapy, surgery and radiation.  Currently on adjuvant Verzenio and letrozole requiring intensive monitoring and follow-up.        Signed by: Paola Saleem MD

## 2025-06-04 NOTE — PROGRESS NOTES
"Oncology Rooming Note    June 4, 2025 8:18 AM   Alicia Iniguez is a 50 year old female who presents for:    Chief Complaint   Patient presents with    Oncology Clinic Visit     Malignant neoplasm of overlapping sites of right breast in female, estrogen receptor positive (H)  Malignant neoplasm involving both nipple and areola of right breast in female, estrogen receptor positive (H)       Initial Vitals: /77   Pulse 78   Temp 98.5  F (36.9  C)   Resp 16   Ht 1.67 m (5' 5.75\")   Wt 78.3 kg (172 lb 9.6 oz)   SpO2 100%   BMI 28.07 kg/m   Estimated body mass index is 28.07 kg/m  as calculated from the following:    Height as of this encounter: 1.67 m (5' 5.75\").    Weight as of this encounter: 78.3 kg (172 lb 9.6 oz). Body surface area is 1.91 meters squared.  No Pain (0) Comment: Data Unavailable   No LMP recorded. (Menstrual status: Chemotherapy).  Allergies reviewed: Yes  Medications reviewed: Yes    Medications: Medication refills not needed today.  Pharmacy name entered into [employer]:    [facility] - [place], [place] - [address]  [id number] [place], [place] - [address] [facility] AT [facility]    Frailty Screening:   Is the patient here for a new oncology consult visit in cancer care? 2. No    PHQ9:  Did this patient require a PHQ9?: No      Clinical concerns: None      Darleen Kerns LPN             "

## 2025-06-04 NOTE — PROGRESS NOTES
[facility] Hematology and Oncology Progress Note    Patient: Alicia Iniguez  MRN: [id number]  Date of Service: Jun 4, 2025          Reason for Visit    Chief Complaint   Patient presents with    Oncology Clinic Visit     Malignant neoplasm of overlapping sites of right breast in female, estrogen receptor positive (H)  Malignant neoplasm involving both nipple and areola of right breast in female, estrogen receptor positive (H)         Assessment and Plan     Cancer Staging   Malignant neoplasm of overlapping sites of right breast in female, estrogen receptor positive (H)  Staging form: Breast, AJCC 8th Edition  - Clinical: Stage IIA (cT3, cN2, cM0, G2, ER+, RI+, HER2-) - Signed by Shanelle Weaver APRN CNP on 3/1/2023  - Pathologic stage from 8/21/2023: No Stage Recommended (ypT3, pN1a(sn), cM0, G2, ER+, RI+, HER2-) - Signed by Ruth Orellana MD on 8/21/2023    1.  Locally Advanced Breast cancer, right side, stage IIa, T3 N2 M0, grade 2, ER/RI positive, HER2/janine negative: Status post neoadjuvant chemotherapy with dose dense Adriamycin and Cytoxan for 4 cycles, followed by weekly Taxol. Underwent bilateral mastectomy on 8/9/2023.  Surgical path showed significant residual disease as expected with minimum treatment effect.  Residual disease measured 5.6 x 3.2 x 2.7 cm in greatest dimensions.  Grade 2.  There was evidence of dermal infiltration with focal invasion of epidermis.  There was focal lymphovascular invasion within the dermal connective tissue.  No evidence of DCIS.  Margins were all negative.  Out of 20 lymph nodes examined 2 were positive for macrometastasis (sentinel lymph nodes).  No evidence of extranodal extension.  ypT3, ypN1a.     After she finished adjuvant radiation, she was started on Verzenio and letrozole in November 2023.    Held for a couple of months during her reconstruction surgery.  Restarted in May 2024.  Current dose is 150 mg twice daily.      Overall doing  well.  No significant side effects reported.    Results  - White blood cell count: 3.7 (low)  - Neutrophils: 2.2 (normal)  - Hemoglobin: 12 (stable)  - Platelet count: normal  - Chemistry results: pending  - Kidneys, liver function tests (LFTs), and alkaline phosphatase: normal  - I reviewed lab results personally and independently interpreted the results.    Plan  Malignant neoplasm of overlapping sites of right breast in female, estrogen receptor positive (H)  Currently on adjuvant letrozole, and Verzenio. The white blood cell count is slightly low at 3.7, but neutrophils are normal at 2.2, and hemoglobin is stable at 12. Platelet count is normal. The patient reports no major side effects from Verzenio. The armpit tightness varies, but physical therapy is helping. No lumps or swelling detected. The patient is aware of the need to stop letrozole and Verzenio before any surgical procedures to avoid complications.  We discussed about surveillance in nonmetastatic breast cancer.  She is worried about cancer recurrence and was inquiring about potential role of serial imaging and cancer markers.  I explained to her that per NCCN guidelines currently there is no strong evidence to suggest routine imaging or lab studies including cancer markers are beneficial and these have not shown to impact survival outcomes.  In fact routine imaging and cancer markers can lead to unnecessary biopsies and further aggressive investigations leading to more harm than good.  Onus will be on clinical symptoms and some periodic lab studies.  Willing to do CMP a couple times a year going forward even after she finishes Verzenio.    Continue current treatment regimen with  letrozole, and Verzenio. Schedule follow-up in three months. The patient will notify the clinic if any surgical procedures are scheduled to receive instructions on medication management. Extend the interval between visits to every six months after completing Verzenio in  November. Continue letrozole for a total of 10 years. Monitor for any clinical symptoms that may indicate recurrence.         ECOG Performance    0 - Independent    Distress Screening (within last 30 days)    1. How concerned are you about your ability to eat? : 0  2. How concerned are you about unintended weight loss or your current weight? : 0  3. How concerned are you about feeling depressed or very sad? : 0  4. How concerned are you about feeling anxious or very scared? : 5  5. Do you struggle with the loss of meaning and tiago in your life? : Not at all  6. How concerned are you about work and home life issues that may be affected by your cancer? : 0  7. How concerned are you about knowing what resources are available to help you? : 0  8. Do you currently have what you would describe as Mandaen or spiritual struggles?            : Not at all       Pain  Pain Score: No Pain (0)    Problem List    Patient Active Problem List   Diagnosis    Malignant neoplasm of overlapping sites of right breast in female, estrogen receptor positive (H)    Malignant neoplasm involving both nipple and areola of right breast in female, estrogen receptor positive (H)    Adverse effect of antineoplastic and immunosuppressive drugs, sequela    Chest pain, unspecified    Hyperlipidemia    Personal history of breast cancer        ______________________________________________________________________________    History of Present Illness    Oncology history:  DIAGNOSIS: Breast cancer, right side, patient had a palpable lump with some nipple and skin changes.  She was also found to have lymphadenopathy in the axilla.  Patient had an ultrasound and mammogram that showed a 4.1 cm tumor with axillary lymphadenopathy  Biopsy was done January 31 and it showed invasive ductal carcinoma.  Grade 2.  ER [place] positive and HER2/janine negative.  Biopsy was done of the lymph node as well and it was positive.    TREATMENT: Patient has started neoadjuvant  chemotherapy with dose dense Adriamycin and Cytoxan for 4 cycles and then 12 weekly Taxol.     Bilateral mastectomy on 8/9/2023    A) RIGHT BREAST, MASTECTOMY:  -INVASIVE DUCTAL CARCINOMA (5.6 x 3.2 x 2.7 IN GREATEST DIMENSIONS)     -BREAST CANCER PROGNOSTIC MARKER ANALYSIS     ESTROGEN RECEPTOR: POSITIVE (95% OF CELLS, STRONG STAINING     PROGESTERONE RECEPTOR: POSITIVE (95% OF CELLS, STRONG STAINING     HER2: NEGATIVE (1+ OUT OF 3 MEMBRANE STAINING)     Dr. Paty Berry and Dr. Richard Townsend have also reviewed this case and concur with the breast cancer prognostic marker analysis.     The following data are used for Residual Cancer [place] calculation:      Primary tumor bed area: 56 mm x 32 mm.  Overall cancer cellularity (as percentage of area): 95%.  Percentage of cancer that is in situ disease: 2%.  Number of positive lymph nodes: 2.  Diameter of largest metastasis: 3 mm.    Residual cancer burden score of 3    Adjuvant endocrine therapy with Zoladex and letrozole initially.    Zoladex stopped in November and continued on letrozole only.  Verzenio started on 11/9/2023.    INTERIM HISTORY:   48-year-old female with right-sided locally advanced breast cancer status post neoadjuvant chemotherapy with AC followed by T who recently underwent bilateral mastectomy she had significant residual disease.  ypT3, ypN1a.     Currently on Verzenio and letrozole.      No major side effects. She mentioned experiencing some tightness in her armpit, which varies in severity, but noted that her range of motion is good. She attends physical therapy once a week with a breast specialist, who monitors the condition. Alicia has not noticed any lumps during self-exams. She reported having diarrhea recently, with episodes occurring once a day over the past couple of days. She also experiences hot flashes and night sweats periodically, but nothing significant. Her white blood cell count has been slightly low, but her hemoglobin and  "platelet counts are stable. Alicia is currently on Verzenio and letrozole and will be completing Verzenio in November. She had a reconstruction surgery previously and is considering further touch-up surgery, but has postponed it due to her busy schedule with family commitments, including her child's graduation.  Alicia discussed a friend who had a recurrence of breast cancer, which has made her more vigilant about monitoring her own health. She is aware of the potential for recurrence and is attentive to any symptoms that might indicate a problem.       Review of Systems    Pertinent items are noted in HPI.    Past History    Past Medical History:   Diagnosis Date    Breast cancer (H)     right       PHYSICAL EXAM  /77   Pulse 78   Temp 98.5  F (36.9  C)   Resp 16   Ht 1.67 m (5' 5.75\")   Wt 78.3 kg (172 lb 9.6 oz)   SpO2 100%   BMI 28.07 kg/m      GENERAL: no acute distress. Cooperative in conversation.  Lymph nodes: No evidence of any peripheral lymphadenopathy      Lab Results    Recent Results (from the past week)   CBC with platelets and differential   Result Value Ref Range    WBC Count 3.7 (L) 4.0 - 11.0 10e3/uL    RBC Count 3.57 (L) 3.80 - 5.20 10e6/uL    Hemoglobin 12.0 11.7 - 15.7 g/dL    Hematocrit 34.8 (L) 35.0 - 47.0 %    MCV 98 78 - 100 fL    MCH 33.6 (H) 26.5 - 33.0 pg    MCHC 34.5 31.5 - 36.5 g/dL    RDW 12.5 10.0 - 15.0 %    Platelet Count 219 150 - 450 10e3/uL    % Neutrophils 60 %    % Lymphocytes 29 %    % Monocytes 6 %    % Eosinophils 3 %    % Basophils 2 %    % Immature Granulocytes 1 %    NRBCs per 100 WBC 0 <1 /100    Absolute Neutrophils 2.2 1.6 - 8.3 10e3/uL    Absolute Lymphocytes 1.1 0.8 - 5.3 10e3/uL    Absolute Monocytes 0.2 0.0 - 1.3 10e3/uL    Absolute Eosinophils 0.1 0.0 - 0.7 10e3/uL    Absolute Basophils 0.1 0.0 - 0.2 10e3/uL    Absolute Immature Granulocytes 0.0 <=0.4 10e3/uL    Absolute NRBCs 0.0 10e3/uL               Imaging    No results found.    The " longitudinal plan of care for the diagnosis(es)/condition(s) as documented were addressed during this visit. Due to the added complexity in care, I will continue to support Alicia in the subsequent management and with ongoing continuity of care.        Signed by: Paola Saleem MD

## 2025-06-04 NOTE — LETTER
"6/4/2025      Alicia Iniguez  [address] [facility] [address]      Dear Colleague,    Thank you for referring your patient, Alicia Iniguez, to the [facility] CANCER [facility]. Please see a copy of my visit note below.    Oncology Rooming Note    June 4, 2025 8:18 AM   Alicia Iniguez is a 50 year old female who presents for:    Chief Complaint   Patient presents with     Oncology Clinic Visit     Malignant neoplasm of overlapping sites of right breast in female, estrogen receptor positive (H)  Malignant neoplasm involving both nipple and areola of right breast in female, estrogen receptor positive (H)       Initial Vitals: /77   Pulse 78   Temp 98.5  F (36.9  C)   Resp 16   Ht 1.67 m (5' 5.75\")   Wt 78.3 kg (172 lb 9.6 oz)   SpO2 100%   BMI 28.07 kg/m   Estimated body mass index is 28.07 kg/m  as calculated from the following:    Height as of this encounter: 1.67 m (5' 5.75\").    Weight as of this encounter: 78.3 kg (172 lb 9.6 oz). Body surface area is 1.91 meters squared.  No Pain (0) Comment: Data Unavailable   No LMP recorded. (Menstrual status: Chemotherapy).  Allergies reviewed: Yes  Medications reviewed: Yes    Medications: Medication refills not needed today.  Pharmacy name entered into [facility]:    [facility] - [place], [place] - [address]  [id number] [address] [facility] AT [facility]    Frailty Screening:   Is the patient here for a new oncology consult visit in cancer care? 2. No    PHQ9:  Did this patient require a PHQ9?: No      Clinical concerns: None      Darleen Kerns LPN                   [facility] Hematology and Oncology Progress Note    Patient: Alicia Iniguez  MRN: [id number]  Date of Service: Jun 4, 2025          Reason for Visit    Chief Complaint   Patient presents with     Oncology Clinic Visit     Malignant neoplasm of overlapping sites of right breast in " female, estrogen receptor positive (H)  Malignant neoplasm involving both nipple and areola of right breast in female, estrogen receptor positive (H)         Assessment and Plan     Cancer Staging   Malignant neoplasm of overlapping sites of right breast in female, estrogen receptor positive (H)  Staging form: Breast, AJCC 8th Edition  - Clinical: Stage IIA (cT3, cN2, cM0, G2, ER+, AR+, HER2-) - Signed by Shanelle Weaver APRN CNP on 3/1/2023  - Pathologic stage from 8/21/2023: No Stage Recommended (ypT3, pN1a(sn), cM0, G2, ER+, AR+, HER2-) - Signed by Ruth Orellana MD on 8/21/2023    1.  Locally Advanced Breast cancer, right side, stage IIa, T3 N2 M0, grade 2, ER/AR positive, HER2/janine negative: Status post neoadjuvant chemotherapy with dose dense Adriamycin and Cytoxan for 4 cycles, followed by weekly Taxol. Underwent bilateral mastectomy on 8/9/2023.  Surgical path showed significant residual disease as expected with minimum treatment effect.  Residual disease measured 5.6 x 3.2 x 2.7 cm in greatest dimensions.  Grade 2.  There was evidence of dermal infiltration with focal invasion of epidermis.  There was focal lymphovascular invasion within the dermal connective tissue.  No evidence of DCIS.  Margins were all negative.  Out of 20 lymph nodes examined 2 were positive for macrometastasis (sentinel lymph nodes).  No evidence of extranodal extension.  ypT3, ypN1a.     After she finished adjuvant radiation, she was started on Verzenio and letrozole in November 2023.    Held for a couple of months during her reconstruction surgery.  Restarted in May 2024.  Current dose is 150 mg twice daily.      Overall doing well.  No significant side effects reported.    Results  - White blood cell count: 3.7 (low)  - Neutrophils: 2.2 (normal)  - Hemoglobin: 12 (stable)  - Platelet count: normal  - Chemistry results: pending  - Kidneys, liver function tests (LFTs), and alkaline phosphatase: normal  - I reviewed lab  results personally and independently interpreted the results.    Plan  Malignant neoplasm of overlapping sites of right breast in female, estrogen receptor positive (H)  Currently on adjuvant letrozole, and Verzenio. The white blood cell count is slightly low at 3.7, but neutrophils are normal at 2.2, and hemoglobin is stable at 12. Platelet count is normal. The patient reports no major side effects from Verzenio. The armpit tightness varies, but physical therapy is helping. No lumps or swelling detected. The patient is aware of the need to stop letrozole and Verzenio before any surgical procedures to avoid complications.  We discussed about surveillance in nonmetastatic breast cancer.  She is worried about cancer recurrence and was inquiring about potential role of serial imaging and cancer markers.  I explained to her that per NCCN guidelines currently there is no strong evidence to suggest routine imaging or lab studies including cancer markers are beneficial and these have not shown to impact survival outcomes.  In fact routine imaging and cancer markers can lead to unnecessary biopsies and further aggressive investigations leading to more harm than good.  Onus will be on clinical symptoms and some periodic lab studies.  Willing to do CMP a couple times a year going forward even after she finishes Verzenio.    Continue current treatment regimen with  letrozole, and Verzenio. Schedule follow-up in three months. The patient will notify the clinic if any surgical procedures are scheduled to receive instructions on medication management. Extend the interval between visits to every six months after completing Verzenio in November. Continue letrozole for a total of 10 years. Monitor for any clinical symptoms that may indicate recurrence.         ECOG Performance    0 - Independent    Distress Screening (within last 30 days)    1. How concerned are you about your ability to eat? : 0  2. How concerned are you about  unintended weight loss or your current weight? : 0  3. How concerned are you about feeling depressed or very sad? : 0  4. How concerned are you about feeling anxious or very scared? : 5  5. Do you struggle with the loss of meaning and tiago in your life? : Not at all  6. How concerned are you about work and home life issues that may be affected by your cancer? : 0  7. How concerned are you about knowing what resources are available to help you? : 0  8. Do you currently have what you would describe as Episcopal or spiritual struggles?            : Not at all       Pain  Pain Score: No Pain (0)    Problem List    Patient Active Problem List   Diagnosis     Malignant neoplasm of overlapping sites of right breast in female, estrogen receptor positive (H)     Malignant neoplasm involving both nipple and areola of right breast in female, estrogen receptor positive (H)     Adverse effect of antineoplastic and immunosuppressive drugs, sequela     Chest pain, unspecified     Hyperlipidemia     Personal history of breast cancer        ______________________________________________________________________________    History of Present Illness    Oncology history:  DIAGNOSIS: Breast cancer, right side, patient had a palpable lump with some nipple and skin changes.  She was also found to have lymphadenopathy in the axilla.  Patient had an ultrasound and mammogram that showed a 4.1 cm tumor with axillary lymphadenopathy  Biopsy was done January 31 and it showed invasive ductal carcinoma.  Grade 2.  ER NM positive and HER2/janine negative.  Biopsy was done of the lymph node as well and it was positive.    TREATMENT: Patient has started neoadjuvant chemotherapy with dose dense Adriamycin and Cytoxan for 4 cycles and then 12 weekly Taxol.     Bilateral mastectomy on 8/9/2023    A) RIGHT BREAST, MASTECTOMY:  -INVASIVE DUCTAL CARCINOMA (5.6 x 3.2 x 2.7 IN GREATEST DIMENSIONS)     -BREAST CANCER PROGNOSTIC MARKER ANALYSIS     ESTROGEN  RECEPTOR: POSITIVE (95% OF CELLS, STRONG STAINING     PROGESTERONE RECEPTOR: POSITIVE (95% OF CELLS, STRONG STAINING     HER2: NEGATIVE (1+ OUT OF 3 MEMBRANE STAINING)     Dr. Paty Berry and Dr. Richard Townsend have also reviewed this case and concur with the breast cancer prognostic marker analysis.     The following data are used for Residual Cancer [place] calculation:      Primary tumor bed area: 56 mm x 32 mm.  Overall cancer cellularity (as percentage of area): 95%.  Percentage of cancer that is in situ disease: 2%.  Number of positive lymph nodes: 2.  Diameter of largest metastasis: 3 mm.    Residual cancer burden score of 3    Adjuvant endocrine therapy with Zoladex and letrozole initially.    Zoladex stopped in November and continued on letrozole only.  Verzenio started on 11/9/2023.    INTERIM HISTORY:   48-year-old female with right-sided locally advanced breast cancer status post neoadjuvant chemotherapy with AC followed by T who recently underwent bilateral mastectomy she had significant residual disease.  ypT3, ypN1a.     Currently on Verzenio and letrozole.      No major side effects. She mentioned experiencing some tightness in her armpit, which varies in severity, but noted that her range of motion is good. She attends physical therapy once a week with a breast specialist, who monitors the condition. Alicia has not noticed any lumps during self-exams. She reported having diarrhea recently, with episodes occurring once a day over the past couple of days. She also experiences hot flashes and night sweats periodically, but nothing significant. Her white blood cell count has been slightly low, but her hemoglobin and platelet counts are stable. Alicia is currently on Verzenio and letrozole and will be completing Verzenio in November. She had a reconstruction surgery previously and is considering further touch-up surgery, but has postponed it due to her busy schedule with family commitments, including  "her child's graduation.  Alicia discussed a friend who had a recurrence of breast cancer, which has made her more vigilant about monitoring her own health. She is aware of the potential for recurrence and is attentive to any symptoms that might indicate a problem.       Review of Systems    Pertinent items are noted in HPI.    Past History    Past Medical History:   Diagnosis Date     Breast cancer (H)     right       PHYSICAL EXAM  /77   Pulse 78   Temp 98.5  F (36.9  C)   Resp 16   Ht 1.67 m (5' 5.75\")   Wt 78.3 kg (172 lb 9.6 oz)   SpO2 100%   BMI 28.07 kg/m      GENERAL: no acute distress. Cooperative in conversation.  Lymph nodes: No evidence of any peripheral lymphadenopathy      Lab Results    Recent Results (from the past week)   CBC with platelets and differential   Result Value Ref Range    WBC Count 3.7 (L) 4.0 - 11.0 10e3/uL    RBC Count 3.57 (L) 3.80 - 5.20 10e6/uL    Hemoglobin 12.0 11.7 - 15.7 g/dL    Hematocrit 34.8 (L) 35.0 - 47.0 %    MCV 98 78 - 100 fL    MCH 33.6 (H) 26.5 - 33.0 pg    MCHC 34.5 31.5 - 36.5 g/dL    RDW 12.5 10.0 - 15.0 %    Platelet Count 219 150 - 450 10e3/uL    % Neutrophils 60 %    % Lymphocytes 29 %    % Monocytes 6 %    % Eosinophils 3 %    % Basophils 2 %    % Immature Granulocytes 1 %    NRBCs per 100 WBC 0 <1 /100    Absolute Neutrophils 2.2 1.6 - 8.3 10e3/uL    Absolute Lymphocytes 1.1 0.8 - 5.3 10e3/uL    Absolute Monocytes 0.2 0.0 - 1.3 10e3/uL    Absolute Eosinophils 0.1 0.0 - 0.7 10e3/uL    Absolute Basophils 0.1 0.0 - 0.2 10e3/uL    Absolute Immature Granulocytes 0.0 <=0.4 10e3/uL    Absolute NRBCs 0.0 10e3/uL               Imaging    No results found.    The longitudinal plan of care for the diagnosis(es)/condition(s) as documented were addressed during this visit. Due to the added complexity in care, I will continue to support Alicia in the subsequent management and with ongoing continuity of care.        Signed by: Paola Saleem MD        [facility] Hematology and Oncology Progress Note    Patient: Alicia Iniguez  MRN: [id number]  Date of Service: Jun 4, 2025          Reason for Visit    Chief Complaint   Patient presents with     Oncology Clinic Visit     Malignant neoplasm of overlapping sites of right breast in female, estrogen receptor positive (H)  Malignant neoplasm involving both nipple and areola of right breast in female, estrogen receptor positive (H)         Assessment and Plan     Cancer Staging   Malignant neoplasm of overlapping sites of right breast in female, estrogen receptor positive (H)  Staging form: Breast, AJCC 8th Edition  - Clinical: Stage IIA (cT3, cN2, cM0, G2, ER+, KS+, HER2-) - Signed by Shanelle Weaver APRN CNP on 3/1/2023  - Pathologic stage from 8/21/2023: No Stage Recommended (ypT3, pN1a(sn), cM0, G2, ER+, KS+, HER2-) - Signed by Ruth Orellana MD on 8/21/2023    1.  Locally Advanced Breast cancer, right side, stage IIa, T3 N2 M0, grade 2, ER/KS positive, HER2/janine negative: Status post neoadjuvant chemotherapy with dose dense Adriamycin and Cytoxan for 4 cycles, followed by weekly Taxol. Underwent bilateral mastectomy on 8/9/2023.  Surgical path showed significant residual disease as expected with minimum treatment effect.  Residual disease measured 5.6 x 3.2 x 2.7 cm in greatest dimensions.  Grade 2.  There was evidence of dermal infiltration with focal invasion of epidermis.  There was focal lymphovascular invasion within the dermal connective tissue.  No evidence of DCIS.  Margins were all negative.  Out of 20 lymph nodes examined 2 were positive for macrometastasis (sentinel lymph nodes).  No evidence of extranodal extension.  ypT3, ypN1a.     After she finished adjuvant radiation, she was started on Verzenio and letrozole in November 2023.    Held for a couple of months during her reconstruction surgery.  Restarted in May 2024.  Current dose is 150 mg twice daily.      Plan:  - Patient is on  Verzenio and letrozole as part of ongoing treatment. No new lumps or bumps reported. Slightly decreased white blood cell count and mildly low hemoglobin, likely due to recent infection. No significant changes in liver or kidney function. Bone density scan normal as of September last year. No need for mammograms post-bilateral mastectomy.  - Continue Verzenio until November 2025. Continue letrozole for 10 years. Maintain three-monthly labs and visits until November 2025, then extend to every six months. Continue calcium and vitamin D supplementation. Monitor for any new nodules or lumps, especially in the armpits or scar area. Echocardiogram to be done at 10 years post-chemotherapy.  - Risks and side effects: Monitor bone health due to potential side effects of letrozole. Be mindful of potential cardiovascular risks associated with past chemotherapy and radiation.         ECOG Performance    0 - Independent    Distress Screening (within last 30 days)    1. How concerned are you about your ability to eat? : 0  2. How concerned are you about unintended weight loss or your current weight? : 0  3. How concerned are you about feeling depressed or very sad? : 0  4. How concerned are you about feeling anxious or very scared? : 5  5. Do you struggle with the loss of meaning and tiago in your life? : Not at all  6. How concerned are you about work and home life issues that may be affected by your cancer? : 0  7. How concerned are you about knowing what resources are available to help you? : 0  8. Do you currently have what you would describe as Bahai or spiritual struggles?            : Not at all       Pain  Pain Score: No Pain (0)    Problem List    Patient Active Problem List   Diagnosis     Malignant neoplasm of overlapping sites of right breast in female, estrogen receptor positive (H)     Malignant neoplasm involving both nipple and areola of right breast in female, estrogen receptor positive (H)     Adverse effect of  antineoplastic and immunosuppressive drugs, sequela     Chest pain, unspecified     Hyperlipidemia     Personal history of breast cancer        ______________________________________________________________________________    History of Present Illness    Oncology history:  DIAGNOSIS: Breast cancer, right side, patient had a palpable lump with some nipple and skin changes.  She was also found to have lymphadenopathy in the axilla.  Patient had an ultrasound and mammogram that showed a 4.1 cm tumor with axillary lymphadenopathy  Biopsy was done January 31 and it showed invasive ductal carcinoma.  Grade 2.  ER CA positive and HER2/janine negative.  Biopsy was done of the lymph node as well and it was positive.    TREATMENT: Patient has started neoadjuvant chemotherapy with dose dense Adriamycin and Cytoxan for 4 cycles and then 12 weekly Taxol.     Bilateral mastectomy on 8/9/2023    A) RIGHT BREAST, MASTECTOMY:  -INVASIVE DUCTAL CARCINOMA (5.6 x 3.2 x 2.7 IN GREATEST DIMENSIONS)     -BREAST CANCER PROGNOSTIC MARKER ANALYSIS     ESTROGEN RECEPTOR: POSITIVE (95% OF CELLS, STRONG STAINING     PROGESTERONE RECEPTOR: POSITIVE (95% OF CELLS, STRONG STAINING     HER2: NEGATIVE (1+ OUT OF 3 MEMBRANE STAINING)     Dr. Paty Berry and Dr. Richard Townsend have also reviewed this case and concur with the breast cancer prognostic marker analysis.     The following data are used for Residual Cancer [place] calculation:      Primary tumor bed area: 56 mm x 32 mm.  Overall cancer cellularity (as percentage of area): 95%.  Percentage of cancer that is in situ disease: 2%.  Number of positive lymph nodes: 2.  Diameter of largest metastasis: 3 mm.    Residual cancer burden score of 3    Adjuvant endocrine therapy with Zoladex and letrozole initially.    Zoladex stopped in November and continued on letrozole only.  Verzenio started on 11/9/2023.    INTERIM HISTORY:   48-year-old female with right-sided locally advanced breast cancer status  "post neoadjuvant chemotherapy with AC followed by T who recently underwent bilateral mastectomy she had significant residual disease.  ypT3, ypN1a.     Currently on Verzenio and letrozole.      She reported experiencing a week of illness last week, which included symptoms such as headache, body aches, and diarrhea. She noted that she felt fine by Thursday and returned to work, but experienced a recurrence of symptoms on Friday. She did not have a fever during this period. Her youngest child also contracted the illness. She continued taking her medication, Verzenio, throughout the illness.     Alicia has been on Verzenio since November 9, 2023, and has been taking letrozole, which has caused manageable hot flashes. She has intentionally lost approximately 20 pounds through dietary changes and regular treadmill exercise, although she took a break from exercise while she was unwell.     Alicia underwent surgery in August 2023, which included lymph node removal on the right side, resulting in some post-surgical tightness and swelling that has since softened. She attends physical therapy once a week to address this tightness. Alicia had a bilateral mastectomy and does not require mammograms. She has been taking calcium and vitamin D supplements. She reported no new lumps or bumps and no swelling. Alicia is planning a trip to [place] and has made arrangements for her medication refills.       Review of Systems    Pertinent items are noted in HPI.    Past History    Past Medical History:   Diagnosis Date     Breast cancer (H)     right       PHYSICAL EXAM  /77   Pulse 78   Temp 98.5  F (36.9  C)   Resp 16   Ht 1.67 m (5' 5.75\")   Wt 78.3 kg (172 lb 9.6 oz)   SpO2 100%   BMI 28.07 kg/m      GENERAL: no acute distress. Cooperative in conversation.  - LUNGS: No wheezing, rales, or crackles noted.  - SKIN: Induration noted on the right side, attributed to scarring, no palpable nodules.     Lab Results    Recent " Results (from the past week)   CBC with platelets and differential   Result Value Ref Range    WBC Count 3.7 (L) 4.0 - 11.0 10e3/uL    RBC Count 3.57 (L) 3.80 - 5.20 10e6/uL    Hemoglobin 12.0 11.7 - 15.7 g/dL    Hematocrit 34.8 (L) 35.0 - 47.0 %    MCV 98 78 - 100 fL    MCH 33.6 (H) 26.5 - 33.0 pg    MCHC 34.5 31.5 - 36.5 g/dL    RDW 12.5 10.0 - 15.0 %    Platelet Count 219 150 - 450 10e3/uL    % Neutrophils 60 %    % Lymphocytes 29 %    % Monocytes 6 %    % Eosinophils 3 %    % Basophils 2 %    % Immature Granulocytes 1 %    NRBCs per 100 WBC 0 <1 /100    Absolute Neutrophils 2.2 1.6 - 8.3 10e3/uL    Absolute Lymphocytes 1.1 0.8 - 5.3 10e3/uL    Absolute Monocytes 0.2 0.0 - 1.3 10e3/uL    Absolute Eosinophils 0.1 0.0 - 0.7 10e3/uL    Absolute Basophils 0.1 0.0 - 0.2 10e3/uL    Absolute Immature Granulocytes 0.0 <=0.4 10e3/uL    Absolute NRBCs 0.0 10e3/uL               Imaging    No results found.    The longitudinal plan of care for the diagnosis(es)/condition(s) as documented were addressed during this visit. Due to the added complexity in care, I will continue to support Alicia in the subsequent management and with ongoing continuity of care.      High risk breast cancer status post surgery chemotherapy, surgery and radiation.  Currently on adjuvant Verzenio and letrozole requiring intensive monitoring and follow-up.        Signed by: Paola Saleem MD    Again, thank you for allowing me to participate in the care of your patient.        Sincerely,        Paola Saleem MD    Electronically signed

## 2025-06-05 DIAGNOSIS — Z17.0 MALIGNANT NEOPLASM OF OVERLAPPING SITES OF RIGHT BREAST IN FEMALE, ESTROGEN RECEPTOR POSITIVE (H): ICD-10-CM

## 2025-06-05 DIAGNOSIS — C50.811 MALIGNANT NEOPLASM OF OVERLAPPING SITES OF RIGHT BREAST IN FEMALE, ESTROGEN RECEPTOR POSITIVE (H): ICD-10-CM

## 2025-06-05 RX ORDER — LETROZOLE 2.5 MG/1
2.5 TABLET, FILM COATED ORAL DAILY
Qty: 90 TABLET | Refills: 1 | Status: SHIPPED | OUTPATIENT
Start: 2025-06-05

## 2025-06-05 NOTE — TELEPHONE ENCOUNTER
Last Written Prescription Date:  3/6/25  Last Fill Quantity: 90,  # refills: 0   Last office visit provider:  6/4/25 with Dr. Saleem     Requested Prescriptions   Pending Prescriptions Disp Refills    letrozole (FEMARA) 2.5 MG tablet 90 tablet 0     Sig: Take 1 tablet (2.5 mg) by mouth daily.       There is no refill protocol information for this order          Cheryl Stacy RN 06/05/25 11:18 AM

## 2025-08-12 ENCOUNTER — PATIENT OUTREACH (OUTPATIENT)
Dept: ONCOLOGY | Facility: HOSPITAL | Age: 50
End: 2025-08-12
Payer: COMMERCIAL

## 2025-09-03 ENCOUNTER — PATIENT OUTREACH (OUTPATIENT)
Dept: ONCOLOGY | Facility: HOSPITAL | Age: 50
End: 2025-09-03

## 2025-09-03 ENCOUNTER — OFFICE VISIT (OUTPATIENT)
Dept: FAMILY MEDICINE | Facility: CLINIC | Age: 50
End: 2025-09-03
Payer: COMMERCIAL

## 2025-09-03 ENCOUNTER — LAB (OUTPATIENT)
Dept: INFUSION THERAPY | Facility: HOSPITAL | Age: 50
End: 2025-09-03
Attending: INTERNAL MEDICINE
Payer: COMMERCIAL

## 2025-09-03 ENCOUNTER — ONCOLOGY VISIT (OUTPATIENT)
Dept: ONCOLOGY | Facility: HOSPITAL | Age: 50
End: 2025-09-03
Attending: INTERNAL MEDICINE
Payer: COMMERCIAL

## 2025-09-03 VITALS
SYSTOLIC BLOOD PRESSURE: 128 MMHG | HEIGHT: 65 IN | WEIGHT: 188 LBS | HEART RATE: 94 BPM | DIASTOLIC BLOOD PRESSURE: 83 MMHG | RESPIRATION RATE: 14 BRPM | OXYGEN SATURATION: 98 % | BODY MASS INDEX: 31.32 KG/M2 | TEMPERATURE: 97.8 F

## 2025-09-03 VITALS
TEMPERATURE: 98.1 F | OXYGEN SATURATION: 100 % | RESPIRATION RATE: 16 BRPM | DIASTOLIC BLOOD PRESSURE: 72 MMHG | WEIGHT: 186.4 LBS | HEIGHT: 66 IN | SYSTOLIC BLOOD PRESSURE: 137 MMHG | HEART RATE: 84 BPM | BODY MASS INDEX: 29.96 KG/M2

## 2025-09-03 DIAGNOSIS — Z85.3 PERSONAL HISTORY OF BREAST CANCER: ICD-10-CM

## 2025-09-03 DIAGNOSIS — C50.811 MALIGNANT NEOPLASM OF OVERLAPPING SITES OF RIGHT BREAST IN FEMALE, ESTROGEN RECEPTOR POSITIVE (H): ICD-10-CM

## 2025-09-03 DIAGNOSIS — Z01.818 PRE-OP EXAM: Primary | ICD-10-CM

## 2025-09-03 DIAGNOSIS — Z12.11 COLON CANCER SCREENING: ICD-10-CM

## 2025-09-03 DIAGNOSIS — C50.811 MALIGNANT NEOPLASM OF OVERLAPPING SITES OF RIGHT BREAST IN FEMALE, ESTROGEN RECEPTOR POSITIVE (H): Primary | ICD-10-CM

## 2025-09-03 DIAGNOSIS — Z17.0 MALIGNANT NEOPLASM OF OVERLAPPING SITES OF RIGHT BREAST IN FEMALE, ESTROGEN RECEPTOR POSITIVE (H): ICD-10-CM

## 2025-09-03 DIAGNOSIS — Z17.0 MALIGNANT NEOPLASM OF OVERLAPPING SITES OF RIGHT BREAST IN FEMALE, ESTROGEN RECEPTOR POSITIVE (H): Primary | ICD-10-CM

## 2025-09-03 LAB
ALBUMIN SERPL BCG-MCNC: 4.4 G/DL (ref 3.5–5.2)
ALP SERPL-CCNC: 88 U/L (ref 40–150)
ALT SERPL W P-5'-P-CCNC: 28 U/L (ref 0–50)
ANION GAP SERPL CALCULATED.3IONS-SCNC: 9 MMOL/L (ref 7–15)
AST SERPL W P-5'-P-CCNC: 22 U/L (ref 0–45)
BASOPHILS # BLD AUTO: 0.05 10E3/UL (ref 0–0.2)
BASOPHILS NFR BLD AUTO: 1.5 %
BILIRUB SERPL-MCNC: 0.4 MG/DL
BUN SERPL-MCNC: 9.8 MG/DL (ref 6–20)
CALCIUM SERPL-MCNC: 9.8 MG/DL (ref 8.8–10.4)
CHLORIDE SERPL-SCNC: 106 MMOL/L (ref 98–107)
CREAT SERPL-MCNC: 0.8 MG/DL (ref 0.51–0.95)
EGFRCR SERPLBLD CKD-EPI 2021: 89 ML/MIN/1.73M2
EOSINOPHIL # BLD AUTO: 0.07 10E3/UL (ref 0–0.7)
EOSINOPHIL NFR BLD AUTO: 2.1 %
ERYTHROCYTE [DISTWIDTH] IN BLOOD BY AUTOMATED COUNT: 13.3 % (ref 10–15)
GLUCOSE SERPL-MCNC: 85 MG/DL (ref 70–99)
HCO3 SERPL-SCNC: 27 MMOL/L (ref 22–29)
HCT VFR BLD AUTO: 36 % (ref 35–47)
HGB BLD-MCNC: 12.2 G/DL (ref 11.7–15.7)
IMM GRANULOCYTES # BLD: <0.03 10E3/UL
IMM GRANULOCYTES NFR BLD: 0.3 %
LYMPHOCYTES # BLD AUTO: 0.99 10E3/UL (ref 0.8–5.3)
LYMPHOCYTES NFR BLD AUTO: 30.1 %
MCH RBC QN AUTO: 33.2 PG (ref 26.5–33)
MCHC RBC AUTO-ENTMCNC: 33.9 G/DL (ref 31.5–36.5)
MCV RBC AUTO: 97.8 FL (ref 78–100)
MONOCYTES # BLD AUTO: 0.25 10E3/UL (ref 0–1.3)
MONOCYTES NFR BLD AUTO: 7.6 %
NEUTROPHILS # BLD AUTO: 1.92 10E3/UL (ref 1.6–8.3)
NEUTROPHILS NFR BLD AUTO: 58.4 %
NRBC # BLD AUTO: <0.03 10E3/UL
NRBC BLD AUTO-RTO: 0 /100
PLATELET # BLD AUTO: 225 10E3/UL (ref 150–450)
POTASSIUM SERPL-SCNC: 4.4 MMOL/L (ref 3.4–5.3)
PROT SERPL-MCNC: 7.3 G/DL (ref 6.4–8.3)
RBC # BLD AUTO: 3.68 10E6/UL (ref 3.8–5.2)
SODIUM SERPL-SCNC: 142 MMOL/L (ref 135–145)
WBC # BLD AUTO: 3.29 10E3/UL (ref 4–11)

## 2025-09-03 PROCEDURE — 3079F DIAST BP 80-89 MM HG: CPT | Performed by: PHYSICIAN ASSISTANT

## 2025-09-03 PROCEDURE — 82247 BILIRUBIN TOTAL: CPT

## 2025-09-03 PROCEDURE — 85025 COMPLETE CBC W/AUTO DIFF WBC: CPT

## 2025-09-03 PROCEDURE — 99214 OFFICE O/P EST MOD 30 MIN: CPT | Performed by: INTERNAL MEDICINE

## 2025-09-03 PROCEDURE — 36591 DRAW BLOOD OFF VENOUS DEVICE: CPT

## 2025-09-03 PROCEDURE — 99214 OFFICE O/P EST MOD 30 MIN: CPT | Performed by: PHYSICIAN ASSISTANT

## 2025-09-03 PROCEDURE — G2211 COMPLEX E/M VISIT ADD ON: HCPCS | Performed by: INTERNAL MEDICINE

## 2025-09-03 PROCEDURE — 3074F SYST BP LT 130 MM HG: CPT | Performed by: PHYSICIAN ASSISTANT

## 2025-09-03 PROCEDURE — 1126F AMNT PAIN NOTED NONE PRSNT: CPT | Performed by: PHYSICIAN ASSISTANT

## 2025-09-03 PROCEDURE — 93000 ELECTROCARDIOGRAM COMPLETE: CPT | Performed by: PHYSICIAN ASSISTANT

## 2025-09-03 ASSESSMENT — PAIN SCALES - GENERAL
PAINLEVEL_OUTOF10: NO PAIN (0)
PAINLEVEL_OUTOF10: NO PAIN (0)

## (undated) DEVICE — Device

## (undated) DEVICE — SU DERMABOND PRINEO 22CM CLR222US

## (undated) DEVICE — SPONGE LAP 18X18" X8435

## (undated) DEVICE — DRAPE CHEST 100X72X124 89227

## (undated) DEVICE — DECANTER BAG 2002S

## (undated) DEVICE — SYR 10ML LL W/O NDL 302995

## (undated) DEVICE — SU SILK 2-0 SH 30" K833H

## (undated) DEVICE — PROBE ELECTROSURGICAL TRUNODE GAMMA 120-807605

## (undated) DEVICE — GLOVE UNDER INDICATOR PI SZ 6.5 LF 41665

## (undated) DEVICE — SU MONOCRYL+ 4-0 18IN PS2 UND MCP496G

## (undated) DEVICE — DRSG ABD TNDRSRB WET PRUF 8IN X 10IN STRL  9194A

## (undated) DEVICE — COVER ULTRASOUND PROBE FLEXI-FEEL 4X96" 25-FF496

## (undated) DEVICE — SUTURE PDS 3-0 FS-2 Z423H

## (undated) DEVICE — CUSTOM PACK GEN MAJOR SBA5BGMHEA

## (undated) DEVICE — SOL NACL 0.9% IRRIG 1000ML BOTTLE 2F7124

## (undated) DEVICE — SUCTION MANIFOLD NEPTUNE 2 SYS 1 PORT 702-025-000

## (undated) DEVICE — CONNECTOR 5 TO 1 STRL 271502

## (undated) DEVICE — BLADE KNIFE SURG 15 371115

## (undated) DEVICE — SUCTION TIP YANKAUER W/O VENT K86

## (undated) DEVICE — DRAPE SHEET REV FOLD 3/4 9349

## (undated) DEVICE — STPL SKIN 35W 6.9MM  PXW35

## (undated) DEVICE — SOL WATER IRRIG 1000ML BOTTLE 2F7114

## (undated) DEVICE — DRSG TEGADERM 4X4 3/4" 1626W

## (undated) DEVICE — SOL NACL 0.9% INJ 1000ML BAG 2B1324X

## (undated) DEVICE — DRSG BIOPATCH GERMICIDAL SPLIT SPONGE 4MM MED 4150

## (undated) DEVICE — ESU ELEC BLADE 2.75" COATED/INSULATED E1455

## (undated) DEVICE — NEEDLE HYPO 25X1 SAFETY 305916

## (undated) DEVICE — GLOVE SURG PI ULTRA TOUCH M SZ 6-1/2 LF

## (undated) DEVICE — BLADE KNIFE SURG 10 371110

## (undated) DEVICE — SUTURE VICRYL+ 2-0 27IN SH UND VCP417H

## (undated) DEVICE — GLOVE BIOGEL PI SZ 6.5 40865

## (undated) DEVICE — PLATE GROUNDING ADULT W/CORD 9165L

## (undated) DEVICE — DRSG KERLIX FLUFFS X5

## (undated) DEVICE — GLOVE BIOGEL PI ULTRATOUCH G SZ 6.0 42160

## (undated) DEVICE — SU MONOCRYL 3-0 PS-2 18" UND MCP497G

## (undated) DEVICE — PREP CHLORAPREP 26ML TINTED HI-LITE ORANGE 930815

## (undated) DEVICE — ESU PENCIL SMOKE EVAC W/ROCKER SWITCH 0703-047-000

## (undated) DEVICE — GOWN LG DISP 9515

## (undated) RX ORDER — FENTANYL CITRATE-0.9 % NACL/PF 10 MCG/ML
PLASTIC BAG, INJECTION (ML) INTRAVENOUS
Status: DISPENSED
Start: 2023-08-09

## (undated) RX ORDER — PROPOFOL 10 MG/ML
INJECTION, EMULSION INTRAVENOUS
Status: DISPENSED
Start: 2023-08-09

## (undated) RX ORDER — ONDANSETRON 2 MG/ML
INJECTION INTRAMUSCULAR; INTRAVENOUS
Status: DISPENSED
Start: 2023-08-09

## (undated) RX ORDER — GLYCOPYRROLATE 0.2 MG/ML
INJECTION, SOLUTION INTRAMUSCULAR; INTRAVENOUS
Status: DISPENSED
Start: 2023-08-09

## (undated) RX ORDER — DEXAMETHASONE SODIUM PHOSPHATE 10 MG/ML
INJECTION, SOLUTION INTRAMUSCULAR; INTRAVENOUS
Status: DISPENSED
Start: 2023-08-09

## (undated) RX ORDER — FENTANYL CITRATE 50 UG/ML
INJECTION, SOLUTION INTRAMUSCULAR; INTRAVENOUS
Status: DISPENSED
Start: 2023-08-09